# Patient Record
Sex: FEMALE | Race: BLACK OR AFRICAN AMERICAN | Employment: OTHER | URBAN - METROPOLITAN AREA
[De-identification: names, ages, dates, MRNs, and addresses within clinical notes are randomized per-mention and may not be internally consistent; named-entity substitution may affect disease eponyms.]

---

## 2017-05-07 ENCOUNTER — ED HISTORICAL/CONVERTED ENCOUNTER (OUTPATIENT)
Dept: OTHER | Age: 57
End: 2017-05-07

## 2017-05-08 ENCOUNTER — IP HISTORICAL/CONVERTED ENCOUNTER (OUTPATIENT)
Dept: OTHER | Age: 57
End: 2017-05-08

## 2018-04-15 ENCOUNTER — IP HISTORICAL/CONVERTED ENCOUNTER (OUTPATIENT)
Dept: OTHER | Age: 58
End: 2018-04-15

## 2018-05-21 ENCOUNTER — OP HISTORICAL/CONVERTED ENCOUNTER (OUTPATIENT)
Dept: OTHER | Age: 58
End: 2018-05-21

## 2018-05-22 ENCOUNTER — OP HISTORICAL/CONVERTED ENCOUNTER (OUTPATIENT)
Dept: OTHER | Age: 58
End: 2018-05-22

## 2018-06-07 ENCOUNTER — OP HISTORICAL/CONVERTED ENCOUNTER (OUTPATIENT)
Dept: OTHER | Age: 58
End: 2018-06-07

## 2018-06-29 ENCOUNTER — OP HISTORICAL/CONVERTED ENCOUNTER (OUTPATIENT)
Dept: OTHER | Age: 58
End: 2018-06-29

## 2018-08-06 ENCOUNTER — OP HISTORICAL/CONVERTED ENCOUNTER (OUTPATIENT)
Dept: OTHER | Age: 58
End: 2018-08-06

## 2018-08-13 ENCOUNTER — OP HISTORICAL/CONVERTED ENCOUNTER (OUTPATIENT)
Dept: OTHER | Age: 58
End: 2018-08-13

## 2018-08-14 ENCOUNTER — OP HISTORICAL/CONVERTED ENCOUNTER (OUTPATIENT)
Dept: OTHER | Age: 58
End: 2018-08-14

## 2018-08-20 ENCOUNTER — OP HISTORICAL/CONVERTED ENCOUNTER (OUTPATIENT)
Dept: OTHER | Age: 58
End: 2018-08-20

## 2018-08-23 ENCOUNTER — OP HISTORICAL/CONVERTED ENCOUNTER (OUTPATIENT)
Dept: OTHER | Age: 58
End: 2018-08-23

## 2018-08-27 ENCOUNTER — OP HISTORICAL/CONVERTED ENCOUNTER (OUTPATIENT)
Dept: OTHER | Age: 58
End: 2018-08-27

## 2018-09-10 ENCOUNTER — OP HISTORICAL/CONVERTED ENCOUNTER (OUTPATIENT)
Dept: OTHER | Age: 58
End: 2018-09-10

## 2018-09-17 ENCOUNTER — OP HISTORICAL/CONVERTED ENCOUNTER (OUTPATIENT)
Dept: OTHER | Age: 58
End: 2018-09-17

## 2018-09-24 ENCOUNTER — OP HISTORICAL/CONVERTED ENCOUNTER (OUTPATIENT)
Dept: OTHER | Age: 58
End: 2018-09-24

## 2018-10-01 ENCOUNTER — OP HISTORICAL/CONVERTED ENCOUNTER (OUTPATIENT)
Dept: OTHER | Age: 58
End: 2018-10-01

## 2018-10-08 ENCOUNTER — OP HISTORICAL/CONVERTED ENCOUNTER (OUTPATIENT)
Dept: OTHER | Age: 58
End: 2018-10-08

## 2018-10-15 ENCOUNTER — OP HISTORICAL/CONVERTED ENCOUNTER (OUTPATIENT)
Dept: OTHER | Age: 58
End: 2018-10-15

## 2018-10-22 ENCOUNTER — OP HISTORICAL/CONVERTED ENCOUNTER (OUTPATIENT)
Dept: OTHER | Age: 58
End: 2018-10-22

## 2018-10-29 ENCOUNTER — OP HISTORICAL/CONVERTED ENCOUNTER (OUTPATIENT)
Dept: OTHER | Age: 58
End: 2018-10-29

## 2018-11-05 ENCOUNTER — OP HISTORICAL/CONVERTED ENCOUNTER (OUTPATIENT)
Dept: OTHER | Age: 58
End: 2018-11-05

## 2018-11-12 ENCOUNTER — OP HISTORICAL/CONVERTED ENCOUNTER (OUTPATIENT)
Dept: OTHER | Age: 58
End: 2018-11-12

## 2018-11-26 ENCOUNTER — OP HISTORICAL/CONVERTED ENCOUNTER (OUTPATIENT)
Dept: OTHER | Age: 58
End: 2018-11-26

## 2019-01-07 ENCOUNTER — OP HISTORICAL/CONVERTED ENCOUNTER (OUTPATIENT)
Dept: OTHER | Age: 59
End: 2019-01-07

## 2019-01-10 ENCOUNTER — OP HISTORICAL/CONVERTED ENCOUNTER (OUTPATIENT)
Dept: OTHER | Age: 59
End: 2019-01-10

## 2019-01-14 ENCOUNTER — OP HISTORICAL/CONVERTED ENCOUNTER (OUTPATIENT)
Dept: OTHER | Age: 59
End: 2019-01-14

## 2019-01-28 ENCOUNTER — OP HISTORICAL/CONVERTED ENCOUNTER (OUTPATIENT)
Dept: OTHER | Age: 59
End: 2019-01-28

## 2019-01-30 ENCOUNTER — ED HISTORICAL/CONVERTED ENCOUNTER (OUTPATIENT)
Dept: OTHER | Age: 59
End: 2019-01-30

## 2019-02-18 ENCOUNTER — OP HISTORICAL/CONVERTED ENCOUNTER (OUTPATIENT)
Dept: OTHER | Age: 59
End: 2019-02-18

## 2019-04-23 ENCOUNTER — OP HISTORICAL/CONVERTED ENCOUNTER (OUTPATIENT)
Dept: OTHER | Age: 59
End: 2019-04-23

## 2020-01-31 ENCOUNTER — ED HISTORICAL/CONVERTED ENCOUNTER (OUTPATIENT)
Dept: OTHER | Age: 60
End: 2020-01-31

## 2020-02-13 ENCOUNTER — OP HISTORICAL/CONVERTED ENCOUNTER (OUTPATIENT)
Dept: OTHER | Age: 60
End: 2020-02-13

## 2020-03-25 ENCOUNTER — OP HISTORICAL/CONVERTED ENCOUNTER (OUTPATIENT)
Dept: OTHER | Age: 60
End: 2020-03-25

## 2020-06-22 ENCOUNTER — IP HISTORICAL/CONVERTED ENCOUNTER (OUTPATIENT)
Dept: OTHER | Age: 60
End: 2020-06-22

## 2020-07-08 ENCOUNTER — OP HISTORICAL/CONVERTED ENCOUNTER (OUTPATIENT)
Dept: OTHER | Age: 60
End: 2020-07-08

## 2020-08-07 ENCOUNTER — OP HISTORICAL/CONVERTED ENCOUNTER (OUTPATIENT)
Dept: OTHER | Age: 60
End: 2020-08-07

## 2020-08-21 ENCOUNTER — IP HISTORICAL/CONVERTED ENCOUNTER (OUTPATIENT)
Dept: OTHER | Age: 60
End: 2020-08-21

## 2021-03-08 ENCOUNTER — APPOINTMENT (OUTPATIENT)
Dept: GENERAL RADIOLOGY | Age: 61
DRG: 177 | End: 2021-03-08
Attending: EMERGENCY MEDICINE
Payer: COMMERCIAL

## 2021-03-08 ENCOUNTER — HOSPITAL ENCOUNTER (INPATIENT)
Age: 61
LOS: 10 days | Discharge: HOME | DRG: 177 | End: 2021-03-18
Attending: EMERGENCY MEDICINE | Admitting: FAMILY MEDICINE
Payer: COMMERCIAL

## 2021-03-08 DIAGNOSIS — U07.1 COVID-19 VIRUS INFECTION: ICD-10-CM

## 2021-03-08 DIAGNOSIS — J18.9 PNEUMONIA DUE TO INFECTIOUS ORGANISM, UNSPECIFIED LATERALITY, UNSPECIFIED PART OF LUNG: Primary | ICD-10-CM

## 2021-03-08 LAB
ABO + RH BLD: NORMAL
ALBUMIN SERPL-MCNC: 1.9 G/DL (ref 3.5–5)
ALBUMIN/GLOB SERPL: 0.5 {RATIO} (ref 1.1–2.2)
ALP SERPL-CCNC: 122 U/L (ref 45–117)
ALT SERPL-CCNC: 22 U/L (ref 12–78)
ANION GAP SERPL CALC-SCNC: 9 MMOL/L (ref 5–15)
APPEARANCE UR: CLEAR
APTT PPP: 30 SEC (ref 23–35.7)
AST SERPL W P-5'-P-CCNC: 26 U/L (ref 15–37)
ATRIAL RATE: 71 BPM
BACTERIA URNS QL MICRO: NEGATIVE /HPF
BASOPHILS # BLD: 0 K/UL (ref 0–0.1)
BASOPHILS NFR BLD: 0 % (ref 0–1)
BILIRUB SERPL-MCNC: 0.3 MG/DL (ref 0.2–1)
BILIRUB UR QL: NEGATIVE
BLOOD BANK CMNT PATIENT-IMP: NORMAL
BLOOD GROUP ANTIBODIES SERPL: NEGATIVE
BLOOD GROUP ANTIBODIES SERPL: NORMAL
BNP SERPL-MCNC: ABNORMAL PG/ML
BUN SERPL-MCNC: 53 MG/DL (ref 6–20)
BUN/CREAT SERPL: 10 (ref 12–20)
CA-I BLD-MCNC: 8.3 MG/DL (ref 8.5–10.1)
CALCULATED P AXIS, ECG09: 46 DEGREES
CALCULATED R AXIS, ECG10: 63 DEGREES
CALCULATED T AXIS, ECG11: 75 DEGREES
CHLORIDE SERPL-SCNC: 108 MMOL/L (ref 97–108)
CO2 SERPL-SCNC: 22 MMOL/L (ref 21–32)
COLOR UR: ABNORMAL
COVID-19 RAPID TEST, COVR: DETECTED
CREAT SERPL-MCNC: 5.4 MG/DL (ref 0.55–1.02)
DIAGNOSIS, 93000: NORMAL
DIFFERENTIAL METHOD BLD: ABNORMAL
EOSINOPHIL # BLD: 0 K/UL (ref 0–0.4)
EOSINOPHIL NFR BLD: 1 % (ref 0–7)
ERYTHROCYTE [DISTWIDTH] IN BLOOD BY AUTOMATED COUNT: 15.2 % (ref 11.5–14.5)
GLOBULIN SER CALC-MCNC: 3.7 G/DL (ref 2–4)
GLUCOSE SERPL-MCNC: 103 MG/DL (ref 65–100)
GLUCOSE UR STRIP.AUTO-MCNC: 50 MG/DL
HCT VFR BLD AUTO: 25.8 % (ref 35–47)
HGB BLD-MCNC: 8 G/DL (ref 11.5–16)
HGB UR QL STRIP: ABNORMAL
IMM GRANULOCYTES # BLD AUTO: 0 K/UL (ref 0–0.04)
IMM GRANULOCYTES NFR BLD AUTO: 0 % (ref 0–0.5)
INR PPP: 1.1 (ref 0.9–1.1)
KETONES UR QL STRIP.AUTO: NEGATIVE MG/DL
LACTATE SERPL-SCNC: 0.5 MMOL/L (ref 0.4–2)
LEUKOCYTE ESTERASE UR QL STRIP.AUTO: NEGATIVE
LYMPHOCYTES # BLD: 0.8 K/UL (ref 0.8–3.5)
LYMPHOCYTES NFR BLD: 16 % (ref 12–49)
MAGNESIUM SERPL-MCNC: 1.8 MG/DL (ref 1.6–2.4)
MCH RBC QN AUTO: 27.3 PG (ref 26–34)
MCHC RBC AUTO-ENTMCNC: 31 G/DL (ref 30–36.5)
MCV RBC AUTO: 88.1 FL (ref 80–99)
MONOCYTES # BLD: 0.6 K/UL (ref 0–1)
MONOCYTES NFR BLD: 12 % (ref 5–13)
MUCOUS THREADS URNS QL MICRO: ABNORMAL /LPF
NEUTS SEG # BLD: 3.7 K/UL (ref 1.8–8)
NEUTS SEG NFR BLD: 71 % (ref 32–75)
NITRITE UR QL STRIP.AUTO: NEGATIVE
P-R INTERVAL, ECG05: 202 MS
PH UR STRIP: 7 [PH] (ref 5–8)
PLATELET # BLD AUTO: 193 K/UL (ref 150–400)
PMV BLD AUTO: 11.1 FL (ref 8.9–12.9)
POTASSIUM SERPL-SCNC: 4.9 MMOL/L (ref 3.5–5.1)
PROCALCITONIN SERPL-MCNC: 0.25 NG/ML
PROT SERPL-MCNC: 5.6 G/DL (ref 6.4–8.2)
PROT UR STRIP-MCNC: >300 MG/DL
PROTHROMBIN TIME: 14.6 SEC (ref 11.9–14.7)
Q-T INTERVAL, ECG07: 422 MS
QRS DURATION, ECG06: 84 MS
QTC CALCULATION (BEZET), ECG08: 458 MS
RBC # BLD AUTO: 2.93 M/UL (ref 3.8–5.2)
RBC #/AREA URNS HPF: ABNORMAL /HPF (ref 0–5)
SARS-COV-2, COV2: NORMAL
SODIUM SERPL-SCNC: 139 MMOL/L (ref 136–145)
SP GR UR REFRACTOMETRY: 1.01 (ref 1–1.03)
SPECIMEN EXP DATE BLD: NORMAL
SPECIMEN SOURCE: ABNORMAL
THERAPEUTIC RANGE,PTTT: NORMAL SEC (ref 68–109)
TROPONIN I SERPL-MCNC: <0.05 NG/ML
TSH SERPL DL<=0.05 MIU/L-ACNC: 1.57 UIU/ML (ref 0.36–3.74)
UA: UC IF INDICATED,UAUC: ABNORMAL
UROBILINOGEN UR QL STRIP.AUTO: 0.1 EU/DL (ref 0.1–1)
VENTRICULAR RATE, ECG03: 71 BPM
WBC # BLD AUTO: 5.1 K/UL (ref 3.6–11)
WBC URNS QL MICRO: ABNORMAL /HPF (ref 0–4)

## 2021-03-08 PROCEDURE — 96374 THER/PROPH/DIAG INJ IV PUSH: CPT

## 2021-03-08 PROCEDURE — 86870 RBC ANTIBODY IDENTIFICATION: CPT

## 2021-03-08 PROCEDURE — 74011250636 HC RX REV CODE- 250/636: Performed by: NURSE PRACTITIONER

## 2021-03-08 PROCEDURE — 85730 THROMBOPLASTIN TIME PARTIAL: CPT

## 2021-03-08 PROCEDURE — 71045 X-RAY EXAM CHEST 1 VIEW: CPT

## 2021-03-08 PROCEDURE — 74011250636 HC RX REV CODE- 250/636: Performed by: EMERGENCY MEDICINE

## 2021-03-08 PROCEDURE — 83735 ASSAY OF MAGNESIUM: CPT

## 2021-03-08 PROCEDURE — 36415 COLL VENOUS BLD VENIPUNCTURE: CPT

## 2021-03-08 PROCEDURE — 83605 ASSAY OF LACTIC ACID: CPT

## 2021-03-08 PROCEDURE — 87086 URINE CULTURE/COLONY COUNT: CPT

## 2021-03-08 PROCEDURE — 81001 URINALYSIS AUTO W/SCOPE: CPT

## 2021-03-08 PROCEDURE — 85610 PROTHROMBIN TIME: CPT

## 2021-03-08 PROCEDURE — 84145 PROCALCITONIN (PCT): CPT

## 2021-03-08 PROCEDURE — 74011000258 HC RX REV CODE- 258: Performed by: NURSE PRACTITIONER

## 2021-03-08 PROCEDURE — 86901 BLOOD TYPING SEROLOGIC RH(D): CPT

## 2021-03-08 PROCEDURE — 93005 ELECTROCARDIOGRAM TRACING: CPT

## 2021-03-08 PROCEDURE — 87040 BLOOD CULTURE FOR BACTERIA: CPT

## 2021-03-08 PROCEDURE — 84484 ASSAY OF TROPONIN QUANT: CPT

## 2021-03-08 PROCEDURE — 87635 SARS-COV-2 COVID-19 AMP PRB: CPT

## 2021-03-08 PROCEDURE — 85025 COMPLETE CBC W/AUTO DIFF WBC: CPT

## 2021-03-08 PROCEDURE — 84443 ASSAY THYROID STIM HORMONE: CPT

## 2021-03-08 PROCEDURE — 80053 COMPREHEN METABOLIC PANEL: CPT

## 2021-03-08 PROCEDURE — 74011000250 HC RX REV CODE- 250: Performed by: EMERGENCY MEDICINE

## 2021-03-08 PROCEDURE — 74011250637 HC RX REV CODE- 250/637: Performed by: EMERGENCY MEDICINE

## 2021-03-08 PROCEDURE — 65270000029 HC RM PRIVATE

## 2021-03-08 PROCEDURE — 96375 TX/PRO/DX INJ NEW DRUG ADDON: CPT

## 2021-03-08 PROCEDURE — 83880 ASSAY OF NATRIURETIC PEPTIDE: CPT

## 2021-03-08 PROCEDURE — 83540 ASSAY OF IRON: CPT

## 2021-03-08 PROCEDURE — 99285 EMERGENCY DEPT VISIT HI MDM: CPT

## 2021-03-08 RX ORDER — DEXAMETHASONE SODIUM PHOSPHATE 10 MG/ML
6 INJECTION INTRAMUSCULAR; INTRAVENOUS ONCE
Status: ACTIVE | OUTPATIENT
Start: 2021-03-08 | End: 2021-03-09

## 2021-03-08 RX ORDER — ONDANSETRON 2 MG/ML
4 INJECTION INTRAMUSCULAR; INTRAVENOUS
Status: COMPLETED | OUTPATIENT
Start: 2021-03-08 | End: 2021-03-08

## 2021-03-08 RX ORDER — FUROSEMIDE 10 MG/ML
40 INJECTION INTRAMUSCULAR; INTRAVENOUS DAILY
Status: DISCONTINUED | OUTPATIENT
Start: 2021-03-09 | End: 2021-03-09

## 2021-03-08 RX ORDER — DEXAMETHASONE SODIUM PHOSPHATE 4 MG/ML
4 INJECTION, SOLUTION INTRA-ARTICULAR; INTRALESIONAL; INTRAMUSCULAR; INTRAVENOUS; SOFT TISSUE EVERY 8 HOURS
Status: DISCONTINUED | OUTPATIENT
Start: 2021-03-09 | End: 2021-03-14

## 2021-03-08 RX ORDER — SODIUM CHLORIDE 0.9 % (FLUSH) 0.9 %
5-40 SYRINGE (ML) INJECTION EVERY 8 HOURS
Status: DISCONTINUED | OUTPATIENT
Start: 2021-03-08 | End: 2021-03-18 | Stop reason: HOSPADM

## 2021-03-08 RX ORDER — MAG HYDROX/ALUMINUM HYD/SIMETH 200-200-20
30 SUSPENSION, ORAL (FINAL DOSE FORM) ORAL ONCE
Status: COMPLETED | OUTPATIENT
Start: 2021-03-08 | End: 2021-03-08

## 2021-03-08 RX ORDER — PROMETHAZINE HYDROCHLORIDE 25 MG/1
12.5 TABLET ORAL
Status: DISCONTINUED | OUTPATIENT
Start: 2021-03-08 | End: 2021-03-18 | Stop reason: HOSPADM

## 2021-03-08 RX ORDER — POLYETHYLENE GLYCOL 3350 17 G/17G
17 POWDER, FOR SOLUTION ORAL DAILY PRN
Status: DISCONTINUED | OUTPATIENT
Start: 2021-03-08 | End: 2021-03-18 | Stop reason: HOSPADM

## 2021-03-08 RX ORDER — LIDOCAINE HYDROCHLORIDE 20 MG/ML
15 SOLUTION OROPHARYNGEAL ONCE
Status: COMPLETED | OUTPATIENT
Start: 2021-03-08 | End: 2021-03-08

## 2021-03-08 RX ORDER — DEXAMETHASONE SODIUM PHOSPHATE 10 MG/ML
6 INJECTION INTRAMUSCULAR; INTRAVENOUS ONCE
Status: COMPLETED | OUTPATIENT
Start: 2021-03-08 | End: 2021-03-08

## 2021-03-08 RX ORDER — ACETAMINOPHEN 325 MG/1
650 TABLET ORAL
Status: DISCONTINUED | OUTPATIENT
Start: 2021-03-08 | End: 2021-03-18 | Stop reason: HOSPADM

## 2021-03-08 RX ORDER — FAMOTIDINE 20 MG/1
20 TABLET, FILM COATED ORAL DAILY
Status: DISCONTINUED | OUTPATIENT
Start: 2021-03-09 | End: 2021-03-18 | Stop reason: HOSPADM

## 2021-03-08 RX ORDER — ONDANSETRON 2 MG/ML
4 INJECTION INTRAMUSCULAR; INTRAVENOUS
Status: DISCONTINUED | OUTPATIENT
Start: 2021-03-08 | End: 2021-03-18 | Stop reason: HOSPADM

## 2021-03-08 RX ORDER — ACETAMINOPHEN 650 MG/1
650 SUPPOSITORY RECTAL
Status: DISCONTINUED | OUTPATIENT
Start: 2021-03-08 | End: 2021-03-18 | Stop reason: HOSPADM

## 2021-03-08 RX ORDER — SODIUM CHLORIDE 0.9 % (FLUSH) 0.9 %
5-40 SYRINGE (ML) INJECTION AS NEEDED
Status: DISCONTINUED | OUTPATIENT
Start: 2021-03-08 | End: 2021-03-18 | Stop reason: HOSPADM

## 2021-03-08 RX ORDER — FUROSEMIDE 10 MG/ML
40 INJECTION INTRAMUSCULAR; INTRAVENOUS
Status: COMPLETED | OUTPATIENT
Start: 2021-03-08 | End: 2021-03-08

## 2021-03-08 RX ADMIN — DEXAMETHASONE SODIUM PHOSPHATE 6 MG: 10 INJECTION, SOLUTION INTRAMUSCULAR; INTRAVENOUS at 13:40

## 2021-03-08 RX ADMIN — LIDOCAINE HYDROCHLORIDE 15 ML: 20 SOLUTION ORAL; TOPICAL at 11:37

## 2021-03-08 RX ADMIN — ONDANSETRON 4 MG: 2 INJECTION INTRAMUSCULAR; INTRAVENOUS at 11:37

## 2021-03-08 RX ADMIN — FUROSEMIDE 40 MG: 10 INJECTION, SOLUTION INTRAMUSCULAR; INTRAVENOUS at 13:41

## 2021-03-08 RX ADMIN — PIPERACILLIN AND TAZOBACTAM 3.38 G: 3; .375 INJECTION, POWDER, LYOPHILIZED, FOR SOLUTION INTRAVENOUS at 18:16

## 2021-03-08 RX ADMIN — ALUMINUM HYDROXIDE, MAGNESIUM HYDROXIDE, AND SIMETHICONE 30 ML: 200; 200; 20 SUSPENSION ORAL at 11:37

## 2021-03-08 RX ADMIN — CEFTRIAXONE 1 G: 1 INJECTION, POWDER, FOR SOLUTION INTRAMUSCULAR; INTRAVENOUS at 13:40

## 2021-03-08 RX ADMIN — AZITHROMYCIN DIHYDRATE 500 MG: 500 INJECTION, POWDER, LYOPHILIZED, FOR SOLUTION INTRAVENOUS at 13:40

## 2021-03-08 RX ADMIN — Medication 10 ML: at 18:10

## 2021-03-08 NOTE — ACP (ADVANCE CARE PLANNING)
Advance Care Planning   Healthcare Decision Maker:       Primary Decision Maker: Arielle Brown - Mother - [phone]

## 2021-03-08 NOTE — PROGRESS NOTES
3/8/21. CM met with pt. PCP is Dr. Rosalia Contreras, Ella Cárdenas - pt stated she spoke to this am. Pt lives @ home with her family & uses no DME. D/C Plan is home. [address] . Mother Etta Warner @ [phone]) will transport home upon d/c.

## 2021-03-08 NOTE — ED NOTES
Updated pt's mother in [place] [address] [phone]. She will return home, can communicate via call.

## 2021-03-08 NOTE — ED NOTES
Calling report to floor, unable to take, nurse off floor. Pt up to [facility], urine to lab.

## 2021-03-08 NOTE — ED PROVIDER NOTES
EMERGENCY DEPARTMENT HISTORY AND PHYSICAL EXAM      Date: 3/8/2021  Patient Name: Millicent Guzman    History of Presenting Illness     Chief Complaint   Patient presents with    Abdominal Pain       History Provided By: Patient    HPI: Millicent Guzman, 62 y.o. female with a past medical history significant No significant past medical history presents to the ED with chief complaint of Abdominal Pain  . 52-year-old female concerned that she has some GERD gastritis worsening upper abdominal pain. Also with some labored breathing. Slight cough with very mild. Presents via EMS. She describes the pain now as a burning pain. Goes to her entire abdomen. No black or bloody stool. No nausea or vomiting. There are no other complaints, changes, or physical findings at this time. PCP: Other, MD Adria        Past History     Past Medical History:  No past medical history on file. Past Surgical History:  No past surgical history on file. Family History:  No family history on file. Social History:  Social History     Tobacco Use    Smoking status: Not on file   Substance Use Topics    Alcohol use: Not on file    Drug use: Not on file       Allergies: Allergies   Allergen Reactions    Doxycycline Swelling    Tetracycline Swelling         Review of Systems   Review of Systems   Constitutional: Negative. Negative for chills, fatigue and fever. HENT: Negative. Negative for congestion, nosebleeds and sore throat. Eyes: Negative. Negative for pain, discharge and visual disturbance. Respiratory: Positive for cough. Negative for chest tightness and shortness of breath. Cardiovascular: Negative for chest pain, palpitations and leg swelling. Gastrointestinal: Positive for abdominal pain. Negative for blood in stool, constipation, diarrhea, nausea and vomiting. Endocrine: Negative. Genitourinary: Negative.   Negative for difficulty urinating, dysuria, pelvic pain and vaginal bleeding. Musculoskeletal: Negative. Negative for arthralgias, back pain and myalgias. Skin: Negative. Negative for rash and wound. Allergic/Immunologic: Negative. Neurological: Negative. Negative for dizziness, syncope, weakness, numbness and headaches. Hematological: Negative. Psychiatric/Behavioral: Negative. Negative for agitation, confusion and suicidal ideas. All other systems reviewed and are negative. Physical Exam   Physical Exam  Vitals signs and nursing note reviewed. Exam conducted with a chaperone present. Constitutional:       Appearance: Normal appearance. She is normal weight. HENT:      Head: Normocephalic and atraumatic. Nose: Nose normal.      Mouth/Throat:      Mouth: Mucous membranes are moist.      Pharynx: Oropharynx is clear. Eyes:      Extraocular Movements: Extraocular movements intact. Conjunctiva/sclera: Conjunctivae normal.      Pupils: Pupils are equal, round, and reactive to light. Neck:      Musculoskeletal: Normal range of motion and neck supple. Cardiovascular:      Rate and Rhythm: Normal rate and regular rhythm. Pulses: Normal pulses. Heart sounds: Normal heart sounds. Pulmonary:      Effort: Respiratory distress present. Breath sounds: Normal breath sounds. Abdominal:      General: Abdomen is flat. Bowel sounds are normal. There is no distension. Palpations: Abdomen is soft. Tenderness: There is abdominal tenderness in the epigastric area. There is no guarding. Musculoskeletal: Normal range of motion. General: No swelling, tenderness, deformity or signs of injury. Right lower leg: No edema. Left lower leg: No edema. Skin:     General: Skin is warm and dry. Capillary Refill: Capillary refill takes less than 2 seconds. Findings: No lesion or rash. Neurological:      General: No focal deficit present. Mental Status: She is alert and oriented to person, place, and time. Mental status is at baseline. Cranial Nerves: No cranial nerve deficit. Psychiatric:         Mood and Affect: Mood normal.         Behavior: Behavior normal.         Thought Content: Thought content normal.         Judgment: Judgment normal.         Diagnostic Study Results     Labs -     Recent Results (from the past 12 hour(s))   CBC WITH AUTOMATED DIFF    Collection Time: 03/08/21 11:45 AM   Result Value Ref Range    WBC 5.1 3.6 - 11.0 K/uL    RBC 2.93 (L) 3.80 - 5.20 M/uL    HGB 8.0 (L) 11.5 - 16.0 g/dL    HCT 25.8 (L) 35.0 - 47.0 %    MCV 88.1 80.0 - 99.0 FL    MCH 27.3 26.0 - 34.0 PG    MCHC 31.0 30.0 - 36.5 g/dL    RDW 15.2 (H) 11.5 - 14.5 %    PLATELET 173 707 - 416 K/uL    MPV 11.1 8.9 - 12.9 FL    NEUTROPHILS 71 32 - 75 %    LYMPHOCYTES 16 12 - 49 %    MONOCYTES 12 5 - 13 %    EOSINOPHILS 1 0 - 7 %    BASOPHILS 0 0 - 1 %    IMMATURE GRANULOCYTES 0 0.0 - 0.5 %    ABS. NEUTROPHILS 3.7 1.8 - 8.0 K/UL    ABS. LYMPHOCYTES 0.8 0.8 - 3.5 K/UL    ABS. MONOCYTES 0.6 0.0 - 1.0 K/UL    ABS. EOSINOPHILS 0.0 0.0 - 0.4 K/UL    ABS. BASOPHILS 0.0 0.0 - 0.1 K/UL    ABS. IMM. GRANS. 0.0 0.00 - 0.04 K/UL    DF AUTOMATED     METABOLIC PANEL, COMPREHENSIVE    Collection Time: 03/08/21 11:45 AM   Result Value Ref Range    Sodium 139 136 - 145 mmol/L    Potassium 4.9 3.5 - 5.1 mmol/L    Chloride 108 97 - 108 mmol/L    CO2 22 21 - 32 mmol/L    Anion gap 9 5 - 15 mmol/L    Glucose 103 (H) 65 - 100 mg/dL    BUN 53 (H) 6 - 20 mg/dL    Creatinine 5.40 (H) 0.55 - 1.02 mg/dL    BUN/Creatinine ratio 10 (L) 12 - 20      GFR est AA 10 (L) >60 ml/min/1.73m2    GFR est non-AA 8 (L) >60 ml/min/1.73m2    Calcium 8.3 (L) 8.5 - 10.1 mg/dL    Bilirubin, total 0.3 0.2 - 1.0 mg/dL    AST (SGOT) 26 15 - 37 U/L    ALT (SGPT) 22 12 - 78 U/L    Alk.  phosphatase 122 (H) 45 - 117 U/L    Protein, total 5.6 (L) 6.4 - 8.2 g/dL    Albumin 1.9 (L) 3.5 - 5.0 g/dL    Globulin 3.7 2.0 - 4.0 g/dL    A-G Ratio 0.5 (L) 1.1 - 2.2     TROPONIN I Collection Time: 03/08/21 11:45 AM   Result Value Ref Range    Troponin-I, Qt. <0.05 <0.05 ng/mL   BNP    Collection Time: 03/08/21 11:45 AM   Result Value Ref Range    NT pro-BNP 14,349 (H) <125 pg/mL   PROTHROMBIN TIME + INR    Collection Time: 03/08/21 11:45 AM   Result Value Ref Range    Prothrombin time 14.6 11.9 - 14.7 sec    INR 1.1 0.9 - 1.1     PTT    Collection Time: 03/08/21 11:45 AM   Result Value Ref Range    aPTT 30.0 23.0 - 35.7 sec    aPTT, therapeutic range   68 - 109 sec   LACTIC ACID    Collection Time: 03/08/21 11:45 AM   Result Value Ref Range    Lactic acid 0.5 0.4 - 2.0 mmol/L   TSH 3RD GENERATION    Collection Time: 03/08/21 11:45 AM   Result Value Ref Range    TSH 1.57 0.36 - 3.74 uIU/mL   MAGNESIUM    Collection Time: 03/08/21 11:45 AM   Result Value Ref Range    Magnesium 1.8 1.6 - 2.4 mg/dL   PROCALCITONIN    Collection Time: 03/08/21 11:45 AM   Result Value Ref Range    Procalcitonin 0.25 (H) 0 ng/mL   SARS-COV-2    Collection Time: 03/08/21  1:25 PM   Result Value Ref Range    SARS-CoV-2 Please find results under separate order     COVID-19 RAPID TEST    Collection Time: 03/08/21  1:25 PM   Result Value Ref Range    Specimen source Nasopharyngeal      COVID-19 rapid test DETECTED (A) Not Detected         Radiologic Studies -   XR CHEST SNGL V   Final Result        CT Results  (Last 48 hours)    None        CXR Results  (Last 48 hours)               03/08/21 1203  XR CHEST SNGL V Final result    Narrative:  1 new comparison June 21       Cardiomegaly with congestion. Mild interstitial edema. Localized airspace   disease right perihilar. No effusion or pneumothorax             Medical Decision Making and ED Course   I am the first provider for this patient. I reviewed the vital signs, available nursing notes, past medical history, past surgical history, family history and social history. Vital Signs-Reviewed the patient's vital signs.   Patient Vitals for the past 12 hrs:   Temp Pulse Resp BP SpO2   03/08/21 1357 -- 69 24 (!) 155/86 99 %   03/08/21 1154 -- (!) 103 26 (!) 160/90 97 %   03/08/21 1054 98.6 °F (37 °C) 72 20 (!) 169/117 97 %       EKG interpretation:   EKG at 1129. Normal sinus rhythm rate of 71. No ST changes. No T wave inversions. Reason rule out dysrhythmia. Interpreted by ER physician. Records Reviewed: Previous Hospital chart. EMS run report      ED Course:   Initial assessment performed. The patients presenting problems have been discussed, and they are in agreement with the care plan formulated and outlined with them. I have encouraged them to ask questions as they arise throughout their visit.     Orders Placed This Encounter    CULTURE, BLOOD, PAIRED     Standing Status:   Standing     Number of Occurrences:   1    COVID-19 RAPID TEST     Standing Status:   Standing     Number of Occurrences:   1    XR CHEST SNGL V     Standing Status:   Standing     Number of Occurrences:   1     Order Specific Question:   Transport     Answer:   BED [2]     Order Specific Question:   Reason for Exam     Answer:   sob    CBC WITH AUTOMATED DIFF     Standing Status:   Standing     Number of Occurrences:   1    METABOLIC PANEL, COMPREHENSIVE     Standing Status:   Standing     Number of Occurrences:   1    TROPONIN I     Standing Status:   Standing     Number of Occurrences:   1    BNP     Standing Status:   Standing     Number of Occurrences:   1    PROTHROMBIN TIME + INR     Standing Status:   Standing     Number of Occurrences:   1    PTT     Standing Status:   Standing     Number of Occurrences:   1    URINALYSIS W/ REFLEX CULTURE     Standing Status:   Standing     Number of Occurrences:   1    LACTIC ACID     Standing Status:   Standing     Number of Occurrences:   1    TSH 3RD GENERATION     Standing Status:   Standing     Number of Occurrences:   1    MAGNESIUM     Standing Status:   Standing     Number of Occurrences:   1    PROCALCITONIN     Standing Status:   Standing     Number of Occurrences:   1    SARS-COV-2     Standing Status:   Standing     Number of Occurrences:   1     Order Specific Question:   Specimen source     Answer:   NASOPHARYNGEAL SWAB [650]     Order Specific Question:   Is this test for diagnosis or screening? Answer:   Diagnosis of ill patient     Order Specific Question:   Symptomatic for COVID-19 as defined by CDC? Answer:   Yes     Order Specific Question:   Date of Symptom Onset     Answer:   3/8/2021     Order Specific Question:   Hospitalized for COVID-19? Answer:   Yes     Order Specific Question:   Admitted to ICU for COVID-19? Answer:   Unknown     Order Specific Question:   Employed in healthcare setting? Answer:   No     Order Specific Question:   Resident in a congregate (group) care setting? Answer:   No     Order Specific Question:   Previously tested for COVID-19? Answer:   No     Order Specific Question:   Pregnant? Answer:   No    Droplet Plus Isolation     Standing Status:   Standing     Number of Occurrences:   1    EKG, 12 LEAD, INITIAL     Standing Status:   Standing     Number of Occurrences:   1     Order Specific Question:   Reason for Exam:     Answer:   sob    alum-mag hydroxide-simeth (MYLANTA) oral suspension 30 mL    lidocaine (XYLOCAINE) 2 % viscous solution 15 mL    ondansetron (ZOFRAN) injection 4 mg    cefTRIAXone (ROCEPHIN) 1 g in sterile water (preservative free) 10 mL IV syringe     Order Specific Question:   Antibiotic Indications     Answer:   Pneumonia (CAP)    azithromycin (ZITHROMAX) 500 mg in 0.9% sodium chloride 250 mL (VIAL-MATE)     Order Specific Question:   Antibiotic Indications     Answer:   Pneumonia (CAP)    dexamethasone (PF) (DECADRON) 10 mg/mL injection 6 mg    furosemide (LASIX) injection 40 mg    INITIAL PHYSICIAN ORDER: INPATIENT Remote Telemetry; 3.  Patient receiving treatment that can only be provided in an inpatient setting (further clarification in H&P documentation)     Standing Status:   Standing     Number of Occurrences:   1     Order Specific Question:   Status: Answer:   INPATIENT [101]     Order Specific Question:   Type of Bed     Answer:   Remote Telemetry [29]     Order Specific Question:   Inpatient Hospitalization Certified Necessary for the Following Reasons     Answer:   3. Patient receiving treatment that can only be provided in an inpatient setting (further clarification in H&P documentation)     Order Specific Question:   Admitting Diagnosis     Answer:   PNA (pneumonia) [[id number]]     Order Specific Question:   Admitting Physician     Answer:   Candace Valentin     Order Specific Question:   Attending Physician     Answer:   Candace Valentin     Order Specific Question:   Estimated Length of Stay     Answer:   2 Midnights     Order Specific Question:   Discharge Plan:     Answer:   Home with Office Follow-up              CONSULTANTS:  Consults  Dr Miladis Mcbride admit    Provider Notes (Medical Decision Making):   62year-old with burning epigastric pain. Patient however is also very labored with her breathing with slight cough. Chest x-ray does show pneumonia. Concern for Covid swab done. Patient does have Covid pneumonia. Admission for increased respiratory rate and sepsis SIRS with empiric antibiotics ordered. Procedures                       Disposition       Emergency Department Disposition:  Admitted      Diagnosis     Clinical Impression:   1. Pneumonia due to infectious organism, unspecified laterality, unspecified part of lung    2. COVID-19 virus infection        Attestations:    Ansley Santana MD    Please note that this dictation was completed with [employer], the computer voice recognition software. Quite often unanticipated grammatical, syntax, homophones, and other interpretive errors are inadvertently transcribed by the computer software. Please disregard these errors.   Please excuse any errors that have escaped final proofreading. Thank you.

## 2021-03-08 NOTE — ED TRIAGE NOTES
abd pain , excessive belching for months, acid reflux , has been taking otc meds. Anxiety , able to refocus easily. Lower extre edema. Renal failure, not on dialysis,  Sat 94% on RA, placed on 2 ltr o2.

## 2021-03-08 NOTE — ED NOTES
Blood cx and COVID swab to lab. Pt sitting on side of cart. rr less labored. No urine output yet. Call bell at side.

## 2021-03-08 NOTE — ED NOTES
Tele monitor tech sees monitor of pt, in w/c, transfer to floor. belongings with pt. Speech clear, skin w/d. On o2 at 2 ltrs.

## 2021-03-08 NOTE — PROGRESS NOTES
Reason for Admission:  PNA                     RUR Score:     N/A                Plan for utilizing home health: Declined. Uses no DME. PCP: First and Last name: Dr. Nani Sarmiento     Name of Practice:    Are you a current patient: Yes/No: Yes   Approximate date of last visit: Spoke with MD today 3/8/21. Can you participate in a virtual visit with your PCP:                   Yes/Call  Current Advanced Directive/Advance Care Plan: Yes, ACP      Healthcare Decision Maker:   Primary HCDM is mother Michel Drummond @ [phone]). Transition of Care Plan:                    D/C Plan is home. Mother Michel Drummond) to transport home on d/c.

## 2021-03-08 NOTE — ED NOTES
Sitting on side of cart. rr mid to upper 20's. Sat 97 on 2 ltr nc. Skin w/d. Took po meds, undressed, ready for CXR.

## 2021-03-08 NOTE — ROUTINE PROCESS
TRANSFER - OUT REPORT:    Verbal report given to bebeto tillman(name) on Millicent Guzman  being transferred to [address] [place] [address](unit) for routine progression of care       Report consisted of patients Situation, Background, Assessment and   Recommendations(SBAR). Information from the following report(s) SBAR, ED Summary and MAR was reviewed with the receiving nurse. Lines:   Peripheral IV 03/08/21 Right Antecubital (Active)        Opportunity for questions and clarification was provided.       Patient transported with:   Monitor  O2 @ 2 ltr liters  Tech

## 2021-03-09 ENCOUNTER — APPOINTMENT (OUTPATIENT)
Dept: ULTRASOUND IMAGING | Age: 61
DRG: 177 | End: 2021-03-09
Attending: INTERNAL MEDICINE
Payer: COMMERCIAL

## 2021-03-09 LAB
ALBUMIN SERPL-MCNC: 2 G/DL (ref 3.5–5)
ALBUMIN/GLOB SERPL: 0.4 {RATIO} (ref 1.1–2.2)
ALP SERPL-CCNC: 133 U/L (ref 45–117)
ALT SERPL-CCNC: 25 U/L (ref 12–78)
ANION GAP SERPL CALC-SCNC: 10 MMOL/L (ref 5–15)
AST SERPL W P-5'-P-CCNC: 20 U/L (ref 15–37)
BASOPHILS # BLD: 0 K/UL (ref 0–0.1)
BASOPHILS NFR BLD: 0 % (ref 0–1)
BILIRUB SERPL-MCNC: 0.3 MG/DL (ref 0.2–1)
BUN SERPL-MCNC: 61 MG/DL (ref 6–20)
BUN/CREAT SERPL: 11 (ref 12–20)
CA-I BLD-MCNC: 9.2 MG/DL (ref 8.5–10.1)
CHLORIDE SERPL-SCNC: 109 MMOL/L (ref 97–108)
CK SERPL-CCNC: 377 U/L (ref 26–192)
CO2 SERPL-SCNC: 18 MMOL/L (ref 21–32)
CREAT SERPL-MCNC: 5.49 MG/DL (ref 0.55–1.02)
DIFFERENTIAL METHOD BLD: ABNORMAL
EOSINOPHIL # BLD: 0 K/UL (ref 0–0.4)
EOSINOPHIL NFR BLD: 0 % (ref 0–7)
ERYTHROCYTE [DISTWIDTH] IN BLOOD BY AUTOMATED COUNT: 15.2 % (ref 11.5–14.5)
GLOBULIN SER CALC-MCNC: 4.5 G/DL (ref 2–4)
GLUCOSE BLD STRIP.AUTO-MCNC: 182 MG/DL (ref 65–100)
GLUCOSE BLD STRIP.AUTO-MCNC: 189 MG/DL (ref 65–100)
GLUCOSE BLD STRIP.AUTO-MCNC: 193 MG/DL (ref 65–100)
GLUCOSE SERPL-MCNC: 179 MG/DL (ref 65–100)
HCT VFR BLD AUTO: 30 % (ref 35–47)
HGB BLD-MCNC: 9.5 G/DL (ref 11.5–16)
IMM GRANULOCYTES # BLD AUTO: 0 K/UL (ref 0–0.04)
IMM GRANULOCYTES NFR BLD AUTO: 0 % (ref 0–0.5)
IRON SATN MFR SERPL: 9 % (ref 20–50)
IRON SERPL-MCNC: 16 UG/DL (ref 35–150)
LYMPHOCYTES # BLD: 0.6 K/UL (ref 0.8–3.5)
LYMPHOCYTES NFR BLD: 14 % (ref 12–49)
MCH RBC QN AUTO: 27.5 PG (ref 26–34)
MCHC RBC AUTO-ENTMCNC: 31.7 G/DL (ref 30–36.5)
MCV RBC AUTO: 87 FL (ref 80–99)
MONOCYTES # BLD: 0.2 K/UL (ref 0–1)
MONOCYTES NFR BLD: 3 % (ref 5–13)
NEUTS SEG # BLD: 3.7 K/UL (ref 1.8–8)
NEUTS SEG NFR BLD: 83 % (ref 32–75)
PERFORMED BY, TECHID: ABNORMAL
PHOSPHATE SERPL-MCNC: 5.3 MG/DL (ref 2.6–4.7)
PLATELET # BLD AUTO: 191 K/UL (ref 150–400)
PMV BLD AUTO: 11 FL (ref 8.9–12.9)
POTASSIUM SERPL-SCNC: 5.2 MMOL/L (ref 3.5–5.1)
PROT SERPL-MCNC: 6.5 G/DL (ref 6.4–8.2)
RBC # BLD AUTO: 3.45 M/UL (ref 3.8–5.2)
SODIUM SERPL-SCNC: 137 MMOL/L (ref 136–145)
TIBC SERPL-MCNC: 176 UG/DL (ref 250–450)
WBC # BLD AUTO: 4.4 K/UL (ref 3.6–11)

## 2021-03-09 PROCEDURE — 85025 COMPLETE CBC W/AUTO DIFF WBC: CPT

## 2021-03-09 PROCEDURE — 87040 BLOOD CULTURE FOR BACTERIA: CPT

## 2021-03-09 PROCEDURE — 80053 COMPREHEN METABOLIC PANEL: CPT

## 2021-03-09 PROCEDURE — 74011250636 HC RX REV CODE- 250/636: Performed by: NURSE PRACTITIONER

## 2021-03-09 PROCEDURE — 82550 ASSAY OF CK (CPK): CPT

## 2021-03-09 PROCEDURE — 74011000258 HC RX REV CODE- 258: Performed by: NURSE PRACTITIONER

## 2021-03-09 PROCEDURE — 74011250637 HC RX REV CODE- 250/637: Performed by: NURSE PRACTITIONER

## 2021-03-09 PROCEDURE — 74011250636 HC RX REV CODE- 250/636: Performed by: INTERNAL MEDICINE

## 2021-03-09 PROCEDURE — 76770 US EXAM ABDO BACK WALL COMP: CPT

## 2021-03-09 PROCEDURE — 74011250637 HC RX REV CODE- 250/637: Performed by: FAMILY MEDICINE

## 2021-03-09 PROCEDURE — 84100 ASSAY OF PHOSPHORUS: CPT

## 2021-03-09 PROCEDURE — 84165 PROTEIN E-PHORESIS SERUM: CPT

## 2021-03-09 PROCEDURE — 82962 GLUCOSE BLOOD TEST: CPT

## 2021-03-09 PROCEDURE — 87205 SMEAR GRAM STAIN: CPT

## 2021-03-09 PROCEDURE — 74011250636 HC RX REV CODE- 250/636: Performed by: FAMILY MEDICINE

## 2021-03-09 PROCEDURE — 65270000029 HC RM PRIVATE

## 2021-03-09 RX ORDER — SODIUM POLYSTYRENE SULFONATE 15 G/60ML
30 SUSPENSION ORAL; RECTAL
Status: COMPLETED | OUTPATIENT
Start: 2021-03-09 | End: 2021-03-09

## 2021-03-09 RX ORDER — FUROSEMIDE 40 MG/1
40 TABLET ORAL DAILY
COMMUNITY
End: 2021-03-18

## 2021-03-09 RX ORDER — CARVEDILOL 12.5 MG/1
12.5 TABLET ORAL 2 TIMES DAILY
COMMUNITY

## 2021-03-09 RX ORDER — VALSARTAN 160 MG/1
160 TABLET ORAL DAILY
COMMUNITY

## 2021-03-09 RX ORDER — SODIUM BICARBONATE 650 MG/1
TABLET ORAL 3 TIMES DAILY
COMMUNITY
End: 2021-06-21

## 2021-03-09 RX ORDER — AMLODIPINE BESYLATE 10 MG/1
TABLET ORAL DAILY
COMMUNITY
End: 2021-03-22

## 2021-03-09 RX ORDER — ISOSORBIDE DINITRATE 20 MG/1
20 TABLET ORAL 3 TIMES DAILY
COMMUNITY
End: 2021-06-21

## 2021-03-09 RX ORDER — ASPIRIN 81 MG/1
81 TABLET ORAL
Status: ON HOLD | COMMUNITY
End: 2022-06-16

## 2021-03-09 RX ORDER — DOCUSATE SODIUM 100 MG/1
100 CAPSULE, LIQUID FILLED ORAL 2 TIMES DAILY
Status: ON HOLD | COMMUNITY
End: 2022-06-16

## 2021-03-09 RX ORDER — HYDRALAZINE HYDROCHLORIDE 100 MG/1
100 TABLET, FILM COATED ORAL
COMMUNITY
End: 2021-03-22

## 2021-03-09 RX ORDER — FUROSEMIDE 10 MG/ML
80 INJECTION INTRAMUSCULAR; INTRAVENOUS 2 TIMES DAILY
Status: COMPLETED | OUTPATIENT
Start: 2021-03-09 | End: 2021-03-11

## 2021-03-09 RX ORDER — HEPARIN SODIUM 5000 [USP'U]/ML
5000 INJECTION, SOLUTION INTRAVENOUS; SUBCUTANEOUS EVERY 8 HOURS
Status: DISCONTINUED | OUTPATIENT
Start: 2021-03-09 | End: 2021-03-18 | Stop reason: HOSPADM

## 2021-03-09 RX ORDER — METOLAZONE 5 MG/1
TABLET ORAL DAILY
COMMUNITY
End: 2021-03-22

## 2021-03-09 RX ORDER — GLIPIZIDE 10 MG/1
10 TABLET ORAL 2 TIMES DAILY
Status: ON HOLD | COMMUNITY
End: 2021-03-22 | Stop reason: SDUPTHER

## 2021-03-09 RX ADMIN — HEPARIN SODIUM 5000 UNITS: 5000 INJECTION INTRAVENOUS; SUBCUTANEOUS at 21:13

## 2021-03-09 RX ADMIN — AZITHROMYCIN DIHYDRATE 500 MG: 500 INJECTION, POWDER, LYOPHILIZED, FOR SOLUTION INTRAVENOUS at 14:20

## 2021-03-09 RX ADMIN — FAMOTIDINE 20 MG: 20 TABLET, FILM COATED ORAL at 10:28

## 2021-03-09 RX ADMIN — Medication 10 ML: at 05:20

## 2021-03-09 RX ADMIN — FUROSEMIDE 40 MG: 10 INJECTION, SOLUTION INTRAMUSCULAR; INTRAVENOUS at 10:28

## 2021-03-09 RX ADMIN — FUROSEMIDE 80 MG: 10 INJECTION, SOLUTION INTRAMUSCULAR; INTRAVENOUS at 21:13

## 2021-03-09 RX ADMIN — Medication 10 ML: at 22:00

## 2021-03-09 RX ADMIN — PIPERACILLIN AND TAZOBACTAM 3.38 G: 3; .375 INJECTION, POWDER, LYOPHILIZED, FOR SOLUTION INTRAVENOUS at 17:17

## 2021-03-09 RX ADMIN — DEXAMETHASONE SODIUM PHOSPHATE 4 MG: 4 INJECTION, SOLUTION INTRA-ARTICULAR; INTRALESIONAL; INTRAMUSCULAR; INTRAVENOUS; SOFT TISSUE at 17:17

## 2021-03-09 RX ADMIN — Medication 10 ML: at 17:18

## 2021-03-09 RX ADMIN — DEXAMETHASONE SODIUM PHOSPHATE 4 MG: 4 INJECTION, SOLUTION INTRA-ARTICULAR; INTRALESIONAL; INTRAMUSCULAR; INTRAVENOUS; SOFT TISSUE at 10:28

## 2021-03-09 RX ADMIN — DEXAMETHASONE SODIUM PHOSPHATE 4 MG: 4 INJECTION, SOLUTION INTRA-ARTICULAR; INTRALESIONAL; INTRAMUSCULAR; INTRAVENOUS; SOFT TISSUE at 01:58

## 2021-03-09 RX ADMIN — SODIUM POLYSTYRENE SULFONATE 30 G: 15 SUSPENSION ORAL; RECTAL at 14:15

## 2021-03-09 RX ADMIN — PIPERACILLIN AND TAZOBACTAM 3.38 G: 3; .375 INJECTION, POWDER, LYOPHILIZED, FOR SOLUTION INTRAVENOUS at 06:10

## 2021-03-09 RX ADMIN — Medication 10 ML: at 05:19

## 2021-03-09 RX ADMIN — HEPARIN SODIUM 5000 UNITS: 5000 INJECTION INTRAVENOUS; SUBCUTANEOUS at 14:24

## 2021-03-09 NOTE — INTERDISCIPLINARY ROUNDS
Patient had her mother bring in her medications so that we could put them on file here and make sure that she receives them while she is here. I put them in on her admission that was not done when she was admitted last evening to complete the admission.

## 2021-03-09 NOTE — CONSULTS
Consult Date: 3/9/2021    IP CONSULT TO NEPHROLOGY  Consult performed by: Jamaica Griffiths MD  Consult ordered by: Saud Valle MD          Subjective [place] Goldmann:  Patient is 23-year-old -American female with history of morbid obesity, hypertension, CKD stage IV, fluid overload, cardiomyopathy, COPD, mild pulmonary hypertension who is admitted to the hospital with complaints of acid reflux plus foamy discharge from mouth. She was found to have coronavirus and is being treated with Decadron, Zosyn and Zithromax. Renal function is found to be worse and I have been consulted. In the past, she has advanced CKD stage V and has been seen by my colleague Dr. Shaheed Schwab. Patient states her legs are significantly swollen with fluid and she is urinating quite frequently. Patient also states that she has been told that she may need dialysis soon and asks me about the possibility of kidney transplant. Past Medical History:   Diagnosis Date    Hypertension     Morbid obesity ([address]       No past surgical history on file. No family history on file.    Social History     Tobacco Use    Smoking status: Not on file   Substance Use Topics    Alcohol use: Not on file       Current Facility-Administered Medications   Medication Dose Route Frequency Provider Last Rate Last Admin    heparin (porcine) injection 5,000 Units  5,000 Units SubCUTAneous Q8H Everardo Gunter MD        sodium polystyrene (KAYEXALATE) 15 gram/60 mL oral suspension 30 g  30 g Oral NOW Rebecca Reyes MD        furosemide (LASIX) injection 80 mg  80 mg IntraVENous BID Everardo Gunter MD        sodium chloride (NS) flush 5-40 mL  5-40 mL IntraVENous Q8H Laith Lemus NP   10 mL at 03/09/21 0520    sodium chloride (NS) flush 5-40 mL  5-40 mL IntraVENous PRN Laith Lemus NP        acetaminophen (TYLENOL) tablet 650 mg  650 mg Oral Q6H PRN Laith Lmeus NP        Or   [facility] acetaminophen (TYLENOL) suppository 650 mg  650 mg Rectal Q6H PRN Ettie Cancel, NP        polyethylene glycol [facility]) packet 17 g  17 g Oral DAILY PRN Ettie Cancel, NP        promethazine (PHENERGAN) tablet 12.5 mg  12.5 mg Oral Q6H PRN Ettie Cancel, NP        Or    ondansetron [facility]) injection 4 mg  4 mg IntraVENous Q6H PRN Ettie Cancel, NP        famotidine (PEPCID) tablet 20 mg  20 mg Oral DAILY Ettie Cancel, NP   20 mg at 03/09/21 1028    dexamethasone (DECADRON) 4 mg/mL injection 4 mg  4 mg IntraVENous Q8H Ettie Cancel, NP   4 mg at 03/09/21 1028    piperacillin-tazobactam (ZOSYN) 3.375 g in 0.9% sodium chloride (MBP/ADV) 100 mL MBP  3.375 g IntraVENous Q12H Ettie Cancel, NP 25 mL/hr at 03/09/21 0610 3.375 g at 03/09/21 0610    azithromycin (ZITHROMAX) 500 mg in 0.9% sodium chloride 250 mL (VIAL-MATE)  500 mg IntraVENous Q24H Rebecca Reyes MD            Review of Systems   Respiratory: Positive for shortness of breath and wheezing. Cardiovascular: Positive for palpitations and leg swelling. Gastrointestinal: Positive for abdominal distention. All other systems reviewed and are negative. Objective     Vital signs for last 24 hours:  Visit Vitals  BP (!) 190/98 (BP 1 Location: Right upper arm)   Pulse 72   Temp 97.5 °F (36.4 °C)   Resp 18   SpO2 96%   Breastfeeding No       Intake/Output this shift:  Current Shift: 03/09 0701 - 03/09 1900  In: 340 [P.O.:240; I.V.:100]  Out: 400 [Urine:400]  Last 3 Shifts: 03/07 1901 - 03/09 0700  In: 120 [P.O.:120]  Out: -   Physical Exam   Constitutional: She is oriented to person, place, and time. She appears well-developed and well-nourished. HENT:   Head: Normocephalic and atraumatic. Neck: No JVD present. No tracheal deviation present. Cardiovascular: Normal rate and regular rhythm. Pulmonary/Chest: Effort normal and breath sounds normal. No stridor. Abdominal: Soft. Bowel sounds are normal.   Neurological: She is alert and oriented to person, place, and time.    Skin: Skin is warm and dry. Psychiatric: She has a normal mood and affect.  Her behavior is normal.      Significant 3+ to 4+ edema of legs  Data Review:   Recent Results (from the past 24 hour(s))   URINALYSIS W/ REFLEX CULTURE    Collection Time: 03/08/21  3:15 PM    Specimen: Urine   Result Value Ref Range    Color Yellow/Straw      Appearance Clear Clear      Specific gravity 1.008 1.003 - 1.030      pH (UA) 7.0 5.0 - 8.0      Protein >300 (A) Negative mg/dL    Glucose 50 (A) Negative mg/dL    Ketone Negative Negative mg/dL    Bilirubin Negative Negative      Blood Small (A) Negative      Urobilinogen 0.1 0.1 - 1.0 EU/dL    Nitrites Negative Negative      Leukocyte Esterase Negative Negative      UA:UC IF INDICATED Culture not indicated by UA result Culture not indicated by UA result      WBC 0-4 0 - 4 /hpf    RBC 0-5 0 - 5 /hpf    Bacteria Negative Negative /hpf    Mucus Trace /lpf   TYPE & SCREEN    Collection Time: 03/08/21  7:10 PM   Result Value Ref Range    Crossmatch Expiration 03/11/2021,2359     ABO/Rh(D) B Positive     Antibody screen Negative     Antibody ID Anti-JK(b)     Comment CERNER HX:  B POS, ANTI JKb, JKb ANTIGEN NEG    IRON PROFILE    Collection Time: 03/08/21  7:10 PM   Result Value Ref Range    Iron 16 (L) 35 - 150 ug/dL    TIBC 176 (L) 250 - 450 ug/dL    Iron % saturation 9 (L) 20 - 50 %   METABOLIC PANEL, COMPREHENSIVE    Collection Time: 03/09/21  9:43 AM   Result Value Ref Range    Sodium 137 136 - 145 mmol/L    Potassium 5.2 (H) 3.5 - 5.1 mmol/L    Chloride 109 (H) 97 - 108 mmol/L    CO2 18 (L) 21 - 32 mmol/L    Anion gap 10 5 - 15 mmol/L    Glucose 179 (H) 65 - 100 mg/dL    BUN 61 (H) 6 - 20 mg/dL    Creatinine 5.49 (H) 0.55 - 1.02 mg/dL    BUN/Creatinine ratio 11 (L) 12 - 20      GFR est AA 10 (L) >60 ml/min/1.73m2    GFR est non-AA 8 (L) >60 ml/min/1.73m2    Calcium 9.2 8.5 - 10.1 mg/dL    Bilirubin, total 0.3 0.2 - 1.0 mg/dL    AST (SGOT) 20 15 - 37 U/L    ALT (SGPT) 25 12 - 78 U/L Alk. phosphatase 133 (H) 45 - 117 U/L    Protein, total 6.5 6.4 - 8.2 g/dL    Albumin 2.0 (L) 3.5 - 5.0 g/dL    Globulin 4.5 (H) 2.0 - 4.0 g/dL    A-G Ratio 0.4 (L) 1.1 - 2.2     CBC WITH AUTOMATED DIFF    Collection Time: 03/09/21  9:43 AM   Result Value Ref Range    WBC 4.4 3.6 - 11.0 K/uL    RBC 3.45 (L) 3.80 - 5.20 M/uL    HGB 9.5 (L) 11.5 - 16.0 g/dL    HCT 30.0 (L) 35.0 - 47.0 %    MCV 87.0 80.0 - 99.0 FL    MCH 27.5 26.0 - 34.0 PG    MCHC 31.7 30.0 - 36.5 g/dL    RDW 15.2 (H) 11.5 - 14.5 %    PLATELET 454 614 - 147 K/uL    MPV 11.0 8.9 - 12.9 FL    NEUTROPHILS 83 (H) 32 - 75 %    LYMPHOCYTES 14 12 - 49 %    MONOCYTES 3 (L) 5 - 13 %    EOSINOPHILS 0 0 - 7 %    BASOPHILS 0 0 - 1 %    IMMATURE GRANULOCYTES 0 0.0 - 0.5 %    ABS. NEUTROPHILS 3.7 1.8 - 8.0 K/UL    ABS. LYMPHOCYTES 0.6 (L) 0.8 - 3.5 K/UL    ABS. MONOCYTES 0.2 0.0 - 1.0 K/UL    ABS. EOSINOPHILS 0.0 0.0 - 0.4 K/UL    ABS. BASOPHILS 0.0 0.0 - 0.1 K/UL    ABS. IMM. GRANS. 0.0 0.00 - 0.04 K/UL    DF AUTOMATED     GLUCOSE, POC    Collection Time: 03/09/21 12:00 PM   Result Value Ref Range    Glucose (POC) 193 (H) 65 - 100 mg/dL    Performed by Raiza Spring          XR CHEST SNGL V   Final Result      US RETROPERITONEUM COMP    (Results Pending)        Patient Active Problem List   Diagnosis Code    PNA (pneumonia) J18.9        DIAGNOSES:  1. CKD stage V-most likely progressive CKD or could be EDDIE on CKD  2. Nephrotic syndrome  3. Anasarca  4. Coronavirus pneumonia  5. Hypertension due to hypervolemia  6. Hyperkalemia  7. Renal tubular acidosis, type IV    PLAN:  · Would intensify diuretic treatment. · This might help with renal tubular acidosis. This may also help with lowering potassium.   · Ultrasound to rule out obstruction  · We will also do work-up for  paraproteinemia  · Intensify Lasix to 80 mg IV twice daily  · Track daily weights  · If this does not succeed in addressing anasarca/fluid overload, I would suggest initiation of hemodialysis. discussed with the patient and she agrees. We will continue to follow.   Thank you for consult

## 2021-03-09 NOTE — H&P
History and Physical    NAME: Stevo Harrell   :  1960   MRN:  [id number]     Date/Time:  3/8/2021 9:22 PM    Patient PCP: Adria Link MD  ______________________________________________________________________             Subjective:     CHIEF COMPLAINT:     Abdominal pain    HISTORY OF PRESENT ILLNESS:       Patient is a 62y.o. year old female history of type 2 diabetes chronic kidney disease CHF chronic kidney disease came to the ER complaining of abdominal pain and some shortness of breath  Patient having some symptom for 1 week nausea and getting more worse seen by the ER physician work-up done shows acute kidney injury elevated BNP    Patient denies any fever chills cough congestion  Past Medical History:   Diagnosis Date    Hypertension     Morbid obesity ([address]    Type 2 diabetes chronic kidney disease and CHF    No past surgical history on file. Social History     Tobacco Use    Smoking status: Not on file   Substance Use Topics    Alcohol use: Not on file        No family history on file.     Allergies   Allergen Reactions    Doxycycline Swelling    Tetracycline Swelling        Prior to Admission medications    Not on File         Current Facility-Administered Medications:     dexamethasone (PF) (DECADRON) 10 mg/mL injection 6 mg, 6 mg, IntraVENous, ONCE, Marvin Shrestha, NP, Stopped at 21 1700    sodium chloride (NS) flush 5-40 mL, 5-40 mL, IntraVENous, Q8H, Mann[place] Choicekemal Shrestha, NP, 10 mL at 21 1810    sodium chloride (NS) flush 5-40 mL, 5-40 mL, IntraVENous, PRN, Marvin Fady, NP  [facility]  acetaminophen (TYLENOL) tablet 650 mg, 650 mg, Oral, Q6H PRN **OR** acetaminophen (TYLENOL) suppository 650 mg, 650 mg, Rectal, Q6H PRN, Marvin Shrestha, NP    polyethylene glycol (MIRALAX) packet 17 g, 17 g, Oral, DAILY PRN, [place] Fady, NP    promethazine (PHENERGAN) tablet 12.5 mg, 12.5 mg, Oral, Q6H PRN **OR** ondansetron (ZOFRAN) injection 4 mg, 4 mg, IntraVENous, Q6H PRN, MERY Quezada  Jennifer Krause ON 3/9/2021] famotidine (PEPCID) tablet 20 mg, 20 mg, Oral, DAILY, MERY Quezada  [START ON 3/9/2021] dexamethasone (DECADRON) 4 mg/mL injection 4 mg, 4 mg, IntraVENous, Q8H, Yanna Jimenez NP    piperacillin-tazobactam (ZOSYN) 3.375 g in 0.9% sodium chloride (MBP/ADV) 100 mL MBP, 3.375 g, IntraVENous, Q12H, Yanna Jimenez NP, Last Rate: 25 mL/hr at 03/08/21 1816, 3.375 g at 03/08/21 1816    [START ON 3/9/2021] furosemide (LASIX) injection 40 mg, 40 mg, IntraVENous, DAILY, Kezia Reyes MD Duana Hark  [START ON 3/9/2021] azithromycin (ZITHROMAX) 500 mg in 0.9% sodium chloride 250 mL (VIAL-MATE), 500 mg, IntraVENous, Q24H, Rebecca Reyes MD    LAB DATA REVIEWED:    Recent Results (from the past 24 hour(s))   EKG, 12 LEAD, INITIAL    Collection Time: 03/08/21 11:29 AM   Result Value Ref Range    Ventricular Rate 71 BPM    Atrial Rate 71 BPM    P-R Interval 202 ms    QRS Duration 84 ms    Q-T Interval 422 ms    QTC Calculation (Bezet) 458 ms    Calculated P Axis 46 degrees    Calculated R Axis 63 degrees    Calculated T Axis 75 degrees    Diagnosis       Normal sinus rhythm  Normal ECG  When compared with ECG of 19-AUG-2020 07:43,  No significant change was found  Confirmed by [facility], [address] ([id number]) on 3/8/2021 4:54:07 PM     CBC WITH AUTOMATED DIFF    Collection Time: 03/08/21 11:45 AM   Result Value Ref Range    WBC 5.1 3.6 - 11.0 K/uL    RBC 2.93 (L) 3.80 - 5.20 M/uL    HGB 8.0 (L) 11.5 - 16.0 g/dL    HCT 25.8 (L) 35.0 - 47.0 %    MCV 88.1 80.0 - 99.0 FL    MCH 27.3 26.0 - 34.0 PG    MCHC 31.0 30.0 - 36.5 g/dL    RDW 15.2 (H) 11.5 - 14.5 %    PLATELET 394 400 - 837 K/uL    MPV 11.1 8.9 - 12.9 FL    NEUTROPHILS 71 32 - 75 %    LYMPHOCYTES 16 12 - 49 %    MONOCYTES 12 5 - 13 %    EOSINOPHILS 1 0 - 7 %    BASOPHILS 0 0 - 1 %    IMMATURE GRANULOCYTES 0 0.0 - 0.5 %    ABS. NEUTROPHILS 3.7 1.8 - 8.0 K/UL    ABS. LYMPHOCYTES 0.8 0.8 - 3.5 K/UL    ABS.  MONOCYTES 0.6 0.0 - 1.0 K/UL    ABS. EOSINOPHILS 0.0 0.0 - 0.4 K/UL    ABS. BASOPHILS 0.0 0.0 - 0.1 K/UL    ABS. IMM. GRANS. 0.0 0.00 - 0.04 K/UL    DF AUTOMATED     METABOLIC PANEL, COMPREHENSIVE    Collection Time: 03/08/21 11:45 AM   Result Value Ref Range    Sodium 139 136 - 145 mmol/L    Potassium 4.9 3.5 - 5.1 mmol/L    Chloride 108 97 - 108 mmol/L    CO2 22 21 - 32 mmol/L    Anion gap 9 5 - 15 mmol/L    Glucose 103 (H) 65 - 100 mg/dL    BUN 53 (H) 6 - 20 mg/dL    Creatinine 5.40 (H) 0.55 - 1.02 mg/dL    BUN/Creatinine ratio 10 (L) 12 - 20      GFR est AA 10 (L) >60 ml/min/1.73m2    GFR est non-AA 8 (L) >60 ml/min/1.73m2    Calcium 8.3 (L) 8.5 - 10.1 mg/dL    Bilirubin, total 0.3 0.2 - 1.0 mg/dL    AST (SGOT) 26 15 - 37 U/L    ALT (SGPT) 22 12 - 78 U/L    Alk.  phosphatase 122 (H) 45 - 117 U/L    Protein, total 5.6 (L) 6.4 - 8.2 g/dL    Albumin 1.9 (L) 3.5 - 5.0 g/dL    Globulin 3.7 2.0 - 4.0 g/dL    A-G Ratio 0.5 (L) 1.1 - 2.2     TROPONIN I    Collection Time: 03/08/21 11:45 AM   Result Value Ref Range    Troponin-I, Qt. <0.05 <0.05 ng/mL   BNP    Collection Time: 03/08/21 11:45 AM   Result Value Ref Range    NT pro-BNP 14,349 (H) <125 pg/mL   PROTHROMBIN TIME + INR    Collection Time: 03/08/21 11:45 AM   Result Value Ref Range    Prothrombin time 14.6 11.9 - 14.7 sec    INR 1.1 0.9 - 1.1     PTT    Collection Time: 03/08/21 11:45 AM   Result Value Ref Range    aPTT 30.0 23.0 - 35.7 sec    aPTT, therapeutic range   68 - 109 sec   LACTIC ACID    Collection Time: 03/08/21 11:45 AM   Result Value Ref Range    Lactic acid 0.5 0.4 - 2.0 mmol/L   TSH 3RD GENERATION    Collection Time: 03/08/21 11:45 AM   Result Value Ref Range    TSH 1.57 0.36 - 3.74 uIU/mL   MAGNESIUM    Collection Time: 03/08/21 11:45 AM   Result Value Ref Range    Magnesium 1.8 1.6 - 2.4 mg/dL   PROCALCITONIN    Collection Time: 03/08/21 11:45 AM   Result Value Ref Range    Procalcitonin 0.25 (H) 0 ng/mL   SARS-COV-2    Collection Time: 03/08/21  1:25 PM   Result Value Ref Range    SARS-CoV-2 Please find results under separate order     COVID-19 RAPID TEST    Collection Time: 03/08/21  1:25 PM   Result Value Ref Range    Specimen source Nasopharyngeal      COVID-19 rapid test DETECTED (A) Not Detected     URINALYSIS W/ REFLEX CULTURE    Collection Time: 03/08/21  3:15 PM    Specimen: Urine   Result Value Ref Range    Color Yellow/Straw      Appearance Clear Clear      Specific gravity 1.008 1.003 - 1.030      pH (UA) 7.0 5.0 - 8.0      Protein >300 (A) Negative mg/dL    Glucose 50 (A) Negative mg/dL    Ketone Negative Negative mg/dL    Bilirubin Negative Negative      Blood Small (A) Negative      Urobilinogen 0.1 0.1 - 1.0 EU/dL    Nitrites Negative Negative      Leukocyte Esterase Negative Negative      UA:UC IF INDICATED Culture not indicated by UA result Culture not indicated by UA result      WBC 0-4 0 - 4 /hpf    RBC 0-5 0 - 5 /hpf    Bacteria Negative Negative /hpf    Mucus Trace /lpf   TYPE & SCREEN    Collection Time: 03/08/21  7:10 PM   Result Value Ref Range    Crossmatch Expiration 03/11/2021,2359     ABO/Rh(D) B Positive     Antibody screen Negative     Antibody ID Anti-JK(b)     Comment CERNER HX:  B POS, ANTI JKb, JKb ANTIGEN NEG        XR Results (most recent):  Results from Hospital Encounter encounter on 03/08/21   XR CHEST SNGL V    Narrative 1 new comparison June 21    Cardiomegaly with congestion. Mild interstitial edema. Localized airspace  disease right perihilar. No effusion or pneumothorax          XR CHEST SNGL V   Final Result           Review of Systems:  Constitutional: Negative for chills and fever. Generalized weakness  HENT: Negative. Eyes: Negative. Respiratory: Negative. Cardiovascular: Negative. Gastrointestinal:  abdominal pain and nausea. Skin: Negative. Neurological: Negative.       Objective:   VITALS:    Visit Vitals  BP (!) 156/88 (BP 1 Location: Right upper arm)   Pulse 66   Temp 98 °F (36.7 °C)   Resp 16   SpO2 95% Breastfeeding No       Physical Exam:   Constitutional: pt is oriented to person, place, and time. HENT:   Head: Normocephalic and atraumatic. Eyes: Pupils are equal, round, and reactive to light. EOM are normal.   Cardiovascular: Normal rate, regular rhythm and normal heart sounds. Pulmonary/Chest: Breath sounds normal. No wheezes. No rales. Exhibits no tenderness. Abdominal: Soft. Bowel sounds are normal. There is no abdominal tenderness. There is no rebound and no guarding. Musculoskeletal: Normal range of motion. Neurological: pt is alert and oriented to person, place, and time. Alert. Normal strength. No cranial nerve deficit or sensory deficit. Displays a negative Romberg sign.     Right lower extremity blister and left lower extremity large wound wound not draining      ASSESSMENT & PLAN:    COVID-19 pneumonitis  Acute on chronic kidney disease  Type 2 diabetes  Acute CHF  Anemia chronic disease      Start on IV Zithromax and IV Zosyn with Decadron  Lasix 40 mg IV daily  Continue Pepcid 20 mg daily  Need home medication list    Nephrology consult infectious disease consult and pulmonary consult    Monitor H&H  Monitor input and output  PT OT consult  Wound care nurse consult  Wound care nurse consult  Stool for occult blood iron studies  ________________________________________________________________________    Signed: Lillie Talavera MD

## 2021-03-09 NOTE — PROGRESS NOTES
General Daily Progress Note          Patient Name:   Stevo Harrell       YOB: 1960       Age:  62 y.o.       Admit Date: 3/8/2021      Subjective:         Patient blood pressure still high  No further abdominal pain        Objective:     Visit Vitals  BP (!) 190/98 (BP 1 Location: Right upper arm)   Pulse 72   Temp 97.5 °F (36.4 °C)   Resp 18   SpO2 96%   Breastfeeding No        Recent Results (from the past 24 hour(s))   CULTURE, BLOOD, PAIRED    Collection Time: 03/08/21  1:05 PM    Specimen: Blood   Result Value Ref Range    Special Requests: No Special Requests      Culture result: No growth after 19 hours     SARS-COV-2    Collection Time: 03/08/21  1:25 PM   Result Value Ref Range    SARS-CoV-2 Please find results under separate order     COVID-19 RAPID TEST    Collection Time: 03/08/21  1:25 PM   Result Value Ref Range    Specimen source Nasopharyngeal      COVID-19 rapid test DETECTED (A) Not Detected     URINALYSIS W/ REFLEX CULTURE    Collection Time: 03/08/21  3:15 PM    Specimen: Urine   Result Value Ref Range    Color Yellow/Straw      Appearance Clear Clear      Specific gravity 1.008 1.003 - 1.030      pH (UA) 7.0 5.0 - 8.0      Protein >300 (A) Negative mg/dL    Glucose 50 (A) Negative mg/dL    Ketone Negative Negative mg/dL    Bilirubin Negative Negative      Blood Small (A) Negative      Urobilinogen 0.1 0.1 - 1.0 EU/dL    Nitrites Negative Negative      Leukocyte Esterase Negative Negative      UA:UC IF INDICATED Culture not indicated by UA result Culture not indicated by UA result      WBC 0-4 0 - 4 /hpf    RBC 0-5 0 - 5 /hpf    Bacteria Negative Negative /hpf    Mucus Trace /lpf   TYPE & SCREEN    Collection Time: 03/08/21  7:10 PM   Result Value Ref Range    Crossmatch Expiration 03/11/2021,2359     ABO/Rh(D) B Positive     Antibody screen Negative     Antibody ID Anti-JK(b)     Comment ROXANA HX:  B POS, ANTI JKb, JKb ANTIGEN NEG    METABOLIC PANEL, COMPREHENSIVE Collection Time: 03/09/21  9:43 AM   Result Value Ref Range    Sodium 137 136 - 145 mmol/L    Potassium 5.2 (H) 3.5 - 5.1 mmol/L    Chloride 109 (H) 97 - 108 mmol/L    CO2 18 (L) 21 - 32 mmol/L    Anion gap 10 5 - 15 mmol/L    Glucose 179 (H) 65 - 100 mg/dL    BUN 61 (H) 6 - 20 mg/dL    Creatinine 5.49 (H) 0.55 - 1.02 mg/dL    BUN/Creatinine ratio 11 (L) 12 - 20      GFR est AA 10 (L) >60 ml/min/1.73m2    GFR est non-AA 8 (L) >60 ml/min/1.73m2    Calcium 9.2 8.5 - 10.1 mg/dL    Bilirubin, total 0.3 0.2 - 1.0 mg/dL    AST (SGOT) 20 15 - 37 U/L    ALT (SGPT) 25 12 - 78 U/L    Alk. phosphatase 133 (H) 45 - 117 U/L    Protein, total 6.5 6.4 - 8.2 g/dL    Albumin 2.0 (L) 3.5 - 5.0 g/dL    Globulin 4.5 (H) 2.0 - 4.0 g/dL    A-G Ratio 0.4 (L) 1.1 - 2.2     CBC WITH AUTOMATED DIFF    Collection Time: 03/09/21  9:43 AM   Result Value Ref Range    WBC 4.4 3.6 - 11.0 K/uL    RBC 3.45 (L) 3.80 - 5.20 M/uL    HGB 9.5 (L) 11.5 - 16.0 g/dL    HCT 30.0 (L) 35.0 - 47.0 %    MCV 87.0 80.0 - 99.0 FL    MCH 27.5 26.0 - 34.0 PG    MCHC 31.7 30.0 - 36.5 g/dL    RDW 15.2 (H) 11.5 - 14.5 %    PLATELET 145 925 - 241 K/uL    MPV 11.0 8.9 - 12.9 FL    NEUTROPHILS 83 (H) 32 - 75 %    LYMPHOCYTES 14 12 - 49 %    MONOCYTES 3 (L) 5 - 13 %    EOSINOPHILS 0 0 - 7 %    BASOPHILS 0 0 - 1 %    IMMATURE GRANULOCYTES 0 0.0 - 0.5 %    ABS. NEUTROPHILS 3.7 1.8 - 8.0 K/UL    ABS. LYMPHOCYTES 0.6 (L) 0.8 - 3.5 K/UL    ABS. MONOCYTES 0.2 0.0 - 1.0 K/UL    ABS. EOSINOPHILS 0.0 0.0 - 0.4 K/UL    ABS. BASOPHILS 0.0 0.0 - 0.1 K/UL    ABS. IMM. GRANS. 0.0 0.00 - 0.04 K/UL    DF AUTOMATED     GLUCOSE, POC    Collection Time: 03/09/21 12:00 PM   Result Value Ref Range    Glucose (POC) 193 (H) 65 - 100 mg/dL    Performed by Taina Sands      [unfilled]      Review of Systems    Constitutional: Negative for chills and fever. HENT: Negative. Eyes: Negative. Respiratory: Negative. Cardiovascular: Negative.     Gastrointestinal: Negative for abdominal pain and nausea. Skin: Negative. Neurological: Negative. Physical Exam:      Constitutional: pt is oriented to person, place, and time. HENT:   Head: Normocephalic and atraumatic. Eyes: Pupils are equal, round, and reactive to light. EOM are normal.   Cardiovascular: Normal rate, regular rhythm and normal heart sounds. Pulmonary/Chest: Breath sounds normal. No wheezes. No rales. Exhibits no tenderness. Abdominal: Soft. Bowel sounds are normal. There is no abdominal tenderness. There is no rebound and no guarding. Musculoskeletal: Normal range of motion. Neurological: pt is alert and oriented to person, place, and time.      XR CHEST SNGL V   Final Result      US RETROPERITONEUM COMP    (Results Pending)        Recent Results (from the past 24 hour(s))   CULTURE, BLOOD, PAIRED    Collection Time: 03/08/21  1:05 PM    Specimen: Blood   Result Value Ref Range    Special Requests: No Special Requests      Culture result: No growth after 19 hours     SARS-COV-2    Collection Time: 03/08/21  1:25 PM   Result Value Ref Range    SARS-CoV-2 Please find results under separate order     COVID-19 RAPID TEST    Collection Time: 03/08/21  1:25 PM   Result Value Ref Range    Specimen source Nasopharyngeal      COVID-19 rapid test DETECTED (A) Not Detected     URINALYSIS W/ REFLEX CULTURE    Collection Time: 03/08/21  3:15 PM    Specimen: Urine   Result Value Ref Range    Color Yellow/Straw      Appearance Clear Clear      Specific gravity 1.008 1.003 - 1.030      pH (UA) 7.0 5.0 - 8.0      Protein >300 (A) Negative mg/dL    Glucose 50 (A) Negative mg/dL    Ketone Negative Negative mg/dL    Bilirubin Negative Negative      Blood Small (A) Negative      Urobilinogen 0.1 0.1 - 1.0 EU/dL    Nitrites Negative Negative      Leukocyte Esterase Negative Negative      UA:UC IF INDICATED Culture not indicated by UA result Culture not indicated by UA result      WBC 0-4 0 - 4 /hpf    RBC 0-5 0 - 5 /hpf Bacteria Negative Negative /hpf    Mucus Trace /lpf   TYPE & SCREEN    Collection Time: 03/08/21  7:10 PM   Result Value Ref Range    Crossmatch Expiration 03/11/2021,2359     ABO/Rh(D) B Positive     Antibody screen Negative     Antibody ID Anti-JK(b)     Comment CERNER HX:  B POS, ANTI JKb, JKb ANTIGEN NEG    METABOLIC PANEL, COMPREHENSIVE    Collection Time: 03/09/21  9:43 AM   Result Value Ref Range    Sodium 137 136 - 145 mmol/L    Potassium 5.2 (H) 3.5 - 5.1 mmol/L    Chloride 109 (H) 97 - 108 mmol/L    CO2 18 (L) 21 - 32 mmol/L    Anion gap 10 5 - 15 mmol/L    Glucose 179 (H) 65 - 100 mg/dL    BUN 61 (H) 6 - 20 mg/dL    Creatinine 5.49 (H) 0.55 - 1.02 mg/dL    BUN/Creatinine ratio 11 (L) 12 - 20      GFR est AA 10 (L) >60 ml/min/1.73m2    GFR est non-AA 8 (L) >60 ml/min/1.73m2    Calcium 9.2 8.5 - 10.1 mg/dL    Bilirubin, total 0.3 0.2 - 1.0 mg/dL    AST (SGOT) 20 15 - 37 U/L    ALT (SGPT) 25 12 - 78 U/L    Alk. phosphatase 133 (H) 45 - 117 U/L    Protein, total 6.5 6.4 - 8.2 g/dL    Albumin 2.0 (L) 3.5 - 5.0 g/dL    Globulin 4.5 (H) 2.0 - 4.0 g/dL    A-G Ratio 0.4 (L) 1.1 - 2.2     CBC WITH AUTOMATED DIFF    Collection Time: 03/09/21  9:43 AM   Result Value Ref Range    WBC 4.4 3.6 - 11.0 K/uL    RBC 3.45 (L) 3.80 - 5.20 M/uL    HGB 9.5 (L) 11.5 - 16.0 g/dL    HCT 30.0 (L) 35.0 - 47.0 %    MCV 87.0 80.0 - 99.0 FL    MCH 27.5 26.0 - 34.0 PG    MCHC 31.7 30.0 - 36.5 g/dL    RDW 15.2 (H) 11.5 - 14.5 %    PLATELET 703 049 - 095 K/uL    MPV 11.0 8.9 - 12.9 FL    NEUTROPHILS 83 (H) 32 - 75 %    LYMPHOCYTES 14 12 - 49 %    MONOCYTES 3 (L) 5 - 13 %    EOSINOPHILS 0 0 - 7 %    BASOPHILS 0 0 - 1 %    IMMATURE GRANULOCYTES 0 0.0 - 0.5 %    ABS. NEUTROPHILS 3.7 1.8 - 8.0 K/UL    ABS. LYMPHOCYTES 0.6 (L) 0.8 - 3.5 K/UL    ABS. MONOCYTES 0.2 0.0 - 1.0 K/UL    ABS. EOSINOPHILS 0.0 0.0 - 0.4 K/UL    ABS. BASOPHILS 0.0 0.0 - 0.1 K/UL    ABS. IMM.  GRANS. 0.0 0.00 - 0.04 K/UL    DF AUTOMATED     GLUCOSE, POC    Collection Time: 03/09/21 12:00 PM   Result Value Ref Range    Glucose (POC) 193 (H) 65 - 100 mg/dL    Performed by Bas[facility]        Results     Procedure Component Value Units Date/Time    CULTURE, BLOOD, LINE [[id number]] Collected: 03/09/21 0032    Order Status: Completed Specimen: Blood Updated: 03/09/21 0041    CULTURE, RESPIRATORY/SPUTUM/BRONCH Von Muckle STAIN [[id number]]     Order Status: Sent Specimen: Sputum     CULTURE, URINE [[id number]] Collected: 03/08/21 1900    Order Status: Completed Specimen: Urine Updated: 03/09/21 0628    COVID-19 RAPID TEST [[id number]]  (Abnormal) Collected: 03/08/21 1325    Order Status: Completed Specimen: Nasopharyngeal Updated: 03/08/21 1415     Specimen source Nasopharyngeal        Comment: Results verified, phoned to and read back by Aiden Monet AT [id number] BY JF        COVID-19 rapid test DETECTED        Comment: Rapid [employer] ID Now   The specimen is POSITIVE for SARS-CoV-2, the novel coronavirus associated with COVID-19. This test has been authorized by the FDA under an Emergency Use Authorization (EUA) for use by authorized laboratories.    Fact sheet for Healthcare Providers: ConventionUpdate.co.nz Fact sheet for Patients: ConventionUpdate.co.nz   Methodology: Isothermal Nucleic Acid Amplification       CULTURE, BLOOD, PAIRED [[id number]] Collected: 03/08/21 1305    Order Status: Completed Specimen: Blood Updated: 03/09/21 0948     Special Requests: No Special Requests        Culture result: No growth after 19 hours              Labs:     Recent Labs     03/09/21  0943 03/08/21  1145   WBC 4.4 5.1   HGB 9.5* 8.0*   HCT 30.0* 25.8*    193     Recent Labs     03/09/21  0943 03/08/21  1145    139   K 5.2* 4.9   * 108   CO2 18* 22   BUN 61* 53*   CREA 5.49* 5.40*   * 103*   CA 9.2 8.3*   MG  --  1.8     Recent Labs     03/09/21  0943 03/08/21  1145   ALT 25 22   * 122*   TBILI 0.3 0.3   TP 6.5 5.6*   ALB 2.0* 1.9* GLOB 4.5* 3.7     Recent Labs     03/08/21  1145   INR 1.1   PTP 14.6   APTT 30.0      No results for input(s): FE, TIBC, PSAT, FERR in the last 72 hours. No results found for: FOL, RBCF   No results for input(s): PH, PCO2, PO2 in the last 72 hours.   Recent Labs     03/08/21  1145   TROIQ <0.05     No results found for: CHOL, CHOLX, CHLST, CHOLV, HDL, HDLP, LDL, LDLC, DLDLP, TGLX, TRIGL, TRIGP, CHHD, CHHDX  Lab Results   Component Value Date/Time    Glucose (POC) 193 (H) 03/09/2021 12:00 PM     Lab Results   Component Value Date/Time    Color Yellow/Straw 03/08/2021 03:15 PM    Appearance Clear 03/08/2021 03:15 PM    Specific gravity 1.008 03/08/2021 03:15 PM    pH (UA) 7.0 03/08/2021 03:15 PM    Protein >300 (A) 03/08/2021 03:15 PM    Glucose 50 (A) 03/08/2021 03:15 PM    Ketone Negative 03/08/2021 03:15 PM    Bilirubin Negative 03/08/2021 03:15 PM    Urobilinogen 0.1 03/08/2021 03:15 PM    Nitrites Negative 03/08/2021 03:15 PM    Leukocyte Esterase Negative 03/08/2021 03:15 PM    Bacteria Negative 03/08/2021 03:15 PM    WBC 0-4 03/08/2021 03:15 PM    RBC 0-5 03/08/2021 03:15 PM         Assessment:     COVID-19 pneumonitis  Acute on chronic kidney disease  Type 2 diabetes  Acute CHF  Anemia chronic disease  Hypokalemia    Plan:     Continue Zithromax Decadron and Zosyn  Lasix 40 mg IV daily  On Pepcid 20 mg daily  Kayexalate 30 g    Consult nephrology oncology still pending        Current Facility-Administered Medications:     heparin (porcine) injection 5,000 Units, 5,000 Units, SubCUTAneous, Q8H, Tequila Gunter MD    sodium chloride (NS) flush 5-40 mL, 5-40 mL, IntraVENous, Q8H, Segun Aldana, NP, 10 mL at 03/09/21 0520    sodium chloride (NS) flush 5-40 mL, 5-40 mL, IntraVENous, PRN, Alyne Later, NP    acetaminophen (TYLENOL) tablet 650 mg, 650 mg, Oral, Q6H PRN **OR** acetaminophen (TYLENOL) suppository 650 mg, 650 mg, Rectal, Q6H PRN, Alyne Later, NP    polyethylene glycol (MIRALAX) packet 17 g, 17 g, Oral, DAILY PRN, Britany Keyes NP    promethazine (PHENERGAN) tablet 12.5 mg, 12.5 mg, Oral, Q6H PRN **OR** ondansetron (ZOFRAN) injection 4 mg, 4 mg, IntraVENous, Q6H PRN, Britany Keyes NP    famotidine (PEPCID) tablet 20 mg, 20 mg, Oral, DAILY, Britany Keyes NP, 20 mg at 03/09/21 1028    dexamethasone (DECADRON) 4 mg/mL injection 4 mg, 4 mg, IntraVENous, Q8H, Britany Keyes NP, 4 mg at 03/09/21 1028    piperacillin-tazobactam (ZOSYN) 3.375 g in 0.9% sodium chloride (MBP/ADV) 100 mL MBP, 3.375 g, IntraVENous, Q12H, Britany Keyes NP, Last Rate: 25 mL/hr at 03/09/21 0610, 3.375 g at 03/09/21 0610    furosemide (LASIX) injection 40 mg, 40 mg, IntraVENous, DAILY, Rebecca Reyes MD, 40 mg at 03/09/21 1028    azithromycin (ZITHROMAX) 500 mg in 0.9% sodium chloride 250 mL (VIAL-MATE), 500 mg, IntraVENous, Q24H, Ira Hernadez MD

## 2021-03-09 NOTE — PROGRESS NOTES
Patient admitted with anterior RLE blister that is not opened. Patient has LLE large round wound that is not draining and has some scabbing. Patient left foot near ankle has two circular wounds that are not draining. Patient states that these wounds are on a \"spot where I had surgery\". Patient unable to state type of surgery. No other skin issues noted.

## 2021-03-09 NOTE — CONSULTS
Consult    NAME: David Davison   :  1960   MRN:  [id number]     Date/Time:  3/9/2021 7:35 AM    Patient PCP: Cal Loya MD  ________________________________________________________________________     Assessment:   Primary cardiologist: [facility] Michael Dudley MD)    PROBLEM LIST:  1.  COVID-19 disease  2. Noncompliance  3. Shortness of breath  4. Chronic heart failure with recovered ejection fraction (HFrecEF )  5. Grade II (moderate) diastolic dysfunction, or pseudonormalization  6. Mild to moderate pulmonary hypertension (RVSP =45 mmHg)  7. Nonocclusive coronary artery disease  8. Hypertension  9. Type 2 diabetes  10. Former smoker (stopped 6 years ago)    6. Class III (severe) obesity (BMI = 41.8 kg/m²)  12. Acute kidney injury/chronic kidney disease (stage IV)  13. Nephrotic syndrome most likely diabetic nephropathy  14. Fluid overload  15. Chronic hypoalbuminemia, due to nephrotic syndrome  16. Edema  17. Anemia  18. Hypocalcemia        []        High complexity decision making was performed        Subjective:   CHIEF COMPLAINT:     HISTORY OF PRESENT ILLNESS:     This 50-year-old -American female with nonocclusive coronary disease presents for evaluation of shortness of breath. The patient is known to me from prior hospitalization, and office visits. She was last seen in office approximately 6 months ago. Since then she has been without cardiovascular complaints until recently. The patient does have a history of nonocclusive coronary artery disease. In addition she has a history of diastolic dysfunction. Within the past 2 weeks she began to experience increasing shortness of breath or dyspnea on exertion. She also describes 2 pillow orthopnea and lower extremity pitting edema. Because of the worsening nature of her breathing difficulties she presented to the emergency department.     In the emergency department her clinical history, laboratory findings, and imaging studies were consistent with acute decompensated heart failure. The patient is also noted to have pneumonia as well as COVID-19 disease. She is started on appropriate therapy including antibiotics and admitted to general medicine with telemetry. Cardiology is consulted to assist in the evaluation management. Past Medical History:   Diagnosis Date    Hypertension     Morbid obesity ([address]       No past surgical history on file. Allergies   Allergen Reactions    Doxycycline Swelling    Tetracycline Swelling      Meds:  See below  Social History     Tobacco Use    Smoking status: Not on file   Substance Use Topics    Alcohol use: Not on file      No family history on file. REVIEW OF SYSTEMS:     []         Unable to obtain  ROS due to ---   [x]         Total of 12 systems reviewed as follows:    Constitutional: negative fever, negative chills, negative weight loss  Eyes:   negative visual changes  ENT:   negative sore throat, tongue or lip swelling  Respiratory:  negative cough, negative dyspnea  Cards:  negative for chest pain, palpitations, lower extremity edema  GI:   negative for nausea, vomiting, diarrhea, and abdominal pain  Genitourinary: negative for frequency, dysuria  Integument:  negative for rash   Hematologic:  negative for easy bruising and gum/nose bleeding  Musculoskel: negative for myalgias,  back pain  Neurological:  negative for headaches, dizziness, vertigo, weakness  Behavl/Psych: negative for feelings of anxiety, depression     Pertinent Positives include :    Objective:      Physical Exam:    Last 24hrs VS reviewed since prior progress note.  Most recent are:    Visit Vitals  BP (!) 180/92 (BP 1 Location: Left upper arm, BP Patient Position: At rest;Lying right side)   Pulse 75   Temp 97.8 °F (36.6 °C)   Resp 16   SpO2 99%   Breastfeeding No       Intake/Output Summary (Last 24 hours) at 3/9/2021 3977  Last data filed at 3/9/2021 020  Gross per 24 hour   Intake 120 ml   Output --   Net 120 ml        General Appearance: Well developed, obese, in no acute respiratory distress. Ears/Nose/Mouth/Throat: Pupils equal and round, Hearing grossly normal.  Neck: Supple. JVP within normal limits. Carotids good upstrokes, with no bruit. Chest: Lungs clear to auscultation bilaterally. Cardiovascular: JVP is not elevated, PMI is not tempted, normal intensity S1-S2, without S3  Abdomen: Soft, non-tender, bowel sounds are active. No organomegaly. Extremities: No edema bilaterally. Femoral pulses +2, Distal Pulses +1. Skin: Warm and dry. Neuro: CN II-XII grossly intact, Strength and sensation grossly intact. Data:      Telemetry:    EKG:  []  No new EKG for review  XR CHEST SNGL V   Final Result           Prior to Admission medications    Not on File       Recent Results (from the past 24 hour(s))   EKG, 12 LEAD, INITIAL    Collection Time: 03/08/21 11:29 AM   Result Value Ref Range    Ventricular Rate 71 BPM    Atrial Rate 71 BPM    P-R Interval 202 ms    QRS Duration 84 ms    Q-T Interval 422 ms    QTC Calculation (Bezet) 458 ms    Calculated P Axis 46 degrees    Calculated R Axis 63 degrees    Calculated T Axis 75 degrees    Diagnosis       Normal sinus rhythm  Normal ECG  When compared with ECG of 19-AUG-2020 07:43,  No significant change was found  Confirmed by [facility] Aniyah Nugent ([id number]) on 3/8/2021 4:54:07 PM     CBC WITH AUTOMATED DIFF    Collection Time: 03/08/21 11:45 AM   Result Value Ref Range    WBC 5.1 3.6 - 11.0 K/uL    RBC 2.93 (L) 3.80 - 5.20 M/uL    HGB 8.0 (L) 11.5 - 16.0 g/dL    HCT 25.8 (L) 35.0 - 47.0 %    MCV 88.1 80.0 - 99.0 FL    MCH 27.3 26.0 - 34.0 PG    MCHC 31.0 30.0 - 36.5 g/dL    RDW 15.2 (H) 11.5 - 14.5 %    PLATELET 607 997 - 792 K/uL    MPV 11.1 8.9 - 12.9 FL    NEUTROPHILS 71 32 - 75 %    LYMPHOCYTES 16 12 - 49 %    MONOCYTES 12 5 - 13 %    EOSINOPHILS 1 0 - 7 %    BASOPHILS 0 0 - 1 %    IMMATURE GRANULOCYTES 0 0.0 - 0.5 %    ABS. NEUTROPHILS 3.7 1.8 - 8.0 K/UL    ABS. LYMPHOCYTES 0.8 0.8 - 3.5 K/UL    ABS. MONOCYTES 0.6 0.0 - 1.0 K/UL    ABS. EOSINOPHILS 0.0 0.0 - 0.4 K/UL    ABS. BASOPHILS 0.0 0.0 - 0.1 K/UL    ABS. IMM. GRANS. 0.0 0.00 - 0.04 K/UL    DF AUTOMATED     METABOLIC PANEL, COMPREHENSIVE    Collection Time: 03/08/21 11:45 AM   Result Value Ref Range    Sodium 139 136 - 145 mmol/L    Potassium 4.9 3.5 - 5.1 mmol/L    Chloride 108 97 - 108 mmol/L    CO2 22 21 - 32 mmol/L    Anion gap 9 5 - 15 mmol/L    Glucose 103 (H) 65 - 100 mg/dL    BUN 53 (H) 6 - 20 mg/dL    Creatinine 5.40 (H) 0.55 - 1.02 mg/dL    BUN/Creatinine ratio 10 (L) 12 - 20      GFR est AA 10 (L) >60 ml/min/1.73m2    GFR est non-AA 8 (L) >60 ml/min/1.73m2    Calcium 8.3 (L) 8.5 - 10.1 mg/dL    Bilirubin, total 0.3 0.2 - 1.0 mg/dL    AST (SGOT) 26 15 - 37 U/L    ALT (SGPT) 22 12 - 78 U/L    Alk.  phosphatase 122 (H) 45 - 117 U/L    Protein, total 5.6 (L) 6.4 - 8.2 g/dL    Albumin 1.9 (L) 3.5 - 5.0 g/dL    Globulin 3.7 2.0 - 4.0 g/dL    A-G Ratio 0.5 (L) 1.1 - 2.2     TROPONIN I    Collection Time: 03/08/21 11:45 AM   Result Value Ref Range    Troponin-I, Qt. <0.05 <0.05 ng/mL   BNP    Collection Time: 03/08/21 11:45 AM   Result Value Ref Range    NT pro-BNP 14,349 (H) <125 pg/mL   PROTHROMBIN TIME + INR    Collection Time: 03/08/21 11:45 AM   Result Value Ref Range    Prothrombin time 14.6 11.9 - 14.7 sec    INR 1.1 0.9 - 1.1     PTT    Collection Time: 03/08/21 11:45 AM   Result Value Ref Range    aPTT 30.0 23.0 - 35.7 sec    aPTT, therapeutic range   68 - 109 sec   LACTIC ACID    Collection Time: 03/08/21 11:45 AM   Result Value Ref Range    Lactic acid 0.5 0.4 - 2.0 mmol/L   TSH 3RD GENERATION    Collection Time: 03/08/21 11:45 AM   Result Value Ref Range    TSH 1.57 0.36 - 3.74 uIU/mL   MAGNESIUM    Collection Time: 03/08/21 11:45 AM   Result Value Ref Range    Magnesium 1.8 1.6 - 2.4 mg/dL   PROCALCITONIN    Collection Time: 03/08/21 11:45 AM   Result Value Ref Range    Procalcitonin 0.25 (H) 0 ng/mL   SARS-COV-2    Collection Time: 03/08/21  1:25 PM   Result Value Ref Range    SARS-CoV-2 Please find results under separate order     COVID-19 RAPID TEST    Collection Time: 03/08/21  1:25 PM   Result Value Ref Range    Specimen source Nasopharyngeal      COVID-19 rapid test DETECTED (A) Not Detected     URINALYSIS W/ REFLEX CULTURE    Collection Time: 03/08/21  3:15 PM    Specimen: Urine   Result Value Ref Range    Color Yellow/Straw      Appearance Clear Clear      Specific gravity 1.008 1.003 - 1.030      pH (UA) 7.0 5.0 - 8.0      Protein >300 (A) Negative mg/dL    Glucose 50 (A) Negative mg/dL    Ketone Negative Negative mg/dL    Bilirubin Negative Negative      Blood Small (A) Negative      Urobilinogen 0.1 0.1 - 1.0 EU/dL    Nitrites Negative Negative      Leukocyte Esterase Negative Negative      UA:UC IF INDICATED Culture not indicated by UA result Culture not indicated by UA result      WBC 0-4 0 - 4 /hpf    RBC 0-5 0 - 5 /hpf    Bacteria Negative Negative /hpf    Mucus Trace /lpf   TYPE & SCREEN    Collection Time: 03/08/21  7:10 PM   Result Value Ref Range    Crossmatch Expiration 03/11/2021,2359     ABO/Rh(D) B Positive     Antibody screen Negative     Antibody ID Anti-JK(b)     Comment ROXANA HX:  B POS, ANTI JKb, JKb ANTIGEN NEG            Plan:   1. Continue telemetry monitor  2. Conclude serial cardiac enzymes  3. Monitor serum electrolytes, and renal function  4. Monitor fluid balance, and daily weights  5.   Continue current cardiovascular medications including IV furosemide   Isaiah Hill MD

## 2021-03-09 NOTE — CONSULTS
PULMONARY CONSULT  [facility]    Name: Stephanie Suarez MRN: [id number]   : 1960 Hospital: [facility]   Date: 3/9/2021  Admission date: 3/8/2021 Hospital Day: 2       HPI:     Hospital Problems  Never Reviewed          Codes Class Noted POA    PNA (pneumonia) ICD-10-CM: J18.9  ICD-9-CM: 899  3/8/2021 Unknown                   [x] High complexity decision making was performed  [x] See my orders for details      Subjective/Initial History:     I was asked by Tri Potter MD to see Stephanie Suarez  a 62 y.o.  female in consultation     Excerpts from admission 3/8/2021 or consult notes as follows:   69-year-old lady came in because of abdominal discomfort significant past medical history of congestive heart failure chronic kidney disease and type 2 diabetes mellitus she was complaining of shortness of breath dyspnea and feeling nauseous for the past 1 week no history of passing out so admitted and she is COVID-19 positive so pulmonary consult was called for further evaluation.       Allergies   Allergen Reactions    Doxycycline Swelling    Tetracycline Swelling        MAR reviewed and pertinent medications noted or modified as needed     Current Facility-Administered Medications   Medication    sodium chloride (NS) flush 5-40 mL    sodium chloride (NS) flush 5-40 mL    acetaminophen (TYLENOL) tablet 650 mg    Or    acetaminophen (TYLENOL) suppository 650 mg    polyethylene glycol (MIRALAX) packet 17 g    promethazine (PHENERGAN) tablet 12.5 mg    Or    ondansetron (ZOFRAN) injection 4 mg    famotidine (PEPCID) tablet 20 mg    dexamethasone (DECADRON) 4 mg/mL injection 4 mg    piperacillin-tazobactam (ZOSYN) 3.375 g in 0.9% sodium chloride (MBP/ADV) 100 mL MBP    furosemide (LASIX) injection 40 mg    azithromycin (ZITHROMAX) 500 mg in 0.9% sodium chloride 250 mL (VIAL-MATE)      Patient PCP: Fariba, MD Adria  PMH:  has a past medical history of Hypertension and Morbid obesity ([facility] [address] PSH:   has no past surgical history on file. FHX: family history is not on file. SHX:       ROS:    Review of Systems   Constitutional: Negative. HENT: Negative. Eyes: Negative. Respiratory: Positive for shortness of breath. Cardiovascular: Negative. Gastrointestinal: Positive for abdominal pain and nausea. Genitourinary: Negative. Musculoskeletal: Negative. Skin: Negative. Neurological: Positive for weakness. Endo/Heme/Allergies: Negative. Psychiatric/Behavioral: Negative. Objective:     Vital Signs: Telemetry:    normal sinus rhythm Intake/Output:   Visit Vitals  BP (!) 190/98 (BP 1 Location: Right upper arm)   Pulse 72   Temp 97.5 °F (36.4 °C)   Resp 18   SpO2 96%   Breastfeeding No       Temp (24hrs), Av °F (36.7 °C), Min:97.5 °F (36.4 °C), Max:98.6 °F (37 °C)        O2 Device: Room air O2 Flow Rate (L/min): 2 l/min       Wt Readings from Last 4 Encounters:   No data found for Wt          Intake/Output Summary (Last 24 hours) at 3/9/2021 0856  Last data filed at 3/9/2021 0208  Gross per 24 hour   Intake 120 ml   Output --   Net 120 ml       Last shift:      No intake/output data recorded. Last 3 shifts:  1901 -  0700  In: 120 [P.O.:120]  Out: -        Physical Exam:     Physical Exam   Constitutional: She appears distressed. HENT:   Head: Normocephalic and atraumatic. Eyes: Pupils are equal, round, and reactive to light. Conjunctivae and EOM are normal.   Neck: Normal range of motion. Neck supple. Cardiovascular: Normal rate and regular rhythm. Pulmonary/Chest: Effort normal and breath sounds normal.   Abdominal: Soft. Bowel sounds are normal.   Musculoskeletal: Normal range of motion. Neurological: She is alert. Psychiatric: She has a normal mood and affect.         Labs:    Recent Labs     21  1145   WBC 5.1   HGB 8.0*      INR 1.1   APTT 30.0     Recent Labs     21  1145      K 4.9      CO2 22   *   BUN 53*   CREA 5.40*   CA 8.3*   MG 1.8   LAC 0.5   ALB 1.9*   ALT 22     No results for input(s): PH, PCO2, PO2, HCO3, FIO2 in the last 72 hours. Recent Labs     03/08/21  1145   TROIQ <0.05     No results found for: BNPP, BNP   No results found for: CULT  Lab Results   Component Value Date/Time    TSH 1.57 03/08/2021 11:45 AM       Imaging:    CXR Results  (Last 48 hours)               03/08/21 1203  XR CHEST SNGL V Final result    Narrative:  1 new comparison June 21       Cardiomegaly with congestion. Mild interstitial edema. Localized airspace   disease right perihilar. No effusion or pneumothorax           Results from [place] encounter on 03/08/21   XR CHEST SNGL V    Narrative 1 new comparison June 21    Cardiomegaly with congestion. Mild interstitial edema. Localized airspace  disease right perihilar. No effusion or pneumothorax     No results found for this or any previous visit. IMPRESSION:   1. COVID-19 pneumonia  2. Acute on chronic kidney disease   3. Congestive heart failure  4. Mild pulmonary hypertension  5. Ex COPD  6. Anemia of chronic disease  7. Additional workup outlined below  8. Pt is requiring Drug therapy requiring intensive monitoring for toxicity  9. Pt is unstable, unpredictable needing inpatient monitoring; is acutely ill and at high risk of sudden decline and decompensation with severe consequenses and continued end organ dysfunction and failure  10. Prognosis guarded       RECOMMENDATIONS/PLAN:     1. She is on room air  2. She is on Decadron Zosyn and Zithromax not a candidate for remdesivir or Actemra  3. Agree with Empiric IV antibiotics pending culture results   4. Follow culture results  5. Chest x-ray shows cardiomegaly with congestion and infiltrate right perihilar  6. Supplemental O2 to keep sats > 93%  7. Aspiration precautions  8. Labs to follow electrolytes, renal function and and blood counts  9.  Glucose monitoring and SSI  10. Bronchial hygiene with respiratory therapy techniques, bronchodilators  11. DVT, SUP prophylaxis       This care involved high complexity medical decision making: I personally:  · Reviewed the flowsheet and previous days notes  · Reviewed and summarized records or history from previous days note or discussions with staff, family  · High Risk Drug therapy requiring intensive monitoring for toxicity: eg steroids, pressors, antibiotics  · Reviewed and/or ordered Clinical lab tests  · Reviewed images and/or ordered Radiology tests  · Reviewed the patients ECG / Telemetry  · Reviewed and/or adjusted NiPPV settings  · Called and arranged for Radiologic procedures or interventions  · performed or ordered Diagnostic endoscopies with identified risk factors.   · discussed my assessment/management with : Nursing, Hospitalist and Family for coordination of care          Hector Mckenna MD

## 2021-03-09 NOTE — PROGRESS NOTES
Problem: Patient Education: Go to Patient Education Activity  Goal: Patient/Family Education  Outcome: Progressing Towards Goal     Problem: Pneumonia: Day 1  Goal: Off Pathway (Use only if patient is Off Pathway)  Outcome: Progressing Towards Goal  Goal: Activity/Safety  Outcome: Progressing Towards Goal  Goal: Consults, if ordered  Outcome: Progressing Towards Goal  Goal: Diagnostic Test/Procedures  Outcome: Progressing Towards Goal  Goal: Nutrition/Diet  Outcome: Progressing Towards Goal  Goal: Medications  Outcome: Progressing Towards Goal  Goal: Respiratory  Outcome: Progressing Towards Goal  Goal: Treatments/Interventions/Procedures  Outcome: Progressing Towards Goal  Goal: Psychosocial  Outcome: Progressing Towards Goal  Goal: *Oxygen saturation within defined limits  Outcome: Progressing Towards Goal  Goal: *Influenza vaccine administered (October-March)  Outcome: Progressing Towards Goal  Goal: *Pneumoccocal vaccine administered  Outcome: Progressing Towards Goal  Goal: *Hemodynamically stable  Outcome: Progressing Towards Goal  Goal: *Demonstrates progressive activity  Outcome: Progressing Towards Goal  Goal: *Tolerating diet  Outcome: Progressing Towards Goal     Problem: Pneumonia: Day 2  Goal: Off Pathway (Use only if patient is Off Pathway)  Outcome: Progressing Towards Goal  Goal: Activity/Safety  Outcome: Progressing Towards Goal  Goal: Consults, if ordered  Outcome: Progressing Towards Goal  Goal: Diagnostic Test/Procedures  Outcome: Progressing Towards Goal  Goal: Nutrition/Diet  Outcome: Progressing Towards Goal  Goal: Discharge Planning  Outcome: Progressing Towards Goal  Goal: Medications  Outcome: Progressing Towards Goal  Goal: Respiratory  Outcome: Progressing Towards Goal  Goal: Treatments/Interventions/Procedures  Outcome: Progressing Towards Goal  Goal: Psychosocial  Outcome: Progressing Towards Goal  Goal: *Oxygen saturation within defined limits  Outcome: Progressing Towards Goal  Goal: *Hemodynamically stable  Outcome: Progressing Towards Goal  Goal: *Demonstrates progressive activity  Outcome: Progressing Towards Goal  Goal: *Tolerating diet  Outcome: Progressing Towards Goal  Goal: *Optimal pain control at patient's stated goal  Outcome: Progressing Towards Goal     Problem: Pneumonia: Discharge Outcomes  Goal: *Demonstrates progressive activity  Outcome: Progressing Towards Goal  Goal: *Describes follow-up/return visits to physicians  Outcome: Progressing Towards Goal  Goal: *Tolerating diet  Outcome: Progressing Towards Goal  Goal: *Verbalizes name, dosage, time, side effects, and number of days to continue medications  Outcome: Progressing Towards Goal  Goal: *Influenza immunization  Outcome: Progressing Towards Goal  Goal: *Pneumococcal immunization  Outcome: Progressing Towards Goal  Goal: *Respiratory status at baseline  Outcome: Progressing Towards Goal  Goal: *Vital signs within defined limits  Outcome: Progressing Towards Goal  Goal: *Describes available resources and support systems  Outcome: Progressing Towards Goal  Goal: *Optimal pain control at patient's stated goal  Outcome: Progressing Towards Goal     Problem: Falls - Risk of  Goal: *Absence of Falls  Description: Document [facility] Fall Risk and appropriate interventions in the flowsheet.   Outcome: Progressing Towards Goal  Note: Fall Risk Interventions:  Mobility Interventions: Patient to call before getting OOB                             Problem: Patient Education: Go to Patient Education Activity  Goal: Patient/Family Education  Outcome: Progressing Towards Goal

## 2021-03-09 NOTE — PROGRESS NOTES
Problem: Pneumonia: Day 1  Goal: Nutrition/Diet  Outcome: Progressing Towards Goal  Note: PATIENT IS TOLERATING HER DIET AND REPORTS NO NAUSEA

## 2021-03-10 ENCOUNTER — APPOINTMENT (OUTPATIENT)
Dept: NON INVASIVE DIAGNOSTICS | Age: 61
DRG: 177 | End: 2021-03-10
Attending: FAMILY MEDICINE
Payer: COMMERCIAL

## 2021-03-10 LAB
ALBUMIN SERPL ELPH-MCNC: 2.3 G/DL (ref 2.9–4.4)
ALBUMIN SERPL-MCNC: 2 G/DL (ref 3.5–5)
ALBUMIN SERPL-MCNC: 2 G/DL (ref 3.5–5)
ALBUMIN/GLOB SERPL: 0.5 {RATIO} (ref 1.1–2.2)
ALBUMIN/GLOB SERPL: 0.7 {RATIO} (ref 0.7–1.7)
ALP SERPL-CCNC: 116 U/L (ref 45–117)
ALPHA1 GLOB SERPL ELPH-MCNC: 0.3 G/DL (ref 0–0.4)
ALPHA2 GLOB SERPL ELPH-MCNC: 1 G/DL (ref 0.4–1)
ALT SERPL-CCNC: 21 U/L (ref 12–78)
ANION GAP SERPL CALC-SCNC: 6 MMOL/L (ref 5–15)
ANION GAP SERPL CALC-SCNC: 7 MMOL/L (ref 5–15)
AST SERPL W P-5'-P-CCNC: 16 U/L (ref 15–37)
B-GLOBULIN SERPL ELPH-MCNC: 0.9 G/DL (ref 0.7–1.3)
BACTERIA SPEC CULT: NORMAL
BASOPHILS # BLD: 0 K/UL (ref 0–0.1)
BASOPHILS NFR BLD: 0 % (ref 0–1)
BILIRUB SERPL-MCNC: 0.2 MG/DL (ref 0.2–1)
BUN SERPL-MCNC: 72 MG/DL (ref 6–20)
BUN SERPL-MCNC: 73 MG/DL (ref 6–20)
BUN/CREAT SERPL: 12 (ref 12–20)
BUN/CREAT SERPL: 12 (ref 12–20)
CA-I BLD-MCNC: 9 MG/DL (ref 8.5–10.1)
CA-I BLD-MCNC: 9 MG/DL (ref 8.5–10.1)
CHLORIDE SERPL-SCNC: 109 MMOL/L (ref 97–108)
CHLORIDE SERPL-SCNC: 110 MMOL/L (ref 97–108)
CO2 SERPL-SCNC: 25 MMOL/L (ref 21–32)
CO2 SERPL-SCNC: 25 MMOL/L (ref 21–32)
CREAT SERPL-MCNC: 6.01 MG/DL (ref 0.55–1.02)
CREAT SERPL-MCNC: 6.01 MG/DL (ref 0.55–1.02)
DIFFERENTIAL METHOD BLD: ABNORMAL
EOSINOPHIL # BLD: 0 K/UL (ref 0–0.4)
EOSINOPHIL NFR BLD: 0 % (ref 0–7)
ERYTHROCYTE [DISTWIDTH] IN BLOOD BY AUTOMATED COUNT: 15.2 % (ref 11.5–14.5)
GAMMA GLOB SERPL ELPH-MCNC: 0.9 G/DL (ref 0.4–1.8)
GLOBULIN SER CALC-MCNC: 3.2 G/DL (ref 2.2–3.9)
GLOBULIN SER CALC-MCNC: 4.4 G/DL (ref 2–4)
GLUCOSE BLD STRIP.AUTO-MCNC: 135 MG/DL (ref 65–100)
GLUCOSE BLD STRIP.AUTO-MCNC: 167 MG/DL (ref 65–100)
GLUCOSE BLD STRIP.AUTO-MCNC: 74 MG/DL (ref 65–100)
GLUCOSE SERPL-MCNC: 109 MG/DL (ref 65–100)
GLUCOSE SERPL-MCNC: 109 MG/DL (ref 65–100)
HCT VFR BLD AUTO: 28.5 % (ref 35–47)
HGB BLD-MCNC: 9 G/DL (ref 11.5–16)
IMM GRANULOCYTES # BLD AUTO: 0 K/UL (ref 0–0.04)
IMM GRANULOCYTES NFR BLD AUTO: 0 % (ref 0–0.5)
LYMPHOCYTES # BLD: 0.5 K/UL (ref 0.8–3.5)
LYMPHOCYTES NFR BLD: 5 % (ref 12–49)
M PROTEIN SERPL ELPH-MCNC: ABNORMAL G/DL
MCH RBC QN AUTO: 27.3 PG (ref 26–34)
MCHC RBC AUTO-ENTMCNC: 31.6 G/DL (ref 30–36.5)
MCV RBC AUTO: 86.4 FL (ref 80–99)
MONOCYTES # BLD: 0.4 K/UL (ref 0–1)
MONOCYTES NFR BLD: 4 % (ref 5–13)
NEUTS SEG # BLD: 9.6 K/UL (ref 1.8–8)
NEUTS SEG NFR BLD: 91 % (ref 32–75)
PERFORMED BY, TECHID: ABNORMAL
PERFORMED BY, TECHID: ABNORMAL
PERFORMED BY, TECHID: NORMAL
PHOSPHATE SERPL-MCNC: 5 MG/DL (ref 2.6–4.7)
PLATELET # BLD AUTO: 206 K/UL (ref 150–400)
PMV BLD AUTO: 10.8 FL (ref 8.9–12.9)
POTASSIUM SERPL-SCNC: 4.9 MMOL/L (ref 3.5–5.1)
POTASSIUM SERPL-SCNC: 4.9 MMOL/L (ref 3.5–5.1)
PROT SERPL-MCNC: 5.5 G/DL (ref 6–8.5)
PROT SERPL-MCNC: 6.4 G/DL (ref 6.4–8.2)
RBC # BLD AUTO: 3.3 M/UL (ref 3.8–5.2)
SODIUM SERPL-SCNC: 141 MMOL/L (ref 136–145)
SODIUM SERPL-SCNC: 141 MMOL/L (ref 136–145)
SPECIAL REQUESTS,SREQ: NORMAL
WBC # BLD AUTO: 10.5 K/UL (ref 3.6–11)

## 2021-03-10 PROCEDURE — 74011250636 HC RX REV CODE- 250/636: Performed by: FAMILY MEDICINE

## 2021-03-10 PROCEDURE — 85025 COMPLETE CBC W/AUTO DIFF WBC: CPT

## 2021-03-10 PROCEDURE — 97165 OT EVAL LOW COMPLEX 30 MIN: CPT

## 2021-03-10 PROCEDURE — 97116 GAIT TRAINING THERAPY: CPT

## 2021-03-10 PROCEDURE — 82962 GLUCOSE BLOOD TEST: CPT

## 2021-03-10 PROCEDURE — 93306 TTE W/DOPPLER COMPLETE: CPT

## 2021-03-10 PROCEDURE — 80053 COMPREHEN METABOLIC PANEL: CPT

## 2021-03-10 PROCEDURE — 74011250636 HC RX REV CODE- 250/636: Performed by: INTERNAL MEDICINE

## 2021-03-10 PROCEDURE — 65270000029 HC RM PRIVATE

## 2021-03-10 PROCEDURE — 74011000258 HC RX REV CODE- 258: Performed by: NURSE PRACTITIONER

## 2021-03-10 PROCEDURE — 36415 COLL VENOUS BLD VENIPUNCTURE: CPT

## 2021-03-10 PROCEDURE — 74011250637 HC RX REV CODE- 250/637: Performed by: NURSE PRACTITIONER

## 2021-03-10 PROCEDURE — 74011250636 HC RX REV CODE- 250/636: Performed by: NURSE PRACTITIONER

## 2021-03-10 PROCEDURE — 97161 PT EVAL LOW COMPLEX 20 MIN: CPT

## 2021-03-10 PROCEDURE — 80069 RENAL FUNCTION PANEL: CPT

## 2021-03-10 PROCEDURE — 74011250637 HC RX REV CODE- 250/637: Performed by: INTERNAL MEDICINE

## 2021-03-10 PROCEDURE — 97530 THERAPEUTIC ACTIVITIES: CPT

## 2021-03-10 RX ORDER — AMLODIPINE BESYLATE 5 MG/1
10 TABLET ORAL DAILY
Status: DISCONTINUED | OUTPATIENT
Start: 2021-03-11 | End: 2021-03-18 | Stop reason: HOSPADM

## 2021-03-10 RX ORDER — CARVEDILOL 12.5 MG/1
12.5 TABLET ORAL 2 TIMES DAILY WITH MEALS
Status: DISCONTINUED | OUTPATIENT
Start: 2021-03-10 | End: 2021-03-18 | Stop reason: HOSPADM

## 2021-03-10 RX ORDER — ISOSORBIDE DINITRATE 10 MG/1
20 TABLET ORAL 3 TIMES DAILY
Status: DISCONTINUED | OUTPATIENT
Start: 2021-03-10 | End: 2021-03-18 | Stop reason: HOSPADM

## 2021-03-10 RX ORDER — HYDRALAZINE HYDROCHLORIDE 50 MG/1
50 TABLET, FILM COATED ORAL 3 TIMES DAILY
Status: DISCONTINUED | OUTPATIENT
Start: 2021-03-10 | End: 2021-03-11

## 2021-03-10 RX ADMIN — ISOSORBIDE DINITRATE 20 MG: 10 TABLET ORAL at 22:39

## 2021-03-10 RX ADMIN — FUROSEMIDE 80 MG: 10 INJECTION, SOLUTION INTRAMUSCULAR; INTRAVENOUS at 09:51

## 2021-03-10 RX ADMIN — Medication 10 ML: at 06:00

## 2021-03-10 RX ADMIN — DEXAMETHASONE SODIUM PHOSPHATE 4 MG: 4 INJECTION, SOLUTION INTRA-ARTICULAR; INTRALESIONAL; INTRAMUSCULAR; INTRAVENOUS; SOFT TISSUE at 17:13

## 2021-03-10 RX ADMIN — PIPERACILLIN AND TAZOBACTAM 3.38 G: 3; .375 INJECTION, POWDER, LYOPHILIZED, FOR SOLUTION INTRAVENOUS at 06:00

## 2021-03-10 RX ADMIN — Medication 10 ML: at 17:09

## 2021-03-10 RX ADMIN — HEPARIN SODIUM 5000 UNITS: 5000 INJECTION INTRAVENOUS; SUBCUTANEOUS at 22:39

## 2021-03-10 RX ADMIN — ISOSORBIDE DINITRATE 20 MG: 10 TABLET ORAL at 17:13

## 2021-03-10 RX ADMIN — FAMOTIDINE 20 MG: 20 TABLET, FILM COATED ORAL at 10:02

## 2021-03-10 RX ADMIN — CARVEDILOL 12.5 MG: 12.5 TABLET, FILM COATED ORAL at 17:13

## 2021-03-10 RX ADMIN — FUROSEMIDE 80 MG: 10 INJECTION, SOLUTION INTRAMUSCULAR; INTRAVENOUS at 22:38

## 2021-03-10 RX ADMIN — AZITHROMYCIN DIHYDRATE 500 MG: 500 INJECTION, POWDER, LYOPHILIZED, FOR SOLUTION INTRAVENOUS at 13:40

## 2021-03-10 RX ADMIN — DEXAMETHASONE SODIUM PHOSPHATE 4 MG: 4 INJECTION, SOLUTION INTRA-ARTICULAR; INTRALESIONAL; INTRAMUSCULAR; INTRAVENOUS; SOFT TISSUE at 01:00

## 2021-03-10 RX ADMIN — HEPARIN SODIUM 5000 UNITS: 5000 INJECTION INTRAVENOUS; SUBCUTANEOUS at 10:04

## 2021-03-10 RX ADMIN — DEXAMETHASONE SODIUM PHOSPHATE 4 MG: 4 INJECTION, SOLUTION INTRA-ARTICULAR; INTRALESIONAL; INTRAMUSCULAR; INTRAVENOUS; SOFT TISSUE at 09:51

## 2021-03-10 RX ADMIN — PIPERACILLIN AND TAZOBACTAM 3.38 G: 3; .375 INJECTION, POWDER, LYOPHILIZED, FOR SOLUTION INTRAVENOUS at 17:08

## 2021-03-10 RX ADMIN — HYDRALAZINE HYDROCHLORIDE 50 MG: 50 TABLET, FILM COATED ORAL at 17:12

## 2021-03-10 RX ADMIN — HEPARIN SODIUM 5000 UNITS: 5000 INJECTION INTRAVENOUS; SUBCUTANEOUS at 03:00

## 2021-03-10 RX ADMIN — HYDRALAZINE HYDROCHLORIDE 50 MG: 50 TABLET, FILM COATED ORAL at 22:39

## 2021-03-10 RX ADMIN — Medication 10 ML: at 22:40

## 2021-03-10 NOTE — PROGRESS NOTES
Problem: Patient Education: Go to Patient Education Activity  Goal: Patient/Family Education  Outcome: Progressing Towards Goal     Problem: Pneumonia: Day 1  Goal: Off Pathway (Use only if patient is Off Pathway)  Outcome: Progressing Towards Goal  Goal: Activity/Safety  Outcome: Progressing Towards Goal  Goal: Consults, if ordered  Outcome: Progressing Towards Goal  Goal: Diagnostic Test/Procedures  Outcome: Progressing Towards Goal  Goal: Nutrition/Diet  Outcome: Progressing Towards Goal  Goal: Medications  Outcome: Progressing Towards Goal  Goal: Respiratory  Outcome: Progressing Towards Goal  Goal: Treatments/Interventions/Procedures  Outcome: Progressing Towards Goal  Goal: Psychosocial  Outcome: Progressing Towards Goal  Goal: *Oxygen saturation within defined limits  Outcome: Progressing Towards Goal  Goal: *Influenza vaccine administered (October-March)  Outcome: Progressing Towards Goal  Goal: *Pneumoccocal vaccine administered  Outcome: Progressing Towards Goal  Goal: *Hemodynamically stable  Outcome: Progressing Towards Goal  Goal: *Demonstrates progressive activity  Outcome: Progressing Towards Goal  Goal: *Tolerating diet  Outcome: Progressing Towards Goal     Problem: Pneumonia: Day 2  Goal: Off Pathway (Use only if patient is Off Pathway)  Outcome: Progressing Towards Goal  Goal: Activity/Safety  Outcome: Progressing Towards Goal  Goal: Consults, if ordered  Outcome: Progressing Towards Goal  Goal: Diagnostic Test/Procedures  Outcome: Progressing Towards Goal  Goal: Nutrition/Diet  Outcome: Progressing Towards Goal  Goal: Discharge Planning  Outcome: Progressing Towards Goal  Goal: Medications  Outcome: Progressing Towards Goal  Goal: Respiratory  Outcome: Progressing Towards Goal  Goal: Treatments/Interventions/Procedures  Outcome: Progressing Towards Goal  Goal: Psychosocial  Outcome: Progressing Towards Goal  Goal: *Oxygen saturation within defined limits  Outcome: Progressing Towards Goal  Goal: *Hemodynamically stable  Outcome: Progressing Towards Goal  Goal: *Demonstrates progressive activity  Outcome: Progressing Towards Goal  Goal: *Tolerating diet  Outcome: Progressing Towards Goal  Goal: *Optimal pain control at patient's stated goal  Outcome: Progressing Towards Goal     Problem: Pneumonia: Day 3  Goal: Off Pathway (Use only if patient is Off Pathway)  Outcome: Progressing Towards Goal  Goal: Activity/Safety  Outcome: Progressing Towards Goal  Goal: Consults, if ordered  Outcome: Progressing Towards Goal  Goal: Diagnostic Test/Procedures  Outcome: Progressing Towards Goal  Goal: Nutrition/Diet  Outcome: Progressing Towards Goal  Goal: Discharge Planning  Outcome: Progressing Towards Goal  Goal: Medications  Outcome: Progressing Towards Goal  Goal: Respiratory  Outcome: Progressing Towards Goal  Goal: Treatments/Interventions/Procedures  Outcome: Progressing Towards Goal  Goal: Psychosocial  Outcome: Progressing Towards Goal  Goal: *Oxygen saturation within defined limits  Outcome: Progressing Towards Goal  Goal: *Hemodynamically stable  Outcome: Progressing Towards Goal  Goal: *Demonstrates progressive activity  Outcome: Progressing Towards Goal  Goal: *Tolerating diet  Outcome: Progressing Towards Goal  Goal: *Describes available resources and support systems  Outcome: Progressing Towards Goal  Goal: *Optimal pain control at patient's stated goal  Outcome: Progressing Towards Goal     Problem: Pneumonia: Discharge Outcomes  Goal: *Demonstrates progressive activity  Outcome: Progressing Towards Goal  Goal: *Describes follow-up/return visits to physicians  Outcome: Progressing Towards Goal  Goal: *Tolerating diet  Outcome: Progressing Towards Goal  Goal: *Verbalizes name, dosage, time, side effects, and number of days to continue medications  Outcome: Progressing Towards Goal  Goal: *Influenza immunization  Outcome: Progressing Towards Goal  Goal: *Pneumococcal immunization  Outcome: Progressing Towards Goal  Goal: *Respiratory status at baseline  Outcome: Progressing Towards Goal  Goal: *Vital signs within defined limits  Outcome: Progressing Towards Goal  Goal: *Describes available resources and support systems  Outcome: Progressing Towards Goal  Goal: *Optimal pain control at patient's stated goal  Outcome: Progressing Towards Goal     Problem: Falls - Risk of  Goal: *Absence of Falls  Description: Document Chrissy Michele Fall Risk and appropriate interventions in the flowsheet.   Outcome: Progressing Towards Goal  Note: Fall Risk Interventions:  Mobility Interventions: Bed/chair exit alarm, Patient to call before getting OOB                             Problem: Patient Education: Go to Patient Education Activity  Goal: Patient/Family Education  Outcome: Progressing Towards Goal

## 2021-03-10 NOTE — WOUND CARE
Wound Care Note:    Wound Care into see patient because of wounds to BLEs. However, patient has scar tissue from previous wounds. No open or draining wounds at this time. Provided patient with Remedy Moisturizing Lotion for dry skin. Advised patient to follow up with PCP for new compression stockings. Patient stated that her current stockings are ripped. Elevate BLEs with pillows while in bed and in chair to help control swelling. Patient is ambulatory and able to reposition self in bed. Will continue to follow. Skin Care & Pressure Relief Recommendations:  Minimize layers of linen  Pads under patient to optimize support surface and microclimate  Turn/reposition approximately every 2 hours. Pillow Wedges  Manage incontinence  Promote continence; Skin Protective lotion to buttocks and sacrum daily and as needed with incontinence care    Offload heels with pillows or offloading boots. Discussed above plan with patient.       Transition of Care: Plan to follow weekly while admitted to hospital.

## 2021-03-10 NOTE — PROGRESS NOTES
PHYSICAL THERAPY EVALUATION  Patient: Tab Rodriguez (24 y.o. female)  Date: 3/10/2021  Primary Diagnosis: PNA (pneumonia) [J18.9]        Precautions: fall  ASSESSMENT  Pt is a 63 y/o F with PMH of type 2 diabetes, chronic kidney disease, and CHF, presenting to [facility] ED with cc of abdominal pain and SOB. Per medical chart, pt has been having  symptoms for 1 week with assosicated nausea. Work-up done shows acute kidney injury, elevated BNP. Pt also COVID-19 (+). Pt admitted 3/8/21 and being treated for PNA. Pt received semi-supine in bed upon arrival, currently on RA, AXO x4, and agreeable to PT/OT evaluations at this time. 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 Pt reports does not currently own any DME, denies fall hx. Based on current observations, pt demonstrates good functional activity tolerance, AROM/strength, coordination, bed mobility, and static/dyanamic sitting/standing balance with no LOB observed. Pt performs bed mobility/supine>sit IND. She transfers sit><stand to/from EOB and chair IND, ambulated in room x2. Pt reports no difficulty with  getting herself to/from bathroom during hospital stay. Denies any SOB or dizziness. Pt educated on PE HEP and energy conservation strategies with good understanding verbalized. At this time, pt with no acute PT needs, performing mobility at IND level or functional baseline. Patient reported she had difficulty lifting her leg while any higher step or curb. she reported would like someone to come at home for [facility]. after seeing her mobility level and her ability to get out of the house, she was educated that she can benefit more from OP therapy and not HH. she also can go to gym for water aerobics and get stronger . No further skilled PT required. Will d/c PT at this time as pt at functional baseline. Pt in agreement.  Recommend d/c home with family once medically appropriate. PLAN :  Recommendations and Planned Interventions:OP therapy   Frequency/Duration: Patient will be followed by physical therapy:  eval only  Recommendation for discharge: (in order for the patient to meet his/her long term goals)  Outpatient physical therapy follow up recommended for LE strengthening    This discharge recommendation:  Has been made in collaboration with the attending provider and/or case management    IF patient discharges home will need the following DME: to be determined (TBD)         SUBJECTIVE:   Patient stated i need HH for leg exs.     OBJECTIVE DATA SUMMARY:   HISTORY:    Past Medical History:   Diagnosis Date    Hypertension     Morbid obesity ([facility] [address]    No past surgical history on file. Personal factors and/or comorbidities impacting plan of care:   Home Situation  Home Environment: Private residence  # Steps to Enter: 8  Rails to Enter: Yes  Hand Rails : Bilateral  One/Two Story Residence: One story  Living Alone: No(Per pt, lives with daughter)  Support Systems: Family member(s)  Patient Expects to be Discharged to[de-identified] Private residence  Current DME Used/Available at Home: None  Tub or Shower Type: Tub/Shower combination    EXAMINATION/PRESENTATION/DECISION MAKING:   Critical Behavior:  Neurologic State: Alert  Orientation Level: Oriented X4  Cognition: Follows commands, Appropriate decision making     Hearing: Auditory  Auditory Impairment: None  Edema: jean carlos le  Range Of Motion:  AROM: Within functional limits                       Strength:    Strength: Within functional limits                              Coordination:  Coordination: Within functional limits       Functional Mobility:  Bed Mobility:  Rolling: Independent  Supine to Sit: Independent     Scooting: Independent  Transfers:  Sit to Stand: Independent  Stand to Sit: Independent        Bed to Chair: Independent              Balance:   Sitting: Intact; Without support  Standing: Intact; Without support    Therapeutic Exercises:   AP, LAQ, reviewed and confirm with better technics to perform exs ,SLR   Standing at sink, marching, 4 way hip SLR  Functional Measure:  [facility] Mobility Inpatient Short Form  How much difficulty does the patient currently have. .. Unable A Lot A Little None   1. Turning over in bed (including adjusting bedclothes, sheets and blankets)? [] 1   [] 2   [] 3   [x] 4   2. Sitting down on and standing up from a chair with arms ( e.g., wheelchair, bedside commode, etc.)   [] 1   [] 2   [] 3   [x] 4   3. Moving from lying on back to sitting on the side of the bed? [] 1   [] 2   [] 3   [x] 4          How much help from another person does the patient currently need. .. Total A Lot A Little None   4. Moving to and from a bed to a chair (including a wheelchair)? [] 1   [] 2   [] 3   [x] 4   5. Need to walk in hospital room? [] 1   [] 2   [] 3   [x] 4   6. Climbing 3-5 steps with a railing? [] 1   [] 2   [] 3   [x] 4   © 2007, Trustees of [address] [place] [address] [address] [address], under license to [email or web address]. All rights reserved     Score:  Initial: 24/24 Most Recent: X (Date: 03/10/2021 )   Interpretation of Tool:  Represents activities that are increasingly more difficult (i.e. Bed mobility, Transfers, Gait).   Score 24 23 22-20 19-15 14-10 9-7 6   Modifier CH CI CJ CK CL CM CN           Physical Therapy Evaluation Charge Determination   History Examination Presentation Decision-Making   LOW Complexity : Zero comorbidities / personal factors that will impact the outcome / POC LOW Complexity : 1-2 Standardized tests and measures addressing body structure, function, activity limitation and / or participation in recreation  LOW Complexity : Stable, uncomplicated  LOW Complexity : FOTO score of       Based on the above components, the patient evaluation is determined to be of the following complexity level: LOW     Pain Ratin/10    Activity Tolerance: WNL  Please refer to the flowsheet for vital signs taken during this treatment. After treatment patient left in no apparent distress:   Sitting in chair, Call bell within reach, and Caregiver / family present    COMMUNICATION/EDUCATION:   The patients plan of care was discussed with: Occupational therapist and Registered nurse. Patient/family agree to work toward stated goals and plan of care.     Thank you for this referral.  Anders Xiong PT

## 2021-03-10 NOTE — PROGRESS NOTES
Progress Note      3/10/2021 7:51 AM  NAME: Tab Rodriguez   MRN:  [id number]   Admit Diagnosis: PNA (pneumonia) [J18.9]      Problem List:   1. COVID-19 disease  2. Noncompliance   3. Covid pneumonitis  4. Chronic heart failure with recovered ejection fraction (HFrecEF )  5. Grade II (moderate) diastolic dysfunction, or pseudonormalization  6. Mild to moderate pulmonary hypertension (RVSP =45 mmHg)  7. Nonocclusive coronary artery disease  8. Hypertension  9. Type 2 diabetes  10. Former smoker (stopped 6 years ago)     11. Class III (severe) obesity (BMI = 41.8 kg/m²)  12. Acute kidney injury/chronic kidney disease (stage IV)  13. Nephrotic syndrome most likely diabetic nephropathy  14. Fluid overload  15. Chronic hypoalbuminemia, due to nephrotic syndrome  16. Edema  17. Anemia  18. Hypocalcemia       Subjective: The patient is seen and examined in room 525. There were no acute cardiovascular events reported overnight. Currently, she denies any cardiovascular complaints. She specifically denies chest pain or shortness of breath. Discussed with RN events overnight.      Medications Personally Reviewed:    Current Facility-Administered Medications   Medication Dose Route Frequency    heparin (porcine) injection 5,000 Units  5,000 Units SubCUTAneous Q8H    furosemide (LASIX) injection 80 mg  80 mg IntraVENous BID    sodium chloride (NS) flush 5-40 mL  5-40 mL IntraVENous Q8H    sodium chloride (NS) flush 5-40 mL  5-40 mL IntraVENous PRN    acetaminophen (TYLENOL) tablet 650 mg  650 mg Oral Q6H PRN    Or    acetaminophen (TYLENOL) suppository 650 mg  650 mg Rectal Q6H PRN    polyethylene glycol (MIRALAX) packet 17 g  17 g Oral DAILY PRN    promethazine (PHENERGAN) tablet 12.5 mg  12.5 mg Oral Q6H PRN    Or    ondansetron (ZOFRAN) injection 4 mg  4 mg IntraVENous Q6H PRN    famotidine (PEPCID) tablet 20 mg  20 mg Oral DAILY    dexamethasone (DECADRON) 4 mg/mL injection 4 mg  4 mg IntraVENous Q8H    piperacillin-tazobactam (ZOSYN) 3.375 g in 0.9% sodium chloride (MBP/ADV) 100 mL MBP  3.375 g IntraVENous Q12H    azithromycin (ZITHROMAX) 500 mg in 0.9% sodium chloride 250 mL (VIAL-MATE)  500 mg IntraVENous Q24H           Objective:      Physical Exam:  Last 24hrs VS reviewed since prior progress note. Most recent are:    Visit Vitals  BP (!) 140/83 Comment: BP after pt took home BP meds   Pulse 78   Temp 97.5 °F (36.4 °C)   Resp 16   SpO2 100%   Breastfeeding No       Intake/Output Summary (Last 24 hours) at 3/10/2021 0751  Last data filed at 3/9/2021 1721  Gross per 24 hour   Intake 340 ml   Output 700 ml   Net -360 ml        Physical exam: Essentially unchanged    Data Review    Telemetry: Sinus rhythm with rare ventricular ectopy    EKG:   [x]  No new EKG for review    Lab Data Personally Reviewed:    Recent Labs     03/09/21  0943 03/08/21  1145   WBC 4.4 5.1   HGB 9.5* 8.0*   HCT 30.0* 25.8*    193     Recent Labs     03/08/21  1145   INR 1.1   PTP 14.6   APTT 30.0      Recent Labs     03/09/21  0943 03/08/21  1145    139   K 5.2* 4.9   * 108   CO2 18* 22   BUN 61* 53*   CREA 5.49* 5.40*   * 103*   CA 9.2 8.3*   MG  --  1.8     Recent Labs     03/09/21  0943 03/08/21  1145   *  --    TROIQ  --  <0.05     No results found for: CHOL, CHOLX, CHLST, CHOLV, HDL, HDLP, LDL, LDLC, DLDLP, TGLX, TRIGL, TRIGP, CHHD, CHHDX    Recent Labs     03/09/21 0943 03/08/21  1145   * 122*   TP 6.5 5.6*   ALB 2.0* 1.9*   GLOB 4.5* 3.7     No results for input(s): PH, PCO2, PO2 in the last 72 hours. Assessment/Plan:   . Continue telemetry monitoring while hospitalized  2. Continue to monitor serum electrolytes, and renal function (recheck serum potassium)  3. Continue to monitor fluid balance, and daily weights  4. Continue current cardiovascular medications including furosemide, and heparin  5.   Will continue to follow with the     Warrenhuong Nieves, MD

## 2021-03-10 NOTE — CONSULTS
PULMONARY NOTE  [facility]    Name: Júnior Ratliff MRN: [id number]   : 1960 Hospital: [address] [place] [address]   Date: 3/10/2021  Admission date: 3/8/2021 Hospital Day: 3       HPI:     Hospital Problems  Never Reviewed          Codes Class Noted POA    PNA (pneumonia) ICD-10-CM: J18.9  ICD-9-CM: 267  3/8/2021 Unknown                   [x] High complexity decision making was performed  [x] See my orders for details      Subjective/Initial History:     I was asked by Reyes Cough, MD to see Júnior Ratliff  a 62 y.o.  female in consultation     Excerpts from admission 3/8/2021 or consult notes as follows:   52-year-old lady came in because of abdominal discomfort significant past medical history of congestive heart failure chronic kidney disease and type 2 diabetes mellitus she was complaining of shortness of breath dyspnea and feeling nauseous for the past 1 week no history of passing out so admitted and she is COVID-19 positive so pulmonary consult was called for further evaluation.       Allergies   Allergen Reactions    Doxycycline Swelling    Tetracycline Swelling        MAR reviewed and pertinent medications noted or modified as needed     Current Facility-Administered Medications   Medication    heparin (porcine) injection 5,000 Units    furosemide (LASIX) injection 80 mg    sodium chloride (NS) flush 5-40 mL    sodium chloride (NS) flush 5-40 mL    acetaminophen (TYLENOL) tablet 650 mg    Or    acetaminophen (TYLENOL) suppository 650 mg    polyethylene glycol (MIRALAX) packet 17 g    promethazine (PHENERGAN) tablet 12.5 mg    Or    ondansetron (ZOFRAN) injection 4 mg    famotidine (PEPCID) tablet 20 mg    dexamethasone (DECADRON) 4 mg/mL injection 4 mg    piperacillin-tazobactam (ZOSYN) 3.375 g in 0.9% sodium chloride (MBP/ADV) 100 mL MBP    azithromycin (ZITHROMAX) 500 mg in 0.9% sodium chloride 250 mL (VIAL-MATE)      Patient PCP: Other, Phys, MD  PMH:  has a past medical history of Hypertension and Morbid obesity ([address] PSH:   has no past surgical history on file. FHX: family history is not on file. SHX:       ROS:    Review of Systems   Constitutional: Negative. HENT: Negative. Eyes: Negative. Respiratory: Positive for shortness of breath. Cardiovascular: Negative. Gastrointestinal: Positive for abdominal pain and nausea. Genitourinary: Negative. Musculoskeletal: Negative. Skin: Negative. Neurological: Positive for weakness. Endo/Heme/Allergies: Negative. Psychiatric/Behavioral: Negative. Objective:     Vital Signs: Telemetry:    normal sinus rhythm Intake/Output:   Visit Vitals  BP (!) 140/83 (BP 1 Location: Left upper arm, BP Patient Position: At rest)   Pulse 72   Temp 98.1 °F (36.7 °C)   Resp 16   SpO2 96%   Breastfeeding No       Temp (24hrs), Av.7 °F (36.5 °C), Min:97.2 °F (36.2 °C), Max:98.1 °F (36.7 °C)        O2 Device: Room air O2 Flow Rate (L/min): 2 l/min       Wt Readings from Last 4 Encounters:   No data found for Wt          Intake/Output Summary (Last 24 hours) at 3/10/2021 0959  Last data filed at 3/9/2021 1721  Gross per 24 hour   Intake 340 ml   Output 700 ml   Net -360 ml       Last shift:      No intake/output data recorded. Last 3 shifts:  1901 - 03/10 0700  In: 460 [P.O.:360; I.V.:100]  Out: 700 [Urine:700]       Physical Exam:     Physical Exam   Constitutional: She appears distressed. HENT:   Head: Normocephalic and atraumatic. Eyes: Pupils are equal, round, and reactive to light. Conjunctivae and EOM are normal.   Neck: Normal range of motion. Neck supple. Cardiovascular: Normal rate and regular rhythm. Pulmonary/Chest: Effort normal and breath sounds normal.   Abdominal: Soft. Bowel sounds are normal.   Musculoskeletal: Normal range of motion. Neurological: She is alert. Psychiatric: She has a normal mood and affect.         Labs:    Recent Labs 03/09/21  0943 03/08/21  1145   WBC 4.4 5.1   HGB 9.5* 8.0*    193   INR  --  1.1   APTT  --  30.0     Recent Labs     03/09/21  0943 03/08/21  1145    139   K 5.2* 4.9   * 108   CO2 18* 22   * 103*   BUN 61* 53*   CREA 5.49* 5.40*   CA 9.2 8.3*   MG  --  1.8   PHOS 5.3*  --    LAC  --  0.5   ALB 2.0* 1.9*   ALT 25 22     No results for input(s): PH, PCO2, PO2, HCO3, FIO2 in the last 72 hours. Recent Labs     03/09/21  0943 03/08/21  1145   *  --    TROIQ  --  <0.05     No results found for: BNPP, BNP   Lab Results   Component Value Date/Time    Culture result: No Growth (<1000 cfu/mL) 03/08/2021 07:00 PM    Culture result: No growth 2 days 03/08/2021 01:05 PM     Lab Results   Component Value Date/Time    TSH 1.57 03/08/2021 11:45 AM       Imaging:    CXR Results  (Last 48 hours)               03/08/21 1203  XR CHEST SNGL V Final result    Narrative:  1 new comparison June 21       Cardiomegaly with congestion. Mild interstitial edema. Localized airspace   disease right perihilar. No effusion or pneumothorax           Results from [place] encounter on 03/08/21   XR CHEST SNGL V    Narrative 1 new comparison June 21    Cardiomegaly with congestion. Mild interstitial edema. Localized airspace  disease right perihilar. No effusion or pneumothorax     No results found for this or any previous visit. IMPRESSION:   1. COVID-19 pneumonia  2. Acute on chronic kidney disease   3. Congestive heart failure  4. Mild pulmonary hypertension  5. Ex COPD  6. Anemia of chronic disease  7. Additional workup outlined below  8. Pt is requiring Drug therapy requiring intensive monitoring for toxicity  9. Prognosis guarded       RECOMMENDATIONS/PLAN:     1. She is on room air  2. She is on Decadron Zosyn and Zithromax not a candidate for remdesivir or Actemra  3. Agree with Empiric IV antibiotics pending culture results   4. Follow culture results  5.  Chest x-ray shows cardiomegaly with congestion and infiltrate right perihilar  6. Supplemental O2 to keep sats > 93%  7. Aspiration precautions  8. Labs to follow electrolytes, renal function and and blood counts  9. Glucose monitoring and SSI  10. Bronchial hygiene with respiratory therapy techniques, bronchodilators  11.  DVT, SUP prophylaxis           Skeet Lefort, MD

## 2021-03-10 NOTE — INTERDISCIPLINARY ROUNDS
Spoke with Dr. Martina Baez in regards to patient medications that she takes at home. Explained that she has been taking her own medications last night while in the room so that her bp will decrease to a safe level.

## 2021-03-10 NOTE — PROGRESS NOTES
OCCUPATIONAL THERAPY EVALUATION/DISCHARGE  Patient: Braden Rosen (31 y.o. female)  Date: 3/10/2021  Primary Diagnosis: PNA (pneumonia) [J18.9]       Precautions: COVID-19 (+)       ASSESSMENT  Pt is a 63 y/o F with PMH of type 2 diabetes, chronic kidney disease, and CHF, presenting to [facility] ED with cc of abdominal pain and SOB. Per medical chart, pt has been having  symptoms for 1 week with assosicated nausea. Work-up done shows acute kidney injury, elevated BNP. Pt also COVID-19 (+). Pt admitted 3/8/21 and being treated for PNA. Pt received semi-supine in bed upon arrival, currently on RA, AXO x4, and agreeable to OT/PT evaluations at this time. 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 Pt reports does not currently own any DME, denies fall hx. Based on current observations, pt demonstrates good functional activity tolerance, AROM/strength, coordination, bed mobility, and static/dyanamic sitting/standing balance with no LOB observed. Pt performs bed mobility/supine>sit IND and dons socks IND at EOB by crossing leg over opposite knee/reaching anteriorly without any LOB. She transfers sit><stand to/from EOB and chair IND, simulating bathroom mobility IND with functional ambulation in room IND. Pt reports no difficulty with ADLs or getting herself to/from bathroom during hospital stay. Denies any SOB or dizziness. Pt educated on UE HEP and energy conservation strategies with good understanding verbalized. At this time, pt with no acute OT needs, performing ADLs at IND level or functional baseline. No further skilled OT required. Will d/c OT at this time as pt at functional baseline. Pt in agreement. Recommend d/c home with family once medically appropriate.      Other factors to consider for discharge: IND at baseline     PLAN :  Recommendation for discharge: (in order for the patient to meet his/her long term goals)  No skilled occupational therapy/ follow up rehabilitation needs identified at this time. This discharge recommendation:  Has been made in collaboration with the attending provider and/or case management    IF patient discharges home will need the following DME: Pt may benefit from shower chair at d/c       SUBJECTIVE:   Patient stated I've been able to get to the bathroom.     OBJECTIVE DATA SUMMARY:   HISTORY:   Past Medical History:   Diagnosis Date    Hypertension     Morbid obesity ([address]    No past surgical history on file. Prior Level of Function/Environment/Context: IND at [facility]  Expanded or extensive additional review of patient history:   Home Situation  Home Environment: Private residence  # Steps to Enter: 8  Rails to Enter: Yes  Hand Rails : Bilateral  One/Two Story Residence: One story  Living Alone: No(Per pt, lives with daughter)  Support Systems: Family member(s)  Patient Expects to be Discharged to[de-identified] Private residence  Current DME Used/Available at Home: None  Tub or Shower Type: Tub/Shower combination    EXAMINATION OF PERFORMANCE DEFICITS:  Cognitive/Behavioral Status:  Neurologic State: Alert  Orientation Level: Oriented X4  Cognition: Follows commands; Appropriate decision making    Hearing: Auditory  Auditory Impairment: None    Vision/Perceptual:     WFL      Range of Motion:  AROM: Within functional limits     Strength:  Strength: Within functional limits     Coordination:  Coordination: Within functional limits  Fine Motor Skills-Upper: Left Intact; Right Intact    Gross Motor Skills-Upper: Left Intact; Right Intact    Balance:  Sitting: Intact; Without support  Standing: Intact; Without support    Functional Mobility and Transfers for ADLs:  Bed Mobility:  Rolling: Independent  Supine to Sit: Independent  Scooting: Independent    Transfers:  Sit to Stand: Independent  Stand to Sit: Independent  Bed to Chair: Independent  Bathroom Mobility: Independent(Simulated ambulating in room)  Toilet Transfer : Independent(Simulated bed to chair)    ADL Intervention and task modifications:  Feeding  Feeding Assistance: Independent    Lower Body Dressing Assistance  Socks: Independent  Leg Crossed Method Used: Yes  Position Performed: Bending forward method;Seated edge of bed    Therapeutic Exercise:  Pt educated on UE HEP to complete throughout the day to improve ROM and strength with good understanding verbalized. Functional Measure:    [employer] AM-PACTM \"6 Clicks\"                                                       Daily Activity Inpatient Short Form  How much help from another person does the patient currently need. .. Total; A Lot A Little None   1. Putting on and taking off regular lower body clothing? []  1 []  2 []  3 [x]  4   2. Bathing (including washing, rinsing, drying)? []  1 []  2 []  3 [x]  4   3. Toileting, which includes using toilet, bedpan or urinal? [] 1 []  2 []  3 [x]  4   4. Putting on and taking off regular upper body clothing? []  1 []  2 []  3 [x]  4   5. Taking care of personal grooming such as brushing teeth? []  1 []  2 []  3 [x]  4   6. Eating meals? []  1 []  2 []  3 [x]  4   © 2007, Trustees of [employer], under license to [employer]. All rights reserved     Score: 24/24     Interpretation of Tool:  Represents clinically-significant functional categories (i.e. Activities of daily living).   Percentage of Impairment CH    0%   CI    1-19% CJ    20-39% CK    40-59% CL    60-79% CM    80-99% CN     100%   [facility]  Score 6-24 24 23 20-22 15-19 10-14 7-9 6       Occupational Therapy Evaluation Charge Determination   History Examination Decision-Making   LOW Complexity : Brief history review  LOW Complexity : 1-3 performance deficits relating to physical, cognitive , or psychosocial skils that result in activity limitations and / or participation restrictions  LOW Complexity : No comorbidities that affect functional and no verbal or physical assistance needed to complete eval tasks       Based on the above components, the patient evaluation is determined to be of the following complexity level: LOW   Pain Ratin/10    Activity Tolerance:   WNL and tolerates ADLs without rest breaks  Please refer to the flowsheet for vital signs taken during this treatment. After treatment patient left in no apparent distress:    Sitting in chair, Heels elevated for pressure relief and Call bell within reach    COMMUNICATION/EDUCATION:   The patients plan of care was discussed with: Physical therapist and Registered nurse. OT/PT sessions occurred together for increased patient and clinician safety for initial OOB ADL/mobility.     Thank you for this referral.  Midge Porteous  Time Calculation: 23 mins

## 2021-03-10 NOTE — PROGRESS NOTES
Pt had elevated BP overnight 195/108 during Epic downtime. I was unable to clarify with pharmacy if her home medications had been verified/approved. Pt decided to take her home BP medication around 5a. m. Pt Took the following:  Amlodipine 10mg, Valsartan 160mg, Hydralazine 100mg, and carvedilol 12.5mg.   BP was rechecked and is now 140/83.

## 2021-03-10 NOTE — PROGRESS NOTES
General Daily Progress Note          Patient Name:   Chana Story       YOB: 1960       Age:  62 y.o. Admit Date: 3/8/2021      Subjective:         Blood pressure much better today      Objective:     Visit Vitals  BP (!) 140/83 (BP 1 Location: Left upper arm, BP Patient Position: At rest)   Pulse 72   Temp 98.1 °F (36.7 °C)   Resp 16   SpO2 96%   Breastfeeding No        Recent Results (from the past 24 hour(s))   GLUCOSE, POC    Collection Time: 03/09/21 12:00 PM   Result Value Ref Range    Glucose (POC) 193 (H) 65 - 100 mg/dL    Performed by Khalida, POC    Collection Time: 03/09/21  4:51 PM   Result Value Ref Range    Glucose (POC) 189 (H) 65 - 100 mg/dL    Performed by Salty Palencia    GLUCOSE, POC    Collection Time: 03/09/21  8:50 PM   Result Value Ref Range    Glucose (POC) 182 (H) 65 - 100 mg/dL    Performed by Nader Dyson, POC    Collection Time: 03/10/21  8:02 AM   Result Value Ref Range    Glucose (POC) 167 (H) 65 - 100 mg/dL    Performed by Juju [facility] [place])      [unfilled]      Review of Systems    Constitutional: Negative for chills and fever. HENT: Negative. Eyes: Negative. Respiratory: Negative. Cardiovascular: Negative. Gastrointestinal: Negative for abdominal pain and nausea. Skin: Negative. Neurological: Negative. Physical Exam:      Constitutional: pt is oriented to person, place, and time. HENT:   Head: Normocephalic and atraumatic. Eyes: Pupils are equal, round, and reactive to light. EOM are normal.   Cardiovascular: Normal rate, regular rhythm and normal heart sounds. Pulmonary/Chest: Breath sounds normal. No wheezes. No rales. Exhibits no tenderness. Abdominal: Soft. Bowel sounds are normal. There is no abdominal tenderness. There is no rebound and no guarding. Musculoskeletal: Normal range of motion. Neurological: pt is alert and oriented to person, place, and time.      [address] [facility] RETROPERITONEUM COMP   Final Result   1. This examination is negative for hydronephrosis as an etiology for the   patient's renal insufficiency. 2.  On prior sonographic imaging there were demonstrated echogenic masses within   the left kidney which are no longer present. There are now is a smaller but more   normal morphology to the patient's left kidney.       XR CHEST SNGL V   Final Result           Recent Results (from the past 24 hour(s))   GLUCOSE, POC    Collection Time: 03/09/21 12:00 PM   Result Value Ref Range    Glucose (POC) 193 (H) 65 - 100 mg/dL    Performed by Khalida, POC    Collection Time: 03/09/21  4:51 PM   Result Value Ref Range    Glucose (POC) 189 (H) 65 - 100 mg/dL    Performed by Corazon Fuller    GLUCOSE, POC    Collection Time: 03/09/21  8:50 PM   Result Value Ref Range    Glucose (POC) 182 (H) 65 - 100 mg/dL    Performed by Nader Dyson, POC    Collection Time: 03/10/21  8:02 AM   Result Value Ref Range    Glucose (POC) 167 (H) 65 - 100 mg/dL    Performed by Matheus Akbar [facility])        Results     Procedure Component Value Units Date/Time    CULTURE, BLOOD, LINE [[id number]] Collected: 03/09/21 0032    Order Status: Completed Specimen: Blood Updated: 03/09/21 0041    CULTURE, RESPIRATORY/SPUTUM/BRONCH Da Necessary STAIN [[id number]]     Order Status: Sent Specimen: Sputum     CULTURE, URINE [[id number]] Collected: 03/08/21 1900    Order Status: Completed Specimen: Urine Updated: 03/10/21 0904     Special Requests: No Special Requests        Culture result: No Growth (<1000 cfu/mL)       COVID-19 RAPID TEST [[id number]]  (Abnormal) Collected: 03/08/21 1325    Order Status: Completed Specimen: Nasopharyngeal Updated: 03/08/21 1415     Specimen source Nasopharyngeal        Comment: Results verified, phoned to and read back by Sunshine Melara AT [id number] BY JF        COVID-19 rapid test DETECTED        Comment: Rapid Abbott ID Now   The specimen is POSITIVE for SARS-CoV-2, the novel coronavirus associated with COVID-19. This test has been authorized by the FDA under an Emergency Use Authorization (EUA) for use by authorized laboratories. Fact sheet for Healthcare Providers: ConventionUpdate.co.nz Fact sheet for Patients: ConventionUpdate.co.nz   Methodology: Isothermal Nucleic Acid Amplification       CULTURE, BLOOD, PAIRED [[id number]] Collected: 03/08/21 1305    Order Status: Completed Specimen: Blood Updated: 03/10/21 0704     Special Requests: No Special Requests        Culture result: No growth 2 days              Labs:     Recent Labs     03/09/21  0943 03/08/21  1145   WBC 4.4 5.1   HGB 9.5* 8.0*   HCT 30.0* 25.8*    193     Recent Labs     03/09/21  0943 03/08/21  1145    139   K 5.2* 4.9   * 108   CO2 18* 22   BUN 61* 53*   CREA 5.49* 5.40*   * 103*   CA 9.2 8.3*   MG  --  1.8   PHOS 5.3*  --      Recent Labs     03/09/21  0943 03/08/21  1145   ALT 25 22   * 122*   TBILI 0.3 0.3   TP 6.5 5.6*   ALB 2.0* 1.9*   GLOB 4.5* 3.7     Recent Labs     03/08/21  1145   INR 1.1   PTP 14.6   APTT 30.0      Recent Labs     03/08/21  1910   TIBC 176*   PSAT 9*      No results found for: FOL, RBCF   No results for input(s): PH, PCO2, PO2 in the last 72 hours.   Recent Labs     03/09/21 0943 03/08/21  1145   *  --    TROIQ  --  <0.05     No results found for: CHOL, CHOLX, CHLST, CHOLV, HDL, HDLP, LDL, LDLC, DLDLP, TGLX, TRIGL, TRIGP, CHHD, CHHDX  Lab Results   Component Value Date/Time    Glucose (POC) 167 (H) 03/10/2021 08:02 AM    Glucose (POC) 182 (H) 03/09/2021 08:50 PM    Glucose (POC) 189 (H) 03/09/2021 04:51 PM    Glucose (POC) 193 (H) 03/09/2021 12:00 PM     Lab Results   Component Value Date/Time    Color Yellow/Straw 03/08/2021 03:15 PM    Appearance Clear 03/08/2021 03:15 PM    Specific gravity 1.008 03/08/2021 03:15 PM    pH (UA) 7.0 03/08/2021 03:15 PM    Protein >300 (A) 03/08/2021 03:15 PM    Glucose 50 (A) 03/08/2021 03:15 PM    Ketone Negative 03/08/2021 03:15 PM    Bilirubin Negative 03/08/2021 03:15 PM    Urobilinogen 0.1 03/08/2021 03:15 PM    Nitrites Negative 03/08/2021 03:15 PM    Leukocyte Esterase Negative 03/08/2021 03:15 PM    Bacteria Negative 03/08/2021 03:15 PM    WBC 0-4 03/08/2021 03:15 PM    RBC 0-5 03/08/2021 03:15 PM         Assessment:     COVID-19 pneumonitis  Acute on chronic kidney disease  Type 2 diabetes  Acute CHF  Anemia chronic disease  Hypokalemia    Plan:     Continue Zithromax Decadron and Zosyn  Lasix 40 mg IV daily  On Pepcid 20 mg daily  Kayexalate 30 g    Today's labs are pending        Current Facility-Administered Medications:     heparin (porcine) injection 5,000 Units, 5,000 Units, SubCUTAneous, Q8H, Katelyn Gunter MD, 5,000 Units at 03/10/21 1004    furosemide (LASIX) injection 80 mg, 80 mg, IntraVENous, BID, Jules Gunter MD, 80 mg at 03/10/21 0951    sodium chloride (NS) flush 5-40 mL, 5-40 mL, IntraVENous, Q8H, Ettie Cancel, NP, 10 mL at 03/10/21 0600    sodium chloride (NS) flush 5-40 mL, 5-40 mL, IntraVENous, PRN, Ettie Cancel, NP    acetaminophen (TYLENOL) tablet 650 mg, 650 mg, Oral, Q6H PRN **OR** acetaminophen (TYLENOL) suppository 650 mg, 650 mg, Rectal, Q6H PRN, Ettie Cancel, NP    polyethylene glycol (MIRALAX) packet 17 g, 17 g, Oral, DAILY PRN, Ettie Cancel, NP    promethazine (PHENERGAN) tablet 12.5 mg, 12.5 mg, Oral, Q6H PRN **OR** ondansetron (ZOFRAN) injection 4 mg, 4 mg, IntraVENous, Q6H PRN, Ettie Cancel, NP    famotidine (PEPCID) tablet 20 mg, 20 mg, Oral, DAILY, Ettie Cancel, NP, 20 mg at 03/10/21 1002    dexamethasone (DECADRON) 4 mg/mL injection 4 mg, 4 mg, IntraVENous, Q8H, Ettie Cancel, NP, 4 mg at 03/10/21 0951    piperacillin-tazobactam (ZOSYN) 3.375 g in 0.9% sodium chloride (MBP/ADV) 100 mL MBP, 3.375 g, IntraVENous, Q12H, Ettie Cancel, NP, Last Rate: 25 mL/hr at 03/10/21 0600, 3.375 g at 03/10/21 0600    azithromycin (ZITHROMAX) 500 mg in 0.9% sodium chloride 250 mL (VIAL-MATE), 500 mg, IntraVENous, Q24H, Rebecca Reyes MD, 500 mg at 03/09/21 1420

## 2021-03-10 NOTE — PROGRESS NOTES
She states that her home medicines are not being given to her and she is taking her own home medications. Blood pressure now is better controlled. Leg swelling is coming down. Not any more short of breath. Past Medical History:   Diagnosis Date    Hypertension     Morbid obesity ([address]       No past surgical history on file. No family history on file.    Social History     Tobacco Use    Smoking status: Not on file   Substance Use Topics    Alcohol use: Not on file       Current Facility-Administered Medications   Medication Dose Route Frequency Provider Last Rate Last Admin    heparin (porcine) injection 5,000 Units  5,000 Units SubCUTAneous Q8H Indy Gunter MD   5,000 Units at 03/10/21 1004    furosemide (LASIX) injection 80 mg  80 mg IntraVENous BID Devin Bailey MD   80 mg at 03/10/21 0951    sodium chloride (NS) flush 5-40 mL  5-40 mL IntraVENous Q8H Lidia Patel NP   10 mL at 03/10/21 0600    sodium chloride (NS) flush 5-40 mL  5-40 mL IntraVENous PRN Lidia Patel NP        acetaminophen (TYLENOL) tablet 650 mg  650 mg Oral Q6H PRN Lidia Patel NP        Or   Voncile Searing acetaminophen (TYLENOL) suppository 650 mg  650 mg Rectal Q6H PRN Lidia Patel NP        polyethylene glycol (MIRALAX) packet 17 g  17 g Oral DAILY PRN Lidia Patel NP        promethazine (PHENERGAN) tablet 12.5 mg  12.5 mg Oral Q6H PRN Lidia Patel NP        Or    [facility]ron [facility]) injection 4 mg  4 mg IntraVENous Q6H PRN Lidia Patel NP        famotidine (PEPCID) tablet 20 mg  20 mg Oral DAILY Lidia Patel NP   20 mg at 03/10/21 1002    dexamethasone (DECADRON) 4 mg/mL injection 4 mg  4 mg IntraVENous Q8H Lidia Patel NP   4 mg at 03/10/21 0951    piperacillin-tazobactam (ZOSYN) 3.375 g in 0.9% sodium chloride (MBP/ADV) 100 mL MBP  3.375 g IntraVENous Q12H Lidia Patel NP 25 mL/hr at 03/10/21 0600 3.375 g at 03/10/21 0600    azithromycin (ZITHROMAX) 500 mg in 0.9% sodium chloride 250 mL (VIAL-MATE)  500 mg IntraVENous Q24H Rebecca Reyes MD   500 mg at 03/10/21 1340        Review of Systems   Respiratory: Positive for shortness of breath and wheezing. Cardiovascular: Positive for palpitations and leg swelling. Gastrointestinal: Positive for abdominal distention. All other systems reviewed and are negative. Objective     Vital signs for last 24 hours:  Visit Vitals  BP (!) 140/83   Pulse 72   Temp 98.1 °F (36.7 °C)   Resp 16   Ht 5' 8.9\" (1.75 m)   Wt 113.4 kg (250 lb)   SpO2 96%   Breastfeeding No   BMI 37.03 kg/m²       Intake/Output this shift:  Current Shift: No intake/output data recorded. Last 3 Shifts: 03/08 1901 - 03/10 0700  In: 460 [P.O.:360; I.V.:100]  Out: 700 [Urine:700]  Physical Exam   Constitutional: She is oriented to person, place, and time. She appears well-developed and well-nourished. HENT:   Head: Normocephalic and atraumatic. Neck: No JVD present. No tracheal deviation present. Cardiovascular: Normal rate and regular rhythm. Pulmonary/Chest: Effort normal and breath sounds normal. No stridor. Abdominal: Soft. Bowel sounds are normal.   Neurological: She is alert and oriented to person, place, and time. Skin: Skin is warm and dry. Psychiatric: She has a normal mood and affect.  Her behavior is normal.      Significant 3+ to 4+ edema of legs      Data Review:   Recent Results (from the past 24 hour(s))   GLUCOSE, POC    Collection Time: 03/09/21  4:51 PM   Result Value Ref Range    Glucose (POC) 189 (H) 65 - 100 mg/dL    Performed by Mariposa Hinojosa    GLUCOSE, POC    Collection Time: 03/09/21  8:50 PM   Result Value Ref Range    Glucose (POC) 182 (H) 65 - 100 mg/dL    Performed by Nader Dyson, POC    Collection Time: 03/10/21  8:02 AM   Result Value Ref Range    Glucose (POC) 167 (H) 65 - 100 mg/dL    Performed by [facility])    GLUCOSE, POC    Collection Time: 03/10/21 11:13 AM   Result Value Ref Range Glucose (POC) 135 (H) 65 - 100 mg/dL    Performed by Tommas Pop    ECHO ADULT COMPLETE    Collection Time: 03/10/21 11:57 AM   Result Value Ref Range    LV ED Vol A2C 133.00 cm3    LV ED Vol A4C 100.00 cm3    LV ES Vol A4C 43.00 cm3    LV ES Vol A2C 72.00 cm3    IVSd 1.12 (A) 0.60 - 0.90 cm    LVIDd 5.48 (A) 3.90 - 5.30 cm    LVIDs 4.16 cm    Pulmonic Regurgitant End Max Velocity 121.00 cm/s    LVOT Peak Gradient 6.00 mmHg    LVPWd 1.27 (A) 0.60 - 0.90 cm    LV E' Septal Velocity 3.70 cm/s    BP EF 47.4 55.0 - 100.0 %    E/E' septal 31.89     Pulmonic Regurgitant End Max Velocity 166.00 cm/s    AoV PG 11.00 mmHg    Pulmonic Regurgitant End Max Velocity 370.00 cm/s    Mitral Valve E Wave Deceleration Time 134.00 ms    MV A Travis 94.40 cm/s    MV E Travis 118.00 cm/s    MV E/A 1.25     Pulmonic Regurgitant End Max Velocity 143.00 cm/s    Pulmonic Valve Systolic Peak Instantaneous Gradient 8.00 mmHg    Pulmonic Regurgitant End Max Velocity 112.00 cm/s    Pulmonic Valve Systolic Peak Instantaneous Gradient 5.00 mmHg    P Vein A Dur 120.00 ms    Pulmonary Vein \"A\" Wave Velocity 31.10 cm/s    Est. RA Pressure 15.00 mmHg    RVIDd 4.03 cm    RVSP 57.00 mmHg    Tricuspid Valve Max Velocity 324.00 cm/s    Triscuspid Valve Regurgitation Peak Gradient 42.00 mmHg    Right Atrial Area 4C 15.70 cm2    LA Area 4C 24.16 cm2    LV Mass .2 67.0 - 162.0 g    LV Mass AL Index 118.7 43.0 - 95.0 g/m2    Aortic Sinus Valsalva 3.30 cm    Left Atrium Minor Axis 2.03 cm    Left Atrium Major Axis 4.60 cm   RENAL FUNCTION PANEL    Collection Time: 03/10/21 12:12 PM   Result Value Ref Range    Sodium 141 136 - 145 mmol/L    Potassium 4.9 3.5 - 5.1 mmol/L    Chloride 110 (H) 97 - 108 mmol/L    CO2 25 21 - 32 mmol/L    Anion gap 6 5 - 15 mmol/L    Glucose 109 (H) 65 - 100 mg/dL    BUN 72 (H) 6 - 20 mg/dL    Creatinine 6.01 (H) 0.55 - 1.02 mg/dL    BUN/Creatinine ratio 12 12 - 20      GFR est AA 9 (L) >60 ml/min/1.73m2    GFR est non-AA 7 (L) >60 ml/min/1.73m2    Calcium 9.0 8.5 - 10.1 mg/dL    Phosphorus 5.0 (H) 2.6 - 4.7 mg/dL    Albumin 2.0 (L) 3.5 - 5.0 g/dL   CBC WITH AUTOMATED DIFF    Collection Time: 03/10/21 12:28 PM   Result Value Ref Range    WBC 10.5 3.6 - 11.0 K/uL    RBC 3.30 (L) 3.80 - 5.20 M/uL    HGB 9.0 (L) 11.5 - 16.0 g/dL    HCT 28.5 (L) 35.0 - 47.0 %    MCV 86.4 80.0 - 99.0 FL    MCH 27.3 26.0 - 34.0 PG    MCHC 31.6 30.0 - 36.5 g/dL    RDW 15.2 (H) 11.5 - 14.5 %    PLATELET 668 620 - 503 K/uL    MPV 10.8 8.9 - 12.9 FL    NEUTROPHILS 91 (H) 32 - 75 %    LYMPHOCYTES 5 (L) 12 - 49 %    MONOCYTES 4 (L) 5 - 13 %    EOSINOPHILS 0 0 - 7 %    BASOPHILS 0 0 - 1 %    IMMATURE GRANULOCYTES 0 0.0 - 0.5 %    ABS. NEUTROPHILS 9.6 (H) 1.8 - 8.0 K/UL    ABS. LYMPHOCYTES 0.5 (L) 0.8 - 3.5 K/UL    ABS. MONOCYTES 0.4 0.0 - 1.0 K/UL    ABS. EOSINOPHILS 0.0 0.0 - 0.4 K/UL    ABS. BASOPHILS 0.0 0.0 - 0.1 K/UL    ABS. IMM. GRANS. 0.0 0.00 - 0.04 K/UL    DF AUTOMATED     METABOLIC PANEL, COMPREHENSIVE    Collection Time: 03/10/21 12:28 PM   Result Value Ref Range    Sodium 141 136 - 145 mmol/L    Potassium 4.9 3.5 - 5.1 mmol/L    Chloride 109 (H) 97 - 108 mmol/L    CO2 25 21 - 32 mmol/L    Anion gap 7 5 - 15 mmol/L    Glucose 109 (H) 65 - 100 mg/dL    BUN 73 (H) 6 - 20 mg/dL    Creatinine 6.01 (H) 0.55 - 1.02 mg/dL    BUN/Creatinine ratio 12 12 - 20      GFR est AA 9 (L) >60 ml/min/1.73m2    GFR est non-AA 7 (L) >60 ml/min/1.73m2    Calcium 9.0 8.5 - 10.1 mg/dL    Bilirubin, total 0.2 0.2 - 1.0 mg/dL    AST (SGOT) 16 15 - 37 U/L    ALT (SGPT) 21 12 - 78 U/L    Alk. phosphatase 116 45 - 117 U/L    Protein, total 6.4 6.4 - 8.2 g/dL    Albumin 2.0 (L) 3.5 - 5.0 g/dL    Globulin 4.4 (H) 2.0 - 4.0 g/dL    A-G Ratio 0.5 (L) 1.1 - 2.2           US RETROPERITONEUM COMP   Final Result   1. This examination is negative for hydronephrosis as an etiology for the   patient's renal insufficiency.    2.  On prior sonographic imaging there were demonstrated echogenic masses within   the left kidney which are no longer present. There are now is a smaller but more   normal morphology to the patient's left kidney. XR CHEST SNGL V   Final Result           Patient Active Problem List   Diagnosis Code    PNA (pneumonia) J18.9        DIAGNOSES:  1. CKD stage V-most likely progressive CKD or could be EDDIE on CKD  2. Nephrotic syndrome  3. Anasarca  4. Coronavirus pneumonia  5. Hypertension due to hypervolemia  6. Hyperkalemia  7. Renal tubular acidosis, type IV    PLAN:  She is responding to Lasix at this time. Blood pressure is well controlled. Explained to patient that we need to give her the home medications in the hospital so that we dont double on doses in error. Potassium is well controlled  Metabolic acidosis is corrected  Defer dialysis for now    Home medications are reinstated, which includes amlodipine, Isordil, hydralazine at half the dose, and Coreg.   Holding off the following for now-Diovan, sodium bicarbonate, oral Lasix and metolazone

## 2021-03-10 NOTE — INTERDISCIPLINARY ROUNDS
Skin Assessment:    Patient has bilateral lower extremities that have scar tissue from some previous wounds that were there. There is no draining wounds. No further skin issues or breakdowns.

## 2021-03-11 LAB
ALBUMIN SERPL-MCNC: 2.2 G/DL (ref 3.5–5)
ALBUMIN SERPL-MCNC: 2.2 G/DL (ref 3.5–5)
ALBUMIN/GLOB SERPL: 0.5 {RATIO} (ref 1.1–2.2)
ALP SERPL-CCNC: 108 U/L (ref 45–117)
ALT SERPL-CCNC: 22 U/L (ref 12–78)
ANION GAP SERPL CALC-SCNC: 9 MMOL/L (ref 5–15)
ANION GAP SERPL CALC-SCNC: 9 MMOL/L (ref 5–15)
AST SERPL W P-5'-P-CCNC: 20 U/L (ref 15–37)
BASOPHILS # BLD: 0 K/UL (ref 0–0.1)
BASOPHILS NFR BLD: 0 % (ref 0–1)
BILIRUB SERPL-MCNC: 0.2 MG/DL (ref 0.2–1)
BUN SERPL-MCNC: 78 MG/DL (ref 6–20)
BUN SERPL-MCNC: 78 MG/DL (ref 6–20)
BUN/CREAT SERPL: 13 (ref 12–20)
BUN/CREAT SERPL: 13 (ref 12–20)
CA-I BLD-MCNC: 8.9 MG/DL (ref 8.5–10.1)
CA-I BLD-MCNC: 9.2 MG/DL (ref 8.5–10.1)
CHLORIDE SERPL-SCNC: 108 MMOL/L (ref 97–108)
CHLORIDE SERPL-SCNC: 109 MMOL/L (ref 97–108)
CO2 SERPL-SCNC: 21 MMOL/L (ref 21–32)
CO2 SERPL-SCNC: 22 MMOL/L (ref 21–32)
CREAT SERPL-MCNC: 6 MG/DL (ref 0.55–1.02)
CREAT SERPL-MCNC: 6.02 MG/DL (ref 0.55–1.02)
DIFFERENTIAL METHOD BLD: ABNORMAL
ECHO AO SINUS VALSALVA DIAM: 3.3 CM
ECHO AV PEAK GRADIENT: 11 MMHG
ECHO EST RA PRESSURE: 15 MMHG
ECHO LA AREA 4C: 24.16 CM2
ECHO LA MAJOR AXIS: 4.6 CM
ECHO LA MINOR AXIS: 2.03 CM
ECHO LV E' SEPTAL VELOCITY: 3.7 CM/S
ECHO LV EDV A2C: 133 CM3
ECHO LV EDV A4C: 100 CM3
ECHO LV EJECTION FRACTION BIPLANE: 47.4 % (ref 55–100)
ECHO LV ESV A2C: 72 CM3
ECHO LV ESV A4C: 43 CM3
ECHO LV INTERNAL DIMENSION DIASTOLIC: 5.48 CM (ref 3.9–5.3)
ECHO LV INTERNAL DIMENSION SYSTOLIC: 4.16 CM
ECHO LV IVSD: 1.12 CM (ref 0.6–0.9)
ECHO LV MASS 2D: 269.2 G (ref 67–162)
ECHO LV MASS INDEX 2D: 118.7 G/M2 (ref 43–95)
ECHO LV POSTERIOR WALL DIASTOLIC: 1.27 CM (ref 0.6–0.9)
ECHO LVOT PEAK GRADIENT: 6 MMHG
ECHO MV A VELOCITY: 94.4 CM/S
ECHO MV E DECELERATION TIME (DT): 134 MS
ECHO MV E VELOCITY: 118 CM/S
ECHO MV E/A RATIO: 1.25
ECHO MV E/E' SEPTAL: 31.89
ECHO PV PEAK INSTANTANEOUS GRADIENT SYSTOLIC: 5 MMHG
ECHO PV PEAK INSTANTANEOUS GRADIENT SYSTOLIC: 8 MMHG
ECHO PV REGURGITANT MAX VELOCITY: 112 CM/S
ECHO PV REGURGITANT MAX VELOCITY: 121 CM/S
ECHO PV REGURGITANT MAX VELOCITY: 143 CM/S
ECHO PV REGURGITANT MAX VELOCITY: 166 CM/S
ECHO PV REGURGITANT MAX VELOCITY: 370 CM/S
ECHO PVEIN A DURATION: 120 MS
ECHO PVEIN A VELOCITY: 31.1 CM/S
ECHO RA AREA 4C: 15.7 CM2
ECHO RIGHT VENTRICULAR SYSTOLIC PRESSURE (RVSP): 57 MMHG
ECHO RV INTERNAL DIMENSION: 4.03 CM
ECHO TV MAX VELOCITY: 324 CM/S
ECHO TV REGURGITANT PEAK GRADIENT: 42 MMHG
EOSINOPHIL # BLD: 0 K/UL (ref 0–0.4)
EOSINOPHIL #/AREA URNS HPF: NEGATIVE /[HPF]
EOSINOPHIL NFR BLD: 0 % (ref 0–7)
ERYTHROCYTE [DISTWIDTH] IN BLOOD BY AUTOMATED COUNT: 15.1 % (ref 11.5–14.5)
GLOBULIN SER CALC-MCNC: 4.2 G/DL (ref 2–4)
GLUCOSE BLD STRIP.AUTO-MCNC: 121 MG/DL (ref 65–100)
GLUCOSE BLD STRIP.AUTO-MCNC: 41 MG/DL (ref 65–100)
GLUCOSE SERPL-MCNC: 45 MG/DL (ref 65–100)
GLUCOSE SERPL-MCNC: 46 MG/DL (ref 65–100)
HCT VFR BLD AUTO: 29.6 % (ref 35–47)
HGB BLD-MCNC: 9.3 G/DL (ref 11.5–16)
IMM GRANULOCYTES # BLD AUTO: 0.1 K/UL (ref 0–0.04)
IMM GRANULOCYTES NFR BLD AUTO: 0 % (ref 0–0.5)
LYMPHOCYTES # BLD: 0.6 K/UL (ref 0.8–3.5)
LYMPHOCYTES NFR BLD: 5 % (ref 12–49)
MCH RBC QN AUTO: 27 PG (ref 26–34)
MCHC RBC AUTO-ENTMCNC: 31.4 G/DL (ref 30–36.5)
MCV RBC AUTO: 86 FL (ref 80–99)
MONOCYTES # BLD: 0.4 K/UL (ref 0–1)
MONOCYTES NFR BLD: 4 % (ref 5–13)
NEUTS SEG # BLD: 10.4 K/UL (ref 1.8–8)
NEUTS SEG NFR BLD: 91 % (ref 32–75)
PERFORMED BY, TECHID: ABNORMAL
PERFORMED BY, TECHID: ABNORMAL
PHOSPHATE SERPL-MCNC: 4.8 MG/DL (ref 2.6–4.7)
PLATELET # BLD AUTO: 231 K/UL (ref 150–400)
PMV BLD AUTO: 11.2 FL (ref 8.9–12.9)
POTASSIUM SERPL-SCNC: 4.5 MMOL/L (ref 3.5–5.1)
POTASSIUM SERPL-SCNC: 4.5 MMOL/L (ref 3.5–5.1)
PROT SERPL-MCNC: 6.4 G/DL (ref 6.4–8.2)
RBC # BLD AUTO: 3.44 M/UL (ref 3.8–5.2)
SODIUM SERPL-SCNC: 139 MMOL/L (ref 136–145)
SODIUM SERPL-SCNC: 139 MMOL/L (ref 136–145)
WBC # BLD AUTO: 11.4 K/UL (ref 3.6–11)

## 2021-03-11 PROCEDURE — 74011250636 HC RX REV CODE- 250/636: Performed by: INTERNAL MEDICINE

## 2021-03-11 PROCEDURE — 80053 COMPREHEN METABOLIC PANEL: CPT

## 2021-03-11 PROCEDURE — 74011000258 HC RX REV CODE- 258: Performed by: NURSE PRACTITIONER

## 2021-03-11 PROCEDURE — 36415 COLL VENOUS BLD VENIPUNCTURE: CPT

## 2021-03-11 PROCEDURE — 74011250636 HC RX REV CODE- 250/636: Performed by: NURSE PRACTITIONER

## 2021-03-11 PROCEDURE — 65270000029 HC RM PRIVATE

## 2021-03-11 PROCEDURE — 82962 GLUCOSE BLOOD TEST: CPT

## 2021-03-11 PROCEDURE — 74011250637 HC RX REV CODE- 250/637: Performed by: INTERNAL MEDICINE

## 2021-03-11 PROCEDURE — 74011250637 HC RX REV CODE- 250/637: Performed by: NURSE PRACTITIONER

## 2021-03-11 PROCEDURE — 80069 RENAL FUNCTION PANEL: CPT

## 2021-03-11 PROCEDURE — 85025 COMPLETE CBC W/AUTO DIFF WBC: CPT

## 2021-03-11 RX ORDER — HYDRALAZINE HYDROCHLORIDE 50 MG/1
100 TABLET, FILM COATED ORAL 3 TIMES DAILY
Status: DISCONTINUED | OUTPATIENT
Start: 2021-03-11 | End: 2021-03-18 | Stop reason: HOSPADM

## 2021-03-11 RX ORDER — FUROSEMIDE 10 MG/ML
80 INJECTION INTRAMUSCULAR; INTRAVENOUS EVERY 12 HOURS
Status: DISCONTINUED | OUTPATIENT
Start: 2021-03-11 | End: 2021-03-13

## 2021-03-11 RX ORDER — SILDENAFIL CITRATE 20 MG/1
20 TABLET ORAL 3 TIMES DAILY
Status: DISCONTINUED | OUTPATIENT
Start: 2021-03-11 | End: 2021-03-11

## 2021-03-11 RX ADMIN — PIPERACILLIN AND TAZOBACTAM 3.38 G: 3; .375 INJECTION, POWDER, LYOPHILIZED, FOR SOLUTION INTRAVENOUS at 05:59

## 2021-03-11 RX ADMIN — FAMOTIDINE 20 MG: 20 TABLET, FILM COATED ORAL at 09:26

## 2021-03-11 RX ADMIN — HEPARIN SODIUM 5000 UNITS: 5000 INJECTION INTRAVENOUS; SUBCUTANEOUS at 13:33

## 2021-03-11 RX ADMIN — ISOSORBIDE DINITRATE 20 MG: 10 TABLET ORAL at 22:00

## 2021-03-11 RX ADMIN — FUROSEMIDE 80 MG: 10 INJECTION, SOLUTION INTRAMUSCULAR; INTRAVENOUS at 13:33

## 2021-03-11 RX ADMIN — CARVEDILOL 12.5 MG: 12.5 TABLET, FILM COATED ORAL at 09:26

## 2021-03-11 RX ADMIN — DEXAMETHASONE SODIUM PHOSPHATE 4 MG: 4 INJECTION, SOLUTION INTRA-ARTICULAR; INTRALESIONAL; INTRAMUSCULAR; INTRAVENOUS; SOFT TISSUE at 02:36

## 2021-03-11 RX ADMIN — HEPARIN SODIUM 5000 UNITS: 5000 INJECTION INTRAVENOUS; SUBCUTANEOUS at 03:29

## 2021-03-11 RX ADMIN — HYDRALAZINE HYDROCHLORIDE 100 MG: 50 TABLET, FILM COATED ORAL at 22:00

## 2021-03-11 RX ADMIN — HYDRALAZINE HYDROCHLORIDE 50 MG: 50 TABLET, FILM COATED ORAL at 09:26

## 2021-03-11 RX ADMIN — Medication 10 ML: at 06:00

## 2021-03-11 RX ADMIN — ISOSORBIDE DINITRATE 20 MG: 10 TABLET ORAL at 09:26

## 2021-03-11 RX ADMIN — DEXAMETHASONE SODIUM PHOSPHATE 4 MG: 4 INJECTION, SOLUTION INTRA-ARTICULAR; INTRALESIONAL; INTRAMUSCULAR; INTRAVENOUS; SOFT TISSUE at 09:27

## 2021-03-11 RX ADMIN — HEPARIN SODIUM 5000 UNITS: 5000 INJECTION INTRAVENOUS; SUBCUTANEOUS at 23:59

## 2021-03-11 RX ADMIN — ISOSORBIDE DINITRATE 20 MG: 10 TABLET ORAL at 17:09

## 2021-03-11 RX ADMIN — CARVEDILOL 12.5 MG: 12.5 TABLET, FILM COATED ORAL at 17:09

## 2021-03-11 RX ADMIN — HYDRALAZINE HYDROCHLORIDE 100 MG: 50 TABLET, FILM COATED ORAL at 17:09

## 2021-03-11 RX ADMIN — FUROSEMIDE 80 MG: 10 INJECTION, SOLUTION INTRAMUSCULAR; INTRAVENOUS at 09:27

## 2021-03-11 RX ADMIN — AMLODIPINE BESYLATE 10 MG: 5 TABLET ORAL at 09:27

## 2021-03-11 NOTE — PROGRESS NOTES
Progress Note      3/11/2021 7:16 AM  NAME: Júnior Ratliff   MRN:  [id number]   Admit Diagnosis: PNA (pneumonia) [J18.9]      Problem List:   1.  COVID-19 disease  2.  Noncompliance   3. Covid pneumonitis  4.  Chronic heart failure with recovered ejection fraction (HFrecEF )  5.  Grade II (moderate) diastolic dysfunction, or pseudonormalization  6. Moderate pulmonary hypertension (RVSP =57 mmHg)  7.  Nonocclusive coronary artery disease  8. Mild valvular heart disease       8a. Mild tricuspid regurgitation       8b. Trace pulmonic valve regurgitation       8c. Mild mitral valve regurgitation  9.  Hypertension  10.  Type 2 diabetes    11.  Former smoker (stopped 6 years ago)  12.  Class III (severe) obesity (BMI = 41.8 kg/m²)  13.  Acute kidney injury/chronic kidney disease (stage IV)  14.  Nephrotic syndrome most likely diabetic nephropathy  15.  Fluid overload  16.  Chronic hypoalbuminemia, due to nephrotic syndrome  17.  Edema  18.  Anemia  19.  Hypocalcemia       Subjective: The patient is seen and examined in room 525. There were no acute cardiovascular events reported overnight. Currently, she denies any cardiovascular complaints. Specifically, she denies chest pain or shortness of breath. The results of her echocardiogram including diastolic dysfunction, and mild to moderate pulmonary hypertension were shared with the patient. We discussed treatment including possible outpatient pulmonary evaluation and the addition of sildenafil.     Medications Personally Reviewed:    Current Facility-Administered Medications   Medication Dose Route Frequency    amLODIPine (NORVASC) tablet 10 mg  10 mg Oral DAILY    hydrALAZINE (APRESOLINE) tablet 50 mg  50 mg Oral TID    carvediloL (COREG) tablet 12.5 mg  12.5 mg Oral BID WITH MEALS    isosorbide dinitrate (ISORDIL) tablet 20 mg  20 mg Oral TID    heparin (porcine) injection 5,000 Units  5,000 Units SubCUTAneous Q8H    furosemide (LASIX) injection 80 mg  80 mg IntraVENous BID    sodium chloride (NS) flush 5-40 mL  5-40 mL IntraVENous Q8H    sodium chloride (NS) flush 5-40 mL  5-40 mL IntraVENous PRN    acetaminophen (TYLENOL) tablet 650 mg  650 mg Oral Q6H PRN    Or    acetaminophen (TYLENOL) suppository 650 mg  650 mg Rectal Q6H PRN    polyethylene glycol (MIRALAX) packet 17 g  17 g Oral DAILY PRN    promethazine (PHENERGAN) tablet 12.5 mg  12.5 mg Oral Q6H PRN    Or    ondansetron (ZOFRAN) injection 4 mg  4 mg IntraVENous Q6H PRN    famotidine (PEPCID) tablet 20 mg  20 mg Oral DAILY    dexamethasone (DECADRON) 4 mg/mL injection 4 mg  4 mg IntraVENous Q8H    piperacillin-tazobactam (ZOSYN) 3.375 g in 0.9% sodium chloride (MBP/ADV) 100 mL MBP  3.375 g IntraVENous Q12H    azithromycin (ZITHROMAX) 500 mg in 0.9% sodium chloride 250 mL (VIAL-MATE)  500 mg IntraVENous Q24H           Objective:      Physical Exam:  Last 24hrs VS reviewed since prior progress note. Most recent are:    Visit Vitals  BP (!) 156/91   Pulse 76   Temp 98 °F (36.7 °C)   Resp 18   Ht 5' 8.9\" (1.75 m)   Wt 113.4 kg (250 lb)   SpO2 94%   Breastfeeding No   BMI 37.03 kg/m²     No intake or output data in the 24 hours ending 03/11/21 0716     Physical exam: Essentially unchanged    Data Review    Telemetry: Sinus rhythm without ventricular ectopy    Complete 2-D echocardiogram:   Final result   · LV: Calculated LVEF is 47%. Normal cavity size. Mild concentric hypertrophy. Moderate (grade 2) left ventricular diastolic dysfunction Left ventricular diastolic dysfunction. · LA: Moderately dilated left atrium. · RV: Mildly dilated right ventricle. Borderline low systolic function. · MV: Mild mitral valve regurgitation is present. · TV: Mild tricuspid valve regurgitation is present. · PA: Moderate pulmonary hypertension. Pulmonary arterial systolic pressure is 57 mmHg. · Echo study was technically difficulty.          Lab Data Personally Reviewed:    Recent Labs 03/10/21  1228 03/09/21  0943   WBC 10.5 4.4   HGB 9.0* 9.5*   HCT 28.5* 30.0*    191     Recent Labs     03/08/21  1145   INR 1.1   PTP 14.6   APTT 30.0      Recent Labs     03/10/21  1228 03/10/21  1212 03/09/21  0943 03/08/21  1145    141 137 139   K 4.9 4.9 5.2* 4.9   * 110* 109* 108   CO2 25 25 18* 22   BUN 73* 72* 61* 53*   CREA 6.01* 6.01* 5.49* 5.40*   * 109* 179* 103*   CA 9.0 9.0 9.2 8.3*   MG  --   --   --  1.8     Recent Labs     03/09/21  0943 03/08/21  1145   *  --    TROIQ  --  <0.05     No results found for: CHOL, CHOLX, CHLST, CHOLV, HDL, HDLP, LDL, LDLC, DLDLP, Ele Mohs, CHHD, CHHDX    Recent Labs     03/10/21  1228 03/10/21  1212 03/09/21  0943 03/08/21  1145     --  133* 122*   TP 6.4  --  5.5*  6.5 5.6*   ALB 2.0* 2.0* 2.0* 1.9*   GLOB 4.4*  --  4.5* 3.7     No results for input(s): PH, PCO2, PO2 in the last 72 hours. Assessment/Plan:   1. Continue telemetry monitor while hospitalized  2. Continue to monitor serum electrolytes, and renal function  3. Continue to monitor fluid balance, and daily weights  4. Continue current cardiovascular medications including amlodipine, carvedilol, furosemide, heparin, hydralazine, and isosorbide    5.   Add sildenafil 20 mg 3 times daily for pulmonary hypertension     Irene Griffith MD

## 2021-03-11 NOTE — PROGRESS NOTES
Dr Elver Dunaway aware of no iv access. Will get a line in when [facility] team returns in the am. All orders are not to change from iv to po.

## 2021-03-11 NOTE — PROGRESS NOTES
General Daily Progress Note          Patient Name:   Ambrocio Wolfe       YOB: 1960       Age:  62 y.o. Admit Date: 3/8/2021      Subjective:         Blood pressure much better today    Still significant leg edema      Objective:     Visit Vitals  BP (!) 178/98 (BP Patient Position: Sitting)   Pulse 84   Temp 97.5 °F (36.4 °C)   Resp 18   Ht 5' 8.9\" (1.75 m)   Wt 113.4 kg (250 lb)   SpO2 100%   Breastfeeding No   BMI 37.03 kg/m²        Recent Results (from the past 24 hour(s))   GLUCOSE, POC    Collection Time: 03/10/21  3:54 PM   Result Value Ref Range    Glucose (POC) 74 65 - 100 mg/dL    Performed by Susan Greer    RENAL FUNCTION PANEL    Collection Time: 03/11/21  7:55 AM   Result Value Ref Range    Sodium 139 136 - 145 mmol/L    Potassium 4.5 3.5 - 5.1 mmol/L    Chloride 109 (H) 97 - 108 mmol/L    CO2 21 21 - 32 mmol/L    Anion gap 9 5 - 15 mmol/L    Glucose 45 (LL) 65 - 100 mg/dL    BUN 78 (H) 6 - 20 mg/dL    Creatinine 6.00 (H) 0.55 - 1.02 mg/dL    BUN/Creatinine ratio 13 12 - 20      GFR est AA 9 (L) >60 ml/min/1.73m2    GFR est non-AA 7 (L) >60 ml/min/1.73m2    Calcium 8.9 8.5 - 10.1 mg/dL    Phosphorus 4.8 (H) 2.6 - 4.7 mg/dL    Albumin 2.2 (L) 3.5 - 5.0 g/dL   CBC WITH AUTOMATED DIFF    Collection Time: 03/11/21  7:55 AM   Result Value Ref Range    WBC 11.4 (H) 3.6 - 11.0 K/uL    RBC 3.44 (L) 3.80 - 5.20 M/uL    HGB 9.3 (L) 11.5 - 16.0 g/dL    HCT 29.6 (L) 35.0 - 47.0 %    MCV 86.0 80.0 - 99.0 FL    MCH 27.0 26.0 - 34.0 PG    MCHC 31.4 30.0 - 36.5 g/dL    RDW 15.1 (H) 11.5 - 14.5 %    PLATELET 615 019 - 462 K/uL    MPV 11.2 8.9 - 12.9 FL    NEUTROPHILS 91 (H) 32 - 75 %    LYMPHOCYTES 5 (L) 12 - 49 %    MONOCYTES 4 (L) 5 - 13 %    EOSINOPHILS 0 0 - 7 %    BASOPHILS 0 0 - 1 %    IMMATURE GRANULOCYTES 0 0.0 - 0.5 %    ABS. NEUTROPHILS 10.4 (H) 1.8 - 8.0 K/UL    ABS. LYMPHOCYTES 0.6 (L) 0.8 - 3.5 K/UL    ABS. MONOCYTES 0.4 0.0 - 1.0 K/UL    ABS.  EOSINOPHILS 0.0 0.0 - 0.4 K/UL ABS. BASOPHILS 0.0 0.0 - 0.1 K/UL    ABS. IMM. GRANS. 0.1 (H) 0.00 - 0.04 K/UL    DF AUTOMATED     METABOLIC PANEL, COMPREHENSIVE    Collection Time: 03/11/21  7:55 AM   Result Value Ref Range    Sodium 139 136 - 145 mmol/L    Potassium 4.5 3.5 - 5.1 mmol/L    Chloride 108 97 - 108 mmol/L    CO2 22 21 - 32 mmol/L    Anion gap 9 5 - 15 mmol/L    Glucose 46 (LL) 65 - 100 mg/dL    BUN 78 (H) 6 - 20 mg/dL    Creatinine 6.02 (H) 0.55 - 1.02 mg/dL    BUN/Creatinine ratio 13 12 - 20      GFR est AA 9 (L) >60 ml/min/1.73m2    GFR est non-AA 7 (L) >60 ml/min/1.73m2    Calcium 9.2 8.5 - 10.1 mg/dL    Bilirubin, total 0.2 0.2 - 1.0 mg/dL    AST (SGOT) 20 15 - 37 U/L    ALT (SGPT) 22 12 - 78 U/L    Alk. phosphatase 108 45 - 117 U/L    Protein, total 6.4 6.4 - 8.2 g/dL    Albumin 2.2 (L) 3.5 - 5.0 g/dL    Globulin 4.2 (H) 2.0 - 4.0 g/dL    A-G Ratio 0.5 (L) 1.1 - 2.2     GLUCOSE, POC    Collection Time: 03/11/21  8:07 AM   Result Value Ref Range    Glucose (POC) 41 (LL) 65 - 100 mg/dL    Performed by Elli ALVAREZ    GLUCOSE, POC    Collection Time: 03/11/21 11:39 AM   Result Value Ref Range    Glucose (POC) 121 (H) 65 - 100 mg/dL    Performed by Elli ALVAREZ      [unfilled]      Review of Systems    Constitutional: Negative for chills and fever. HENT: Negative. Eyes: Negative. Respiratory: Negative. Cardiovascular: Negative. Gastrointestinal: Negative for abdominal pain and nausea. Skin: Negative. Neurological: Negative. Physical Exam:      Constitutional: pt is oriented to person, place, and time. HENT:   Head: Normocephalic and atraumatic. Eyes: Pupils are equal, round, and reactive to light. EOM are normal.   Cardiovascular: Normal rate, regular rhythm and normal heart sounds. Pulmonary/Chest: Breath sounds normal. No wheezes. No rales. Exhibits no tenderness. Abdominal: Soft. Bowel sounds are normal. There is no abdominal tenderness. There is no rebound and no guarding. Musculoskeletal: Normal range of motion. Neurological: pt is alert and oriented to person, place, and time. Extremities 2+ edema    US RETROPERITONEUM COMP   Final Result   1. This examination is negative for hydronephrosis as an etiology for the   patient's renal insufficiency. 2.  On prior sonographic imaging there were demonstrated echogenic masses within   the left kidney which are no longer present. There are now is a smaller but more   normal morphology to the patient's left kidney. XR CHEST SNGL V   Final Result           Recent Results (from the past 24 hour(s))   GLUCOSE, POC    Collection Time: 03/10/21  3:54 PM   Result Value Ref Range    Glucose (POC) 74 65 - 100 mg/dL    Performed by Rafael Rocha    RENAL FUNCTION PANEL    Collection Time: 03/11/21  7:55 AM   Result Value Ref Range    Sodium 139 136 - 145 mmol/L    Potassium 4.5 3.5 - 5.1 mmol/L    Chloride 109 (H) 97 - 108 mmol/L    CO2 21 21 - 32 mmol/L    Anion gap 9 5 - 15 mmol/L    Glucose 45 (LL) 65 - 100 mg/dL    BUN 78 (H) 6 - 20 mg/dL    Creatinine 6.00 (H) 0.55 - 1.02 mg/dL    BUN/Creatinine ratio 13 12 - 20      GFR est AA 9 (L) >60 ml/min/1.73m2    GFR est non-AA 7 (L) >60 ml/min/1.73m2    Calcium 8.9 8.5 - 10.1 mg/dL    Phosphorus 4.8 (H) 2.6 - 4.7 mg/dL    Albumin 2.2 (L) 3.5 - 5.0 g/dL   CBC WITH AUTOMATED DIFF    Collection Time: 03/11/21  7:55 AM   Result Value Ref Range    WBC 11.4 (H) 3.6 - 11.0 K/uL    RBC 3.44 (L) 3.80 - 5.20 M/uL    HGB 9.3 (L) 11.5 - 16.0 g/dL    HCT 29.6 (L) 35.0 - 47.0 %    MCV 86.0 80.0 - 99.0 FL    MCH 27.0 26.0 - 34.0 PG    MCHC 31.4 30.0 - 36.5 g/dL    RDW 15.1 (H) 11.5 - 14.5 %    PLATELET 543 364 - 271 K/uL    MPV 11.2 8.9 - 12.9 FL    NEUTROPHILS 91 (H) 32 - 75 %    LYMPHOCYTES 5 (L) 12 - 49 %    MONOCYTES 4 (L) 5 - 13 %    EOSINOPHILS 0 0 - 7 %    BASOPHILS 0 0 - 1 %    IMMATURE GRANULOCYTES 0 0.0 - 0.5 %    ABS. NEUTROPHILS 10.4 (H) 1.8 - 8.0 K/UL    ABS.  LYMPHOCYTES 0.6 (L) 0.8 - 3.5 K/UL    ABS. MONOCYTES 0.4 0.0 - 1.0 K/UL    ABS. EOSINOPHILS 0.0 0.0 - 0.4 K/UL    ABS. BASOPHILS 0.0 0.0 - 0.1 K/UL    ABS. IMM. GRANS. 0.1 (H) 0.00 - 0.04 K/UL    DF AUTOMATED     METABOLIC PANEL, COMPREHENSIVE    Collection Time: 03/11/21  7:55 AM   Result Value Ref Range    Sodium 139 136 - 145 mmol/L    Potassium 4.5 3.5 - 5.1 mmol/L    Chloride 108 97 - 108 mmol/L    CO2 22 21 - 32 mmol/L    Anion gap 9 5 - 15 mmol/L    Glucose 46 (LL) 65 - 100 mg/dL    BUN 78 (H) 6 - 20 mg/dL    Creatinine 6.02 (H) 0.55 - 1.02 mg/dL    BUN/Creatinine ratio 13 12 - 20      GFR est AA 9 (L) >60 ml/min/1.73m2    GFR est non-AA 7 (L) >60 ml/min/1.73m2    Calcium 9.2 8.5 - 10.1 mg/dL    Bilirubin, total 0.2 0.2 - 1.0 mg/dL    AST (SGOT) 20 15 - 37 U/L    ALT (SGPT) 22 12 - 78 U/L    Alk.  phosphatase 108 45 - 117 U/L    Protein, total 6.4 6.4 - 8.2 g/dL    Albumin 2.2 (L) 3.5 - 5.0 g/dL    Globulin 4.2 (H) 2.0 - 4.0 g/dL    A-G Ratio 0.5 (L) 1.1 - 2.2     GLUCOSE, POC    Collection Time: 03/11/21  8:07 AM   Result Value Ref Range    Glucose (POC) 41 (LL) 65 - 100 mg/dL    Performed by Marlo Alu    GLUCOSE, POC    Collection Time: 03/11/21 11:39 AM   Result Value Ref Range    Glucose (POC) 121 (H) 65 - 100 mg/dL    Performed by Marlo [employer]        Results     Procedure Component Value Units Date/Time    CULTURE, BLOOD, LINE [[id number]] Collected: 03/09/21 0032    Order Status: Completed Specimen: Blood Updated: 03/11/21 0719     Special Requests: No Special Requests        Culture result: No growth 1 day       CULTURE, URINE [[id number]] Collected: 03/08/21 1900    Order Status: Completed Specimen: Urine Updated: 03/10/21 0904     Special Requests: No Special Requests        Culture result: No Growth (<1000 cfu/mL)       CULTURE, RESPIRATORY/SPUTUM/BRONCH Thereasa Gravely STAIN [[id number]] Collected: 03/08/21 1900    Order Status: Canceled Specimen: Sputum     COVID-19 RAPID TEST [[id number]]  (Abnormal) Collected: 03/08/21 1325    Order Status: Completed Specimen: Nasopharyngeal Updated: 03/08/21 1415     Specimen source Nasopharyngeal        Comment: Results verified, phoned to and read back by Sergo Canchola AT 0192 BY JF        COVID-19 rapid test DETECTED        Comment: Rapid [employer] ID Now   The specimen is POSITIVE for SARS-CoV-2, the novel coronavirus associated with COVID-19. This test has been authorized by the FDA under an Emergency Use Authorization (EUA) for use by authorized laboratories. Fact sheet for Healthcare Providers: kstattoo[employer]com Fact sheet for Patients: kstattoo.com   Methodology: Isothermal Nucleic Acid Amplification       CULTURE, BLOOD, PAIRED [[id number]] Collected: 03/08/21 1305    Order Status: Completed Specimen: Blood Updated: 03/11/21 0655     Special Requests: No Special Requests        Culture result: No growth 3 days              Labs:     Recent Labs     03/11/21  0755 03/10/21  1228   WBC 11.4* 10.5   HGB 9.3* 9.0*   HCT 29.6* 28.5*    206     Recent Labs     03/11/21  0755 03/10/21  1228 03/10/21  1212 03/09/21  0943     139 141 141 137   K 4.5  4.5 4.9 4.9 5.2*   *  108 109* 110* 109*   CO2 21  22 25 25 18*   BUN 78*  78* 73* 72* 61*   CREA 6.00*  6.02* 6.01* 6.01* 5.49*   GLU 45*  46* 109* 109* 179*   CA 8.9  9.2 9.0 9.0 9.2   PHOS 4.8*  --  5.0* 5.3*     Recent Labs     03/11/21  0755 03/10/21  1228 03/10/21  1212 03/09/21  0943   ALT 22 21  --  25    116  --  133*   TBILI 0.2 0.2  --  0.3   TP 6.4 6.4  --  5.5*  6.5   ALB 2.2*  2.2* 2.0* 2.0* 2.0*   GLOB 4.2* 4.4*  --  4.5*     No results for input(s): INR, PTP, APTT, INREXT, INREXT in the last 72 hours. Recent Labs     03/08/21  1910   TIBC 176*   PSAT 9*      No results found for: FOL, RBCF   No results for input(s): PH, PCO2, PO2 in the last 72 hours.   Recent Labs     03/09/21  0943   *     No results found for: CHOL, CHOLX, CHLST, CHOLV, HDL, HDLP, LDL, LDLC, DLDLP, TGLX, TRIGL, TRIGP, CHHD, CHHDX  Lab Results   Component Value Date/Time    Glucose (POC) 121 (H) 03/11/2021 11:39 AM    Glucose (POC) 41 (LL) 03/11/2021 08:07 AM    Glucose (POC) 74 03/10/2021 03:54 PM    Glucose (POC) 135 (H) 03/10/2021 11:13 AM    Glucose (POC) 167 (H) 03/10/2021 08:02 AM     Lab Results   Component Value Date/Time    Color Yellow/Straw 03/08/2021 03:15 PM    Appearance Clear 03/08/2021 03:15 PM    Specific gravity 1.008 03/08/2021 03:15 PM    pH (UA) 7.0 03/08/2021 03:15 PM    Protein >300 (A) 03/08/2021 03:15 PM    Glucose 50 (A) 03/08/2021 03:15 PM    Ketone Negative 03/08/2021 03:15 PM    Bilirubin Negative 03/08/2021 03:15 PM    Urobilinogen 0.1 03/08/2021 03:15 PM    Nitrites Negative 03/08/2021 03:15 PM    Leukocyte Esterase Negative 03/08/2021 03:15 PM    Bacteria Negative 03/08/2021 03:15 PM    WBC 0-4 03/08/2021 03:15 PM    RBC 0-5 03/08/2021 03:15 PM         Assessment:     COVID-19 pneumonitis  Acute on chronic kidney disease  Type 2 diabetes  Acute CHF  Anemia chronic disease  Hypokalemia    Plan:       Patient on Norvasc 10 mg daily  Zithromax 500 mg IV daily  Coreg 12.5 twice daily  Decadron 4 mg every 6 hours  Pepcid 20 daily  On Lasix 80 mg IV every 12 hours  Hydralazine 100 mg 3 times a day  Isosorbide dinitrate 20 mg 3 times a day  IV Zosyn  Subcu heparin for DVT prophylaxis    Repeat the labs in the morning        Current Facility-Administered Medications:     hydrALAZINE (APRESOLINE) tablet 100 mg, 100 mg, Oral, TID, Milan Gunter MD    furosemide (LASIX) injection 80 mg, 80 mg, IntraVENous, Q12H, Katelyn Gunter MD    amLODIPine (NORVASC) tablet 10 mg, 10 mg, Oral, DAILY, Katelyn Gunter MD, 10 mg at 03/11/21 0753    carvediloL (COREG) tablet 12.5 mg, 12.5 mg, Oral, BID WITH MEALS, Katelyn Gunter MD, 12.5 mg at 03/11/21 0926    isosorbide dinitrate (ISORDIL) tablet 20 mg, 20 mg, Oral, TID, Milan Gunter MD, 20 mg at 03/11/21 7447    heparin (porcine) injection 5,000 Units, 5,000 Units, SubCUTAneous, Q8H, Jules Gunter MD, 5,000 Units at 03/11/21 0329    sodium chloride (NS) flush 5-40 mL, 5-40 mL, IntraVENous, Q8H, Ettie Cancel, NP, 10 mL at 03/11/21 0600    sodium chloride (NS) flush 5-40 mL, 5-40 mL, IntraVENous, PRN, Ettie Cancel, NP  [employer]Ask  acetaminophen (TYLENOL) tablet 650 mg, 650 mg, Oral, Q6H PRN **OR** acetaminophen (TYLENOL) suppository 650 mg, 650 mg, Rectal, Q6H PRN, Ettie Cancel, NP    polyethylene glycol (MIRALAX) packet 17 g, 17 g, Oral, DAILY PRN, Ettie Cancel, NP    promethazine (PHENERGAN) tablet 12.5 mg, 12.5 mg, Oral, Q6H PRN **OR** ondansetron (ZOFRAN) injection 4 mg, 4 mg, IntraVENous, Q6H PRN, Ettie Cancel, NP    famotidine (PEPCID) tablet 20 mg, 20 mg, Oral, DAILY, Ettie Cancel, NP, 20 mg at 03/11/21 0926    dexamethasone (DECADRON) 4 mg/mL injection 4 mg, 4 mg, IntraVENous, Q8H, Ettie Cancel, NP, 4 mg at 03/11/21 0927    piperacillin-tazobactam (ZOSYN) 3.375 g in 0.9% sodium chloride (MBP/ADV) 100 mL MBP, 3.375 g, IntraVENous, Q12H, Ettie Cancel, NP, Last Rate: 25 mL/hr at 03/11/21 0559, 3.375 g at 03/11/21 0559    azithromycin (ZITHROMAX) 500 mg in 0.9% sodium chloride 250 mL (VIAL-MATE), 500 mg, IntraVENous, Q24H, Rebecca Reyes MD, 500 mg at 03/10/21 1340

## 2021-03-11 NOTE — CONSULTS
PULMONARY NOTE  [facility]    Name: Joseph Johnson MRN: [id number]   : 1960 Hospital: [address] [place] [address]   Date: 3/11/2021  Admission date: 3/8/2021 Hospital Day: 4       HPI:     Hospital Problems  Never Reviewed          Codes Class Noted POA    PNA (pneumonia) ICD-10-CM: J18.9  ICD-9-CM: 023  3/8/2021 Unknown                   [x] High complexity decision making was performed  [x] See my orders for details      Subjective/Initial History:     I was asked by Natasha Winters MD to see Joseph Johnson  a 62 y.o.  female in consultation     Excerpts from admission 3/8/2021 or consult notes as follows:   23-year-old lady came in because of abdominal discomfort significant past medical history of congestive heart failure chronic kidney disease and type 2 diabetes mellitus she was complaining of shortness of breath dyspnea and feeling nauseous for the past 1 week no history of passing out so admitted and she is COVID-19 positive so pulmonary consult was called for further evaluation.       Allergies   Allergen Reactions    Doxycycline Swelling    Tetracycline Swelling        MAR reviewed and pertinent medications noted or modified as needed     Current Facility-Administered Medications   Medication    sildenafiL (pulmonary hypertension) (REVATIO) tablet 20 mg    amLODIPine (NORVASC) tablet 10 mg    hydrALAZINE (APRESOLINE) tablet 50 mg    carvediloL (COREG) tablet 12.5 mg    isosorbide dinitrate (ISORDIL) tablet 20 mg    heparin (porcine) injection 5,000 Units    furosemide (LASIX) injection 80 mg    sodium chloride (NS) flush 5-40 mL    sodium chloride (NS) flush 5-40 mL    acetaminophen (TYLENOL) tablet 650 mg    Or    acetaminophen (TYLENOL) suppository 650 mg    polyethylene glycol (MIRALAX) packet 17 g    promethazine (PHENERGAN) tablet 12.5 mg    Or    ondansetron (ZOFRAN) injection 4 mg    famotidine (PEPCID) tablet 20 mg    dexamethasone (DECADRON) 4 mg/mL injection 4 mg    piperacillin-tazobactam (ZOSYN) 3.375 g in 0.9% sodium chloride (MBP/ADV) 100 mL MBP    azithromycin (ZITHROMAX) 500 mg in 0.9% sodium chloride 250 mL (VIAL-MATE)      Patient PCP: Fariba, MD Adria  PMH:  has a past medical history of Hypertension and Morbid obesity ([address] PSH:   has no past surgical history on file. FHX: family history is not on file. SHX:       ROS:    Review of Systems   Constitutional: Negative. HENT: Negative. Eyes: Negative. Respiratory: Positive for shortness of breath. Cardiovascular: Negative. Gastrointestinal: Positive for abdominal pain and nausea. Genitourinary: Negative. Musculoskeletal: Negative. Skin: Negative. Neurological: Positive for weakness. Endo/Heme/Allergies: Negative. Psychiatric/Behavioral: Negative. Objective:     Vital Signs: Telemetry:    normal sinus rhythm Intake/Output:   Visit Vitals  BP (!) 178/98 (BP Patient Position: Sitting)   Pulse 84   Temp 97.5 °F (36.4 °C)   Resp 18   Ht 5' 8.9\" (1.75 m)   Wt 113.4 kg (250 lb)   SpO2 96%   Breastfeeding No   BMI 37.03 kg/m²       Temp (24hrs), Av.6 °F (36.4 °C), Min:97.4 °F (36.3 °C), Max:98 °F (36.7 °C)        O2 Device: Room air O2 Flow Rate (L/min): 2 l/min       Wt Readings from Last 4 Encounters:   03/10/21 113.4 kg (250 lb)        No intake or output data in the 24 hours ending 21 0916    Last shift:      No intake/output data recorded. Last 3 shifts: No intake/output data recorded. Physical Exam:     Physical Exam   Constitutional: She appears distressed. HENT:   Head: Normocephalic and atraumatic. Eyes: Pupils are equal, round, and reactive to light. Conjunctivae and EOM are normal.   Neck: Normal range of motion. Neck supple. Cardiovascular: Normal rate and regular rhythm. Pulmonary/Chest: Effort normal and breath sounds normal.   Abdominal: Soft.  Bowel sounds are normal.   Musculoskeletal: Normal range of motion. Neurological: She is alert. Psychiatric: She has a normal mood and affect. Labs:    Recent Labs     03/11/21  0755 03/10/21  1228 03/09/21  0943 03/08/21  1145   WBC 11.4* 10.5 4.4 5.1   HGB 9.3* 9.0* 9.5* 8.0*    206 191 193   INR  --   --   --  1.1   APTT  --   --   --  30.0     Recent Labs     03/11/21  0755 03/10/21  1228 03/10/21  1212 03/09/21  0943 03/08/21  1145     139 141 141 137 139   K 4.5  4.5 4.9 4.9 5.2* 4.9   *  108 109* 110* 109* 108   CO2 21 22 25 25 18* 22   GLU 45*  46* 109* 109* 179* 103*   BUN 78*  78* 73* 72* 61* 53*   CREA 6.00*  6.02* 6.01* 6.01* 5.49* 5.40*   CA 8.9  9.2 9.0 9.0 9.2 8.3*   MG  --   --   --   --  1.8   PHOS 4.8*  --  5.0* 5.3*  --    LAC  --   --   --   --  0.5   ALB 2.2*  2.2* 2.0* 2.0* 2.0* 1.9*   ALT 22 21  --  25 22     No results for input(s): PH, PCO2, PO2, HCO3, FIO2 in the last 72 hours. Recent Labs     03/09/21  0943 03/08/21  1145   *  --    TROIQ  --  <0.05     No results found for: BNPP, BNP   Lab Results   Component Value Date/Time    Culture result: No growth 1 day 03/09/2021 12:32 AM    Culture result: No Growth (<1000 cfu/mL) 03/08/2021 07:00 PM    Culture result: No growth 3 days 03/08/2021 01:05 PM     Lab Results   Component Value Date/Time    TSH 1.57 03/08/2021 11:45 AM       Imaging:    CXR Results  (Last 48 hours)    None        Results from [place] encounter on 03/08/21   XR CHEST SNGL V    Narrative 1 new comparison June 21    Cardiomegaly with congestion. Mild interstitial edema. Localized airspace  disease right perihilar. No effusion or pneumothorax     No results found for this or any previous visit. IMPRESSION:   1. COVID-19 pneumonia  2. Acute on chronic kidney disease   3. Congestive heart failure  4. Mild pulmonary hypertension  5. Ex COPD  6. Anemia of chronic disease  7. Additional workup outlined below  8.  Pt is requiring Drug therapy requiring intensive monitoring for toxicity  9. Prognosis guarded       RECOMMENDATIONS/PLAN:     1. She is on room air  2. She is on Decadron Zosyn and Zithromax not a candidate for remdesivir or Actemra  3. Agree with Empiric IV antibiotics pending culture results   4. Follow culture results  5. Chest x-ray shows cardiomegaly with congestion and infiltrate right perihilar  6. Supplemental O2 to keep sats > 93%  7. Aspiration precautions  8. Labs to follow electrolytes, renal function and and blood counts  9.  DVT, SUP prophylaxis           Zachary Ro MD

## 2021-03-11 NOTE — PROGRESS NOTES
Patient is seen and examined. No new complaints to me. Leg swelling is same or going down. Blood pressure is improved but still high. Breathing improved. Walking inside room. Without oxygen. Past Medical History:   Diagnosis Date    Hypertension     Morbid obesity ([address]       No past surgical history on file. No family history on file.    Social History     Tobacco Use    Smoking status: Not on file   Substance Use Topics    Alcohol use: Not on file       Current Facility-Administered Medications   Medication Dose Route Frequency Provider Last Rate Last Admin    amLODIPine (NORVASC) tablet 10 mg  10 mg Oral DAILY Nahomy Gunter MD   10 mg at 03/11/21 7156    hydrALAZINE (APRESOLINE) tablet 50 mg  50 mg Oral TID Latrell Verdin MD   50 mg at 03/11/21 0926    carvediloL (COREG) tablet 12.5 mg  12.5 mg Oral BID WITH MEALS Latrell Verdin MD   12.5 mg at 03/11/21 0926    isosorbide dinitrate (ISORDIL) tablet 20 mg  20 mg Oral TID Latrell Verdin MD   20 mg at 03/11/21 0926    heparin (porcine) injection 5,000 Units  5,000 Units SubCUTAneous Q8H Latrell Verdin MD   5,000 Units at 03/11/21 0329    sodium chloride (NS) flush 5-40 mL  5-40 mL IntraVENous Q8H Anna Guerra, NP   10 mL at 03/11/21 0600    sodium chloride (NS) flush 5-40 mL  5-40 mL IntraVENous PRN Anna Guerra, NP        acetaminophen (TYLENOL) tablet 650 mg  650 mg Oral Q6H PRN Anna Kanga NP        Or   Curt Phan acetaminophen (TYLENOL) suppository 650 mg  650 mg Rectal Q6H PRN Anna Kanga, NP        polyethylene glycol (MIRALAX) packet 17 g  17 g Oral DAILY PRN Anna Kanga, NP        promethazine (PHENERGAN) tablet 12.5 mg  12.5 mg Oral Q6H PRN Anna Kanga, NP        Or    [facility]) injection 4 mg  4 mg IntraVENous Q6H PRN Anna Mari, NP        famotidine (PEPCID) tablet 20 mg  20 mg Oral DAILY Gaylyn Mari, NP   20 mg at 03/11/21 0926    dexamethasone (DECADRON) 4 mg/mL injection 4 mg  4 mg IntraVENous Q8H Carmelo Hinojosa NP   4 mg at 03/11/21 0927    piperacillin-tazobactam (ZOSYN) 3.375 g in 0.9% sodium chloride (MBP/ADV) 100 mL MBP  3.375 g IntraVENous Q12H Carmelo Hinojosa NP 25 mL/hr at 03/11/21 0559 3.375 g at 03/11/21 0559    azithromycin (ZITHROMAX) 500 mg in 0.9% sodium chloride 250 mL (VIAL-MATE)  500 mg IntraVENous Q24H Rebecca Reyes MD   500 mg at 03/10/21 1340        Review of Systems   Respiratory: Negative for shortness of breath and wheezing. Cardiovascular: Positive for leg swelling. Negative for palpitations. Gastrointestinal: Positive for abdominal distention. All other systems reviewed and are negative. Objective     Vital signs for last 24 hours:  Visit Vitals  BP (!) 178/98 (BP Patient Position: Sitting)   Pulse 84   Temp 97.5 °F (36.4 °C)   Resp 18   Ht 5' 8.9\" (1.75 m)   Wt 113.4 kg (250 lb)   SpO2 100%   Breastfeeding No   BMI 37.03 kg/m²       Intake/Output this shift:  Current Shift: No intake/output data recorded. Last 3 Shifts: No intake/output data recorded. Physical Exam   Constitutional: She is oriented to person, place, and time. She appears well-developed and well-nourished. HENT:   Head: Normocephalic and atraumatic. Neck: No JVD present. No tracheal deviation present. Cardiovascular: Normal rate and regular rhythm. Pulmonary/Chest: Effort normal and breath sounds normal. No stridor. Abdominal: Soft. Bowel sounds are normal.   Neurological: She is alert and oriented to person, place, and time. Skin: Skin is warm and dry. Psychiatric: She has a normal mood and affect.  Her behavior is normal.      Significant 3+ to 4+ edema of legs      Data Review:   Recent Results (from the past 24 hour(s))   GLUCOSE, POC    Collection Time: 03/10/21  3:54 PM   Result Value Ref Range    Glucose (POC) 74 65 - 100 mg/dL    Performed by Kari Anderson    RENAL FUNCTION PANEL    Collection Time: 03/11/21  7:55 AM   Result Value Ref Range Sodium 139 136 - 145 mmol/L    Potassium 4.5 3.5 - 5.1 mmol/L    Chloride 109 (H) 97 - 108 mmol/L    CO2 21 21 - 32 mmol/L    Anion gap 9 5 - 15 mmol/L    Glucose 45 (LL) 65 - 100 mg/dL    BUN 78 (H) 6 - 20 mg/dL    Creatinine 6.00 (H) 0.55 - 1.02 mg/dL    BUN/Creatinine ratio 13 12 - 20      GFR est AA 9 (L) >60 ml/min/1.73m2    GFR est non-AA 7 (L) >60 ml/min/1.73m2    Calcium 8.9 8.5 - 10.1 mg/dL    Phosphorus 4.8 (H) 2.6 - 4.7 mg/dL    Albumin 2.2 (L) 3.5 - 5.0 g/dL   CBC WITH AUTOMATED DIFF    Collection Time: 03/11/21  7:55 AM   Result Value Ref Range    WBC 11.4 (H) 3.6 - 11.0 K/uL    RBC 3.44 (L) 3.80 - 5.20 M/uL    HGB 9.3 (L) 11.5 - 16.0 g/dL    HCT 29.6 (L) 35.0 - 47.0 %    MCV 86.0 80.0 - 99.0 FL    MCH 27.0 26.0 - 34.0 PG    MCHC 31.4 30.0 - 36.5 g/dL    RDW 15.1 (H) 11.5 - 14.5 %    PLATELET 616 591 - 394 K/uL    MPV 11.2 8.9 - 12.9 FL    NEUTROPHILS 91 (H) 32 - 75 %    LYMPHOCYTES 5 (L) 12 - 49 %    MONOCYTES 4 (L) 5 - 13 %    EOSINOPHILS 0 0 - 7 %    BASOPHILS 0 0 - 1 %    IMMATURE GRANULOCYTES 0 0.0 - 0.5 %    ABS. NEUTROPHILS 10.4 (H) 1.8 - 8.0 K/UL    ABS. LYMPHOCYTES 0.6 (L) 0.8 - 3.5 K/UL    ABS. MONOCYTES 0.4 0.0 - 1.0 K/UL    ABS. EOSINOPHILS 0.0 0.0 - 0.4 K/UL    ABS. BASOPHILS 0.0 0.0 - 0.1 K/UL    ABS. IMM. GRANS. 0.1 (H) 0.00 - 0.04 K/UL    DF AUTOMATED     METABOLIC PANEL, COMPREHENSIVE    Collection Time: 03/11/21  7:55 AM   Result Value Ref Range    Sodium 139 136 - 145 mmol/L    Potassium 4.5 3.5 - 5.1 mmol/L    Chloride 108 97 - 108 mmol/L    CO2 22 21 - 32 mmol/L    Anion gap 9 5 - 15 mmol/L    Glucose 46 (LL) 65 - 100 mg/dL    BUN 78 (H) 6 - 20 mg/dL    Creatinine 6.02 (H) 0.55 - 1.02 mg/dL    BUN/Creatinine ratio 13 12 - 20      GFR est AA 9 (L) >60 ml/min/1.73m2    GFR est non-AA 7 (L) >60 ml/min/1.73m2    Calcium 9.2 8.5 - 10.1 mg/dL    Bilirubin, total 0.2 0.2 - 1.0 mg/dL    AST (SGOT) 20 15 - 37 U/L    ALT (SGPT) 22 12 - 78 U/L    Alk.  phosphatase 108 45 - 117 U/L    Protein, total 6.4 6.4 - 8.2 g/dL    Albumin 2.2 (L) 3.5 - 5.0 g/dL    Globulin 4.2 (H) 2.0 - 4.0 g/dL    A-G Ratio 0.5 (L) 1.1 - 2.2     GLUCOSE, POC    Collection Time: 03/11/21  8:07 AM   Result Value Ref Range    Glucose (POC) 41 (LL) 65 - 100 mg/dL    Performed by Elli ALVAREZ    GLUCOSE, POC    Collection Time: 03/11/21 11:39 AM   Result Value Ref Range    Glucose (POC) 121 (H) 65 - 100 mg/dL    Performed by Elli ALVAREZ          US RETROPERITONEUM COMP   Final Result   1. This examination is negative for hydronephrosis as an etiology for the   patient's renal insufficiency. 2.  On prior sonographic imaging there were demonstrated echogenic masses within   the left kidney which are no longer present. There are now is a smaller but more   normal morphology to the patient's left kidney. XR CHEST SNGL V   Final Result           Patient Active Problem List   Diagnosis Code    PNA (pneumonia) J18.9        DIAGNOSES:  1. CKD stage V-most likely progressive CKD or could be EDDIE on CKD  2. Nephrotic syndrome  3. Anasarca  4. Coronavirus pneumonia  5. Hypertension due to hypervolemia  6. Hyperkalemia  7. Renal tubular acidosis, type IV    PLAN:  Increase hydralazine 200  Increase Lasix dose 80 BID  Sildenafil added - Dr Issa Benavides appreciated.   Monitor for hypoglycemia  No indication for dialysis

## 2021-03-11 NOTE — PROGRESS NOTES
Nursing is unable to restart a peripheral iv line on this patient. There have been 5 attempts by nursing.

## 2021-03-12 ENCOUNTER — APPOINTMENT (OUTPATIENT)
Dept: GENERAL RADIOLOGY | Age: 61
DRG: 177 | End: 2021-03-12
Attending: RADIOLOGY
Payer: COMMERCIAL

## 2021-03-12 ENCOUNTER — APPOINTMENT (OUTPATIENT)
Dept: INTERVENTIONAL RADIOLOGY/VASCULAR | Age: 61
DRG: 177 | End: 2021-03-12
Attending: INTERNAL MEDICINE
Payer: COMMERCIAL

## 2021-03-12 LAB
ALBUMIN SERPL-MCNC: 1.9 G/DL (ref 3.5–5)
ALBUMIN SERPL-MCNC: 1.9 G/DL (ref 3.5–5)
ALBUMIN/GLOB SERPL: 0.5 {RATIO} (ref 1.1–2.2)
ALP SERPL-CCNC: 96 U/L (ref 45–117)
ALT SERPL-CCNC: 20 U/L (ref 12–78)
ANION GAP SERPL CALC-SCNC: 7 MMOL/L (ref 5–15)
ANION GAP SERPL CALC-SCNC: 8 MMOL/L (ref 5–15)
AST SERPL W P-5'-P-CCNC: 23 U/L (ref 15–37)
BILIRUB SERPL-MCNC: 0.1 MG/DL (ref 0.2–1)
BNP SERPL-MCNC: ABNORMAL PG/ML
BUN SERPL-MCNC: 79 MG/DL (ref 6–20)
BUN SERPL-MCNC: 80 MG/DL (ref 6–20)
BUN/CREAT SERPL: 13 (ref 12–20)
BUN/CREAT SERPL: 13 (ref 12–20)
CA-I BLD-MCNC: 8.2 MG/DL (ref 8.5–10.1)
CA-I BLD-MCNC: 8.3 MG/DL (ref 8.5–10.1)
CHLORIDE SERPL-SCNC: 109 MMOL/L (ref 97–108)
CHLORIDE SERPL-SCNC: 109 MMOL/L (ref 97–108)
CO2 SERPL-SCNC: 22 MMOL/L (ref 21–32)
CO2 SERPL-SCNC: 23 MMOL/L (ref 21–32)
CREAT SERPL-MCNC: 6.23 MG/DL (ref 0.55–1.02)
CREAT SERPL-MCNC: 6.31 MG/DL (ref 0.55–1.02)
ERYTHROCYTE [DISTWIDTH] IN BLOOD BY AUTOMATED COUNT: 15.3 % (ref 11.5–14.5)
GLOBULIN SER CALC-MCNC: 3.8 G/DL (ref 2–4)
GLUCOSE BLD STRIP.AUTO-MCNC: 100 MG/DL (ref 65–100)
GLUCOSE BLD STRIP.AUTO-MCNC: 104 MG/DL (ref 65–100)
GLUCOSE BLD STRIP.AUTO-MCNC: 106 MG/DL (ref 65–100)
GLUCOSE BLD STRIP.AUTO-MCNC: 190 MG/DL (ref 65–100)
GLUCOSE SERPL-MCNC: 127 MG/DL (ref 65–100)
GLUCOSE SERPL-MCNC: 127 MG/DL (ref 65–100)
HCT VFR BLD AUTO: 27.3 % (ref 35–47)
HGB BLD-MCNC: 8.6 G/DL (ref 11.5–16)
MCH RBC QN AUTO: 27.2 PG (ref 26–34)
MCHC RBC AUTO-ENTMCNC: 31.5 G/DL (ref 30–36.5)
MCV RBC AUTO: 86.4 FL (ref 80–99)
PERFORMED BY, TECHID: ABNORMAL
PERFORMED BY, TECHID: NORMAL
PHOSPHATE SERPL-MCNC: 4.1 MG/DL (ref 2.6–4.7)
PLATELET # BLD AUTO: 220 K/UL (ref 150–400)
PMV BLD AUTO: 11 FL (ref 8.9–12.9)
POTASSIUM SERPL-SCNC: 4.6 MMOL/L (ref 3.5–5.1)
POTASSIUM SERPL-SCNC: 4.6 MMOL/L (ref 3.5–5.1)
PROT SERPL-MCNC: 5.7 G/DL (ref 6.4–8.2)
RBC # BLD AUTO: 3.16 M/UL (ref 3.8–5.2)
SODIUM SERPL-SCNC: 139 MMOL/L (ref 136–145)
SODIUM SERPL-SCNC: 139 MMOL/L (ref 136–145)
WBC # BLD AUTO: 9.4 K/UL (ref 3.6–11)

## 2021-03-12 PROCEDURE — 36556 INSERT NON-TUNNEL CV CATH: CPT

## 2021-03-12 PROCEDURE — 76937 US GUIDE VASCULAR ACCESS: CPT

## 2021-03-12 PROCEDURE — 74011250637 HC RX REV CODE- 250/637: Performed by: INTERNAL MEDICINE

## 2021-03-12 PROCEDURE — 74011000258 HC RX REV CODE- 258: Performed by: NURSE PRACTITIONER

## 2021-03-12 PROCEDURE — 74011250636 HC RX REV CODE- 250/636: Performed by: NURSE PRACTITIONER

## 2021-03-12 PROCEDURE — 74011250636 HC RX REV CODE- 250/636: Performed by: INTERNAL MEDICINE

## 2021-03-12 PROCEDURE — 74011250637 HC RX REV CODE- 250/637: Performed by: NURSE PRACTITIONER

## 2021-03-12 PROCEDURE — 80053 COMPREHEN METABOLIC PANEL: CPT

## 2021-03-12 PROCEDURE — 36415 COLL VENOUS BLD VENIPUNCTURE: CPT

## 2021-03-12 PROCEDURE — 83880 ASSAY OF NATRIURETIC PEPTIDE: CPT

## 2021-03-12 PROCEDURE — 80069 RENAL FUNCTION PANEL: CPT

## 2021-03-12 PROCEDURE — 71045 X-RAY EXAM CHEST 1 VIEW: CPT

## 2021-03-12 PROCEDURE — 05HM33Z INSERTION OF INFUSION DEVICE INTO RIGHT INTERNAL JUGULAR VEIN, PERCUTANEOUS APPROACH: ICD-10-PCS | Performed by: FAMILY MEDICINE

## 2021-03-12 PROCEDURE — 85027 COMPLETE CBC AUTOMATED: CPT

## 2021-03-12 PROCEDURE — 65270000029 HC RM PRIVATE

## 2021-03-12 PROCEDURE — 82962 GLUCOSE BLOOD TEST: CPT

## 2021-03-12 RX ORDER — FUROSEMIDE 40 MG/1
120 TABLET ORAL 2 TIMES DAILY
Status: DISCONTINUED | OUTPATIENT
Start: 2021-03-12 | End: 2021-03-13

## 2021-03-12 RX ADMIN — FUROSEMIDE 80 MG: 10 INJECTION, SOLUTION INTRAMUSCULAR; INTRAVENOUS at 20:03

## 2021-03-12 RX ADMIN — CARVEDILOL 12.5 MG: 12.5 TABLET, FILM COATED ORAL at 10:26

## 2021-03-12 RX ADMIN — ISOSORBIDE DINITRATE 20 MG: 10 TABLET ORAL at 18:13

## 2021-03-12 RX ADMIN — ISOSORBIDE DINITRATE 20 MG: 10 TABLET ORAL at 10:26

## 2021-03-12 RX ADMIN — DEXAMETHASONE SODIUM PHOSPHATE 4 MG: 4 INJECTION, SOLUTION INTRA-ARTICULAR; INTRALESIONAL; INTRAMUSCULAR; INTRAVENOUS; SOFT TISSUE at 18:13

## 2021-03-12 RX ADMIN — HYDRALAZINE HYDROCHLORIDE 100 MG: 50 TABLET, FILM COATED ORAL at 18:13

## 2021-03-12 RX ADMIN — HEPARIN SODIUM 5000 UNITS: 5000 INJECTION INTRAVENOUS; SUBCUTANEOUS at 20:03

## 2021-03-12 RX ADMIN — PIPERACILLIN AND TAZOBACTAM 3.38 G: 3; .375 INJECTION, POWDER, LYOPHILIZED, FOR SOLUTION INTRAVENOUS at 18:13

## 2021-03-12 RX ADMIN — HYDRALAZINE HYDROCHLORIDE 100 MG: 50 TABLET, FILM COATED ORAL at 10:26

## 2021-03-12 RX ADMIN — FUROSEMIDE 120 MG: 40 TABLET ORAL at 20:05

## 2021-03-12 RX ADMIN — ISOSORBIDE DINITRATE 20 MG: 10 TABLET ORAL at 20:10

## 2021-03-12 RX ADMIN — HEPARIN SODIUM 5000 UNITS: 5000 INJECTION INTRAVENOUS; SUBCUTANEOUS at 10:35

## 2021-03-12 RX ADMIN — AMLODIPINE BESYLATE 10 MG: 5 TABLET ORAL at 10:26

## 2021-03-12 RX ADMIN — Medication 10 ML: at 18:22

## 2021-03-12 RX ADMIN — FAMOTIDINE 20 MG: 20 TABLET, FILM COATED ORAL at 09:00

## 2021-03-12 RX ADMIN — HYDRALAZINE HYDROCHLORIDE 100 MG: 50 TABLET, FILM COATED ORAL at 20:10

## 2021-03-12 RX ADMIN — Medication 10 ML: at 20:24

## 2021-03-12 RX ADMIN — CARVEDILOL 12.5 MG: 12.5 TABLET, FILM COATED ORAL at 18:27

## 2021-03-12 NOTE — CONSULTS
PULMONARY NOTE  [facility]    Name: Helen Velázquez MRN: [id number]   : 1960 Hospital: [address] [place] [address]   Date: 3/12/2021  Admission date: 3/8/2021 Hospital Day: 5       HPI:     Hospital Problems  Never Reviewed          Codes Class Noted POA    PNA (pneumonia) ICD-10-CM: J18.9  ICD-9-CM: 706  3/8/2021 Unknown                   [x] High complexity decision making was performed  [x] See my orders for details      Subjective/Initial History:     I was asked by Mohsen Mccarty MD to see Helen Velázquez  a 62 y.o.  female in consultation     Excerpts from admission 3/8/2021 or consult notes as follows:   50-year-old lady came in because of abdominal discomfort significant past medical history of congestive heart failure chronic kidney disease and type 2 diabetes mellitus she was complaining of shortness of breath dyspnea and feeling nauseous for the past 1 week no history of passing out so admitted and she is COVID-19 positive so pulmonary consult was called for further evaluation.       Allergies   Allergen Reactions    Doxycycline Swelling    Egg Diarrhea    Milk Containing Products Nausea and Vomiting    Tetracycline Swelling        MAR reviewed and pertinent medications noted or modified as needed     Current Facility-Administered Medications   Medication    hydrALAZINE (APRESOLINE) tablet 100 mg    furosemide (LASIX) injection 80 mg    amLODIPine (NORVASC) tablet 10 mg    carvediloL (COREG) tablet 12.5 mg    isosorbide dinitrate (ISORDIL) tablet 20 mg    heparin (porcine) injection 5,000 Units    sodium chloride (NS) flush 5-40 mL    sodium chloride (NS) flush 5-40 mL    acetaminophen (TYLENOL) tablet 650 mg    Or    acetaminophen (TYLENOL) suppository 650 mg    polyethylene glycol (MIRALAX) packet 17 g    promethazine (PHENERGAN) tablet 12.5 mg    Or    ondansetron (ZOFRAN) injection 4 mg    famotidine (PEPCID) tablet 20 mg    dexamethasone (DECADRON) 4 mg/mL injection 4 mg    piperacillin-tazobactam (ZOSYN) 3.375 g in 0.9% sodium chloride (MBP/ADV) 100 mL MBP    azithromycin (ZITHROMAX) 500 mg in 0.9% sodium chloride 250 mL (VIAL-MATE)      Patient PCP: Fariba, MD Adria  PMH:  has a past medical history of Hypertension and Morbid obesity ([address] PSH:   has no past surgical history on file. FHX: family history is not on file. SHX:       ROS:    Review of Systems   Constitutional: Negative. HENT: Negative. Eyes: Negative. Respiratory: Positive for shortness of breath. Cardiovascular: Negative. Gastrointestinal: Positive for abdominal pain and nausea. Genitourinary: Negative. Musculoskeletal: Negative. Skin: Negative. Neurological: Positive for weakness. Endo/Heme/Allergies: Negative. Psychiatric/Behavioral: Negative. Objective:     Vital Signs: Telemetry:    normal sinus rhythm Intake/Output:   Visit Vitals  BP (!) 156/76 (BP 1 Location: Left upper arm, BP Patient Position: At rest)   Pulse 76   Temp 98.2 °F (36.8 °C)   Resp 20   Ht 5' 8.9\" (1.75 m)   Wt 113.4 kg (250 lb)   SpO2 98%   Breastfeeding No   BMI 37.03 kg/m²       Temp (24hrs), Av.9 °F (36.6 °C), Min:97.2 °F (36.2 °C), Max:98.3 °F (36.8 °C)        O2 Device: Room air O2 Flow Rate (L/min): 0 l/min       Wt Readings from Last 4 Encounters:   03/10/21 113.4 kg (250 lb)        No intake or output data in the 24 hours ending 21 1041    Last shift:      No intake/output data recorded. Last 3 shifts: No intake/output data recorded. Physical Exam:     Physical Exam   Constitutional: She appears distressed. HENT:   Head: Normocephalic and atraumatic. Eyes: Pupils are equal, round, and reactive to light. Conjunctivae and EOM are normal.   Neck: Normal range of motion. Neck supple. Cardiovascular: Normal rate and regular rhythm. Pulmonary/Chest: Effort normal and breath sounds normal.   Abdominal: Soft.  Bowel sounds are normal. Musculoskeletal: Normal range of motion. Neurological: She is alert. Psychiatric: She has a normal mood and affect. Labs:    Recent Labs     03/11/21  0755 03/10/21  1228   WBC 11.4* 10.5   HGB 9.3* 9.0*    206     Recent Labs     03/11/21  0755 03/10/21  1228 03/10/21  1212     139 141 141   K 4.5  4.5 4.9 4.9   *  108 109* 110*   CO2 21 22 25 25   GLU 45*  46* 109* 109*   BUN 78*  78* 73* 72*   CREA 6.00*  6.02* 6.01* 6.01*   CA 8.9  9.2 9.0 9.0   PHOS 4.8*  --  5.0*   ALB 2.2*  2.2* 2.0* 2.0*   ALT 22 21  --      No results for input(s): PH, PCO2, PO2, HCO3, FIO2 in the last 72 hours. No results for input(s): CPK, CKNDX, TROIQ in the last 72 hours. No lab exists for component: CPKMB  No results found for: BNPP, BNP   Lab Results   Component Value Date/Time    Culture result: No growth 1 day 03/09/2021 12:32 AM    Culture result: No Growth (<1000 cfu/mL) 03/08/2021 07:00 PM    Culture result: No growth 3 days 03/08/2021 01:05 PM     Lab Results   Component Value Date/Time    TSH 1.57 03/08/2021 11:45 AM       Imaging:    CXR Results  (Last 48 hours)    None        Results from [place] encounter on 03/08/21   XR CHEST SNGL V    Narrative 1 new comparison June 21    Cardiomegaly with congestion. Mild interstitial edema. Localized airspace  disease right perihilar. No effusion or pneumothorax     No results found for this or any previous visit. IMPRESSION:   1. COVID-19 pneumonia  2. Acute on chronic kidney disease   3. Congestive heart failure  4. Pulmonary hypertension  5. Ex COPD  6. Anemia of chronic disease  7. Additional workup outlined below  8. Pt is requiring Drug therapy requiring intensive monitoring for toxicity  9. Prognosis guarded       RECOMMENDATIONS/PLAN:     1. She is on room air  2. She is on Decadron Zosyn and Zithromax not a candidate for remdesivir or Actemra  3.  Chest x-ray shows cardiomegaly with congestion and infiltrate right perihilar  4. She is on Lasix 80 twice daily  5. On sildenafil for pulmonary hypertension  6. Supplemental O2 to keep sats > 93%  7. Aspiration precautions  8. Labs to follow electrolytes, renal function and and blood counts  9.  DVT, SUP prophylaxis           Wade Quiñones MD

## 2021-03-12 NOTE — PROGRESS NOTES
Progress Note      3/12/2021 7:14 AM  NAME: David Davison   MRN:  [id number]   Admit Diagnosis: PNA (pneumonia) [J18.9]      Problem List:   1.  COVID-19 disease  2.  Noncompliance   3.  Covid pneumonitis  4.  Chronic heart failure with recovered ejection fraction (HFrecEF )  5.  Grade II (moderate) diastolic dysfunction, or pseudonormalization  6. Moderate pulmonary hypertension (RVSP =57 mmHg)  7.  Nonocclusive coronary artery disease  8. Mild valvular heart disease       8a. Mild tricuspid regurgitation       8b. Trace pulmonic valve regurgitation       8c. Mild mitral valve regurgitation  9.  Hypertension  10.  Type 2 diabetes     11.  Former smoker (stopped 6 years ago)  12.  Class III (severe) obesity (BMI = 41.8 kg/m²)  13.  Acute kidney injury/chronic kidney disease (stage IV)  14.  Nephrotic syndrome most likely diabetic nephropathy  15.  Fluid overload  16.  Chronic hypoalbuminemia, due to nephrotic syndrome  17. Leukocytosis  18.  Anemia  19.  Hypocalcemia       Subjective: The patient is seen and examined in room 525. There were no acute cardiovascular events reported overnight. Currently, she denies any cardiovascular complaints. Specifically, she denies chest pain or shortness of breath.     Medications Personally Reviewed:    Current Facility-Administered Medications   Medication Dose Route Frequency    hydrALAZINE (APRESOLINE) tablet 100 mg  100 mg Oral TID    furosemide (LASIX) injection 80 mg  80 mg IntraVENous Q12H    amLODIPine (NORVASC) tablet 10 mg  10 mg Oral DAILY    carvediloL (COREG) tablet 12.5 mg  12.5 mg Oral BID WITH MEALS    isosorbide dinitrate (ISORDIL) tablet 20 mg  20 mg Oral TID    heparin (porcine) injection 5,000 Units  5,000 Units SubCUTAneous Q8H    sodium chloride (NS) flush 5-40 mL  5-40 mL IntraVENous Q8H    sodium chloride (NS) flush 5-40 mL  5-40 mL IntraVENous PRN    acetaminophen (TYLENOL) tablet 650 mg  650 mg Oral Q6H PRN    Or    acetaminophen (TYLENOL) suppository 650 mg  650 mg Rectal Q6H PRN    polyethylene glycol (MIRALAX) packet 17 g  17 g Oral DAILY PRN    promethazine (PHENERGAN) tablet 12.5 mg  12.5 mg Oral Q6H PRN    Or    ondansetron (ZOFRAN) injection 4 mg  4 mg IntraVENous Q6H PRN    famotidine (PEPCID) tablet 20 mg  20 mg Oral DAILY    dexamethasone (DECADRON) 4 mg/mL injection 4 mg  4 mg IntraVENous Q8H    piperacillin-tazobactam (ZOSYN) 3.375 g in 0.9% sodium chloride (MBP/ADV) 100 mL MBP  3.375 g IntraVENous Q12H    azithromycin (ZITHROMAX) 500 mg in 0.9% sodium chloride 250 mL (VIAL-MATE)  500 mg IntraVENous Q24H           Objective:      Physical Exam:  Last 24hrs VS reviewed since prior progress note. Most recent are:    Visit Vitals  /67 (BP 1 Location: Left upper arm, BP Patient Position: At rest)   Pulse 75   Temp 98.2 °F (36.8 °C)   Resp 18   Ht 5' 8.9\" (1.75 m)   Wt 113.4 kg (250 lb)   SpO2 100%   Breastfeeding No   BMI 37.03 kg/m²     No intake or output data in the 24 hours ending 03/12/21 0714     Physical exam: Essentially unchanged    Data Review    Telemetry: Sinus rhythm without ventricular ectopy    EKG:   [x]  No new EKG for review    Lab Data Personally Reviewed:    Recent Labs     03/11/21  0755 03/10/21  1228   WBC 11.4* 10.5   HGB 9.3* 9.0*   HCT 29.6* 28.5*    206     No results for input(s): INR, PTP, APTT, INREXT in the last 72 hours.    Recent Labs     03/11/21  0755 03/10/21  1228 03/10/21  1212     139 141 141   K 4.5  4.5 4.9 4.9   *  108 109* 110*   CO2 21 22 25 25   BUN 78*  78* 73* 72*   CREA 6.00*  6.02* 6.01* 6.01*   GLU 45*  46* 109* 109*   CA 8.9  9.2 9.0 9.0     Recent Labs     03/09/21  0943   *     No results found for: CHOL, CHOLX, CHLST, CHOLV, HDL, HDLP, LDL, LDLC, DLDLP, Timmothy Puls, CHHD, CHHDX    Recent Labs     03/11/21  0755 03/10/21  1228 03/10/21  1212 03/09/21  0943    116  --  133*   TP 6.4 6.4  --  5.5*  6.5   ALB 2.2*  2.2* 2.0* 2.0* 2.0*   GLOB 4.2* 4.4*  --  4.5*     No results for input(s): PH, PCO2, PO2 in the last 72 hours. Assessment/Plan:   1. Continue telemetry monitoring while hospitalized  2. Continue to monitor serum electrolytes, and renal function  3. Continue current cardiovascular medications including amlodipine, carvedilol, furosemide, heparin, hydralazine, and isosorbide  4. No further cardiac testing indicated at this time  5. Wwill sign off, but remain available as needed    6.   The patient is to follow-up in our office within 1 to 2 weeks after discharge     Chris Pardo MD

## 2021-03-12 NOTE — PROGRESS NOTES
Patient is seen and examined. No new complaints to me. So she is lost her IV lines. No blood draws available  She is now getting IV Lasix. Past Medical History:   Diagnosis Date    Hypertension     Morbid obesity ([address]       No past surgical history on file. No family history on file.    Social History     Tobacco Use    Smoking status: Not on file   Substance Use Topics    Alcohol use: Not on file       Current Facility-Administered Medications   Medication Dose Route Frequency Provider Last Rate Last Admin    hydrALAZINE (APRESOLINE) tablet 100 mg  100 mg Oral TID Garrett Argueta MD   100 mg at 03/12/21 1026    furosemide (LASIX) injection 80 mg  80 mg IntraVENous Q12H Garrett Argueta MD   Stopped at 03/12/21 0900    amLODIPine (NORVASC) tablet 10 mg  10 mg Oral DAILY Tim Gunter MD   10 mg at 03/12/21 1026    carvediloL (COREG) tablet 12.5 mg  12.5 mg Oral BID WITH MEALS Tim Gunter MD   12.5 mg at 03/12/21 1026    isosorbide dinitrate (ISORDIL) tablet 20 mg  20 mg Oral TID Garrett Argueta MD   20 mg at 03/12/21 1026    heparin (porcine) injection 5,000 Units  5,000 Units SubCUTAneous Q8H Garrett Argueta MD   5,000 Units at 03/12/21 1035    sodium chloride (NS) flush 5-40 mL  5-40 mL IntraVENous Q8H Janeth Moran NP   Stopped at 03/11/21 1400    sodium chloride (NS) flush 5-40 mL  5-40 mL IntraVENous PRN Janeth Moran NP        acetaminophen (TYLENOL) tablet 650 mg  650 mg Oral Q6H PRN Janeth Moran NP        Or   Collette Satchel acetaminophen (TYLENOL) suppository 650 mg  650 mg Rectal Q6H PRN Janeth Moran NP        polyethylene glycol (MIRALAX) packet 17 g  17 g Oral DAILY PRN Janeth Moran NP        promethazine (PHENERGAN) tablet 12.5 mg  12.5 mg Oral Q6H PRN Janeth Moran NP        Or    ondansetron [facility]) injection 4 mg  4 mg IntraVENous Q6H PRN Janeth Moran NP        famotidine (PEPCID) tablet 20 mg  20 mg Oral DAILY Janeth Moran NP   20 mg at 03/12/21 0900    dexamethasone (DECADRON) 4 mg/mL injection 4 mg  4 mg IntraVENous Q8H Lidia Patel NP   Stopped at 03/11/21 1700    piperacillin-tazobactam (ZOSYN) 3.375 g in 0.9% sodium chloride (MBP/ADV) 100 mL MBP  3.375 g IntraVENous Q12H Lidia Patel NP   Stopped at 03/11/21 1800    azithromycin (ZITHROMAX) 500 mg in 0.9% sodium chloride 250 mL (VIAL-MATE)  500 mg IntraVENous Q24H Rebecca Reyes MD   Stopped at 03/11/21 1400        Review of Systems   Respiratory: Negative for shortness of breath and wheezing. Cardiovascular: Positive for leg swelling. Negative for palpitations. Gastrointestinal: Positive for abdominal distention. All other systems reviewed and are negative. Objective     Vital signs for last 24 hours:  Visit Vitals  BP (!) 156/76 (BP 1 Location: Left upper arm, BP Patient Position: At rest)   Pulse 76   Temp 98.2 °F (36.8 °C)   Resp 20   Ht 5' 8.9\" (1.75 m)   Wt 113.4 kg (250 lb)   SpO2 98%   Breastfeeding No   BMI 37.03 kg/m²       Intake/Output this shift:  Current Shift: No intake/output data recorded. Last 3 Shifts: No intake/output data recorded. Physical Exam   Constitutional: She is oriented to person, place, and time. She appears well-developed and well-nourished. HENT:   Head: Normocephalic and atraumatic. Neck: No JVD present. No tracheal deviation present. Cardiovascular: Normal rate and regular rhythm. Pulmonary/Chest: Effort normal and breath sounds normal. No stridor. Abdominal: Soft. Bowel sounds are normal.   Neurological: She is alert and oriented to person, place, and time. Skin: Skin is warm and dry. Psychiatric: She has a normal mood and affect.  Her behavior is normal.      Significant 3+ to 4+ edema of legs      Data Review:   Recent Results (from the past 24 hour(s))   GLUCOSE, POC    Collection Time: 03/12/21  6:20 AM   Result Value Ref Range    Glucose (POC) 100 65 - 100 mg/dL    Performed by [facility]) GLUCOSE, POC    Collection Time: 03/12/21  7:54 AM   Result Value Ref Range    Glucose (POC) 104 (H) 65 - 100 mg/dL    Performed by Kelli ALVAREZ          US RETROPERITONEUM COMP   Final Result   1. This examination is negative for hydronephrosis as an etiology for the   patient's renal insufficiency. 2.  On prior sonographic imaging there were demonstrated echogenic masses within   the left kidney which are no longer present. There are now is a smaller but more   normal morphology to the patient's left kidney. XR CHEST SNGL V   Final Result           Patient Active Problem List   Diagnosis Code    PNA (pneumonia) J18.9        DIAGNOSES:  1. CKD stage V-most likely progressive CKD or could be EDDIE on CKD  2. Nephrotic syndrome  3. Anasarca  4. Coronavirus pneumonia  5. Hypertension due to hypervolemia  6. Hyperkalemia  7. Renal tubular acidosis, type IV    PLAN:  Lasix 120 mg p.o. twice a day until IV lines are established  IR consult for tunneled central line or power line. Continue with current management.   Discussed with Dr. Britney Qureshi

## 2021-03-12 NOTE — PROGRESS NOTES
Chart reviewed. Patient continues to have no discharge needs at this time and will return home self-care once medically stable. CM remains available should any needs arise.

## 2021-03-12 NOTE — PROGRESS NOTES
General Daily Progress Note          Patient Name:   Taniya Fountain       YOB: 1960       Age:  62 y.o. Admit Date: 3/8/2021      Subjective:     Lying in bed. No distress    Awaiting vascular access.  IR consulted by Dr. John Huff      Objective:     Visit Vitals  BP (!) 143/78 (BP 1 Location: Left upper arm, BP Patient Position: At rest)   Pulse 76   Temp 98.2 °F (36.8 °C)   Resp 20   Ht 5' 8.9\" (1.75 m)   Wt 113.4 kg (250 lb)   SpO2 99%   Breastfeeding No   BMI 37.03 kg/m²        Recent Results (from the past 24 hour(s))   GLUCOSE, POC    Collection Time: 03/12/21  6:20 AM   Result Value Ref Range    Glucose (POC) 100 65 - 100 mg/dL    Performed by Theodora Love [facility])    GLUCOSE, POC    Collection Time: 03/12/21  7:54 AM   Result Value Ref Range    Glucose (POC) 104 (H) 65 - 100 mg/dL    Performed by Ainsley ALVAREZ    RENAL FUNCTION PANEL    Collection Time: 03/12/21 11:37 AM   Result Value Ref Range    Sodium 139 136 - 145 mmol/L    Potassium 4.6 3.5 - 5.1 mmol/L    Chloride 109 (H) 97 - 108 mmol/L    CO2 22 21 - 32 mmol/L    Anion gap 8 5 - 15 mmol/L    Glucose 127 (H) 65 - 100 mg/dL    BUN 80 (H) 6 - 20 mg/dL    Creatinine 6.31 (H) 0.55 - 1.02 mg/dL    BUN/Creatinine ratio 13 12 - 20      GFR est AA 8 (L) >60 ml/min/1.73m2    GFR est non-AA 7 (L) >60 ml/min/1.73m2    Calcium 8.3 (L) 8.5 - 10.1 mg/dL    Phosphorus 4.1 2.6 - 4.7 mg/dL    Albumin 1.9 (L) 3.5 - 5.0 g/dL   BNP    Collection Time: 03/12/21 11:37 AM   Result Value Ref Range    NT pro-BNP 15,314 (H) <125 pg/mL   CBC W/O DIFF    Collection Time: 03/12/21 11:37 AM   Result Value Ref Range    WBC 9.4 3.6 - 11.0 K/uL    RBC 3.16 (L) 3.80 - 5.20 M/uL    HGB 8.6 (L) 11.5 - 16.0 g/dL    HCT 27.3 (L) 35.0 - 47.0 %    MCV 86.4 80.0 - 99.0 FL    MCH 27.2 26.0 - 34.0 PG    MCHC 31.5 30.0 - 36.5 g/dL    RDW 15.3 (H) 11.5 - 14.5 %    PLATELET 543 747 - 261 K/uL    MPV 11.0 8.9 - 23.3 FL   METABOLIC PANEL, COMPREHENSIVE Collection Time: 03/12/21 11:37 AM   Result Value Ref Range    Sodium 139 136 - 145 mmol/L    Potassium 4.6 3.5 - 5.1 mmol/L    Chloride 109 (H) 97 - 108 mmol/L    CO2 23 21 - 32 mmol/L    Anion gap 7 5 - 15 mmol/L    Glucose 127 (H) 65 - 100 mg/dL    BUN 79 (H) 6 - 20 mg/dL    Creatinine 6.23 (H) 0.55 - 1.02 mg/dL    BUN/Creatinine ratio 13 12 - 20      GFR est AA 8 (L) >60 ml/min/1.73m2    GFR est non-AA 7 (L) >60 ml/min/1.73m2    Calcium 8.2 (L) 8.5 - 10.1 mg/dL    Bilirubin, total 0.1 (L) 0.2 - 1.0 mg/dL    AST (SGOT) 23 15 - 37 U/L    ALT (SGPT) 20 12 - 78 U/L    Alk. phosphatase 96 45 - 117 U/L    Protein, total 5.7 (L) 6.4 - 8.2 g/dL    Albumin 1.9 (L) 3.5 - 5.0 g/dL    Globulin 3.8 2.0 - 4.0 g/dL    A-G Ratio 0.5 (L) 1.1 - 2.2     GLUCOSE, POC    Collection Time: 03/12/21 12:42 PM   Result Value Ref Range    Glucose (POC) 106 (H) 65 - 100 mg/dL    Performed by Yuliya ALVAREZ      [unfilled]      Review of Systems    Constitutional: Negative for chills and fever. HENT: Negative. Eyes: Negative. Respiratory: Negative. Cardiovascular: LEG SWELLING   Gastrointestinal: Negative for abdominal pain and nausea. Skin: Negative. Neurological: Negative. Physical Exam:      Constitutional: Alert and oriented to person, place, and time. Head: Normocephalic and atraumatic. Eyes: Pupils are equal, round, and reactive to light. EOM are normal.   Cardiovascular: Normal rate, regular rhythm and normal heart sounds. 3+ PITTING BILATERAL LOWER EXTREMITY EDEMA   Pulmonary/Chest: Breath sounds normal. No wheezes. No rales. Exhibits no tenderness. Abdominal: SOFTLY DISTENDED (chronic) Bowel sounds are normal. There is no abdominal tenderness. There is no rebound and no guarding. Musculoskeletal: Normal range of motion. Neurological: pt is alert and oriented to person, place, and time. US RETROPERITONEUM COMP   Final Result   1.   This examination is negative for hydronephrosis as an etiology for the   patient's renal insufficiency. 2.  On prior sonographic imaging there were demonstrated echogenic masses within   the left kidney which are no longer present. There are now is a smaller but more   normal morphology to the patient's left kidney.       XR CHEST SNGL V   Final Result           Recent Results (from the past 24 hour(s))   GLUCOSE, POC    Collection Time: 03/12/21  6:20 AM   Result Value Ref Range    Glucose (POC) 100 65 - 100 mg/dL    Performed by Ann PAK [facility])    GLUCOSE, POC    Collection Time: 03/12/21  7:54 AM   Result Value Ref Range    Glucose (POC) 104 (H) 65 - 100 mg/dL    Performed by Jameel ALVAREZ    RENAL FUNCTION PANEL    Collection Time: 03/12/21 11:37 AM   Result Value Ref Range    Sodium 139 136 - 145 mmol/L    Potassium 4.6 3.5 - 5.1 mmol/L    Chloride 109 (H) 97 - 108 mmol/L    CO2 22 21 - 32 mmol/L    Anion gap 8 5 - 15 mmol/L    Glucose 127 (H) 65 - 100 mg/dL    BUN 80 (H) 6 - 20 mg/dL    Creatinine 6.31 (H) 0.55 - 1.02 mg/dL    BUN/Creatinine ratio 13 12 - 20      GFR est AA 8 (L) >60 ml/min/1.73m2    GFR est non-AA 7 (L) >60 ml/min/1.73m2    Calcium 8.3 (L) 8.5 - 10.1 mg/dL    Phosphorus 4.1 2.6 - 4.7 mg/dL    Albumin 1.9 (L) 3.5 - 5.0 g/dL   BNP    Collection Time: 03/12/21 11:37 AM   Result Value Ref Range    NT pro-BNP 15,314 (H) <125 pg/mL   CBC W/O DIFF    Collection Time: 03/12/21 11:37 AM   Result Value Ref Range    WBC 9.4 3.6 - 11.0 K/uL    RBC 3.16 (L) 3.80 - 5.20 M/uL    HGB 8.6 (L) 11.5 - 16.0 g/dL    HCT 27.3 (L) 35.0 - 47.0 %    MCV 86.4 80.0 - 99.0 FL    MCH 27.2 26.0 - 34.0 PG    MCHC 31.5 30.0 - 36.5 g/dL    RDW 15.3 (H) 11.5 - 14.5 %    PLATELET 040 225 - 039 K/uL    MPV 11.0 8.9 - 35.0 FL   METABOLIC PANEL, COMPREHENSIVE    Collection Time: 03/12/21 11:37 AM   Result Value Ref Range    Sodium 139 136 - 145 mmol/L    Potassium 4.6 3.5 - 5.1 mmol/L    Chloride 109 (H) 97 - 108 mmol/L    CO2 23 21 - 32 mmol/L    Anion gap 7 5 - 15 mmol/L    Glucose 127 (H) 65 - 100 mg/dL    BUN 79 (H) 6 - 20 mg/dL    Creatinine 6.23 (H) 0.55 - 1.02 mg/dL    BUN/Creatinine ratio 13 12 - 20      GFR est AA 8 (L) >60 ml/min/1.73m2    GFR est non-AA 7 (L) >60 ml/min/1.73m2    Calcium 8.2 (L) 8.5 - 10.1 mg/dL    Bilirubin, total 0.1 (L) 0.2 - 1.0 mg/dL    AST (SGOT) 23 15 - 37 U/L    ALT (SGPT) 20 12 - 78 U/L    Alk. phosphatase 96 45 - 117 U/L    Protein, total 5.7 (L) 6.4 - 8.2 g/dL    Albumin 1.9 (L) 3.5 - 5.0 g/dL    Globulin 3.8 2.0 - 4.0 g/dL    A-G Ratio 0.5 (L) 1.1 - 2.2     GLUCOSE, POC    Collection Time: 03/12/21 12:42 PM   Result Value Ref Range    Glucose (POC) 106 (H) 65 - 100 mg/dL    Performed by Justin Kwong        Results     Procedure Component Value Units Date/Time    CULTURE, BLOOD, LINE [[id number]] Collected: 03/09/21 0032    Order Status: Completed Specimen: Blood Updated: 03/11/21 0719     Special Requests: No Special Requests        Culture result: No growth 1 day       CULTURE, URINE [[id number]] Collected: 03/08/21 1900    Order Status: Completed Specimen: Urine Updated: 03/10/21 0904     Special Requests: No Special Requests        Culture result: No Growth (<1000 cfu/mL)       CULTURE, RESPIRATORY/SPUTUM/BRONCH [place] STAIN [[id number]] Collected: 03/08/21 1900    Order Status: Canceled Specimen: Sputum     COVID-19 RAPID TEST [[id number]]  (Abnormal) Collected: 03/08/21 1325    Order Status: Completed Specimen: Nasopharyngeal Updated: 03/08/21 1415     Specimen source Nasopharyngeal        Comment: Results verified, phoned to and read back by Ashley Singh AT [id number] BY JF        COVID-19 rapid test DETECTED        Comment: Rapid Abbott ID Now   The specimen is POSITIVE for SARS-CoV-2, the novel coronavirus associated with COVID-19. This test has been authorized by the FDA under an Emergency Use Authorization (EUA) for use by authorized laboratories.    Fact sheet for Healthcare Providers: [employer]stattoo[employer]com Fact sheet for Patients: [employer]   Methodology: Isothermal Nucleic Acid Amplification       CULTURE, BLOOD, PAIRED [[id number]] Collected: 03/08/21 1305    Order Status: Completed Specimen: Blood Updated: 03/11/21 0655     Special Requests: No Special Requests        Culture result: No growth 3 days              Labs:     Recent Labs     03/12/21 1137 03/11/21  0755   WBC 9.4 11.4*   HGB 8.6* 9.3*   HCT 27.3* 29.6*    231     Recent Labs     03/12/21 1137 03/11/21 0755 03/10/21  1228 03/10/21  1212     139 139  139 141 141   K 4.6  4.6 4.5  4.5 4.9 4.9   *  109* 109*  108 109* 110*   CO2 22  23 21  22 25 25   BUN 80*  79* 78*  78* 73* 72*   CREA 6.31*  6.23* 6.00*  6.02* 6.01* 6.01*   *  127* 45*  46* 109* 109*   CA 8.3*  8.2* 8.9  9.2 9.0 9.0   PHOS 4.1 4.8*  --  5.0*     Recent Labs     03/12/21  1137 03/11/21  0755 03/10/21  1228   ALT 20 22 21   AP 96 108 116   TBILI 0.1* 0.2 0.2   TP 5.7* 6.4 6.4   ALB 1.9*  1.9* 2.2*  2.2* 2.0*   GLOB 3.8 4.2* 4.4*     No results for input(s): INR, PTP, APTT, INREXT in the last 72 hours. No results for input(s): FE, TIBC, PSAT, FERR in the last 72 hours. No results found for: FOL, RBCF   No results for input(s): PH, PCO2, PO2 in the last 72 hours. No results for input(s): CPK, CKNDX, TROIQ in the last 72 hours.     No lab exists for component: CPKMB  No results found for: CHOL, CHOLX, CHLST, CHOLV, HDL, HDLP, LDL, LDLC, DLDLP, TGLX, TRIGL, TRIGP, CHHD, CHHDX  Lab Results   Component Value Date/Time    Glucose (POC) 106 (H) 03/12/2021 12:42 PM    Glucose (POC) 104 (H) 03/12/2021 07:54 AM    Glucose (POC) 100 03/12/2021 06:20 AM    Glucose (POC) 121 (H) 03/11/2021 11:39 AM    Glucose (POC) 41 (LL) 03/11/2021 08:07 AM     Lab Results   Component Value Date/Time    Color Yellow/Straw 03/08/2021 03:15 PM    Appearance Clear 03/08/2021 03:15 PM    Specific gravity 1.008 03/08/2021 03:15 PM    pH (UA) 7.0 03/08/2021 03:15 PM    Protein >300 (A) 03/08/2021 03:15 PM    Glucose 50 (A) 03/08/2021 03:15 PM    Ketone Negative 03/08/2021 03:15 PM    Bilirubin Negative 03/08/2021 03:15 PM    Urobilinogen 0.1 03/08/2021 03:15 PM    Nitrites Negative 03/08/2021 03:15 PM    Leukocyte Esterase Negative 03/08/2021 03:15 PM    Bacteria Negative 03/08/2021 03:15 PM    WBC 0-4 03/08/2021 03:15 PM    RBC 0-5 03/08/2021 03:15 PM         Assessment/Plan     COVID 19 Pneumonia     Pulmonary following     On Decadron, zosyn, and azithromycin     Repeat CXR prn     Blood cultures NG preliminary - continue current antibiotics pending        Final report     Continue COVID precautions  Moderate pulmonary hypertension  HFpEF     Pulmonary and cardiology following     LVEF 93%, Grade 2 diastolic dysfunction, PA pressure 57 on echo 3/10/21     On Isordil - no sildenafil yet while on nitrates.      BNP still elevated over 15,000 (minimal change from admission)     Lasix changed to po by nephrology while awaiting vascular access  CKD stage V  Nephrotic syndrome  Hypertension in renal disease, chronic  Hyperkalemia - resolved  Anemia of chronic disease      Nephrology following      IR for vascular access      Monitor labs      Creatinine remains above 6, GFR 8 (no change)  Hypoalbuminemia      1.9 today      Nephrology following      Anasarca     DVT PPX Heparin subcutaneous    Patient is full code this admission    Discussed with Dr. Tabatha Rome        Current Facility-Administered Medications:     furosemide (LASIX) tablet 120 mg, 120 mg, Oral, BID, Renee Gunter MD    hydrALAZINE (APRESOLINE) tablet 100 mg, 100 mg, Oral, TID, Katelyn Gunter MD, 100 mg at 03/12/21 1026    furosemide (LASIX) injection 80 mg, 80 mg, IntraVENous, Q12H, Renee Gunter MD, Stopped at 03/12/21 0900    amLODIPine (NORVASC) tablet 10 mg, 10 mg, Oral, DAILY, Katelyn Gunter MD, 10 mg at 03/12/21 1026   carvediloL (COREG) tablet 12.5 mg, 12.5 mg, Oral, BID WITH MEALS, Pako Gunter MD, 12.5 mg at 03/12/21 1026    isosorbide dinitrate (ISORDIL) tablet 20 mg, 20 mg, Oral, TID, Pako Gunter MD, 20 mg at 03/12/21 1026    heparin (porcine) injection 5,000 Units, 5,000 Units, SubCUTAneous, Q8H, Pako Gunter MD, 5,000 Units at 03/12/21 1035    sodium chloride (NS) flush 5-40 mL, 5-40 mL, IntraVENous, Q8H, Se Medina NP, Stopped at 03/11/21 1400    sodium chloride (NS) flush 5-40 mL, 5-40 mL, IntraVENous, PRN, Se Medina NP  Sherl Log  acetaminophen (TYLENOL) tablet 650 mg, 650 mg, Oral, Q6H PRN **OR** acetaminophen (TYLENOL) suppository 650 mg, 650 mg, Rectal, Q6H PRN, Se Medina NP    polyethylene glycol (MIRALAX) packet 17 g, 17 g, Oral, DAILY PRN, Se Medina NP    promethazine (PHENERGAN) tablet 12.5 mg, 12.5 mg, Oral, Q6H PRN **OR** ondansetron (ZOFRAN) injection 4 mg, 4 mg, IntraVENous, Q6H PRN, Se Medina NP    famotidine (PEPCID) tablet 20 mg, 20 mg, Oral, DAILY, Se Medina NP, 20 mg at 03/12/21 0900    dexamethasone (DECADRON) 4 mg/mL injection 4 mg, 4 mg, IntraVENous, Q8H, Se Medina NP, Stopped at 03/11/21 1700    piperacillin-tazobactam (ZOSYN) 3.375 g in 0.9% sodium chloride (MBP/ADV) 100 mL MBP, 3.375 g, IntraVENous, Q12H, Se Medina NP, Stopped at 03/11/21 1800    azithromycin (ZITHROMAX) 500 mg in 0.9% sodium chloride 250 mL (VIAL-MATE), 500 mg, IntraVENous, Q24H, Jory Reyes MD, Stopped at 03/11/21 1400

## 2021-03-12 NOTE — PROGRESS NOTES
Problem: Patient Education: Go to Patient Education Activity  Goal: Patient/Family Education  Outcome: Progressing Towards Goal     Problem: Pneumonia: Day 1  Goal: Off Pathway (Use only if patient is Off Pathway)  Outcome: Progressing Towards Goal  Goal: Activity/Safety  Outcome: Progressing Towards Goal  Goal: Consults, if ordered  Outcome: Progressing Towards Goal  Goal: Diagnostic Test/Procedures  Outcome: Progressing Towards Goal  Goal: Nutrition/Diet  Outcome: Progressing Towards Goal  Goal: Medications  Outcome: Progressing Towards Goal  Goal: Respiratory  Outcome: Progressing Towards Goal  Goal: Treatments/Interventions/Procedures  Outcome: Progressing Towards Goal  Goal: Psychosocial  Outcome: Progressing Towards Goal  Goal: *Oxygen saturation within defined limits  Outcome: Progressing Towards Goal  Goal: *Influenza vaccine administered (October-March)  Outcome: Progressing Towards Goal  Goal: *Pneumoccocal vaccine administered  Outcome: Progressing Towards Goal  Goal: *Hemodynamically stable  Outcome: Progressing Towards Goal  Goal: *Demonstrates progressive activity  Outcome: Progressing Towards Goal  Goal: *Tolerating diet  Outcome: Progressing Towards Goal

## 2021-03-12 NOTE — PROGRESS NOTES
Called for picc placement no order in kidney function very elevated suggested trialysis if dialysis was being considered or CVC.  Nurse will speak to Dr Anton Nicely dfor final decision

## 2021-03-12 NOTE — PROGRESS NOTES
Change batteries in telemetry box. Patient is resting in bed in no acute distress. Patient has no complaints at this time. Patient back to sleep after battery changed.

## 2021-03-13 LAB
GLUCOSE BLD STRIP.AUTO-MCNC: 301 MG/DL (ref 65–100)
GLUCOSE BLD STRIP.AUTO-MCNC: 301 MG/DL (ref 65–100)
PERFORMED BY, TECHID: ABNORMAL
PERFORMED BY, TECHID: ABNORMAL

## 2021-03-13 PROCEDURE — 74011250636 HC RX REV CODE- 250/636: Performed by: INTERNAL MEDICINE

## 2021-03-13 PROCEDURE — 82962 GLUCOSE BLOOD TEST: CPT

## 2021-03-13 PROCEDURE — 65270000029 HC RM PRIVATE

## 2021-03-13 PROCEDURE — 74011000258 HC RX REV CODE- 258: Performed by: NURSE PRACTITIONER

## 2021-03-13 PROCEDURE — 74011250636 HC RX REV CODE- 250/636: Performed by: FAMILY MEDICINE

## 2021-03-13 PROCEDURE — 74011250636 HC RX REV CODE- 250/636: Performed by: NURSE PRACTITIONER

## 2021-03-13 PROCEDURE — 74011250637 HC RX REV CODE- 250/637: Performed by: INTERNAL MEDICINE

## 2021-03-13 PROCEDURE — 74011250637 HC RX REV CODE- 250/637: Performed by: NURSE PRACTITIONER

## 2021-03-13 RX ORDER — FUROSEMIDE 10 MG/ML
80 INJECTION INTRAMUSCULAR; INTRAVENOUS EVERY 8 HOURS
Status: DISCONTINUED | OUTPATIENT
Start: 2021-03-13 | End: 2021-03-17

## 2021-03-13 RX ADMIN — PIPERACILLIN AND TAZOBACTAM 3.38 G: 3; .375 INJECTION, POWDER, LYOPHILIZED, FOR SOLUTION INTRAVENOUS at 05:22

## 2021-03-13 RX ADMIN — AMLODIPINE BESYLATE 10 MG: 5 TABLET ORAL at 08:16

## 2021-03-13 RX ADMIN — DEXAMETHASONE SODIUM PHOSPHATE 4 MG: 4 INJECTION, SOLUTION INTRA-ARTICULAR; INTRALESIONAL; INTRAMUSCULAR; INTRAVENOUS; SOFT TISSUE at 08:16

## 2021-03-13 RX ADMIN — FUROSEMIDE 80 MG: 10 INJECTION, SOLUTION INTRAMUSCULAR; INTRAVENOUS at 22:19

## 2021-03-13 RX ADMIN — PIPERACILLIN AND TAZOBACTAM 3.38 G: 3; .375 INJECTION, POWDER, LYOPHILIZED, FOR SOLUTION INTRAVENOUS at 18:20

## 2021-03-13 RX ADMIN — FAMOTIDINE 20 MG: 20 TABLET, FILM COATED ORAL at 08:15

## 2021-03-13 RX ADMIN — CARVEDILOL 12.5 MG: 12.5 TABLET, FILM COATED ORAL at 18:19

## 2021-03-13 RX ADMIN — HEPARIN SODIUM 5000 UNITS: 5000 INJECTION INTRAVENOUS; SUBCUTANEOUS at 20:33

## 2021-03-13 RX ADMIN — HYDRALAZINE HYDROCHLORIDE 100 MG: 50 TABLET, FILM COATED ORAL at 16:04

## 2021-03-13 RX ADMIN — ISOSORBIDE DINITRATE 20 MG: 10 TABLET ORAL at 22:19

## 2021-03-13 RX ADMIN — ISOSORBIDE DINITRATE 20 MG: 10 TABLET ORAL at 16:04

## 2021-03-13 RX ADMIN — HYDRALAZINE HYDROCHLORIDE 100 MG: 50 TABLET, FILM COATED ORAL at 22:19

## 2021-03-13 RX ADMIN — DEXAMETHASONE SODIUM PHOSPHATE 4 MG: 4 INJECTION, SOLUTION INTRA-ARTICULAR; INTRALESIONAL; INTRAMUSCULAR; INTRAVENOUS; SOFT TISSUE at 00:38

## 2021-03-13 RX ADMIN — Medication 10 ML: at 16:05

## 2021-03-13 RX ADMIN — FUROSEMIDE 80 MG: 10 INJECTION, SOLUTION INTRAMUSCULAR; INTRAVENOUS at 14:36

## 2021-03-13 RX ADMIN — ISOSORBIDE DINITRATE 20 MG: 10 TABLET ORAL at 08:25

## 2021-03-13 RX ADMIN — HEPARIN SODIUM 5000 UNITS: 5000 INJECTION INTRAVENOUS; SUBCUTANEOUS at 11:48

## 2021-03-13 RX ADMIN — FUROSEMIDE 80 MG: 10 INJECTION, SOLUTION INTRAMUSCULAR; INTRAVENOUS at 08:16

## 2021-03-13 RX ADMIN — Medication 10 ML: at 05:22

## 2021-03-13 RX ADMIN — Medication 10 ML: at 22:19

## 2021-03-13 RX ADMIN — HEPARIN SODIUM 5000 UNITS: 5000 INJECTION INTRAVENOUS; SUBCUTANEOUS at 03:42

## 2021-03-13 RX ADMIN — CARVEDILOL 12.5 MG: 12.5 TABLET, FILM COATED ORAL at 08:15

## 2021-03-13 RX ADMIN — DEXAMETHASONE SODIUM PHOSPHATE 4 MG: 4 INJECTION, SOLUTION INTRA-ARTICULAR; INTRALESIONAL; INTRAMUSCULAR; INTRAVENOUS; SOFT TISSUE at 18:19

## 2021-03-13 RX ADMIN — HYDRALAZINE HYDROCHLORIDE 100 MG: 50 TABLET, FILM COATED ORAL at 08:16

## 2021-03-13 RX ADMIN — AZITHROMYCIN DIHYDRATE 500 MG: 500 INJECTION, POWDER, LYOPHILIZED, FOR SOLUTION INTRAVENOUS at 14:37

## 2021-03-13 NOTE — PROGRESS NOTES
Problem: Pneumonia: Day 2  Goal: *Optimal pain control at patient's stated goal  Outcome: Progressing Towards Goal     Problem: Pneumonia: Day 3  Goal: Activity/Safety  Outcome: Progressing Towards Goal     Problem: Heart Failure: Day 1  Goal: Activity/Safety  Outcome: Progressing Towards Goal  Goal: Nutrition/Diet  Outcome: Progressing Towards Goal     Problem: Heart Failure: Day 1  Goal: Activity/Safety  Outcome: Progressing Towards Goal     Problem: Heart Failure: Day 1  Goal: Nutrition/Diet  Outcome: Progressing Towards Goal

## 2021-03-13 NOTE — CONSULTS
PULMONARY NOTE  [facility]    Name: Jeancarlos Garcia MRN: [id number]   : 1960 Hospital: [address] [place] [address]   Date: 3/13/2021  Admission date: 3/8/2021 Hospital Day: 6       HPI:     Hospital Problems  Never Reviewed          Codes Class Noted POA    PNA (pneumonia) ICD-10-CM: J18.9  ICD-9-CM: 292  3/8/2021 Unknown                   [x] High complexity decision making was performed  [x] See my orders for details      Subjective/Initial History:     I was asked by Melvina Rueda MD to see Jeancarlos Garcia  a 62 y.o.  female in consultation     Excerpts from admission 3/8/2021 or consult notes as follows:   31-year-old lady came in because of abdominal discomfort significant past medical history of congestive heart failure chronic kidney disease and type 2 diabetes mellitus she was complaining of shortness of breath dyspnea and feeling nauseous for the past 1 week no history of passing out so admitted and she is COVID-19 positive so pulmonary consult was called for further evaluation.       Allergies   Allergen Reactions    Doxycycline Swelling    Egg Diarrhea    Milk Containing Products Nausea and Vomiting    Tetracycline Swelling        MAR reviewed and pertinent medications noted or modified as needed     Current Facility-Administered Medications   Medication    furosemide (LASIX) tablet 120 mg    hydrALAZINE (APRESOLINE) tablet 100 mg    furosemide (LASIX) injection 80 mg    amLODIPine (NORVASC) tablet 10 mg    carvediloL (COREG) tablet 12.5 mg    isosorbide dinitrate (ISORDIL) tablet 20 mg    heparin (porcine) injection 5,000 Units    sodium chloride (NS) flush 5-40 mL    sodium chloride (NS) flush 5-40 mL    acetaminophen (TYLENOL) tablet 650 mg    Or    acetaminophen (TYLENOL) suppository 650 mg    polyethylene glycol (MIRALAX) packet 17 g    promethazine (PHENERGAN) tablet 12.5 mg    Or    ondansetron (ZOFRAN) injection 4 mg    famotidine (PEPCID) tablet 20 mg    dexamethasone (DECADRON) 4 mg/mL injection 4 mg    piperacillin-tazobactam (ZOSYN) 3.375 g in 0.9% sodium chloride (MBP/ADV) 100 mL MBP    azithromycin (ZITHROMAX) 500 mg in 0.9% sodium chloride 250 mL (VIAL-MATE)      Patient PCP: Fariba, MD Adria  PMH:  has a past medical history of Hypertension and Morbid obesity ([address] PSH:   has a past surgical history that includes ir insert non tunl cvc over 5 yrs (3/12/2021). FHX: family history is not on file. SHX:       ROS:    Review of Systems   Constitutional: Negative. HENT: Negative. Eyes: Negative. Respiratory: Positive for shortness of breath. Cardiovascular: Negative. Gastrointestinal: Positive for abdominal pain and nausea. Genitourinary: Negative. Musculoskeletal: Negative. Skin: Negative. Neurological: Positive for weakness. Endo/Heme/Allergies: Negative. Psychiatric/Behavioral: Negative. Objective:     Vital Signs: Telemetry:    normal sinus rhythm Intake/Output:   Visit Vitals  BP (!) 177/90   Pulse 81   Temp 98.1 °F (36.7 °C)   Resp 20   Ht 5' 8.9\" (1.75 m)   Wt 113.4 kg (250 lb)   SpO2 96%   Breastfeeding No   BMI 37.03 kg/m²       Temp (24hrs), Av.2 °F (36.8 °C), Min:98.1 °F (36.7 °C), Max:98.3 °F (36.8 °C)        O2 Device: Room air O2 Flow Rate (L/min): 0 l/min       Wt Readings from Last 4 Encounters:   03/10/21 113.4 kg (250 lb)        No intake or output data in the 24 hours ending 21 0852    Last shift:      No intake/output data recorded. Last 3 shifts: No intake/output data recorded. Physical Exam:     Physical Exam   Constitutional: She appears distressed. HENT:   Head: Normocephalic and atraumatic. Eyes: Pupils are equal, round, and reactive to light. Conjunctivae and EOM are normal.   Neck: Normal range of motion. Neck supple. Cardiovascular: Normal rate and regular rhythm. Pulmonary/Chest: Effort normal and breath sounds normal.   Abdominal: Soft. Bowel sounds are normal.   Musculoskeletal: Normal range of motion. Neurological: She is alert. Psychiatric: She has a normal mood and affect. Labs:    Recent Labs     03/12/21  1137 03/11/21  0755 03/10/21  1228   WBC 9.4 11.4* 10.5   HGB 8.6* 9.3* 9.0*    231 206     Recent Labs     03/12/21  1137 03/11/21  0755 03/10/21  1228 03/10/21  1212     139 139  139 141 141   K 4.6  4.6 4.5  4.5 4.9 4.9   *  109* 109*  108 109* 110*   CO2 22  23 21  22 25 25   *  127* 45*  46* 109* 109*   BUN 80*  79* 78*  78* 73* 72*   CREA 6.31*  6.23* 6.00*  6.02* 6.01* 6.01*   CA 8.3*  8.2* 8.9  9.2 9.0 9.0   PHOS 4.1 4.8*  --  5.0*   ALB 1.9*  1.9* 2.2*  2.2* 2.0* 2.0*   ALT 20 22 21  --      No results for input(s): PH, PCO2, PO2, HCO3, FIO2 in the last 72 hours. No results for input(s): CPK, CKNDX, TROIQ in the last 72 hours. No lab exists for component: CPKMB  No results found for: BNPP, BNP   Lab Results   Component Value Date/Time    Culture result: No growth 3 days 03/09/2021 12:32 AM    Culture result: No Growth (<1000 cfu/mL) 03/08/2021 07:00 PM    Culture result: No growth 5 days 03/08/2021 01:05 PM     Lab Results   Component Value Date/Time    TSH 1.57 03/08/2021 11:45 AM       Imaging:    CXR Results  (Last 48 hours)    None        Results from [place] encounter on 03/08/21   XR CHEST SNGL V INSPIR    Narrative Portable upright radiograph chest 3:51 PM compared to March 8, 2021. INDICATION: Post right IJ catheter placement. Heart size remains enlarged. Interval placement of a right IJ central line with  tip projecting over the SVC. Improved aeration in the right perihilar region. No  pneumothorax or gross effusion. Impression Status post right IJ central line placement without evidence of  complication and improved aeration in the right perihilar region. XR CHEST SNGL V    Narrative 1 new comparison June 21    Cardiomegaly with congestion. Mild interstitial edema. Localized airspace  disease right perihilar. No effusion or pneumothorax     No results found for this or any previous visit. IMPRESSION:   1. COVID-19 pneumonia  2. Acute on chronic kidney disease   3. Congestive heart failure  4. Pulmonary hypertension  5. Ex COPD  6. Anemia of chronic disease  7. Additional workup outlined below  8. Pt is requiring Drug therapy requiring intensive monitoring for toxicity  9. Prognosis guarded       RECOMMENDATIONS/PLAN:     1. She is on room air  2. She is on Decadron Zosyn and Zithromax not a candidate for remdesivir or Actemra  3. Chest x-ray shows cardiomegaly with congestion and infiltrate right perihilar  4. She is on Lasix 80 twice daily  5. Patient is on Isordil cannot start patient on sildenafil will discuss with cardiologist patient has EF about 47% WHO group 2 for pulmonary hypertension  6. Supplemental O2 to keep sats > 93%  7. Aspiration precautions  8. Labs to follow electrolytes, renal function and and blood counts  9.  DVT, SUP prophylaxis           Ivelisse Escobedo MD

## 2021-03-13 NOTE — PROGRESS NOTES
Bedside and Verbal shift change report given to Jaylen Haney RN (oncoming nurse) by Jennifer Way RN (offgoing nurse). Report included the following information SBAR, Intake/Output, MAR, Recent Results, Med Rec Status and Cardiac Rhythm NSR.

## 2021-03-13 NOTE — PROGRESS NOTES
Patient tearful about having the right IJ in her neck. Educated on why she had to have it and the benefits of not having to be stuck again for another IV insertion.

## 2021-03-13 NOTE — PROGRESS NOTES
Patient is seen and examined. Triple-lumen catheter was placed in right IJ. Patient states that she is urinating frequently. Patient states that she is limiting oral intake. No nausea and vomiting. No dysuria. Leg swelling still remains. No dyspnea at rest.    Past Medical History:   Diagnosis Date    Hypertension     Morbid obesity ([address]       Past Surgical History:   Procedure Laterality Date    IR INSERT NON TUNL CVC OVER 5 YRS  3/12/2021     No family history on file.    Social History     Tobacco Use    Smoking status: Not on file   Substance Use Topics    Alcohol use: Not on file       Current Facility-Administered Medications   Medication Dose Route Frequency Provider Last Rate Last Admin    furosemide (LASIX) tablet 120 mg  120 mg Oral BID Sirisha Schwartz MD   Stopped at 03/13/21 0900    hydrALAZINE (APRESOLINE) tablet 100 mg  100 mg Oral TID Sirisha Schwartz MD   100 mg at 03/13/21 0816    furosemide (LASIX) injection 80 mg  80 mg IntraVENous Q12H Sirisha Schwartz MD   80 mg at 03/13/21 0816    amLODIPine (NORVASC) tablet 10 mg  10 mg Oral DAILY Sirisha Schwartz MD   10 mg at 03/13/21 0816    carvediloL (COREG) tablet 12.5 mg  12.5 mg Oral BID WITH MEALS Sirisha Schwartz MD   12.5 mg at 03/13/21 0815    isosorbide dinitrate (ISORDIL) tablet 20 mg  20 mg Oral TID Sirisha Schwartz MD   20 mg at 03/13/21 0825    heparin (porcine) injection 5,000 Units  5,000 Units SubCUTAneous Q8H Sirisha Schwartz MD   5,000 Units at 03/13/21 0342    sodium chloride (NS) flush 5-40 mL  5-40 mL IntraVENous Q8H Alyne Later, NP   10 mL at 03/13/21 0522    sodium chloride (NS) flush 5-40 mL  5-40 mL IntraVENous PRN Alyne Later, NP        acetaminophen (TYLENOL) tablet 650 mg  650 mg Oral Q6H PRN Alyne Later, NP        Or   [facility] acetaminophen (TYLENOL) suppository 650 mg  650 mg Rectal Q6H PRN Alyne Later, NP        polyethylene glycol (MIRALAX) packet 17 g  17 g Oral DAILY PRN Cheyenne Velasco Minerva Duff, NP        promethazine (PHENERGAN) tablet 12.5 mg  12.5 mg Oral Q6H PRN Rocio Rowe NP        Or    [facility]) injection 4 mg  4 mg IntraVENous Q6H PRN Rociokeke Rowe NP        famotidine (PEPCID) tablet 20 mg  20 mg Oral DAILY Rocio Rowe, NP   20 mg at 03/13/21 0815    dexamethasone (DECADRON) 4 mg/mL injection 4 mg  4 mg IntraVENous Q8H Rocio Ok NP   4 mg at 03/13/21 0816    piperacillin-tazobactam (ZOSYN) 3.375 g in 0.9% sodium chloride (MBP/ADV) 100 mL MBP  3.375 g IntraVENous Q12H Rocio Rowe NP 25 mL/hr at 03/13/21 0522 3.375 g at 03/13/21 0522    azithromycin (ZITHROMAX) 500 mg in 0.9% sodium chloride 250 mL (VIAL-MATE)  500 mg IntraVENous Q24H Katelin Reyes MD   Stopped at 03/11/21 1400        Review of Systems   Respiratory: Negative for shortness of breath and wheezing. Cardiovascular: Positive for leg swelling. Negative for palpitations. Gastrointestinal: Positive for abdominal distention. All other systems reviewed and are negative. Objective     Vital signs for last 24 hours:  Visit Vitals  BP (!) 177/90   Pulse 81   Temp 98.1 °F (36.7 °C)   Resp 20   Ht 5' 8.9\" (1.75 m)   Wt 113.4 kg (250 lb)   SpO2 96%   Breastfeeding No   BMI 37.03 kg/m²       Intake/Output this shift:  Current Shift: No intake/output data recorded. Last 3 Shifts: No intake/output data recorded. Physical Exam   Constitutional: She is oriented to person, place, and time. She appears well-developed and well-nourished. HENT:   Head: Normocephalic and atraumatic. Neck: No JVD present. No tracheal deviation present. Cardiovascular: Normal rate and regular rhythm. Pulmonary/Chest: Effort normal and breath sounds normal. No stridor. Abdominal: Soft. Bowel sounds are normal.   Neurological: She is alert and oriented to person, place, and time. Skin: Skin is warm and dry. Psychiatric: She has a normal mood and affect.  Her behavior is normal. Significant 3+ to 4+ edema of legs      Data Review:   Recent Results (from the past 24 hour(s))   RENAL FUNCTION PANEL    Collection Time: 03/12/21 11:37 AM   Result Value Ref Range    Sodium 139 136 - 145 mmol/L    Potassium 4.6 3.5 - 5.1 mmol/L    Chloride 109 (H) 97 - 108 mmol/L    CO2 22 21 - 32 mmol/L    Anion gap 8 5 - 15 mmol/L    Glucose 127 (H) 65 - 100 mg/dL    BUN 80 (H) 6 - 20 mg/dL    Creatinine 6.31 (H) 0.55 - 1.02 mg/dL    BUN/Creatinine ratio 13 12 - 20      GFR est AA 8 (L) >60 ml/min/1.73m2    GFR est non-AA 7 (L) >60 ml/min/1.73m2    Calcium 8.3 (L) 8.5 - 10.1 mg/dL    Phosphorus 4.1 2.6 - 4.7 mg/dL    Albumin 1.9 (L) 3.5 - 5.0 g/dL   BNP    Collection Time: 03/12/21 11:37 AM   Result Value Ref Range    NT pro-BNP 15,314 (H) <125 pg/mL   CBC W/O DIFF    Collection Time: 03/12/21 11:37 AM   Result Value Ref Range    WBC 9.4 3.6 - 11.0 K/uL    RBC 3.16 (L) 3.80 - 5.20 M/uL    HGB 8.6 (L) 11.5 - 16.0 g/dL    HCT 27.3 (L) 35.0 - 47.0 %    MCV 86.4 80.0 - 99.0 FL    MCH 27.2 26.0 - 34.0 PG    MCHC 31.5 30.0 - 36.5 g/dL    RDW 15.3 (H) 11.5 - 14.5 %    PLATELET 288 430 - 893 K/uL    MPV 11.0 8.9 - 82.3 FL   METABOLIC PANEL, COMPREHENSIVE    Collection Time: 03/12/21 11:37 AM   Result Value Ref Range    Sodium 139 136 - 145 mmol/L    Potassium 4.6 3.5 - 5.1 mmol/L    Chloride 109 (H) 97 - 108 mmol/L    CO2 23 21 - 32 mmol/L    Anion gap 7 5 - 15 mmol/L    Glucose 127 (H) 65 - 100 mg/dL    BUN 79 (H) 6 - 20 mg/dL    Creatinine 6.23 (H) 0.55 - 1.02 mg/dL    BUN/Creatinine ratio 13 12 - 20      GFR est AA 8 (L) >60 ml/min/1.73m2    GFR est non-AA 7 (L) >60 ml/min/1.73m2    Calcium 8.2 (L) 8.5 - 10.1 mg/dL    Bilirubin, total 0.1 (L) 0.2 - 1.0 mg/dL    AST (SGOT) 23 15 - 37 U/L    ALT (SGPT) 20 12 - 78 U/L    Alk.  phosphatase 96 45 - 117 U/L    Protein, total 5.7 (L) 6.4 - 8.2 g/dL    Albumin 1.9 (L) 3.5 - 5.0 g/dL    Globulin 3.8 2.0 - 4.0 g/dL    A-G Ratio 0.5 (L) 1.1 - 2.2     GLUCOSE, POC Collection Time: 03/12/21 12:42 PM   Result Value Ref Range    Glucose (POC) 106 (H) 65 - 100 mg/dL    Performed by Virgie Lozano    GLUCOSE, POC    Collection Time: 03/12/21  4:47 PM   Result Value Ref Range    Glucose (POC) 190 (H) 65 - 100 mg/dL    Performed by Yordy Michael   Final Result   Status post right IJ central line placement without evidence of   complication and improved aeration in the right perihilar region. IR INSERT NON TUNL CVC OVER 5 YRS   Final Result   Successful placement of a triple-lumen central venous catheter. The catheter is   ready for use. IR US GUIDED VASCULAR ACCESS   Final Result   Successful placement of a triple-lumen central venous catheter. The catheter is   ready for use. US RETROPERITONEUM COMP   Final Result   1. This examination is negative for hydronephrosis as an etiology for the   patient's renal insufficiency. 2.  On prior sonographic imaging there were demonstrated echogenic masses within   the left kidney which are no longer present. There are now is a smaller but more   normal morphology to the patient's left kidney. XR CHEST SNGL V   Final Result           Patient Active Problem List   Diagnosis Code    PNA (pneumonia) J18.9        DIAGNOSES:  1. CKD stage V-most likely progressive CKD or could be EDDIE on CKD  2. Nephrotic syndrome  3. Anasarca  4. Coronavirus pneumonia  5. Hypertension due to hypervolemia  6. Hyperkalemia  7. Renal tubular acidosis, type IV    PLAN:  · Continue with IV Lasix and will dose increased to 80 mg 3 times a day. · Potassium is stable. · No evidence of metabolic alkalosis. · Asked her to limit fluids. · 2 g sodium diet. · Potassium is well controlled. · Creatinine is worsening a bit as expected. · Not a candidate for ACE/ ARB at this late stage of CKD. ·  will soon need dialysis.

## 2021-03-13 NOTE — PROGRESS NOTES
General Daily Progress Note          Patient Name:   Clifton Ruiz       YOB: 1960       Age:  62 y.o. Admit Date: 3/8/2021      Subjective:     Lying in bed. No distress    No shortness of breath. Vascular access obtained in right IJ yesterday    AM LABS STILL PENDING AT THIS TIME      Objective:     Visit Vitals  BP (!) 177/90   Pulse 81   Temp 98.1 °F (36.7 °C)   Resp 20   Ht 5' 8.9\" (1.75 m)   Wt 113.4 kg (250 lb)   SpO2 96%   Breastfeeding No   BMI 37.03 kg/m²        Recent Results (from the past 24 hour(s))   GLUCOSE, POC    Collection Time: 03/12/21 12:42 PM   Result Value Ref Range    Glucose (POC) 106 (H) 65 - 100 mg/dL    Performed by Kianna ALVAREZ    GLUCOSE, POC    Collection Time: 03/12/21  4:47 PM   Result Value Ref Range    Glucose (POC) 190 (H) 65 - 100 mg/dL    Performed by Estela Tipton      [unfilled]      Review of Systems    Constitutional: Negative for chills and fever. HENT: Negative. Eyes: Negative. Respiratory: Negative. Cardiovascular: LEG SWELLING (better)  Gastrointestinal: Negative for abdominal pain and nausea. Skin: Negative. Neurological: Negative. Physical Exam:      Constitutional: Alert and oriented to person, place, and time. Head: Normocephalic and atraumatic. Eyes: Pupils are equal, round, and reactive to light. EOM are normal.   Cardiovascular: Normal rate, regular rhythm and normal heart sounds. 3+ PITTING BILATERAL LOWER EXTREMITY EDEMA (slightly less this morning and patient agrees)  Pulmonary/Chest: Breath sounds normal. No wheezes. No rales. Exhibits no tenderness. Abdominal: SOFTLY DISTENDED (chronic) Bowel sounds are normal. There is no abdominal tenderness. There is no rebound and no guarding. Musculoskeletal: Normal range of motion. Neurological: pt is alert and oriented to person, place, and time.      XR CHEST SNGL V INSPIR   Final Result   Status post right IJ central line placement without evidence of   complication and improved aeration in the right perihilar region. IR INSERT NON TUNL CVC OVER 5 YRS   Final Result   Successful placement of a triple-lumen central venous catheter. The catheter is   ready for use. IR US GUIDED VASCULAR ACCESS   Final Result   Successful placement of a triple-lumen central venous catheter. The catheter is   ready for use. US RETROPERITONEUM COMP   Final Result   1. This examination is negative for hydronephrosis as an etiology for the   patient's renal insufficiency. 2.  On prior sonographic imaging there were demonstrated echogenic masses within   the left kidney which are no longer present. There are now is a smaller but more   normal morphology to the patient's left kidney.       XR CHEST SNGL V   Final Result           Recent Results (from the past 24 hour(s))   GLUCOSE, POC    Collection Time: 03/12/21 12:42 PM   Result Value Ref Range    Glucose (POC) 106 (H) 65 - 100 mg/dL    Performed by Eber Garay    GLUCOSE, POC    Collection Time: 03/12/21  4:47 PM   Result Value Ref Range    Glucose (POC) 190 (H) 65 - 100 mg/dL    Performed by Ishan Zhou        Results     Procedure Component Value Units Date/Time    CULTURE, BLOOD, LINE [[id number]] Collected: 03/09/21 0032    Order Status: Completed Specimen: Blood Updated: 03/13/21 0707     Special Requests: No Special Requests        Culture result: No growth 3 days       CULTURE, URINE [[id number]] Collected: 03/08/21 1900    Order Status: Completed Specimen: Urine Updated: 03/10/21 0904     Special Requests: No Special Requests        Culture result: No Growth (<1000 cfu/mL)       CULTURE, RESPIRATORY/SPUTUM/BRONCH Mollie Amen STAIN [[id number]] Collected: 03/08/21 1900    Order Status: Canceled Specimen: Sputum     COVID-19 RAPID TEST [[id number]]  (Abnormal) Collected: 03/08/21 1325    Order Status: Completed Specimen: Nasopharyngeal Updated: 03/08/21 1415     Specimen source Nasopharyngeal        Comment: Results verified, phoned to and read back by Rahda Fuchs AT [id number] BY JF        COVID-19 rapid test DETECTED        Comment: Rapid Abbott ID Now   The specimen is POSITIVE for SARS-CoV-2, the novel coronavirus associated with COVID-19. This test has been authorized by the FDA under an Emergency Use Authorization (EUA) for use by authorized laboratories. Fact sheet for Healthcare Providers: [employer] Fact sheet for Patients: [employer]   Methodology: Isothermal Nucleic Acid Amplification       CULTURE, BLOOD, PAIRED [[id number]] Collected: 03/08/21 1305    Order Status: Completed Specimen: Blood Updated: 03/13/21 0707     Special Requests: No Special Requests        Culture result: No growth 5 days              Labs:     Recent Labs     03/12/21 1137 03/11/21  0755   WBC 9.4 11.4*   HGB 8.6* 9.3*   HCT 27.3* 29.6*    231     Recent Labs     03/12/21  1137 03/11/21  0755 03/10/21  1228     139 139  139 141   K 4.6  4.6 4.5  4.5 4.9   *  109* 109*  108 109*   CO2 22  23 21  22 25   BUN 80*  79* 78*  78* 73*   CREA 6.31*  6.23* 6.00*  6.02* 6.01*   *  127* 45*  46* 109*   CA 8.3*  8.2* 8.9  9.2 9.0   PHOS 4.1 4.8*  --      Recent Labs     03/12/21 1137 03/11/21  0755 03/10/21  1228   ALT 20 22 21   AP 96 108 116   TBILI 0.1* 0.2 0.2   TP 5.7* 6.4 6.4   ALB 1.9*  1.9* 2.2*  2.2* 2.0*   GLOB 3.8 4.2* 4.4*     No results for input(s): INR, PTP, APTT, INREXT, INREXT in the last 72 hours. No results for input(s): FE, TIBC, PSAT, FERR in the last 72 hours. No results found for: FOL, RBCF   No results for input(s): PH, PCO2, PO2 in the last 72 hours. No results for input(s): CPK, CKNDX, TROIQ in the last 72 hours.     No lab exists for component: CPKMB  No results found for: CHOL, CHOLX, CHLST, CHOLV, HDL, HDLP, LDL, LDLC, DLDLP, TGLX, TRIGL, TRIGP, CHHD, CHHDX  Lab Results Component Value Date/Time    Glucose (POC) 190 (H) 03/12/2021 04:47 PM    Glucose (POC) 106 (H) 03/12/2021 12:42 PM    Glucose (POC) 104 (H) 03/12/2021 07:54 AM    Glucose (POC) 100 03/12/2021 06:20 AM    Glucose (POC) 121 (H) 03/11/2021 11:39 AM     Lab Results   Component Value Date/Time    Color Yellow/Straw 03/08/2021 03:15 PM    Appearance Clear 03/08/2021 03:15 PM    Specific gravity 1.008 03/08/2021 03:15 PM    pH (UA) 7.0 03/08/2021 03:15 PM    Protein >300 (A) 03/08/2021 03:15 PM    Glucose 50 (A) 03/08/2021 03:15 PM    Ketone Negative 03/08/2021 03:15 PM    Bilirubin Negative 03/08/2021 03:15 PM    Urobilinogen 0.1 03/08/2021 03:15 PM    Nitrites Negative 03/08/2021 03:15 PM    Leukocyte Esterase Negative 03/08/2021 03:15 PM    Bacteria Negative 03/08/2021 03:15 PM    WBC 0-4 03/08/2021 03:15 PM    RBC 0-5 03/08/2021 03:15 PM         Assessment/Plan     COVID 19 Pneumonia     Pulmonary following     On Decadron, zosyn, and azithromycin     Repeat CXR prn     Blood cultures NG preliminary - continue current antibiotics pending        Final report     Continue COVID precautions     On room air - supplemental oxygen PRN  Moderate pulmonary hypertension WHO group 2  HFpEF     Pulmonary and cardiology following     LVEF 22%, Grade 2 diastolic dysfunction, PA pressure 57 on echo 3/10/21     On Isordil - no sildenafil yet while on nitrates.      BNP still elevated over 15,000 (minimal change from admission)     Lasix increased to TID by nephrology this morning     Changed diet to cardiac 2 gram sodium per nephrology's note  CKD stage V  Nephrotic syndrome  Hypertension in renal disease, chronic  Hyperkalemia - resolved  Anemia of chronic disease      Nephrology following      Monitor labs - pending      AM labs ordered  Hypoalbuminemia      Nephrology following      Anasarca     DVT PPX Heparin subcutaneous    Patient is full code this admission    Discussed with Dr. Zhen Mathew Facility-Administered Medications:     furosemide (LASIX) injection 80 mg, 80 mg, IntraVENous, Q8H, Alan Gunter MD    hydrALAZINE (APRESOLINE) tablet 100 mg, 100 mg, Oral, TID, Alan Gunter MD, 100 mg at 03/13/21 0816    amLODIPine (NORVASC) tablet 10 mg, 10 mg, Oral, DAILY, Alan Gunter MD, 10 mg at 03/13/21 0816    carvediloL (COREG) tablet 12.5 mg, 12.5 mg, Oral, BID WITH MEALS, Alan Gunter MD, 12.5 mg at 03/13/21 0815    isosorbide dinitrate (ISORDIL) tablet 20 mg, 20 mg, Oral, TID, Alan Gunter MD, 20 mg at 03/13/21 0825    heparin (porcine) injection 5,000 Units, 5,000 Units, SubCUTAneous, Q8H, Alan Gunter MD, 5,000 Units at 03/13/21 1148    sodium chloride (NS) flush 5-40 mL, 5-40 mL, IntraVENous, Q8H, Angelica Ge NP, 10 mL at 03/13/21 0522    sodium chloride (NS) flush 5-40 mL, 5-40 mL, IntraVENous, PRN, Angelica Ge NP  [facility]  acetaminophen (TYLENOL) tablet 650 mg, 650 mg, Oral, Q6H PRN **OR** acetaminophen (TYLENOL) suppository 650 mg, 650 mg, Rectal, Q6H PRN, Angelica Ge NP    polyethylene glycol (MIRALAX) packet 17 g, 17 g, Oral, DAILY PRN, Angelica Ge NP    promethazine (PHENERGAN) tablet 12.5 mg, 12.5 mg, Oral, Q6H PRN **OR** ondansetron (ZOFRAN) injection 4 mg, 4 mg, IntraVENous, Q6H PRN, Angelica Ge NP    famotidine (PEPCID) tablet 20 mg, 20 mg, Oral, DAILY, Angelica Ge NP, 20 mg at 03/13/21 0815    dexamethasone (DECADRON) 4 mg/mL injection 4 mg, 4 mg, IntraVENous, Q8H, Angelica Ge NP, 4 mg at 03/13/21 0816    piperacillin-tazobactam (ZOSYN) 3.375 g in 0.9% sodium chloride (MBP/ADV) 100 mL MBP, 3.375 g, IntraVENous, Q12H, Angelica Ge NP, Last Rate: 25 mL/hr at 03/13/21 0522, 3.375 g at 03/13/21 0522    azithromycin (ZITHROMAX) 500 mg in 0.9% sodium chloride 250 mL (VIAL-MATE), 500 mg, IntraVENous, Q24H, Aliyah Reyes MD, Stopped at 03/11/21 1400

## 2021-03-14 LAB
ALBUMIN SERPL-MCNC: 2.1 G/DL (ref 3.5–5)
ALBUMIN SERPL-MCNC: 2.2 G/DL (ref 3.5–5)
ALBUMIN/GLOB SERPL: 0.5 {RATIO} (ref 1.1–2.2)
ALP SERPL-CCNC: 89 U/L (ref 45–117)
ALT SERPL-CCNC: 21 U/L (ref 12–78)
ANION GAP SERPL CALC-SCNC: 10 MMOL/L (ref 5–15)
ANION GAP SERPL CALC-SCNC: 13 MMOL/L (ref 5–15)
AST SERPL W P-5'-P-CCNC: 12 U/L (ref 15–37)
BACTERIA SPEC CULT: NORMAL
BILIRUB SERPL-MCNC: 0.2 MG/DL (ref 0.2–1)
BUN SERPL-MCNC: 79 MG/DL (ref 6–20)
BUN SERPL-MCNC: 83 MG/DL (ref 6–20)
BUN/CREAT SERPL: 13 (ref 12–20)
BUN/CREAT SERPL: 13 (ref 12–20)
CA-I BLD-MCNC: 8.4 MG/DL (ref 8.5–10.1)
CA-I BLD-MCNC: 8.7 MG/DL (ref 8.5–10.1)
CHLORIDE SERPL-SCNC: 105 MMOL/L (ref 97–108)
CHLORIDE SERPL-SCNC: 107 MMOL/L (ref 97–108)
CO2 SERPL-SCNC: 19 MMOL/L (ref 21–32)
CO2 SERPL-SCNC: 20 MMOL/L (ref 21–32)
CREAT SERPL-MCNC: 6.18 MG/DL (ref 0.55–1.02)
CREAT SERPL-MCNC: 6.33 MG/DL (ref 0.55–1.02)
ERYTHROCYTE [DISTWIDTH] IN BLOOD BY AUTOMATED COUNT: 15 % (ref 11.5–14.5)
GLOBULIN SER CALC-MCNC: 4.1 G/DL (ref 2–4)
GLUCOSE BLD STRIP.AUTO-MCNC: 207 MG/DL (ref 65–100)
GLUCOSE BLD STRIP.AUTO-MCNC: 230 MG/DL (ref 65–100)
GLUCOSE BLD STRIP.AUTO-MCNC: 286 MG/DL (ref 65–100)
GLUCOSE SERPL-MCNC: 200 MG/DL (ref 65–100)
GLUCOSE SERPL-MCNC: 222 MG/DL (ref 65–100)
HCT VFR BLD AUTO: 28.6 % (ref 35–47)
HGB BLD-MCNC: 9 G/DL (ref 11.5–16)
MCH RBC QN AUTO: 26.9 PG (ref 26–34)
MCHC RBC AUTO-ENTMCNC: 31.5 G/DL (ref 30–36.5)
MCV RBC AUTO: 85.6 FL (ref 80–99)
PERFORMED BY, TECHID: ABNORMAL
PHOSPHATE SERPL-MCNC: 5 MG/DL (ref 2.6–4.7)
PLATELET # BLD AUTO: 249 K/UL (ref 150–400)
PMV BLD AUTO: 10.9 FL (ref 8.9–12.9)
POTASSIUM SERPL-SCNC: 4.3 MMOL/L (ref 3.5–5.1)
POTASSIUM SERPL-SCNC: 4.6 MMOL/L (ref 3.5–5.1)
PROT SERPL-MCNC: 6.2 G/DL (ref 6.4–8.2)
RBC # BLD AUTO: 3.34 M/UL (ref 3.8–5.2)
SODIUM SERPL-SCNC: 137 MMOL/L (ref 136–145)
SODIUM SERPL-SCNC: 137 MMOL/L (ref 136–145)
SPECIAL REQUESTS,SREQ: NORMAL
WBC # BLD AUTO: 7.7 K/UL (ref 3.6–11)

## 2021-03-14 PROCEDURE — 65270000029 HC RM PRIVATE

## 2021-03-14 PROCEDURE — 80069 RENAL FUNCTION PANEL: CPT

## 2021-03-14 PROCEDURE — 74011250636 HC RX REV CODE- 250/636: Performed by: INTERNAL MEDICINE

## 2021-03-14 PROCEDURE — 85027 COMPLETE CBC AUTOMATED: CPT

## 2021-03-14 PROCEDURE — 80053 COMPREHEN METABOLIC PANEL: CPT

## 2021-03-14 PROCEDURE — 74011250636 HC RX REV CODE- 250/636: Performed by: NURSE PRACTITIONER

## 2021-03-14 PROCEDURE — 74011636637 HC RX REV CODE- 636/637: Performed by: FAMILY MEDICINE

## 2021-03-14 PROCEDURE — 36415 COLL VENOUS BLD VENIPUNCTURE: CPT

## 2021-03-14 PROCEDURE — 74011250637 HC RX REV CODE- 250/637: Performed by: INTERNAL MEDICINE

## 2021-03-14 PROCEDURE — 74011000258 HC RX REV CODE- 258: Performed by: NURSE PRACTITIONER

## 2021-03-14 PROCEDURE — 82962 GLUCOSE BLOOD TEST: CPT

## 2021-03-14 PROCEDURE — 83036 HEMOGLOBIN GLYCOSYLATED A1C: CPT

## 2021-03-14 PROCEDURE — 74011250637 HC RX REV CODE- 250/637: Performed by: FAMILY MEDICINE

## 2021-03-14 PROCEDURE — 74011250637 HC RX REV CODE- 250/637: Performed by: NURSE PRACTITIONER

## 2021-03-14 RX ORDER — INSULIN LISPRO 100 [IU]/ML
INJECTION, SOLUTION INTRAVENOUS; SUBCUTANEOUS
Status: DISCONTINUED | OUTPATIENT
Start: 2021-03-14 | End: 2021-03-14 | Stop reason: SDUPTHER

## 2021-03-14 RX ORDER — INSULIN LISPRO 100 [IU]/ML
INJECTION, SOLUTION INTRAVENOUS; SUBCUTANEOUS
Status: DISCONTINUED | OUTPATIENT
Start: 2021-03-14 | End: 2021-03-18 | Stop reason: HOSPADM

## 2021-03-14 RX ORDER — MAGNESIUM SULFATE 100 %
4 CRYSTALS MISCELLANEOUS AS NEEDED
Status: DISCONTINUED | OUTPATIENT
Start: 2021-03-14 | End: 2021-03-18 | Stop reason: HOSPADM

## 2021-03-14 RX ORDER — CLONIDINE HYDROCHLORIDE 0.1 MG/1
0.1 TABLET ORAL
Status: DISCONTINUED | OUTPATIENT
Start: 2021-03-14 | End: 2021-03-15

## 2021-03-14 RX ORDER — MAGNESIUM SULFATE 100 %
4 CRYSTALS MISCELLANEOUS AS NEEDED
Status: DISCONTINUED | OUTPATIENT
Start: 2021-03-14 | End: 2021-03-14 | Stop reason: SDUPTHER

## 2021-03-14 RX ORDER — DEXAMETHASONE SODIUM PHOSPHATE 4 MG/ML
4 INJECTION, SOLUTION INTRA-ARTICULAR; INTRALESIONAL; INTRAMUSCULAR; INTRAVENOUS; SOFT TISSUE EVERY 12 HOURS
Status: DISCONTINUED | OUTPATIENT
Start: 2021-03-14 | End: 2021-03-18 | Stop reason: HOSPADM

## 2021-03-14 RX ORDER — DEXTROSE 50 % IN WATER (D50W) INTRAVENOUS SYRINGE
25-50 AS NEEDED
Status: DISCONTINUED | OUTPATIENT
Start: 2021-03-14 | End: 2021-03-14 | Stop reason: SDUPTHER

## 2021-03-14 RX ORDER — DEXTROSE 50 % IN WATER (D50W) INTRAVENOUS SYRINGE
25-50 AS NEEDED
Status: DISCONTINUED | OUTPATIENT
Start: 2021-03-14 | End: 2021-03-18 | Stop reason: HOSPADM

## 2021-03-14 RX ADMIN — CLONIDINE HYDROCHLORIDE 0.1 MG: 0.1 TABLET ORAL at 21:55

## 2021-03-14 RX ADMIN — Medication 10 ML: at 13:13

## 2021-03-14 RX ADMIN — DEXAMETHASONE SODIUM PHOSPHATE 4 MG: 4 INJECTION, SOLUTION INTRA-ARTICULAR; INTRALESIONAL; INTRAMUSCULAR; INTRAVENOUS; SOFT TISSUE at 09:08

## 2021-03-14 RX ADMIN — FUROSEMIDE 80 MG: 10 INJECTION, SOLUTION INTRAMUSCULAR; INTRAVENOUS at 13:12

## 2021-03-14 RX ADMIN — INSULIN LISPRO 4 UNITS: 100 INJECTION, SOLUTION INTRAVENOUS; SUBCUTANEOUS at 21:00

## 2021-03-14 RX ADMIN — FUROSEMIDE 80 MG: 10 INJECTION, SOLUTION INTRAMUSCULAR; INTRAVENOUS at 06:14

## 2021-03-14 RX ADMIN — DEXAMETHASONE SODIUM PHOSPHATE 4 MG: 4 INJECTION, SOLUTION INTRAMUSCULAR; INTRAVENOUS at 20:49

## 2021-03-14 RX ADMIN — CARVEDILOL 12.5 MG: 12.5 TABLET, FILM COATED ORAL at 09:08

## 2021-03-14 RX ADMIN — AMLODIPINE BESYLATE 10 MG: 5 TABLET ORAL at 09:08

## 2021-03-14 RX ADMIN — PIPERACILLIN AND TAZOBACTAM 3.38 G: 3; .375 INJECTION, POWDER, LYOPHILIZED, FOR SOLUTION INTRAVENOUS at 17:03

## 2021-03-14 RX ADMIN — PIPERACILLIN AND TAZOBACTAM 3.38 G: 3; .375 INJECTION, POWDER, LYOPHILIZED, FOR SOLUTION INTRAVENOUS at 06:14

## 2021-03-14 RX ADMIN — HYDRALAZINE HYDROCHLORIDE 100 MG: 50 TABLET, FILM COATED ORAL at 09:08

## 2021-03-14 RX ADMIN — HEPARIN SODIUM 5000 UNITS: 5000 INJECTION INTRAVENOUS; SUBCUTANEOUS at 03:31

## 2021-03-14 RX ADMIN — Medication 10 ML: at 06:15

## 2021-03-14 RX ADMIN — INSULIN LISPRO 7 UNITS: 100 INJECTION, SOLUTION INTRAVENOUS; SUBCUTANEOUS at 16:30

## 2021-03-14 RX ADMIN — HEPARIN SODIUM 5000 UNITS: 5000 INJECTION INTRAVENOUS; SUBCUTANEOUS at 20:48

## 2021-03-14 RX ADMIN — DEXAMETHASONE SODIUM PHOSPHATE 4 MG: 4 INJECTION, SOLUTION INTRA-ARTICULAR; INTRALESIONAL; INTRAMUSCULAR; INTRAVENOUS; SOFT TISSUE at 03:31

## 2021-03-14 RX ADMIN — CARVEDILOL 12.5 MG: 12.5 TABLET, FILM COATED ORAL at 17:03

## 2021-03-14 RX ADMIN — HYDRALAZINE HYDROCHLORIDE 100 MG: 50 TABLET, FILM COATED ORAL at 17:03

## 2021-03-14 RX ADMIN — ISOSORBIDE DINITRATE 20 MG: 10 TABLET ORAL at 20:46

## 2021-03-14 RX ADMIN — Medication 10 ML: at 20:47

## 2021-03-14 RX ADMIN — HEPARIN SODIUM 5000 UNITS: 5000 INJECTION INTRAVENOUS; SUBCUTANEOUS at 13:12

## 2021-03-14 RX ADMIN — ISOSORBIDE DINITRATE 20 MG: 10 TABLET ORAL at 17:03

## 2021-03-14 RX ADMIN — FAMOTIDINE 20 MG: 20 TABLET, FILM COATED ORAL at 09:08

## 2021-03-14 RX ADMIN — FUROSEMIDE 80 MG: 10 INJECTION, SOLUTION INTRAMUSCULAR; INTRAVENOUS at 20:47

## 2021-03-14 RX ADMIN — HYDRALAZINE HYDROCHLORIDE 100 MG: 50 TABLET, FILM COATED ORAL at 20:47

## 2021-03-14 RX ADMIN — ISOSORBIDE DINITRATE 20 MG: 10 TABLET ORAL at 09:08

## 2021-03-14 NOTE — CONSULTS
PULMONARY NOTE  [facility]    Name: Mara Ballesteros MRN: [id number]   : 1960 Hospital: [address] [place] [address]   Date: 3/14/2021  Admission date: 3/8/2021 Hospital Day: 7       HPI:     Hospital Problems  Never Reviewed          Codes Class Noted POA    PNA (pneumonia) ICD-10-CM: J18.9  ICD-9-CM: 898  3/8/2021 Unknown                   [x] High complexity decision making was performed  [x] See my orders for details      Subjective/Initial History:     Sitting up on side of bed. Reports watery diarrhea in large amounts without much abdominal cramping. Eating breakfast. No change in lower extremity edema from yesterday. Excerpts from admission 3/8/2021 or consult notes as follows:   64-year-old lady came in because of abdominal discomfort significant past medical history of congestive heart failure chronic kidney disease and type 2 diabetes mellitus she was complaining of shortness of breath dyspnea and feeling nauseous for the past 1 week no history of passing out so admitted and she is COVID-19 positive so pulmonary consult was called for further evaluation.       Allergies   Allergen Reactions    Doxycycline Swelling    Egg Diarrhea    Milk Containing Products Nausea and Vomiting    Tetracycline Swelling        MAR reviewed and pertinent medications noted or modified as needed     Current Facility-Administered Medications   Medication    furosemide (LASIX) injection 80 mg    hydrALAZINE (APRESOLINE) tablet 100 mg    amLODIPine (NORVASC) tablet 10 mg    carvediloL (COREG) tablet 12.5 mg    isosorbide dinitrate (ISORDIL) tablet 20 mg    heparin (porcine) injection 5,000 Units    sodium chloride (NS) flush 5-40 mL    sodium chloride (NS) flush 5-40 mL    acetaminophen (TYLENOL) tablet 650 mg    Or    acetaminophen (TYLENOL) suppository 650 mg    polyethylene glycol (MIRALAX) packet 17 g    promethazine (PHENERGAN) tablet 12.5 mg    Or    ondansetron (ZOFRAN) injection 4 mg    famotidine (PEPCID) tablet 20 mg    dexamethasone (DECADRON) 4 mg/mL injection 4 mg    piperacillin-tazobactam (ZOSYN) 3.375 g in 0.9% sodium chloride (MBP/ADV) 100 mL MBP    azithromycin (ZITHROMAX) 500 mg in 0.9% sodium chloride 250 mL (VIAL-MATE)      Patient PCP: Fariba, MD Adria  PMH:  has a past medical history of Hypertension and Morbid obesity ([address] PSH:   has a past surgical history that includes ir insert non tunl cvc over 5 yrs (3/12/2021). FHX: family history is not on file. SHX:       ROS:    Review of Systems   Constitutional: Negative. HENT: Negative. Eyes: Negative. Respiratory: Positive for shortness of breath. Cardiovascular: Positive for leg swelling. Gastrointestinal: Positive for abdominal pain and diarrhea. Negative for nausea. Genitourinary: Negative. Musculoskeletal: Negative. Skin: Negative. Neurological: Negative for weakness. Endo/Heme/Allergies: Negative. Psychiatric/Behavioral: Negative. Objective:     Vital Signs: Telemetry:    normal sinus rhythm Intake/Output:   Visit Vitals  BP (!) 173/89   Pulse 79   Temp 98.4 °F (36.9 °C)   Resp 18   Ht 5' 8.9\" (1.75 m)   Wt 113.4 kg (250 lb)   SpO2 96%   Breastfeeding No   BMI 37.03 kg/m²       Temp (24hrs), Av.1 °F (36.7 °C), Min:97.9 °F (36.6 °C), Max:98.4 °F (36.9 °C)        O2 Device: Room air O2 Flow Rate (L/min): 0 l/min       Wt Readings from Last 4 Encounters:   03/10/21 113.4 kg (250 lb)          Intake/Output Summary (Last 24 hours) at 3/14/2021 1009  Last data filed at 3/14/2021 0650  Gross per 24 hour   Intake 580 ml   Output 650 ml   Net -70 ml       Last shift:      No intake/output data recorded. Last 3 shifts:  1901 -  0700  In: 80 [P.O.:360; I.V.:220]  Out: 650 [Urine:650]       Physical Exam:     Physical Exam   Constitutional: She is oriented to person, place, and time. She appears well-developed. No distress. HENT:   Head: Normocephalic and atraumatic. Eyes: Pupils are equal, round, and reactive to light. Conjunctivae are normal.   Neck: Normal range of motion. Neck supple. No JVD present. Cardiovascular: Normal rate, regular rhythm and normal heart sounds. Pulmonary/Chest: Effort normal and breath sounds normal.   Abdominal: Soft. Bowel sounds are normal. She exhibits no distension. There is no abdominal tenderness. Musculoskeletal: Normal range of motion. General: Edema present. No tenderness. Comments: 3-4+ pitting edema in bilateral lower extremities - chronic and unchanged from yesterday   Neurological: She is alert and oriented to person, place, and time. Skin: Skin is warm and dry. Areas of scars from healed lower extremity wounds/lesions bilaterally. Psychiatric: She has a normal mood and affect. Labs:    Recent Labs     03/12/21  1137   WBC 9.4   HGB 8.6*        Recent Labs     03/12/21  1137     139   K 4.6  4.6   *  109*   CO2 22  23   *  127*   BUN 80*  79*   CREA 6.31*  6.23*   CA 8.3*  8.2*   PHOS 4.1   ALB 1.9*  1.9*   ALT 20     No results for input(s): PH, PCO2, PO2, HCO3, FIO2 in the last 72 hours. No results for input(s): CPK, CKNDX, TROIQ in the last 72 hours. No lab exists for component: CPKMB  No results found for: BNPP, BNP   Lab Results   Component Value Date/Time    Culture result: No growth 4 days 03/09/2021 01:32 AM    Culture result: No Growth (<1000 cfu/mL) 03/08/2021 07:00 PM    Culture result: No growth 6 days 03/08/2021 02:05 PM     Lab Results   Component Value Date/Time    TSH 1.57 03/08/2021 11:45 AM       Imaging:    CXR Results  (Last 48 hours)    None        Results from [place] encounter on 03/08/21   XR CHEST SNGL V INSPIR    Narrative Portable upright radiograph chest 3:51 PM compared to March 8, 2021. INDICATION: Post right IJ catheter placement. Heart size remains enlarged.  Interval placement of a right IJ central line with  tip projecting over the SVC. Improved aeration in the right perihilar region. No  pneumothorax or gross effusion. Impression Status post right IJ central line placement without evidence of  complication and improved aeration in the right perihilar region. XR CHEST SNGL V    Narrative 1 new comparison June 21    Cardiomegaly with congestion. Mild interstitial edema. Localized airspace  disease right perihilar. No effusion or pneumothorax     No results found for this or any previous visit. IMPRESSION:   1. COVID-19 pneumonia  2. Acute on chronic kidney disease   3. Congestive heart failure  4. Pulmonary hypertension  5. Ex COPD  6. Anemia of chronic disease  7. Additional workup outlined below  8. Pt is requiring Drug therapy requiring intensive monitoring for toxicity  9. Prognosis guarded       RECOMMENDATIONS/PLAN:     1. She is on room air without evidence of hypoxia. 96-97% on room air  2. Zosyn therapy completes tomorrow morning. She is not a candidate for remdesivir or Actemra  3. Azithromycin discontinued and c-diff studies ordered due to new onset of multiple episodes of liquid diarrhea stools. 4. Decadron decreased to q12h  5. Chest x-ray shows cardiomegaly with congestion and infiltrate right perihilar  6. She is on Lasix 80 twice daily  7. Patient is on Isordil cannot start patient on sildenafil will discuss with cardiologist patient has EF about 47% WHO group 2 for pulmonary hypertension  8. Supplemental O2 to keep sats > 93%  9. Aspiration precautions  10. Labs to follow electrolytes, renal function and and blood counts  11.  DVT, SUP prophylaxis     Discussed with Dr. Nnamdi Tinsley, NP

## 2021-03-14 NOTE — PROGRESS NOTES
Patient had blood sugar of 301 at bedtime - this RN asked patient if she takes insulin at home and patient denied insulin at home and refused to take it here when told how high her sugar is. Patient also refusing Zithromax.

## 2021-03-14 NOTE — PROGRESS NOTES
.  Seen and examined in room. Says she is urinating well. States her leg swelling is going down. Says her appetite is good. No nausea. Urinating quite frequently and there is no dysuria     Past Medical History:   Diagnosis Date    Hypertension     Morbid obesity ([address]       Past Surgical History:   Procedure Laterality Date    IR INSERT NON TUNL CVC OVER 5 YRS  3/12/2021     No family history on file.    Social History     Tobacco Use    Smoking status: Not on file   Substance Use Topics    Alcohol use: Not on file       Current Facility-Administered Medications   Medication Dose Route Frequency Provider Last Rate Last Admin    dexamethasone (DECADRON) 4 mg/mL injection 4 mg  4 mg IntraVENous Q12H Suzette Romero NP        glucose chewable tablet 16 g  4 Tab Oral PRN Israel Reyes MD        dextrose (D50W) injection syrg 12.5-25 g  25-50 mL IntraVENous PRN Israel Reyes MD        glucagon (GLUCAGEN) injection 1 mg  1 mg IntraMUSCular PRN Israel Reyes MD       [facility] insulin lispro (HUMALOG) injection   SubCUTAneous AC&HS Israel Reyes MD        cloNIDine HCL (CATAPRES) tablet 0.1 mg  0.1 mg Oral QHS Rebecca Reyes MD        furosemide (LASIX) injection 80 mg  80 mg IntraVENous Q8H Alverto Gunter MD   80 mg at 03/14/21 1312    hydrALAZINE (APRESOLINE) tablet 100 mg  100 mg Oral TID Brianna Abrams MD   100 mg at 03/14/21 0908    amLODIPine (NORVASC) tablet 10 mg  10 mg Oral DAILY Brianna Abrams MD   10 mg at 03/14/21 0908    carvediloL (COREG) tablet 12.5 mg  12.5 mg Oral BID WITH MEALS Brianna Abrams MD   12.5 mg at 03/14/21 0908    isosorbide dinitrate (ISORDIL) tablet 20 mg  20 mg Oral TID Brianna Abrams MD   20 mg at 03/14/21 0908    heparin (porcine) injection 5,000 Units  5,000 Units SubCUTAneous Q8H Brianna Abrams MD   5,000 Units at 03/14/21 1312    sodium chloride (NS) flush 5-40 mL  5-40 mL IntraVENous Q8H Suzette Romero NP   10 mL at 03/14/21 1310    sodium chloride (NS) flush 5-40 mL  5-40 mL IntraVENous PRN Ettie Cancel, NP        acetaminophen (TYLENOL) tablet 650 mg  650 mg Oral Q6H PRN Ettie Cancel, NP        Or   [place] acetaminophen (TYLENOL) suppository 650 mg  650 mg Rectal Q6H PRN Ettie Cancel, NP        polyethylene glycol (MIRALAX) packet 17 g  17 g Oral DAILY PRN Ettie Cancel, NP        promethazine (PHENERGAN) tablet 12.5 mg  12.5 mg Oral Q6H PRN Ettie Cancel, NP        Or    ondansetron [facility]) injection 4 mg  4 mg IntraVENous Q6H PRN Ettie Cancel, NP        famotidine (PEPCID) tablet 20 mg  20 mg Oral DAILY Ettie Cancel, NP   20 mg at 03/14/21 0908    piperacillin-tazobactam (ZOSYN) 3.375 g in 0.9% sodium chloride (MBP/ADV) 100 mL MBP  3.375 g IntraVENous Q12H Ettie Cancel, NP 25 mL/hr at 03/14/21 0614 3.375 g at 03/14/21 3673        Review of Systems   Respiratory: Negative for shortness of breath and wheezing. Cardiovascular: Negative for palpitations and leg swelling. Gastrointestinal: Negative for abdominal distention. All other systems reviewed and are negative. Objective     Vital signs for last 24 hours:  Visit Vitals  BP (!) 173/89   Pulse 79   Temp 98.4 °F (36.9 °C)   Resp 18   Ht 5' 8.9\" (1.75 m)   Wt 91.6 kg (202 lb)   SpO2 96%   Breastfeeding No   BMI 29.92 kg/m²       Intake/Output this shift:  Current Shift: No intake/output data recorded. Last 3 Shifts: 03/12 1901 - 03/14 0700  In: 80 [P.O.:360; I.V.:220]  Out: 650 [Urine:650]  Physical Exam   Constitutional: She is oriented to person, place, and time. She appears well-developed and well-nourished. HENT:   Head: Normocephalic and atraumatic. Neck: No JVD present. No tracheal deviation present. Cardiovascular: Normal rate and regular rhythm. Pulmonary/Chest: Effort normal and breath sounds normal. No stridor. Abdominal: Soft. Bowel sounds are normal.   Neurological: She is alert and oriented to person, place, and time. Skin: Skin is warm and dry. Psychiatric: She has a normal mood and affect.  Her behavior is normal.      Significant 3+ to 4+ edema of legs      Data Review:   Recent Results (from the past 24 hour(s))   GLUCOSE, POC    Collection Time: 03/13/21  8:25 PM   Result Value Ref Range    Glucose (POC) 301 (H) 65 - 100 mg/dL    Performed by Nader Dyson, POC    Collection Time: 03/13/21  8:26 PM   Result Value Ref Range    Glucose (POC) 301 (H) 65 - 100 mg/dL    Performed by Shasta Liang    GLUCOSE, POC    Collection Time: 03/14/21  9:05 AM   Result Value Ref Range    Glucose (POC) 230 (H) 65 - 100 mg/dL    Performed by Kamala Alt    RENAL FUNCTION PANEL    Collection Time: 03/14/21  9:40 AM   Result Value Ref Range    Sodium 137 136 - 145 mmol/L    Potassium 4.6 3.5 - 5.1 mmol/L    Chloride 105 97 - 108 mmol/L    CO2 19 (L) 21 - 32 mmol/L    Anion gap 13 5 - 15 mmol/L    Glucose 200 (H) 65 - 100 mg/dL    BUN 79 (H) 6 - 20 mg/dL    Creatinine 6.18 (H) 0.55 - 1.02 mg/dL    BUN/Creatinine ratio 13 12 - 20      GFR est AA 8 (L) >60 ml/min/1.73m2    GFR est non-AA 7 (L) >60 ml/min/1.73m2    Calcium 8.4 (L) 8.5 - 10.1 mg/dL    Phosphorus 5.0 (H) 2.6 - 4.7 mg/dL    Albumin 2.2 (L) 3.5 - 5.0 g/dL   CBC W/O DIFF    Collection Time: 03/14/21  9:40 AM   Result Value Ref Range    WBC 7.7 3.6 - 11.0 K/uL    RBC 3.34 (L) 3.80 - 5.20 M/uL    HGB 9.0 (L) 11.5 - 16.0 g/dL    HCT 28.6 (L) 35.0 - 47.0 %    MCV 85.6 80.0 - 99.0 FL    MCH 26.9 26.0 - 34.0 PG    MCHC 31.5 30.0 - 36.5 g/dL    RDW 15.0 (H) 11.5 - 14.5 %    PLATELET 799 368 - 074 K/uL    MPV 10.9 8.9 - 80.2 FL   METABOLIC PANEL, COMPREHENSIVE    Collection Time: 03/14/21  9:40 AM   Result Value Ref Range    Sodium 137 136 - 145 mmol/L    Potassium 4.3 3.5 - 5.1 mmol/L    Chloride 107 97 - 108 mmol/L    CO2 20 (L) 21 - 32 mmol/L    Anion gap 10 5 - 15 mmol/L    Glucose 222 (H) 65 - 100 mg/dL    BUN 83 (H) 6 - 20 mg/dL    Creatinine 6.33 (H) 0.55 - 1.02 mg/dL BUN/Creatinine ratio 13 12 - 20      GFR est AA 8 (L) >60 ml/min/1.73m2    GFR est non-AA 7 (L) >60 ml/min/1.73m2    Calcium 8.7 8.5 - 10.1 mg/dL    Bilirubin, total 0.2 0.2 - 1.0 mg/dL    AST (SGOT) 12 (L) 15 - 37 U/L    ALT (SGPT) 21 12 - 78 U/L    Alk. phosphatase 89 45 - 117 U/L    Protein, total 6.2 (L) 6.4 - 8.2 g/dL    Albumin 2.1 (L) 3.5 - 5.0 g/dL    Globulin 4.1 (H) 2.0 - 4.0 g/dL    A-G Ratio 0.5 (L) 1.1 - 2.2     GLUCOSE, POC    Collection Time: 03/14/21  4:06 PM   Result Value Ref Range    Glucose (POC) 286 (H) 65 - 100 mg/dL    Performed by Aura Reddy          XR CHEST SNGL V INSPIR   Final Result   Status post right IJ central line placement without evidence of   complication and improved aeration in the right perihilar region. IR INSERT NON TUNL CVC OVER 5 YRS   Final Result   Successful placement of a triple-lumen central venous catheter. The catheter is   ready for use. IR US GUIDED VASCULAR ACCESS   Final Result   Successful placement of a triple-lumen central venous catheter. The catheter is   ready for use. US RETROPERITONEUM COMP   Final Result   1. This examination is negative for hydronephrosis as an etiology for the   patient's renal insufficiency. 2.  On prior sonographic imaging there were demonstrated echogenic masses within   the left kidney which are no longer present. There are now is a smaller but more   normal morphology to the patient's left kidney. XR CHEST SNGL V   Final Result           Patient Active Problem List   Diagnosis Code    PNA (pneumonia) J18.9        DIAGNOSES:  1. CKD stage V-most likely progressive CKD or could be EDDIE on CKD  2. Nephrotic syndrome  3. Anasarca  4. Coronavirus pneumonia  5. Hypertension due to hypervolemia  6. Hyperkalemia- controlled  7. Renal tubular acidosis, type IV    PLAN:  · See notes from yesterday. · Continue IV Lasix. · Hyperkalemia is controlled.   · Fluid restriction to 1200 mL a day  · Discharge on Lasix 80 twice a day plus metolazone. · We will soon need dialysis.   ·

## 2021-03-14 NOTE — PROGRESS NOTES
Problem: Pneumonia: Day 1  Goal: Activity/Safety  Outcome: Progressing Towards Goal     Problem: Patient Education: Go to Patient Education Activity  Goal: Patient/Family Education  Outcome: Progressing Towards Goal     Problem: Heart Failure: Day 1  Goal: Activity/Safety  Outcome: Progressing Towards Goal  Goal: Medications  Outcome: Progressing Towards Goal

## 2021-03-14 NOTE — PROGRESS NOTES
General Daily Progress Note          Patient Name:   Sue Verma       YOB: 1960       Age:  62 y.o.       Admit Date: 3/8/2021      Subjective:       Pressure still elevated    Still significant leg edema      Objective:     Visit Vitals  BP (!) 173/89   Pulse 79   Temp 98.4 °F (36.9 °C)   Resp 18   Ht 5' 8.9\" (1.75 m)   Wt 113.4 kg (250 lb)   SpO2 96%   Breastfeeding No   BMI 37.03 kg/m²        Recent Results (from the past 24 hour(s))   GLUCOSE, POC    Collection Time: 03/13/21  8:25 PM   Result Value Ref Range    Glucose (POC) 301 (H) 65 - 100 mg/dL    Performed by [facility]    GLUCOSE, POC    Collection Time: 03/13/21  8:26 PM   Result Value Ref Range    Glucose (POC) 301 (H) 65 - 100 mg/dL    Performed by [facility]    GLUCOSE, POC    Collection Time: 03/14/21  9:05 AM   Result Value Ref Range    Glucose (POC) 230 (H) 65 - 100 mg/dL    Performed by Anselmo York    RENAL FUNCTION PANEL    Collection Time: 03/14/21  9:40 AM   Result Value Ref Range    Sodium 137 136 - 145 mmol/L    Potassium 4.6 3.5 - 5.1 mmol/L    Chloride 105 97 - 108 mmol/L    CO2 19 (L) 21 - 32 mmol/L    Anion gap 13 5 - 15 mmol/L    Glucose 200 (H) 65 - 100 mg/dL    BUN 79 (H) 6 - 20 mg/dL    Creatinine 6.18 (H) 0.55 - 1.02 mg/dL    BUN/Creatinine ratio 13 12 - 20      GFR est AA 8 (L) >60 ml/min/1.73m2    GFR est non-AA 7 (L) >60 ml/min/1.73m2    Calcium 8.4 (L) 8.5 - 10.1 mg/dL    Phosphorus 5.0 (H) 2.6 - 4.7 mg/dL    Albumin 2.2 (L) 3.5 - 5.0 g/dL   CBC W/O DIFF    Collection Time: 03/14/21  9:40 AM   Result Value Ref Range    WBC 7.7 3.6 - 11.0 K/uL    RBC 3.34 (L) 3.80 - 5.20 M/uL    HGB 9.0 (L) 11.5 - 16.0 g/dL    HCT 28.6 (L) 35.0 - 47.0 %    MCV 85.6 80.0 - 99.0 FL    MCH 26.9 26.0 - 34.0 PG    MCHC 31.5 30.0 - 36.5 g/dL    RDW 15.0 (H) 11.5 - 14.5 %    PLATELET 994 099 - 960 K/uL    MPV 10.9 8.9 - 19.0 FL   METABOLIC PANEL, COMPREHENSIVE    Collection Time: 03/14/21  9:40 AM   Result Value Ref Range Sodium 137 136 - 145 mmol/L    Potassium 4.3 3.5 - 5.1 mmol/L    Chloride 107 97 - 108 mmol/L    CO2 20 (L) 21 - 32 mmol/L    Anion gap 10 5 - 15 mmol/L    Glucose 222 (H) 65 - 100 mg/dL    BUN 83 (H) 6 - 20 mg/dL    Creatinine 6.33 (H) 0.55 - 1.02 mg/dL    BUN/Creatinine ratio 13 12 - 20      GFR est AA 8 (L) >60 ml/min/1.73m2    GFR est non-AA 7 (L) >60 ml/min/1.73m2    Calcium 8.7 8.5 - 10.1 mg/dL    Bilirubin, total 0.2 0.2 - 1.0 mg/dL    AST (SGOT) 12 (L) 15 - 37 U/L    ALT (SGPT) 21 12 - 78 U/L    Alk. phosphatase 89 45 - 117 U/L    Protein, total 6.2 (L) 6.4 - 8.2 g/dL    Albumin 2.1 (L) 3.5 - 5.0 g/dL    Globulin 4.1 (H) 2.0 - 4.0 g/dL    A-G Ratio 0.5 (L) 1.1 - 2.2       [unfilled]      Review of Systems    Constitutional: Negative for chills and fever. HENT: Negative. Eyes: Negative. Respiratory: Negative. Cardiovascular: Negative. Gastrointestinal: Negative for abdominal pain and nausea. Skin: Negative. Neurological: Negative. Physical Exam:      Constitutional: pt is oriented to person, place, and time. HENT:   Head: Normocephalic and atraumatic. Eyes: Pupils are equal, round, and reactive to light. EOM are normal.   Cardiovascular: Normal rate, regular rhythm and normal heart sounds. Pulmonary/Chest: Breath sounds normal. No wheezes. No rales. Exhibits no tenderness. Abdominal: Soft. Bowel sounds are normal. There is no abdominal tenderness. There is no rebound and no guarding. Musculoskeletal: Normal range of motion. Neurological: pt is alert and oriented to person, place, and time. Extremities 2+ edema    XR CHEST SNGL V INSPIR   Final Result   Status post right IJ central line placement without evidence of   complication and improved aeration in the right perihilar region. IR INSERT NON TUNL CVC OVER 5 YRS   Final Result   Successful placement of a triple-lumen central venous catheter. The catheter is   ready for use.          IR US GUIDED VASCULAR ACCESS   Final Result   Successful placement of a triple-lumen central venous catheter. The catheter is   ready for use. US RETROPERITONEUM COMP   Final Result   1. This examination is negative for hydronephrosis as an etiology for the   patient's renal insufficiency. 2.  On prior sonographic imaging there were demonstrated echogenic masses within   the left kidney which are no longer present. There are now is a smaller but more   normal morphology to the patient's left kidney.       XR CHEST SNGL V   Final Result           Recent Results (from the past 24 hour(s))   GLUCOSE, POC    Collection Time: 03/13/21  8:25 PM   Result Value Ref Range    Glucose (POC) 301 (H) 65 - 100 mg/dL    Performed by Shelby Isaacs    GLUCOSE, POC    Collection Time: 03/13/21  8:26 PM   Result Value Ref Range    Glucose (POC) 301 (H) 65 - 100 mg/dL    Performed by Shelby Isaacs    GLUCOSE, POC    Collection Time: 03/14/21  9:05 AM   Result Value Ref Range    Glucose (POC) 230 (H) 65 - 100 mg/dL    Performed by Edith Boswell    RENAL FUNCTION PANEL    Collection Time: 03/14/21  9:40 AM   Result Value Ref Range    Sodium 137 136 - 145 mmol/L    Potassium 4.6 3.5 - 5.1 mmol/L    Chloride 105 97 - 108 mmol/L    CO2 19 (L) 21 - 32 mmol/L    Anion gap 13 5 - 15 mmol/L    Glucose 200 (H) 65 - 100 mg/dL    BUN 79 (H) 6 - 20 mg/dL    Creatinine 6.18 (H) 0.55 - 1.02 mg/dL    BUN/Creatinine ratio 13 12 - 20      GFR est AA 8 (L) >60 ml/min/1.73m2    GFR est non-AA 7 (L) >60 ml/min/1.73m2    Calcium 8.4 (L) 8.5 - 10.1 mg/dL    Phosphorus 5.0 (H) 2.6 - 4.7 mg/dL    Albumin 2.2 (L) 3.5 - 5.0 g/dL   CBC W/O DIFF    Collection Time: 03/14/21  9:40 AM   Result Value Ref Range    WBC 7.7 3.6 - 11.0 K/uL    RBC 3.34 (L) 3.80 - 5.20 M/uL    HGB 9.0 (L) 11.5 - 16.0 g/dL    HCT 28.6 (L) 35.0 - 47.0 %    MCV 85.6 80.0 - 99.0 FL    MCH 26.9 26.0 - 34.0 PG    MCHC 31.5 30.0 - 36.5 g/dL    RDW 15.0 (H) 11.5 - 14.5 %    PLATELET 542 460 - 627 K/uL    MPV 10.9 8.9 - 61.6 FL   METABOLIC PANEL, COMPREHENSIVE    Collection Time: 03/14/21  9:40 AM   Result Value Ref Range    Sodium 137 136 - 145 mmol/L    Potassium 4.3 3.5 - 5.1 mmol/L    Chloride 107 97 - 108 mmol/L    CO2 20 (L) 21 - 32 mmol/L    Anion gap 10 5 - 15 mmol/L    Glucose 222 (H) 65 - 100 mg/dL    BUN 83 (H) 6 - 20 mg/dL    Creatinine 6.33 (H) 0.55 - 1.02 mg/dL    BUN/Creatinine ratio 13 12 - 20      GFR est AA 8 (L) >60 ml/min/1.73m2    GFR est non-AA 7 (L) >60 ml/min/1.73m2    Calcium 8.7 8.5 - 10.1 mg/dL    Bilirubin, total 0.2 0.2 - 1.0 mg/dL    AST (SGOT) 12 (L) 15 - 37 U/L    ALT (SGPT) 21 12 - 78 U/L    Alk.  phosphatase 89 45 - 117 U/L    Protein, total 6.2 (L) 6.4 - 8.2 g/dL    Albumin 2.1 (L) 3.5 - 5.0 g/dL    Globulin 4.1 (H) 2.0 - 4.0 g/dL    A-G Ratio 0.5 (L) 1.1 - 2.2         Results     Procedure Component Value Units Date/Time    C. DIFFICILE (DNA) [[id number]]     Order Status: Sent Specimen: Stool     CULTURE, BLOOD, LINE [[id number]] Collected: 03/09/21 0132    Order Status: Completed Specimen: Blood Updated: 03/14/21 0850     Special Requests: No Special Requests        Culture result: No growth 4 days       CULTURE, URINE [[id number]] Collected: 03/08/21 1900    Order Status: Completed Specimen: Urine Updated: 03/10/21 0904     Special Requests: No Special Requests        Culture result: No Growth (<1000 cfu/mL)       CULTURE, RESPIRATORY/SPUTUM/BRONCH Radha Shaker STAIN [[id number]] Collected: 03/08/21 1900    Order Status: Canceled Specimen: Sputum     CULTURE, BLOOD, PAIRED [[id number]] Collected: 03/08/21 1405    Order Status: Completed Specimen: Blood Updated: 03/14/21 0850     Special Requests: No Special Requests        Culture result: No growth 6 days       COVID-19 RAPID TEST [[id number]]  (Abnormal) Collected: 03/08/21 1325    Order Status: Completed Specimen: Nasopharyngeal Updated: 03/08/21 1415     Specimen source Nasopharyngeal        Comment: Results verified, phoned to and read back by Anne Marie Barlow AT [id number] BY DONALD        COVID-19 rapid test DETECTED        Comment: Rapid Abbott ID Now   The specimen is POSITIVE for SARS-CoV-2, the novel coronavirus associated with COVID-19. This test has been authorized by the FDA under an Emergency Use Authorization (EUA) for use by authorized laboratories. Fact sheet for Healthcare Providers: ConventionUpdate.co.nz Fact sheet for Patients: ConventionUpdate.co.nz   Methodology: Isothermal Nucleic Acid Amplification              Labs:     Recent Labs     03/14/21  0940 03/12/21  1137   WBC 7.7 9.4   HGB 9.0* 8.6*   HCT 28.6* 27.3*    220     Recent Labs     03/14/21  0940 03/12/21  1137     137 139  139   K 4.3  4.6 4.6  4.6     105 109*  109*   CO2 20*  19* 22  23   BUN 83*  79* 80*  79*   CREA 6.33*  6.18* 6.31*  6.23*   *  200* 127*  127*   CA 8.7  8.4* 8.3*  8.2*   PHOS 5.0* 4.1     Recent Labs     03/14/21  0940 03/12/21  1137   ALT 21 20   AP 89 96   TBILI 0.2 0.1*   TP 6.2* 5.7*   ALB 2.1*  2.2* 1.9*  1.9*   GLOB 4.1* 3.8     No results for input(s): INR, PTP, APTT, INREXT, INREXT in the last 72 hours. No results for input(s): FE, TIBC, PSAT, FERR in the last 72 hours. No results found for: FOL, RBCF   No results for input(s): PH, PCO2, PO2 in the last 72 hours. No results for input(s): CPK, CKNDX, TROIQ in the last 72 hours.     No lab exists for component: CPKMB  No results found for: CHOL, CHOLX, CHLST, CHOLV, HDL, HDLP, LDL, LDLC, DLDLP, TGLX, TRIGL, TRIGP, CHHD, CHHDX  Lab Results   Component Value Date/Time    Glucose (POC) 230 (H) 03/14/2021 09:05 AM    Glucose (POC) 301 (H) 03/13/2021 08:26 PM    Glucose (POC) 301 (H) 03/13/2021 08:25 PM    Glucose (POC) 190 (H) 03/12/2021 04:47 PM    Glucose (POC) 106 (H) 03/12/2021 12:42 PM     Lab Results   Component Value Date/Time    Color Yellow/Straw 03/08/2021 03:15 PM    Appearance Clear 03/08/2021 03:15 PM    Specific gravity 1.008 03/08/2021 03:15 PM    pH (UA) 7.0 03/08/2021 03:15 PM    Protein >300 (A) 03/08/2021 03:15 PM    Glucose 50 (A) 03/08/2021 03:15 PM    Ketone Negative 03/08/2021 03:15 PM    Bilirubin Negative 03/08/2021 03:15 PM    Urobilinogen 0.1 03/08/2021 03:15 PM    Nitrites Negative 03/08/2021 03:15 PM    Leukocyte Esterase Negative 03/08/2021 03:15 PM    Bacteria Negative 03/08/2021 03:15 PM    WBC 0-4 03/08/2021 03:15 PM    RBC 0-5 03/08/2021 03:15 PM         Assessment:     COVID-19 pneumonitis  Moderate pulmonary hypertension  Acute on chronic kidney disease stage V  Type 2 diabetes uncontrolled  Acute CHF  Anemia chronic disease  Hypokalemia  Hypoalbuminemia  Patient uncontrolled    Plan:     Add clonidine 0.1 mg daily  Patient on Norvasc 10 mg daily  Zithromax 500 mg IV daily  Coreg 12.5 twice daily  Decadron 4 mg every 6 hours  Pepcid 20 daily  On Lasix 80 mg IV every 12 hours  Hydralazine 100 mg 3 times a day  Isosorbide dinitrate 20 mg 3 times a day  IV Zosyn  Subcu heparin for DVT prophylaxis  Sliding-scale insulin    Repeat the labs in the morning        Current Facility-Administered Medications:     dexamethasone (DECADRON) 4 mg/mL injection 4 mg, 4 mg, IntraVENous, Q12H, Britany Keyes NP    glucose chewable tablet 16 g, 4 Tab, Oral, PRN, Rebecca Reyes MD    dextrose (D50W) injection syrg 12.5-25 g, 25-50 mL, IntraVENous, PRN, Rebecca Reyes MD    glucagon (GLUCAGEN) injection 1 mg, 1 mg, IntraMUSCular, PRN, Rebecca Reyes MD    insulin lispro (HUMALOG) injection, , SubCUTAneous, AC&HS, Rebecca Reyes MD    furosemide (LASIX) injection 80 mg, 80 mg, IntraVENous, Q8H, Katelyn Gunter MD, 80 mg at 03/14/21 1312    hydrALAZINE (APRESOLINE) tablet 100 mg, 100 mg, Oral, TID, Katelyn Gunter MD, 100 mg at 03/14/21 0908    amLODIPine (NORVASC) tablet 10 mg, 10 mg, Oral, DAILY, Katelyn Gunter MD, 10 mg at 03/14/21 0908    carvediloL (COREG) tablet 12.5 mg, 12.5 mg, Oral, BID WITH MEALS, Louie Gunter MD, 12.5 mg at 03/14/21 0908    isosorbide dinitrate (ISORDIL) tablet 20 mg, 20 mg, Oral, TID, Louie Gunter MD, 20 mg at 03/14/21 0908    heparin (porcine) injection 5,000 Units, 5,000 Units, SubCUTAneous, Q8H, Louie Gunter MD, 5,000 Units at 03/14/21 1312    sodium chloride (NS) flush 5-40 mL, 5-40 mL, IntraVENous, Q8H, Carmelo Hinojosa NP, 10 mL at 03/14/21 1313    sodium chloride (NS) flush 5-40 mL, 5-40 mL, IntraVENous, PRN, MERY Turner.Hummingbirlauro  acetaminophen (TYLENOL) tablet 650 mg, 650 mg, Oral, Q6H PRN **OR** acetaminophen (TYLENOL) suppository 650 mg, 650 mg, Rectal, Q6H PRN, Carmelo Hinojosa NP    polyethylene glycol (MIRALAX) packet 17 g, 17 g, Oral, DAILY PRN, Carmelo Hinojosa NP    promethazine (PHENERGAN) tablet 12.5 mg, 12.5 mg, Oral, Q6H PRN **OR** ondansetron (ZOFRAN) injection 4 mg, 4 mg, IntraVENous, Q6H PRN, Carmelo Hinojosa NP    famotidine (PEPCID) tablet 20 mg, 20 mg, Oral, DAILY, Carmelo Hinojosa NP, 20 mg at 03/14/21 0908    piperacillin-tazobactam (ZOSYN) 3.375 g in 0.9% sodium chloride (MBP/ADV) 100 mL MBP, 3.375 g, IntraVENous, Q12H, Carmelo Hinojosa NP, Last Rate: 25 mL/hr at 03/14/21 0614, 3.375 g at 03/14/21 0102

## 2021-03-15 LAB
EST. AVERAGE GLUCOSE BLD GHB EST-MCNC: 128 MG/DL
GLUCOSE BLD STRIP.AUTO-MCNC: 150 MG/DL (ref 65–100)
GLUCOSE BLD STRIP.AUTO-MCNC: 157 MG/DL (ref 65–100)
GLUCOSE BLD STRIP.AUTO-MCNC: 171 MG/DL (ref 65–100)
GLUCOSE BLD STRIP.AUTO-MCNC: 177 MG/DL (ref 65–100)
HBA1C MFR BLD: 6.1 % (ref 4–5.6)
PERFORMED BY, TECHID: ABNORMAL

## 2021-03-15 PROCEDURE — 65270000029 HC RM PRIVATE

## 2021-03-15 PROCEDURE — 74011000258 HC RX REV CODE- 258: Performed by: NURSE PRACTITIONER

## 2021-03-15 PROCEDURE — 74011250636 HC RX REV CODE- 250/636: Performed by: NURSE PRACTITIONER

## 2021-03-15 PROCEDURE — 74011636637 HC RX REV CODE- 636/637: Performed by: FAMILY MEDICINE

## 2021-03-15 PROCEDURE — 74011250637 HC RX REV CODE- 250/637: Performed by: FAMILY MEDICINE

## 2021-03-15 PROCEDURE — 74011250636 HC RX REV CODE- 250/636: Performed by: INTERNAL MEDICINE

## 2021-03-15 PROCEDURE — 82962 GLUCOSE BLOOD TEST: CPT

## 2021-03-15 PROCEDURE — 74011250637 HC RX REV CODE- 250/637: Performed by: INTERNAL MEDICINE

## 2021-03-15 PROCEDURE — 74011250637 HC RX REV CODE- 250/637: Performed by: NURSE PRACTITIONER

## 2021-03-15 RX ORDER — CLONIDINE HYDROCHLORIDE 0.1 MG/1
0.1 TABLET ORAL 2 TIMES DAILY
Status: DISCONTINUED | OUTPATIENT
Start: 2021-03-15 | End: 2021-03-16

## 2021-03-15 RX ADMIN — FAMOTIDINE 20 MG: 20 TABLET, FILM COATED ORAL at 08:14

## 2021-03-15 RX ADMIN — PIPERACILLIN AND TAZOBACTAM 3.38 G: 3; .375 INJECTION, POWDER, LYOPHILIZED, FOR SOLUTION INTRAVENOUS at 05:48

## 2021-03-15 RX ADMIN — INSULIN LISPRO 3 UNITS: 100 INJECTION, SOLUTION INTRAVENOUS; SUBCUTANEOUS at 07:30

## 2021-03-15 RX ADMIN — Medication 10 ML: at 20:48

## 2021-03-15 RX ADMIN — FUROSEMIDE 80 MG: 10 INJECTION, SOLUTION INTRAMUSCULAR; INTRAVENOUS at 20:48

## 2021-03-15 RX ADMIN — DEXAMETHASONE SODIUM PHOSPHATE 4 MG: 4 INJECTION, SOLUTION INTRAMUSCULAR; INTRAVENOUS at 08:14

## 2021-03-15 RX ADMIN — CLONIDINE HYDROCHLORIDE 0.1 MG: 0.1 TABLET ORAL at 20:47

## 2021-03-15 RX ADMIN — ISOSORBIDE DINITRATE 20 MG: 10 TABLET ORAL at 20:47

## 2021-03-15 RX ADMIN — HYDRALAZINE HYDROCHLORIDE 100 MG: 50 TABLET, FILM COATED ORAL at 08:13

## 2021-03-15 RX ADMIN — ISOSORBIDE DINITRATE 20 MG: 10 TABLET ORAL at 08:13

## 2021-03-15 RX ADMIN — HYDRALAZINE HYDROCHLORIDE 100 MG: 50 TABLET, FILM COATED ORAL at 16:55

## 2021-03-15 RX ADMIN — FUROSEMIDE 80 MG: 10 INJECTION, SOLUTION INTRAMUSCULAR; INTRAVENOUS at 05:48

## 2021-03-15 RX ADMIN — HYDRALAZINE HYDROCHLORIDE 100 MG: 50 TABLET, FILM COATED ORAL at 20:47

## 2021-03-15 RX ADMIN — CARVEDILOL 12.5 MG: 12.5 TABLET, FILM COATED ORAL at 08:13

## 2021-03-15 RX ADMIN — Medication 10 ML: at 15:19

## 2021-03-15 RX ADMIN — HEPARIN SODIUM 5000 UNITS: 5000 INJECTION INTRAVENOUS; SUBCUTANEOUS at 19:38

## 2021-03-15 RX ADMIN — HEPARIN SODIUM 5000 UNITS: 5000 INJECTION INTRAVENOUS; SUBCUTANEOUS at 12:19

## 2021-03-15 RX ADMIN — HEPARIN SODIUM 5000 UNITS: 5000 INJECTION INTRAVENOUS; SUBCUTANEOUS at 04:33

## 2021-03-15 RX ADMIN — AMLODIPINE BESYLATE 10 MG: 5 TABLET ORAL at 08:13

## 2021-03-15 RX ADMIN — INSULIN LISPRO 3 UNITS: 100 INJECTION, SOLUTION INTRAVENOUS; SUBCUTANEOUS at 16:30

## 2021-03-15 RX ADMIN — Medication 10 ML: at 05:48

## 2021-03-15 RX ADMIN — DEXAMETHASONE SODIUM PHOSPHATE 4 MG: 4 INJECTION, SOLUTION INTRAMUSCULAR; INTRAVENOUS at 20:48

## 2021-03-15 RX ADMIN — INSULIN LISPRO 3 UNITS: 100 INJECTION, SOLUTION INTRAVENOUS; SUBCUTANEOUS at 11:30

## 2021-03-15 RX ADMIN — ISOSORBIDE DINITRATE 20 MG: 10 TABLET ORAL at 16:55

## 2021-03-15 RX ADMIN — FUROSEMIDE 80 MG: 10 INJECTION, SOLUTION INTRAMUSCULAR; INTRAVENOUS at 15:19

## 2021-03-15 RX ADMIN — CARVEDILOL 12.5 MG: 12.5 TABLET, FILM COATED ORAL at 16:56

## 2021-03-15 NOTE — PROGRESS NOTES
General Daily Progress Note          Patient Name:   Ankur Jones       YOB: 1960       Age:  62 y.o. Admit Date: 3/8/2021      Subjective:       Pressure still elevated    Still significant leg edema      Objective:     Visit Vitals  BP (!) 177/89 (BP 1 Location: Left upper arm, BP Patient Position: At rest)   Pulse 78   Temp 97.8 °F (36.6 °C)   Resp 20   Ht 5' 8.9\" (1.75 m)   Wt 91.6 kg (202 lb)   SpO2 98%   Breastfeeding No   BMI 29.92 kg/m²        Recent Results (from the past 24 hour(s))   GLUCOSE, POC    Collection Time: 03/14/21  4:06 PM   Result Value Ref Range    Glucose (POC) 286 (H) 65 - 100 mg/dL    Performed by Jolynn Richard, POC    Collection Time: 03/14/21  7:50 PM   Result Value Ref Range    Glucose (POC) 207 (H) 65 - 100 mg/dL    Performed by Landon Martini    GLUCOSE, POC    Collection Time: 03/15/21  7:39 AM   Result Value Ref Range    Glucose (POC) 171 (H) 65 - 100 mg/dL    Performed by Jeff Fontenot    GLUCOSE, POC    Collection Time: 03/15/21 11:19 AM   Result Value Ref Range    Glucose (POC) 150 (H) 65 - 100 mg/dL    Performed by Jeff Fontenot      [unfilled]      Review of Systems    Constitutional: Negative for chills and fever. HENT: Negative. Eyes: Negative. Respiratory: Negative. Cardiovascular: Negative. Gastrointestinal: Negative for abdominal pain and nausea. Skin: Negative. Neurological: Negative. Physical Exam:      Constitutional: pt is oriented to person, place, and time. HENT:   Head: Normocephalic and atraumatic. Eyes: Pupils are equal, round, and reactive to light. EOM are normal.   Cardiovascular: Normal rate, regular rhythm and normal heart sounds. Pulmonary/Chest: Breath sounds normal. No wheezes. No rales. Exhibits no tenderness. Abdominal: Soft. Bowel sounds are normal. There is no abdominal tenderness. There is no rebound and no guarding. Musculoskeletal: Normal range of motion. Neurological: pt is alert and oriented to person, place, and time. Extremities 2+ edema    XR CHEST SNGL V INSPIR   Final Result   Status post right IJ central line placement without evidence of   complication and improved aeration in the right perihilar region. IR INSERT NON TUNL CVC OVER 5 YRS   Final Result   Successful placement of a triple-lumen central venous catheter. The catheter is   ready for use. IR US GUIDED VASCULAR ACCESS   Final Result   Successful placement of a triple-lumen central venous catheter. The catheter is   ready for use. US RETROPERITONEUM COMP   Final Result   1. This examination is negative for hydronephrosis as an etiology for the   patient's renal insufficiency. 2.  On prior sonographic imaging there were demonstrated echogenic masses within   the left kidney which are no longer present. There are now is a smaller but more   normal morphology to the patient's left kidney.       XR CHEST SNGL V   Final Result           Recent Results (from the past 24 hour(s))   GLUCOSE, POC    Collection Time: 03/14/21  4:06 PM   Result Value Ref Range    Glucose (POC) 286 (H) 65 - 100 mg/dL    Performed by Thuan Flores, POC    Collection Time: 03/14/21  7:50 PM   Result Value Ref Range    Glucose (POC) 207 (H) 65 - 100 mg/dL    Performed by Roxana Chavez, POC    Collection Time: 03/15/21  7:39 AM   Result Value Ref Range    Glucose (POC) 171 (H) 65 - 100 mg/dL    Performed by [address] [place] [address], POC    Collection Time: 03/15/21 11:19 AM   Result Value Ref Range    Glucose (POC) 150 (H) 65 - 100 mg/dL    Performed by Marlon Wheat        Results     Procedure Component Value Units Date/Time    C. DIFFICILE (DNA) [[id number]]     Order Status: Sent Specimen: Stool     CULTURE, BLOOD, LINE [[id number]] Collected: 03/09/21 0132    Order Status: Completed Specimen: Blood Updated: 03/14/21 0850     Special Requests: No Special Requests Culture result: No growth 4 days       CULTURE, URINE [[id number]] Collected: 03/08/21 1900    Order Status: Completed Specimen: Urine Updated: 03/10/21 0904     Special Requests: No Special Requests        Culture result: No Growth (<1000 cfu/mL)       CULTURE, RESPIRATORY/SPUTUM/BRONCH Da Necessary STAIN [[id number]] Collected: 03/08/21 1900    Order Status: Canceled Specimen: Sputum     CULTURE, BLOOD, PAIRED [[id number]] Collected: 03/08/21 1405    Order Status: Completed Specimen: Blood Updated: 03/14/21 0850     Special Requests: No Special Requests        Culture result: No growth 6 days       COVID-19 RAPID TEST [[id number]]  (Abnormal) Collected: 03/08/21 1325    Order Status: Completed Specimen: Nasopharyngeal Updated: 03/08/21 1415     Specimen source Nasopharyngeal        Comment: Results verified, phoned to and read back by Sunshine Melara AT [id number] BY JF        COVID-19 rapid test DETECTED        Comment: Rapid Abbott ID Now   The specimen is POSITIVE for SARS-CoV-2, the novel coronavirus associated with COVID-19. This test has been authorized by the FDA under an Emergency Use Authorization (EUA) for use by authorized laboratories.    Fact sheet for Healthcare Providers: ConventionUpdate.co.nz Fact sheet for Patients: ConventionUpdate.co.nz   Methodology: Isothermal Nucleic Acid Amplification              Labs:     Recent Labs     03/14/21  0940 03/12/21  1137   WBC 7.7 9.4   HGB 9.0* 8.6*   HCT 28.6* 27.3*    220     Recent Labs     03/14/21  0940 03/12/21  1137     137 139  139   K 4.3  4.6 4.6  4.6     105 109*  109*   CO2 20*  19* 22  23   BUN 83*  79* 80*  79*   CREA 6.33*  6.18* 6.31*  6.23*   *  200* 127*  127*   CA 8.7  8.4* 8.3*  8.2*   PHOS 5.0* 4.1     Recent Labs     03/14/21  0940 03/12/21  1137   ALT 21 20   AP 89 96   TBILI 0.2 0.1*   TP 6.2* 5.7*   ALB 2.1*  2.2* 1.9*  1.9*   GLOB 4.1* 3.8     No results for input(s): INR, PTP, APTT, INREXT, INREXT in the last 72 hours. No results for input(s): FE, TIBC, PSAT, FERR in the last 72 hours. No results found for: FOL, RBCF   No results for input(s): PH, PCO2, PO2 in the last 72 hours. No results for input(s): CPK, CKNDX, TROIQ in the last 72 hours.     No lab exists for component: CPKMB  No results found for: CHOL, CHOLX, CHLST, CHOLV, HDL, HDLP, LDL, LDLC, DLDLP, TGLX, TRIGL, TRIGP, CHHD, CHHDX  Lab Results   Component Value Date/Time    Glucose (POC) 150 (H) 03/15/2021 11:19 AM    Glucose (POC) 171 (H) 03/15/2021 07:39 AM    Glucose (POC) 207 (H) 03/14/2021 07:50 PM    Glucose (POC) 286 (H) 03/14/2021 04:06 PM    Glucose (POC) 230 (H) 03/14/2021 09:05 AM     Lab Results   Component Value Date/Time    Color Yellow/Straw 03/08/2021 03:15 PM    Appearance Clear 03/08/2021 03:15 PM    Specific gravity 1.008 03/08/2021 03:15 PM    pH (UA) 7.0 03/08/2021 03:15 PM    Protein >300 (A) 03/08/2021 03:15 PM    Glucose 50 (A) 03/08/2021 03:15 PM    Ketone Negative 03/08/2021 03:15 PM    Bilirubin Negative 03/08/2021 03:15 PM    Urobilinogen 0.1 03/08/2021 03:15 PM    Nitrites Negative 03/08/2021 03:15 PM    Leukocyte Esterase Negative 03/08/2021 03:15 PM    Bacteria Negative 03/08/2021 03:15 PM    WBC 0-4 03/08/2021 03:15 PM    RBC 0-5 03/08/2021 03:15 PM         Assessment:     COVID-19 pneumonitis  Moderate pulmonary hypertension  Acute on chronic kidney disease stage V  Type 2 diabetes uncontrolled  Acute CHF  Anemia chronic disease  Hypokalemia  Hypoalbuminemia  Patient uncontrolled    Plan:     Increase clonidine to 0.1 twice daily  Patient on Norvasc 10 mg daily  Zithromax 500 mg IV daily  Coreg 12.5 twice daily  Decadron 4 mg every 6 hours  Pepcid 20 daily  On Lasix 80 mg IV every 12 hours  Hydralazine 100 mg 3 times a day  Isosorbide dinitrate 20 mg 3 times a day  IV Zosyn  Subcu heparin for DVT prophylaxis  Sliding-scale insulin    Repeat the labs in the morning        Current Facility-Administered Medications:     dexamethasone (DECADRON) 4 mg/mL injection 4 mg, 4 mg, IntraVENous, Q12H, Segun Aldana, NP, 4 mg at 03/15/21 8490    glucose chewable tablet 16 g, 4 Tab, Oral, PRN, Julieta Reyes MD    dextrose (D50W) injection syrg 12.5-25 g, 25-50 mL, IntraVENous, PRN, Julieta Reyes MD    glucagon (GLUCAGEN) injection 1 mg, 1 mg, IntraMUSCular, PRN, Julieta Reyes MD    insulin lispro (HUMALOG) injection, , SubCUTAneous, AC&HS, Rebecca Reyes MD, 3 Units at 03/15/21 1130    cloNIDine HCL (CATAPRES) tablet 0.1 mg, 0.1 mg, Oral, QHS, Rebecca Reyes MD, 0.1 mg at 03/14/21 2155    furosemide (LASIX) injection 80 mg, 80 mg, IntraVENous, Q8H, Tequila Gunter MD, 80 mg at 03/15/21 0548    hydrALAZINE (APRESOLINE) tablet 100 mg, 100 mg, Oral, TID, Tequila Gunter MD, 100 mg at 03/15/21 0813    amLODIPine (NORVASC) tablet 10 mg, 10 mg, Oral, DAILY, Tequila Gunter MD, 10 mg at 03/15/21 0813    carvediloL (COREG) tablet 12.5 mg, 12.5 mg, Oral, BID WITH MEALS, Tequila Gunter MD, 12.5 mg at 03/15/21 0813    isosorbide dinitrate (ISORDIL) tablet 20 mg, 20 mg, Oral, TID, Tequila Gunter MD, 20 mg at 03/15/21 0813    heparin (porcine) injection 5,000 Units, 5,000 Units, SubCUTAneous, Q8H, Tequila Gunter MD, 5,000 Units at 03/15/21 1219    sodium chloride (NS) flush 5-40 mL, 5-40 mL, IntraVENous, Q8H, Segun Aldana, NP, 10 mL at 03/15/21 0548    sodium chloride (NS) flush 5-40 mL, 5-40 mL, IntraVENous, PRN, Alyne Later, NP    acetaminophen (TYLENOL) tablet 650 mg, 650 mg, Oral, Q6H PRN **OR** acetaminophen (TYLENOL) suppository 650 mg, 650 mg, Rectal, Q6H PRN, Alyne Later, NP    polyethylene glycol (MIRALAX) packet 17 g, 17 g, Oral, DAILY PRN, Alyne Later, NP    promethazine (PHENERGAN) tablet 12.5 mg, 12.5 mg, Oral, Q6H PRN **OR** ondansetron (ZOFRAN) injection 4 mg, 4 mg, IntraVENous, Q6H PRN, Alyne Later, NP    famotidine (PEPCID) tablet 20 mg, 20 mg, Oral, DAILY, Darcia Ratel, NP, 20 mg at 03/15/21 0814    piperacillin-tazobactam (ZOSYN) 3.375 g in 0.9% sodium chloride (MBP/ADV) 100 mL MBP, 3.375 g, IntraVENous, Q12H, Darcia Ratel, NP, Last Rate: 25 mL/hr at 03/15/21 0548, 3.375 g at 03/15/21 5988

## 2021-03-15 NOTE — PROGRESS NOTES
Comprehensive Nutrition Assessment    Type and Reason for Visit: RD nutrition re-screen/LOS    Nutrition Recommendations/Plan:   Continue cardiac diet  [place] pt preferences  Please document %PO intakes, BMs  Obtain measured body weight    Nutrition Assessment:  Admitted for abdominal pain, SOB, COVID-19+. Intake 100% of trays on cardiac diet per EMR, pt reports intake % depending on the meal. Denies nutrition intervention at this time. Labs and meds reviewed. Malnutrition Assessment:  Malnutrition Status:  No malnutrition      Nutrition Related Findings:  NFPE not performed 2/2 isolation precautions. No c/s difficulties noted. Pt denies N/V. Large amounts watery diarrhea noted yesterday 3/14. Wounds:    None       Current Nutrition Therapies:  DIET CARDIAC Regular; 2 GM NA (House Low NA)    Anthropometric Measures:  · Height:  5' 9\" (175.3 cm)  · Current Body Wt:  91.6 kg (201 lb 15.1 oz)(3/14)   · Usual Body Wt:  83.9 kg (185 lb)     · Ideal Body Wt:  145 lbs:  139.3 %   · BMI Category: Overweight (BMI 25.0-29. 9)       Nutrition Diagnosis:   No nutrition diagnosis at this time     Nutrition Interventions:   Food and/or Nutrient Delivery: Continue current diet  Nutrition Education and Counseling: No recommendations at this time  Coordination of Nutrition Care: Continue to monitor while inpatient    Nutrition Monitoring and Evaluation:   Behavioral-Environmental Outcomes: None identified  Food/Nutrient Intake Outcomes: Food and nutrient intake  Physical Signs/Symptoms Outcomes: Weight, Diarrhea    Discharge Planning:    No discharge needs at this time     Electronically signed by Catie Maharaj RD on 3/15/2021 at 4:54 PM    Contact: Ext [phone]

## 2021-03-15 NOTE — CONSULTS
PULMONARY NOTE  [facility]    Name: Ankur Jones MRN: [id number]   : 1960 Hospital: [address] [place] [address]   Date: 3/15/2021  Admission date: 3/8/2021 Hospital Day: 8       HPI:     Hospital Problems  Never Reviewed          Codes Class Noted POA    PNA (pneumonia) ICD-10-CM: J18.9  ICD-9-CM: 070  3/8/2021 Unknown                   [x] High complexity decision making was performed  [x] See my orders for details      Subjective/Initial History:     Sitting up on side of bed. Reports watery diarrhea in large amounts without much abdominal cramping. Eating breakfast. No change in lower extremity edema from yesterday. Excerpts from admission 3/8/2021 or consult notes as follows:   80-year-old lady came in because of abdominal discomfort significant past medical history of congestive heart failure chronic kidney disease and type 2 diabetes mellitus she was complaining of shortness of breath dyspnea and feeling nauseous for the past 1 week no history of passing out so admitted and she is COVID-19 positive so pulmonary consult was called for further evaluation.       Allergies   Allergen Reactions    Doxycycline Swelling    Egg Diarrhea    Milk Containing Products Nausea and Vomiting    Tetracycline Swelling        MAR reviewed and pertinent medications noted or modified as needed     Current Facility-Administered Medications   Medication    dexamethasone (DECADRON) 4 mg/mL injection 4 mg    glucose chewable tablet 16 g    dextrose (D50W) injection syrg 12.5-25 g    glucagon (GLUCAGEN) injection 1 mg    insulin lispro (HUMALOG) injection    cloNIDine HCL (CATAPRES) tablet 0.1 mg    furosemide (LASIX) injection 80 mg    hydrALAZINE (APRESOLINE) tablet 100 mg    amLODIPine (NORVASC) tablet 10 mg    carvediloL (COREG) tablet 12.5 mg    isosorbide dinitrate (ISORDIL) tablet 20 mg    heparin (porcine) injection 5,000 Units    sodium chloride (NS) flush 5-40 mL    sodium chloride (NS) flush 5-40 mL    acetaminophen (TYLENOL) tablet 650 mg    Or    acetaminophen (TYLENOL) suppository 650 mg    polyethylene glycol (MIRALAX) packet 17 g    promethazine (PHENERGAN) tablet 12.5 mg    Or    ondansetron (ZOFRAN) injection 4 mg    famotidine (PEPCID) tablet 20 mg    piperacillin-tazobactam (ZOSYN) 3.375 g in 0.9% sodium chloride (MBP/ADV) 100 mL MBP      Patient PCP: Fariba, MD Adria  PMH:  has a past medical history of Hypertension and Morbid obesity ([facility] [address] PSH:   has a past surgical history that includes ir insert non tunl cvc over 5 yrs (3/12/2021). FHX: family history is not on file. SHX:       ROS:    Review of Systems   Constitutional: Negative. HENT: Negative. Eyes: Negative. Respiratory: Positive for shortness of breath. Cardiovascular: Positive for leg swelling. Gastrointestinal: Positive for abdominal pain and diarrhea. Negative for nausea. Genitourinary: Negative. Musculoskeletal: Negative. Skin: Negative. Neurological: Negative for weakness. Endo/Heme/Allergies: Negative. Psychiatric/Behavioral: Negative. Objective:     Vital Signs: Telemetry:    normal sinus rhythm Intake/Output:   Visit Vitals  BP (!) 177/89 (BP 1 Location: Left upper arm, BP Patient Position: At rest)   Pulse 78   Temp 97.8 °F (36.6 °C)   Resp 20   Ht 5' 8.9\" (1.75 m)   Wt 91.6 kg (202 lb)   SpO2 98%   Breastfeeding No   BMI 29.92 kg/m²       Temp (24hrs), Av °F (36.7 °C), Min:97.8 °F (36.6 °C), Max:98.4 °F (36.9 °C)        O2 Device: Room air O2 Flow Rate (L/min): 0 l/min       Wt Readings from Last 4 Encounters:   21 91.6 kg (202 lb)          Intake/Output Summary (Last 24 hours) at 3/15/2021 1035  Last data filed at 3/15/2021 0650  Gross per 24 hour   Intake 620 ml   Output 950 ml   Net -330 ml       Last shift:      No intake/output data recorded. Last 3 shifts: 1901 - 03/15 0700  In: 1200 [P.O.:760;  I.V.:440]  Out: 1600 [Urine:1600] Physical Exam:     Physical Exam   Constitutional: She is oriented to person, place, and time. She appears well-developed. No distress. HENT:   Head: Normocephalic and atraumatic. Eyes: Pupils are equal, round, and reactive to light. Conjunctivae are normal.   Neck: Normal range of motion. Neck supple. No JVD present. Cardiovascular: Normal rate, regular rhythm and normal heart sounds. Pulmonary/Chest: Effort normal and breath sounds normal.   Abdominal: Soft. Bowel sounds are normal. She exhibits no distension. There is no abdominal tenderness. Musculoskeletal: Normal range of motion. General: Edema present. No tenderness. Comments: 3-4+ pitting edema in bilateral lower extremities - chronic and unchanged from yesterday   Neurological: She is alert and oriented to person, place, and time. Skin: Skin is warm and dry. Areas of scars from healed lower extremity wounds/lesions bilaterally. Psychiatric: She has a normal mood and affect. Labs:    Recent Labs     03/14/21  0940 03/12/21  1137   WBC 7.7 9.4   HGB 9.0* 8.6*    220     Recent Labs     03/14/21  0940 03/12/21  1137     137 139  139   K 4.3  4.6 4.6  4.6     105 109*  109*   CO2 20*  19* 22  23   *  200* 127*  127*   BUN 83*  79* 80*  79*   CREA 6.33*  6.18* 6.31*  6.23*   CA 8.7  8.4* 8.3*  8.2*   PHOS 5.0* 4.1   ALB 2.1*  2.2* 1.9*  1.9*   ALT 21 20     No results for input(s): PH, PCO2, PO2, HCO3, FIO2 in the last 72 hours. No results for input(s): CPK, CKNDX, TROIQ in the last 72 hours.     No lab exists for component: CPKMB  No results found for: BNPP, BNP   Lab Results   Component Value Date/Time    Culture result: No growth 4 days 03/09/2021 01:32 AM    Culture result: No Growth (<1000 cfu/mL) 03/08/2021 07:00 PM    Culture result: No growth 6 days 03/08/2021 02:05 PM     Lab Results   Component Value Date/Time    TSH 1.57 03/08/2021 11:45 AM       Imaging:    CXR Results  (Last 48 hours)    None        Results from [place] encounter on 03/08/21   XR CHEST SNGL V INSPIR    Narrative Portable upright radiograph chest 3:51 PM compared to March 8, 2021. INDICATION: Post right IJ catheter placement. Heart size remains enlarged. Interval placement of a right IJ central line with  tip projecting over the SVC. Improved aeration in the right perihilar region. No  pneumothorax or gross effusion. Impression Status post right IJ central line placement without evidence of  complication and improved aeration in the right perihilar region. XR CHEST SNGL V    Narrative 1 new comparison June 21    Cardiomegaly with congestion. Mild interstitial edema. Localized airspace  disease right perihilar. No effusion or pneumothorax     No results found for this or any previous visit. IMPRESSION:   1. COVID-19 pneumonia  2. Acute on chronic kidney disease   3. Congestive heart failure  4. Pulmonary hypertension  5. Ex COPD  6. Anemia of chronic disease  7. Additional workup outlined below  8. Pt is requiring Drug therapy requiring intensive monitoring for toxicity  9. Prognosis guarded       RECOMMENDATIONS/PLAN:     1. She is on room air without evidence of hypoxia. 96-97% on room air  2. Zosyn therapy completes tomorrow morning. She is not a candidate for remdesivir or Actemra  3. Azithromycin discontinued and c-diff studies ordered due to new onset of multiple episodes of liquid diarrhea stools. Results pending  4. Decadron decreased to q12h  5. Chest x-ray shows cardiomegaly with congestion and infiltrate right perihilar  6. She is on Lasix 80 twice daily  7. Patient is on Isordil cannot start patient on sildenafil will discuss with cardiologist patient has EF about 47% WHO group 2 for pulmonary hypertension  8. Supplemental O2 to keep sats > 93%  9. Aspiration precautions  10. Labs to follow electrolytes, renal function and and blood counts  11.  DVT, SUP prophylaxis Adam Charles MD

## 2021-03-15 NOTE — PROGRESS NOTES
Renal Daily Progress Note    Admit Date: 3/8/2021      Subjective:   She tells me that she has been eating well. She denies dysgeusia. She denies pruritus. No shortness of breath. She complains of swelling in the legs. No headache. Current Facility-Administered Medications   Medication Dose Route Frequency    cloNIDine HCL (CATAPRES) tablet 0.1 mg  0.1 mg Oral BID    dexamethasone (DECADRON) 4 mg/mL injection 4 mg  4 mg IntraVENous Q12H    glucose chewable tablet 16 g  4 Tab Oral PRN    dextrose (D50W) injection syrg 12.5-25 g  25-50 mL IntraVENous PRN    glucagon (GLUCAGEN) injection 1 mg  1 mg IntraMUSCular PRN    insulin lispro (HUMALOG) injection   SubCUTAneous AC&HS    furosemide (LASIX) injection 80 mg  80 mg IntraVENous Q8H    hydrALAZINE (APRESOLINE) tablet 100 mg  100 mg Oral TID    amLODIPine (NORVASC) tablet 10 mg  10 mg Oral DAILY    carvediloL (COREG) tablet 12.5 mg  12.5 mg Oral BID WITH MEALS    isosorbide dinitrate (ISORDIL) tablet 20 mg  20 mg Oral TID    heparin (porcine) injection 5,000 Units  5,000 Units SubCUTAneous Q8H    sodium chloride (NS) flush 5-40 mL  5-40 mL IntraVENous Q8H    sodium chloride (NS) flush 5-40 mL  5-40 mL IntraVENous PRN    acetaminophen (TYLENOL) tablet 650 mg  650 mg Oral Q6H PRN    Or    acetaminophen (TYLENOL) suppository 650 mg  650 mg Rectal Q6H PRN    polyethylene glycol (MIRALAX) packet 17 g  17 g Oral DAILY PRN    promethazine (PHENERGAN) tablet 12.5 mg  12.5 mg Oral Q6H PRN    Or    ondansetron (ZOFRAN) injection 4 mg  4 mg IntraVENous Q6H PRN    famotidine (PEPCID) tablet 20 mg  20 mg Oral DAILY    piperacillin-tazobactam (ZOSYN) 3.375 g in 0.9% sodium chloride (MBP/ADV) 100 mL MBP  3.375 g IntraVENous Q12H        Review of Systems    Review of Systems   Respiratory: Positive for shortness of breath. Negative for cough. Cardiovascular: Positive for leg swelling. Negative for chest pain.    Gastrointestinal: Positive for abdominal pain.   Genitourinary: Negative for dysuria. Musculoskeletal: Negative for joint pain. Neurological: Negative for dizziness and headaches. Objective:     Patient Vitals for the past 8 hrs:   BP Temp Pulse Resp SpO2   03/15/21 0822 (!) 177/89 97.8 °F (36.6 °C) 78 20 98 %     No intake/output data recorded. 03/13 1901 - 03/15 0700  In: 1200 [P.O.:760; I.V.:440]  Out: 1600 [Urine:1600]    Physical Exam:   Physical Exam   Neck: No JVD present. Cardiovascular: Regular rhythm. Pulmonary/Chest: Breath sounds normal. No respiratory distress. Abdominal: Soft. Bowel sounds are normal. She exhibits no distension. Musculoskeletal:         General: Edema present. Neurological: She is alert. No asterixis            XR CHEST SNGL V INSPIR   Final Result   Status post right IJ central line placement without evidence of   complication and improved aeration in the right perihilar region. IR INSERT NON TUNL CVC OVER 5 YRS   Final Result   Successful placement of a triple-lumen central venous catheter. The catheter is   ready for use. IR US GUIDED VASCULAR ACCESS   Final Result   Successful placement of a triple-lumen central venous catheter. The catheter is   ready for use. US RETROPERITONEUM COMP   Final Result   1. This examination is negative for hydronephrosis as an etiology for the   patient's renal insufficiency. 2.  On prior sonographic imaging there were demonstrated echogenic masses within   the left kidney which are no longer present. There are now is a smaller but more   normal morphology to the patient's left kidney.       XR CHEST SNGL V   Final Result           Data Review   Recent Labs     03/14/21  0940   WBC 7.7   HGB 9.0*   HCT 28.6*        Recent Labs     03/14/21  0940     137   K 4.3  4.6     105   CO2 20*  19*   *  200*   BUN 83*  79*   CREA 6.33*  6.18*   CA 8.7  8.4*   PHOS 5.0*   ALB 2.1*  2.2*   ALT 21     No components found for: [place]  No results for input(s): PH, PCO2, PO2, HCO3, FIO2 in the last 72 hours. No results for input(s): INR, INREXT, INREXT in the last 72 hours. Assessment:           Active Problems:    PNA (pneumonia) (3/8/2021)    Stage V CKD but no uremic symptoms. She is nonoliguric with urine output of 950 mL yesterday. Nephrotic syndrome with sequela  Anemia of chronic disease  Hypertension stage II    Plan:   Clonidine dose has been increased 2.1 mg twice daily  Suspect that hypotension is volume mediated. She is on steroids. Continue furosemide 80 mg every 8 hours. Consider addition of metolazone.

## 2021-03-16 LAB
BACTERIA SPEC CULT: NORMAL
GLUCOSE BLD STRIP.AUTO-MCNC: 189 MG/DL (ref 65–100)
GLUCOSE BLD STRIP.AUTO-MCNC: 191 MG/DL (ref 65–100)
GLUCOSE BLD STRIP.AUTO-MCNC: 193 MG/DL (ref 65–100)
GLUCOSE BLD STRIP.AUTO-MCNC: 196 MG/DL (ref 65–100)
GLUCOSE BLD STRIP.AUTO-MCNC: 209 MG/DL (ref 65–100)
PERFORMED BY, TECHID: ABNORMAL
SPECIAL REQUESTS,SREQ: NORMAL

## 2021-03-16 PROCEDURE — 74011250637 HC RX REV CODE- 250/637: Performed by: FAMILY MEDICINE

## 2021-03-16 PROCEDURE — 74011636637 HC RX REV CODE- 636/637: Performed by: FAMILY MEDICINE

## 2021-03-16 PROCEDURE — 74011250637 HC RX REV CODE- 250/637: Performed by: INTERNAL MEDICINE

## 2021-03-16 PROCEDURE — 74011250636 HC RX REV CODE- 250/636: Performed by: INTERNAL MEDICINE

## 2021-03-16 PROCEDURE — 74011250637 HC RX REV CODE- 250/637: Performed by: NURSE PRACTITIONER

## 2021-03-16 PROCEDURE — 82962 GLUCOSE BLOOD TEST: CPT

## 2021-03-16 PROCEDURE — 65270000029 HC RM PRIVATE

## 2021-03-16 PROCEDURE — 74011250636 HC RX REV CODE- 250/636: Performed by: NURSE PRACTITIONER

## 2021-03-16 RX ORDER — CLONIDINE HYDROCHLORIDE 0.1 MG/1
0.1 TABLET ORAL EVERY 8 HOURS
Status: DISCONTINUED | OUTPATIENT
Start: 2021-03-16 | End: 2021-03-18 | Stop reason: HOSPADM

## 2021-03-16 RX ADMIN — ISOSORBIDE DINITRATE 20 MG: 10 TABLET ORAL at 08:34

## 2021-03-16 RX ADMIN — HEPARIN SODIUM 5000 UNITS: 5000 INJECTION INTRAVENOUS; SUBCUTANEOUS at 12:31

## 2021-03-16 RX ADMIN — DEXAMETHASONE SODIUM PHOSPHATE 4 MG: 4 INJECTION, SOLUTION INTRAMUSCULAR; INTRAVENOUS at 21:46

## 2021-03-16 RX ADMIN — HYDRALAZINE HYDROCHLORIDE 100 MG: 50 TABLET, FILM COATED ORAL at 21:45

## 2021-03-16 RX ADMIN — HEPARIN SODIUM 5000 UNITS: 5000 INJECTION INTRAVENOUS; SUBCUTANEOUS at 19:50

## 2021-03-16 RX ADMIN — DEXAMETHASONE SODIUM PHOSPHATE 4 MG: 4 INJECTION, SOLUTION INTRAMUSCULAR; INTRAVENOUS at 08:35

## 2021-03-16 RX ADMIN — ISOSORBIDE DINITRATE 20 MG: 10 TABLET ORAL at 21:46

## 2021-03-16 RX ADMIN — CLONIDINE HYDROCHLORIDE 0.1 MG: 0.1 TABLET ORAL at 08:35

## 2021-03-16 RX ADMIN — CARVEDILOL 12.5 MG: 12.5 TABLET, FILM COATED ORAL at 08:35

## 2021-03-16 RX ADMIN — CARVEDILOL 12.5 MG: 12.5 TABLET, FILM COATED ORAL at 17:21

## 2021-03-16 RX ADMIN — HEPARIN SODIUM 5000 UNITS: 5000 INJECTION INTRAVENOUS; SUBCUTANEOUS at 03:13

## 2021-03-16 RX ADMIN — FUROSEMIDE 80 MG: 10 INJECTION, SOLUTION INTRAMUSCULAR; INTRAVENOUS at 21:45

## 2021-03-16 RX ADMIN — Medication 10 ML: at 21:47

## 2021-03-16 RX ADMIN — CLONIDINE HYDROCHLORIDE 0.1 MG: 0.1 TABLET ORAL at 21:45

## 2021-03-16 RX ADMIN — INSULIN LISPRO 3 UNITS: 100 INJECTION, SOLUTION INTRAVENOUS; SUBCUTANEOUS at 16:30

## 2021-03-16 RX ADMIN — HYDRALAZINE HYDROCHLORIDE 100 MG: 50 TABLET, FILM COATED ORAL at 08:34

## 2021-03-16 RX ADMIN — FUROSEMIDE 80 MG: 10 INJECTION, SOLUTION INTRAMUSCULAR; INTRAVENOUS at 06:00

## 2021-03-16 RX ADMIN — INSULIN LISPRO 3 UNITS: 100 INJECTION, SOLUTION INTRAVENOUS; SUBCUTANEOUS at 07:30

## 2021-03-16 RX ADMIN — HYDRALAZINE HYDROCHLORIDE 100 MG: 50 TABLET, FILM COATED ORAL at 17:21

## 2021-03-16 RX ADMIN — AMLODIPINE BESYLATE 10 MG: 5 TABLET ORAL at 08:34

## 2021-03-16 RX ADMIN — Medication 10 ML: at 05:01

## 2021-03-16 RX ADMIN — FUROSEMIDE 80 MG: 10 INJECTION, SOLUTION INTRAMUSCULAR; INTRAVENOUS at 14:47

## 2021-03-16 RX ADMIN — INSULIN LISPRO 4 UNITS: 100 INJECTION, SOLUTION INTRAVENOUS; SUBCUTANEOUS at 11:30

## 2021-03-16 RX ADMIN — ISOSORBIDE DINITRATE 20 MG: 10 TABLET ORAL at 17:24

## 2021-03-16 RX ADMIN — Medication 10 ML: at 14:49

## 2021-03-16 RX ADMIN — FAMOTIDINE 20 MG: 20 TABLET, FILM COATED ORAL at 08:34

## 2021-03-16 NOTE — PROGRESS NOTES
General Daily Progress Note          Patient Name:   Ankur Jones       YOB: 1960       Age:  62 y.o. Admit Date: 3/8/2021      Subjective:       Pressure still elevated    Still significant leg edema      Objective:     Visit Vitals  BP (!) 168/87 (BP Patient Position: At rest)   Pulse 76   Temp 97.2 °F (36.2 °C)   Resp 18   Ht 5' 9\" (1.753 m)   Wt 91.6 kg (202 lb)   SpO2 99%   Breastfeeding No   BMI 29.83 kg/m²        Recent Results (from the past 24 hour(s))   GLUCOSE, POC    Collection Time: 03/15/21  4:15 PM   Result Value Ref Range    Glucose (POC) 177 (H) 65 - 100 mg/dL    Performed by [facility], POC    Collection Time: 03/15/21  8:29 PM   Result Value Ref Range    Glucose (POC) 157 (H) 65 - 100 mg/dL    Performed by [facility] [address], POC    Collection Time: 03/16/21  8:04 AM   Result Value Ref Range    Glucose (POC) 193 (H) 65 - 100 mg/dL    Performed by Katelin ALVAREZ    GLUCOSE, POC    Collection Time: 03/16/21  8:25 AM   Result Value Ref Range    Glucose (POC) 191 (H) 65 - 100 mg/dL    Performed by Patel Villegas    GLUCOSE, POC    Collection Time: 03/16/21 10:56 AM   Result Value Ref Range    Glucose (POC) 209 (H) 65 - 100 mg/dL    Performed by Patel Villegas      [unfilled]      Review of Systems    Constitutional: Negative for chills and fever. HENT: Negative. Eyes: Negative. Respiratory: Negative. Cardiovascular: Negative. Gastrointestinal: Negative for abdominal pain and nausea. Skin: Negative. Neurological: Negative. Physical Exam:      Constitutional: pt is oriented to person, place, and time. HENT:   Head: Normocephalic and atraumatic. Eyes: Pupils are equal, round, and reactive to light. EOM are normal.   Cardiovascular: Normal rate, regular rhythm and normal heart sounds. Pulmonary/Chest: Breath sounds normal. No wheezes. No rales. Exhibits no tenderness. Abdominal: Soft.  Bowel sounds are normal. There is no abdominal tenderness. There is no rebound and no guarding. Musculoskeletal: Normal range of motion. Neurological: pt is alert and oriented to person, place, and time. Extremities 2+ edema    XR CHEST SNGL V INSPIR   Final Result   Status post right IJ central line placement without evidence of   complication and improved aeration in the right perihilar region. IR INSERT NON TUNL CVC OVER 5 YRS   Final Result   Successful placement of a triple-lumen central venous catheter. The catheter is   ready for use. IR US GUIDED VASCULAR ACCESS   Final Result   Successful placement of a triple-lumen central venous catheter. The catheter is   ready for use. US RETROPERITONEUM COMP   Final Result   1. This examination is negative for hydronephrosis as an etiology for the   patient's renal insufficiency. 2.  On prior sonographic imaging there were demonstrated echogenic masses within   the left kidney which are no longer present. There are now is a smaller but more   normal morphology to the patient's left kidney.       XR CHEST SNGL V   Final Result           Recent Results (from the past 24 hour(s))   GLUCOSE, POC    Collection Time: 03/15/21  4:15 PM   Result Value Ref Range    Glucose (POC) 177 (H) 65 - 100 mg/dL    Performed by [address] [place] [address], POC    Collection Time: 03/15/21  8:29 PM   Result Value Ref Range    Glucose (POC) 157 (H) 65 - 100 mg/dL    Performed by [facility] [address], POC    Collection Time: 03/16/21  8:04 AM   Result Value Ref Range    Glucose (POC) 193 (H) 65 - 100 mg/dL    Performed by Shira ALVAREZ    GLUCOSE, POC    Collection Time: 03/16/21  8:25 AM   Result Value Ref Range    Glucose (POC) 191 (H) 65 - 100 mg/dL    Performed by Rakesh Tena    GLUCOSE, POC    Collection Time: 03/16/21 10:56 AM   Result Value Ref Range    Glucose (POC) 209 (H) 65 - 100 mg/dL    Performed by Rakesh Tena        Results Procedure Component Value Units Date/Time    C. DIFFICILE (DNA) [[id number]]     Order Status: Canceled Specimen: Stool     CULTURE, BLOOD, LINE [[id number]] Collected: 03/09/21 0132    Order Status: Completed Specimen: Blood Updated: 03/16/21 0919     Special Requests: No Special Requests        Culture result: No growth 6 days       CULTURE, URINE [[id number]] Collected: 03/08/21 1900    Order Status: Completed Specimen: Urine Updated: 03/10/21 0904     Special Requests: No Special Requests        Culture result: No Growth (<1000 cfu/mL)       CULTURE, RESPIRATORY/SPUTUM/BRONCH Izaetta Cruise STAIN [[id number]] Collected: 03/08/21 1900    Order Status: Canceled Specimen: Sputum     CULTURE, BLOOD, PAIRED [[id number]] Collected: 03/08/21 1405    Order Status: Completed Specimen: Blood Updated: 03/14/21 0850     Special Requests: No Special Requests        Culture result: No growth 6 days       COVID-19 RAPID TEST [[id number]]  (Abnormal) Collected: 03/08/21 1325    Order Status: Completed Specimen: Nasopharyngeal Updated: 03/08/21 1415     Specimen source Nasopharyngeal        Comment: Results verified, phoned to and read back by Delonte Omer AT [id number] BY JF        COVID-19 rapid test DETECTED        Comment: Rapid Abbott ID Now   The specimen is POSITIVE for SARS-CoV-2, the novel coronavirus associated with COVID-19. This test has been authorized by the FDA under an Emergency Use Authorization (EUA) for use by authorized laboratories.    Fact sheet for Healthcare Providers: ConventionUpdate.co.nz Fact sheet for Patients: ConventionUpdate.co.nz   Methodology: Isothermal Nucleic Acid Amplification              Labs:     Recent Labs     03/14/21  0940   WBC 7.7   HGB 9.0*   HCT 28.6*        Recent Labs     03/14/21  0940     137   K 4.3  4.6     105   CO2 20*  19*   BUN 83*  79*   CREA 6.33*  6.18*   *  200*   CA 8.7  8.4*   PHOS 5.0*     Recent Labs 03/14/21  0940   ALT 21   AP 89   TBILI 0.2   TP 6.2*   ALB 2.1*  2.2*   GLOB 4.1*     No results for input(s): INR, PTP, APTT, INREXT, INREXT in the last 72 hours. No results for input(s): FE, TIBC, PSAT, FERR in the last 72 hours. No results found for: FOL, RBCF   No results for input(s): PH, PCO2, PO2 in the last 72 hours. No results for input(s): CPK, CKNDX, TROIQ in the last 72 hours.     No lab exists for component: CPKMB  No results found for: CHOL, CHOLX, CHLST, CHOLV, HDL, HDLP, LDL, LDLC, DLDLP, TGLX, TRIGL, TRIGP, CHHD, CHHDX  Lab Results   Component Value Date/Time    Glucose (POC) 209 (H) 03/16/2021 10:56 AM    Glucose (POC) 191 (H) 03/16/2021 08:25 AM    Glucose (POC) 193 (H) 03/16/2021 08:04 AM    Glucose (POC) 157 (H) 03/15/2021 08:29 PM    Glucose (POC) 177 (H) 03/15/2021 04:15 PM     Lab Results   Component Value Date/Time    Color Yellow/Straw 03/08/2021 03:15 PM    Appearance Clear 03/08/2021 03:15 PM    Specific gravity 1.008 03/08/2021 03:15 PM    pH (UA) 7.0 03/08/2021 03:15 PM    Protein >300 (A) 03/08/2021 03:15 PM    Glucose 50 (A) 03/08/2021 03:15 PM    Ketone Negative 03/08/2021 03:15 PM    Bilirubin Negative 03/08/2021 03:15 PM    Urobilinogen 0.1 03/08/2021 03:15 PM    Nitrites Negative 03/08/2021 03:15 PM    Leukocyte Esterase Negative 03/08/2021 03:15 PM    Bacteria Negative 03/08/2021 03:15 PM    WBC 0-4 03/08/2021 03:15 PM    RBC 0-5 03/08/2021 03:15 PM         Assessment:     COVID-19 pneumonitis  Moderate pulmonary hypertension  Acute on chronic kidney disease stage V  Type 2 diabetes uncontrolled  Acute CHF  Anemia chronic disease  Hypokalemia  Hypoalbuminemia  Patient uncontrolled  Have significant diarrhea    Plan:     Increase clonidine to 0.1 twice daily  Patient on Norvasc 10 mg daily  Zithromax 500 mg IV daily  Coreg 12.5 twice daily  Decadron 4 mg every 6 hours  Pepcid 20 daily  On Lasix 80 mg IV every 12 hours  Hydralazine 100 mg 3 times a day  Isosorbide dinitrate 20 mg 3 times a day  IV Zosyn  Subcu heparin for DVT prophylaxis  Sliding-scale insulin  Order stool for C. difficile    Repeat the labs in the morning        Current Facility-Administered Medications:     cloNIDine HCL (CATAPRES) tablet 0.1 mg, 0.1 mg, Oral, BID, Rebecca Reyes MD, 0.1 mg at 03/16/21 0835    dexamethasone (DECADRON) 4 mg/mL injection 4 mg, 4 mg, IntraVENous, Q12H, Pepe Shrestha, NP, 4 mg at 03/16/21 2747    glucose chewable tablet 16 g, 4 Tab, Oral, PRN, Rebecca Reyes MD    dextrose (D50W) injection syrg 12.5-25 g, 25-50 mL, IntraVENous, PRN, Rebecca Reyes MD    glucagon (GLUCAGEN) injection 1 mg, 1 mg, IntraMUSCular, PRN, Rebecca Reyes MD    insulin lispro (HUMALOG) injection, , SubCUTAneous, AC&HS, Rebecca Reyes MD, 4 Units at 03/16/21 1130    furosemide (LASIX) injection 80 mg, 80 mg, IntraVENous, Q8H, Katelyn Gunter MD, 80 mg at 03/16/21 0600    hydrALAZINE (APRESOLINE) tablet 100 mg, 100 mg, Oral, TID, Ellyn Gunter MD, 100 mg at 03/16/21 0834    amLODIPine (NORVASC) tablet 10 mg, 10 mg, Oral, DAILY, Katelyn Gunter MD, 10 mg at 03/16/21 0834    carvediloL (COREG) tablet 12.5 mg, 12.5 mg, Oral, BID WITH MEALS, Katelyn Gunter MD, 12.5 mg at 03/16/21 0835    isosorbide dinitrate (ISORDIL) tablet 20 mg, 20 mg, Oral, TID, Ellyn Gunter MD, 20 mg at 03/16/21 0834    heparin (porcine) injection 5,000 Units, 5,000 Units, SubCUTAneous, Q8H, Katelyn Gunter MD, 5,000 Units at 03/16/21 1231    sodium chloride (NS) flush 5-40 mL, 5-40 mL, IntraVENous, Q8H, [facility], NP, 10 mL at 03/16/21 0501    sodium chloride (NS) flush 5-40 mL, 5-40 mL, IntraVENous, PRN, [facility] Fady, NP    acetaminophen (TYLENOL) tablet 650 mg, 650 mg, Oral, Q6H PRN **OR** acetaminophen (TYLENOL) suppository 650 mg, 650 mg, Rectal, Q6H PRN, [facility] Fady, NP    polyethylene glycol (MIRALAX) packet 17 g, 17 g, Oral, DAILY PRN, [facility], NP    promethazine (PHENERGAN) tablet 12.5 mg, 12.5 mg, Oral, Q6H PRN **OR** ondansetron (ZOFRAN) injection 4 mg, 4 mg, IntraVENous, Q6H PRN, Lyndol Rideau, NP    famotidine (PEPCID) tablet 20 mg, 20 mg, Oral, DAILY, Lyndol Ridlexusu, NP, 20 mg at 03/16/21 0851

## 2021-03-16 NOTE — PROGRESS NOTES
Renal Daily Progress Note    Admit Date: 3/8/2021      Subjective:   Patient not seen. Chart reviewed. Current Facility-Administered Medications   Medication Dose Route Frequency    cloNIDine HCL (CATAPRES) tablet 0.1 mg  0.1 mg Oral BID    dexamethasone (DECADRON) 4 mg/mL injection 4 mg  4 mg IntraVENous Q12H    glucose chewable tablet 16 g  4 Tab Oral PRN    dextrose (D50W) injection syrg 12.5-25 g  25-50 mL IntraVENous PRN    glucagon (GLUCAGEN) injection 1 mg  1 mg IntraMUSCular PRN    insulin lispro (HUMALOG) injection   SubCUTAneous AC&HS    furosemide (LASIX) injection 80 mg  80 mg IntraVENous Q8H    hydrALAZINE (APRESOLINE) tablet 100 mg  100 mg Oral TID    amLODIPine (NORVASC) tablet 10 mg  10 mg Oral DAILY    carvediloL (COREG) tablet 12.5 mg  12.5 mg Oral BID WITH MEALS    isosorbide dinitrate (ISORDIL) tablet 20 mg  20 mg Oral TID    heparin (porcine) injection 5,000 Units  5,000 Units SubCUTAneous Q8H    sodium chloride (NS) flush 5-40 mL  5-40 mL IntraVENous Q8H    sodium chloride (NS) flush 5-40 mL  5-40 mL IntraVENous PRN    acetaminophen (TYLENOL) tablet 650 mg  650 mg Oral Q6H PRN    Or    acetaminophen (TYLENOL) suppository 650 mg  650 mg Rectal Q6H PRN    polyethylene glycol (MIRALAX) packet 17 g  17 g Oral DAILY PRN    promethazine (PHENERGAN) tablet 12.5 mg  12.5 mg Oral Q6H PRN    Or    ondansetron (ZOFRAN) injection 4 mg  4 mg IntraVENous Q6H PRN    famotidine (PEPCID) tablet 20 mg  20 mg Oral DAILY        Review of Systems    ROS       Objective:     Patient Vitals for the past 8 hrs:   BP Temp Pulse Resp SpO2   03/16/21 1452 (!) 165/85 (!) 96.5 °F (35.8 °C) 65 20 100 %     No intake/output data recorded. 03/14 1901 - 03/16 0700  In: 620 [P.O.:400;  I.V.:220]  Out: 1550 [Urine:1550]    Physical Exam:   Physical Exam   As per primary service      XR CHEST SNGL V INSPIR   Final Result   Status post right IJ central line placement without evidence of   complication and improved aeration in the right perihilar region. IR INSERT NON TUNL CVC OVER 5 YRS   Final Result   Successful placement of a triple-lumen central venous catheter. The catheter is   ready for use. IR US GUIDED VASCULAR ACCESS   Final Result   Successful placement of a triple-lumen central venous catheter. The catheter is   ready for use. US RETROPERITONEUM COMP   Final Result   1. This examination is negative for hydronephrosis as an etiology for the   patient's renal insufficiency. 2.  On prior sonographic imaging there were demonstrated echogenic masses within   the left kidney which are no longer present. There are now is a smaller but more   normal morphology to the patient's left kidney. XR CHEST SNGL V   Final Result           Data Review   Recent Labs     03/14/21  0940   WBC 7.7   HGB 9.0*   HCT 28.6*        Recent Labs     03/14/21  0940     137   K 4.3  4.6     105   CO2 20*  19*   *  200*   BUN 83*  79*   CREA 6.33*  6.18*   CA 8.7  8.4*   PHOS 5.0*   ALB 2.1*  2.2*   ALT 21     No components found for: GLPOC  No results for input(s): PH, PCO2, PO2, HCO3, FIO2 in the last 72 hours. No results for input(s): INR, INREXT, INREXT, INREXT in the last 72 hours. Assessment:           Active Problems:    PNA (pneumonia) (3/8/2021)    Stage V CKD   Nephrotic syndrome with sequela  Anemia of chronic disease  Hypertension stage II    Plan:   Clonidine dose has been increased  to .1 mg twice daily- Increase to .1 mg 3 times daily  Suspect that hypertension is volume mediated. She is on steroids. Continue furosemide 80 mg every 8 hours.

## 2021-03-16 NOTE — CONSULTS
PULMONARY NOTE  [facility]    Name: Brown Chang MRN: [id number]   : 1960 Hospital: [facility]   Date: 3/16/2021  Admission date: 3/8/2021 Hospital Day: 9       HPI:     Hospital Problems  Never Reviewed          Codes Class Noted POA    PNA (pneumonia) ICD-10-CM: J18.9  ICD-9-CM: 881  3/8/2021 Unknown                   [x] High complexity decision making was performed  [x] See my orders for details      Subjective/Initial History:     Sitting up on side of bed. Reports watery diarrhea in large amounts without much abdominal cramping. Eating breakfast. No change in lower extremity edema from yesterday. Excerpts from admission 3/8/2021 or consult notes as follows:   23-year-old lady came in because of abdominal discomfort significant past medical history of congestive heart failure chronic kidney disease and type 2 diabetes mellitus she was complaining of shortness of breath dyspnea and feeling nauseous for the past 1 week no history of passing out so admitted and she is COVID-19 positive so pulmonary consult was called for further evaluation.       Allergies   Allergen Reactions    Doxycycline Swelling    Egg Diarrhea    Milk Containing Products Nausea and Vomiting    Tetracycline Swelling        MAR reviewed and pertinent medications noted or modified as needed     Current Facility-Administered Medications   Medication    cloNIDine HCL (CATAPRES) tablet 0.1 mg    dexamethasone (DECADRON) 4 mg/mL injection 4 mg    glucose chewable tablet 16 g    dextrose (D50W) injection syrg 12.5-25 g    glucagon (GLUCAGEN) injection 1 mg    insulin lispro (HUMALOG) injection    furosemide (LASIX) injection 80 mg    hydrALAZINE (APRESOLINE) tablet 100 mg    amLODIPine (NORVASC) tablet 10 mg    carvediloL (COREG) tablet 12.5 mg    isosorbide dinitrate (ISORDIL) tablet 20 mg    heparin (porcine) injection 5,000 Units    sodium chloride (NS) flush 5-40 mL    sodium chloride (NS) flush 5-40 mL    acetaminophen (TYLENOL) tablet 650 mg    Or    acetaminophen (TYLENOL) suppository 650 mg    polyethylene glycol (MIRALAX) packet 17 g    promethazine (PHENERGAN) tablet 12.5 mg    Or    ondansetron (ZOFRAN) injection 4 mg    famotidine (PEPCID) tablet 20 mg      Patient PCP: Adria Link MD  PMH:  has a past medical history of Hypertension and Morbid obesity ([address] PSH:   has a past surgical history that includes ir insert non tunl cvc over 5 yrs (3/12/2021). FHX: family history is not on file. SHX:       ROS:    Review of Systems   Constitutional: Negative. HENT: Negative. Eyes: Negative. Respiratory: Positive for shortness of breath. Cardiovascular: Positive for leg swelling. Gastrointestinal: Positive for abdominal pain and diarrhea. Negative for nausea. Genitourinary: Negative. Musculoskeletal: Negative. Skin: Negative. Neurological: Negative for weakness. Endo/Heme/Allergies: Negative. Psychiatric/Behavioral: Negative. Objective:     Vital Signs: Telemetry:    normal sinus rhythm Intake/Output:   Visit Vitals  BP (!) 168/87 (BP Patient Position: At rest)   Pulse 76   Temp 97.2 °F (36.2 °C)   Resp 18   Ht 5' 9\" (1.753 m)   Wt 91.6 kg (202 lb)   SpO2 99%   Breastfeeding No   BMI 29.83 kg/m²       Temp (24hrs), Av °F (36.7 °C), Min:97.2 °F (36.2 °C), Max:98.5 °F (36.9 °C)        O2 Device: Room air O2 Flow Rate (L/min): 0 l/min       Wt Readings from Last 4 Encounters:   21 91.6 kg (202 lb)          Intake/Output Summary (Last 24 hours) at 3/16/2021 0946  Last data filed at 3/16/2021 0208  Gross per 24 hour   Intake --   Output 600 ml   Net -600 ml       Last shift:      No intake/output data recorded. Last 3 shifts:  1901 -  0700  In: 620 [P.O.:400; I.V.:220]  Out: 1550 [Urine:1550]       Physical Exam:     Physical Exam   Constitutional: She is oriented to person, place, and time. She appears well-developed.  No distress. HENT:   Head: Normocephalic and atraumatic. Eyes: Pupils are equal, round, and reactive to light. Conjunctivae are normal.   Neck: Normal range of motion. Neck supple. No JVD present. Cardiovascular: Normal rate, regular rhythm and normal heart sounds. Pulmonary/Chest: Effort normal and breath sounds normal.   Abdominal: Soft. Bowel sounds are normal. She exhibits no distension. There is no abdominal tenderness. Musculoskeletal: Normal range of motion. General: Edema present. No tenderness. Comments: 3-4+ pitting edema in bilateral lower extremities - chronic and unchanged from yesterday   Neurological: She is alert and oriented to person, place, and time. Skin: Skin is warm and dry. Areas of scars from healed lower extremity wounds/lesions bilaterally. Psychiatric: She has a normal mood and affect. Labs:    Recent Labs     03/14/21  0940   WBC 7.7   HGB 9.0*        Recent Labs     03/14/21  0940     137   K 4.3  4.6     105   CO2 20*  19*   *  200*   BUN 83*  79*   CREA 6.33*  6.18*   CA 8.7  8.4*   PHOS 5.0*   ALB 2.1*  2.2*   ALT 21     No results for input(s): PH, PCO2, PO2, HCO3, FIO2 in the last 72 hours. No results for input(s): CPK, CKNDX, TROIQ in the last 72 hours. No lab exists for component: CPKMB  No results found for: BNPP, BNP   Lab Results   Component Value Date/Time    Culture result: No growth 6 days 03/09/2021 01:32 AM    Culture result: No Growth (<1000 cfu/mL) 03/08/2021 07:00 PM    Culture result: No growth 6 days 03/08/2021 02:05 PM     Lab Results   Component Value Date/Time    TSH 1.57 03/08/2021 11:45 AM       Imaging:    CXR Results  (Last 48 hours)    None        Results from [place] encounter on 03/08/21   XR CHEST SNGL V INSPIR    Narrative Portable upright radiograph chest 3:51 PM compared to March 8, 2021. INDICATION: Post right IJ catheter placement. Heart size remains enlarged. Interval placement of a right IJ central line with  tip projecting over the SVC. Improved aeration in the right perihilar region. No  pneumothorax or gross effusion. Impression Status post right IJ central line placement without evidence of  complication and improved aeration in the right perihilar region. XR CHEST SNGL V    Narrative 1 new comparison June 21    Cardiomegaly with congestion. Mild interstitial edema. Localized airspace  disease right perihilar. No effusion or pneumothorax     No results found for this or any previous visit. IMPRESSION:   1. COVID-19 pneumonia  2. Acute on chronic kidney disease   3. Congestive heart failure  4. Pulmonary hypertension  5. Ex COPD  6. Anemia of chronic disease  7. Prognosis guarded       RECOMMENDATIONS/PLAN:     1. She is on room air without evidence of hypoxia. 96-97% on room air  2. Off zosyn. 3. Azithromycin discontinued and c-diff studies ordered due to new onset of multiple episodes of liquid diarrhea stools. 4. Decadron decreased to q12h  5. Chest x-ray shows cardiomegaly with congestion and infiltrate right perihilar  6. She is on Lasix 80 twice daily  7. Patient is on Isordil cannot start patient on sildenafil will discuss with cardiologist patient has EF about 47% WHO group 2 for pulmonary hypertension  8. Supplemental O2 to keep sats > 93%  9.  DVT, SUP prophylaxis       Stephanie Del Cid MD

## 2021-03-17 LAB
BNP SERPL-MCNC: 7341 PG/ML
GLUCOSE BLD STRIP.AUTO-MCNC: 190 MG/DL (ref 65–100)
GLUCOSE BLD STRIP.AUTO-MCNC: 199 MG/DL (ref 65–100)
GLUCOSE BLD STRIP.AUTO-MCNC: 209 MG/DL (ref 65–100)
GLUCOSE BLD STRIP.AUTO-MCNC: 217 MG/DL (ref 65–100)
PERFORMED BY, TECHID: ABNORMAL

## 2021-03-17 PROCEDURE — 74011250636 HC RX REV CODE- 250/636: Performed by: NURSE PRACTITIONER

## 2021-03-17 PROCEDURE — 36415 COLL VENOUS BLD VENIPUNCTURE: CPT

## 2021-03-17 PROCEDURE — 74011250636 HC RX REV CODE- 250/636: Performed by: INTERNAL MEDICINE

## 2021-03-17 PROCEDURE — 83880 ASSAY OF NATRIURETIC PEPTIDE: CPT

## 2021-03-17 PROCEDURE — 94760 N-INVAS EAR/PLS OXIMETRY 1: CPT

## 2021-03-17 PROCEDURE — 74011636637 HC RX REV CODE- 636/637: Performed by: FAMILY MEDICINE

## 2021-03-17 PROCEDURE — 74011250637 HC RX REV CODE- 250/637: Performed by: NURSE PRACTITIONER

## 2021-03-17 PROCEDURE — 65270000029 HC RM PRIVATE

## 2021-03-17 PROCEDURE — 74011250637 HC RX REV CODE- 250/637: Performed by: INTERNAL MEDICINE

## 2021-03-17 PROCEDURE — 82962 GLUCOSE BLOOD TEST: CPT

## 2021-03-17 RX ORDER — FUROSEMIDE 40 MG/1
120 TABLET ORAL 2 TIMES DAILY
Status: DISCONTINUED | OUTPATIENT
Start: 2021-03-17 | End: 2021-03-18 | Stop reason: HOSPADM

## 2021-03-17 RX ADMIN — HYDRALAZINE HYDROCHLORIDE 100 MG: 50 TABLET, FILM COATED ORAL at 17:08

## 2021-03-17 RX ADMIN — CARVEDILOL 12.5 MG: 12.5 TABLET, FILM COATED ORAL at 17:08

## 2021-03-17 RX ADMIN — FUROSEMIDE 120 MG: 40 TABLET ORAL at 20:50

## 2021-03-17 RX ADMIN — HEPARIN SODIUM 5000 UNITS: 5000 INJECTION INTRAVENOUS; SUBCUTANEOUS at 20:49

## 2021-03-17 RX ADMIN — INSULIN LISPRO 4 UNITS: 100 INJECTION, SOLUTION INTRAVENOUS; SUBCUTANEOUS at 16:30

## 2021-03-17 RX ADMIN — ISOSORBIDE DINITRATE 20 MG: 10 TABLET ORAL at 17:08

## 2021-03-17 RX ADMIN — CLONIDINE HYDROCHLORIDE 0.1 MG: 0.1 TABLET ORAL at 17:08

## 2021-03-17 RX ADMIN — DEXAMETHASONE SODIUM PHOSPHATE 4 MG: 4 INJECTION, SOLUTION INTRAMUSCULAR; INTRAVENOUS at 21:31

## 2021-03-17 RX ADMIN — HEPARIN SODIUM 5000 UNITS: 5000 INJECTION INTRAVENOUS; SUBCUTANEOUS at 02:59

## 2021-03-17 RX ADMIN — INSULIN LISPRO 4 UNITS: 100 INJECTION, SOLUTION INTRAVENOUS; SUBCUTANEOUS at 07:30

## 2021-03-17 RX ADMIN — AMLODIPINE BESYLATE 10 MG: 5 TABLET ORAL at 08:21

## 2021-03-17 RX ADMIN — ISOSORBIDE DINITRATE 20 MG: 10 TABLET ORAL at 21:30

## 2021-03-17 RX ADMIN — CLONIDINE HYDROCHLORIDE 0.1 MG: 0.1 TABLET ORAL at 21:32

## 2021-03-17 RX ADMIN — CARVEDILOL 12.5 MG: 12.5 TABLET, FILM COATED ORAL at 08:21

## 2021-03-17 RX ADMIN — HYDRALAZINE HYDROCHLORIDE 100 MG: 50 TABLET, FILM COATED ORAL at 21:31

## 2021-03-17 RX ADMIN — DEXAMETHASONE SODIUM PHOSPHATE 4 MG: 4 INJECTION, SOLUTION INTRAMUSCULAR; INTRAVENOUS at 08:22

## 2021-03-17 RX ADMIN — Medication 10 ML: at 21:31

## 2021-03-17 RX ADMIN — FUROSEMIDE 80 MG: 10 INJECTION, SOLUTION INTRAMUSCULAR; INTRAVENOUS at 05:54

## 2021-03-17 RX ADMIN — Medication 10 ML: at 05:52

## 2021-03-17 RX ADMIN — ISOSORBIDE DINITRATE 20 MG: 10 TABLET ORAL at 08:21

## 2021-03-17 RX ADMIN — INSULIN LISPRO 3 UNITS: 100 INJECTION, SOLUTION INTRAVENOUS; SUBCUTANEOUS at 11:30

## 2021-03-17 RX ADMIN — Medication 10 ML: at 14:00

## 2021-03-17 RX ADMIN — FAMOTIDINE 20 MG: 20 TABLET, FILM COATED ORAL at 08:21

## 2021-03-17 RX ADMIN — HYDRALAZINE HYDROCHLORIDE 100 MG: 50 TABLET, FILM COATED ORAL at 08:21

## 2021-03-17 RX ADMIN — CLONIDINE HYDROCHLORIDE 0.1 MG: 0.1 TABLET ORAL at 05:54

## 2021-03-17 RX ADMIN — HEPARIN SODIUM 5000 UNITS: 5000 INJECTION INTRAVENOUS; SUBCUTANEOUS at 12:09

## 2021-03-17 NOTE — PROGRESS NOTES
General Daily Progress Note          Patient Name:   Jimmie Cleaning       YOB: 1960       Age:  62 y.o. Admit Date: 3/8/2021      Subjective:       Patient alert awake today blood pressure is better still on IV Lasix as per nephrology    Objective:     Visit Vitals  /76 (BP Patient Position: At rest)   Pulse 69   Temp 99 °F (37.2 °C)   Resp 18   Ht 5' 9\" (1.753 m)   Wt 91.6 kg (202 lb)   SpO2 96%   Breastfeeding No   BMI 29.83 kg/m²        Recent Results (from the past 24 hour(s))   GLUCOSE, POC    Collection Time: 03/16/21  4:18 PM   Result Value Ref Range    Glucose (POC) 189 (H) 65 - 100 mg/dL    Performed by Reji Quarles, POC    Collection Time: 03/16/21  8:07 PM   Result Value Ref Range    Glucose (POC) 196 (H) 65 - 100 mg/dL    Performed by Kisha Smith, POC    Collection Time: 03/17/21  7:48 AM   Result Value Ref Range    Glucose (POC) 209 (H) 65 - 100 mg/dL    Performed by Claudio Brooke    GLUCOSE, POC    Collection Time: 03/17/21 11:09 AM   Result Value Ref Range    Glucose (POC) 199 (H) 65 - 100 mg/dL    Performed by Claudio Brooke      [unfilled]      Review of Systems    Constitutional: Negative for chills and fever. HENT: Negative. Eyes: Negative. Respiratory: Negative. Cardiovascular: Negative. Gastrointestinal: Negative for abdominal pain and nausea. Skin: Negative. Neurological: Negative. Physical Exam:      Constitutional: pt is oriented to person, place, and time. HENT:   Head: Normocephalic and atraumatic. Eyes: Pupils are equal, round, and reactive to light. EOM are normal.   Cardiovascular: Normal rate, regular rhythm and normal heart sounds. Pulmonary/Chest: Breath sounds normal. No wheezes. No rales. Exhibits no tenderness. Abdominal: Soft. Bowel sounds are normal. There is no abdominal tenderness. There is no rebound and no guarding. Musculoskeletal: Normal range of motion. Neurological: pt is alert and oriented to person, place, and time. Extremities 2+ edema    XR CHEST SNGL V INSPIR   Final Result   Status post right IJ central line placement without evidence of   complication and improved aeration in the right perihilar region. IR INSERT NON TUNL CVC OVER 5 YRS   Final Result   Successful placement of a triple-lumen central venous catheter. The catheter is   ready for use. IR US GUIDED VASCULAR ACCESS   Final Result   Successful placement of a triple-lumen central venous catheter. The catheter is   ready for use. US RETROPERITONEUM COMP   Final Result   1. This examination is negative for hydronephrosis as an etiology for the   patient's renal insufficiency. 2.  On prior sonographic imaging there were demonstrated echogenic masses within   the left kidney which are no longer present. There are now is a smaller but more   normal morphology to the patient's left kidney.       XR CHEST SNGL V   Final Result           Recent Results (from the past 24 hour(s))   GLUCOSE, POC    Collection Time: 03/16/21  4:18 PM   Result Value Ref Range    Glucose (POC) 189 (H) 65 - 100 mg/dL    Performed by Maria Esther Parker, POC    Collection Time: 03/16/21  8:07 PM   Result Value Ref Range    Glucose (POC) 196 (H) 65 - 100 mg/dL    Performed by Kisha Cornejo 10, POC    Collection Time: 03/17/21  7:48 AM   Result Value Ref Range    Glucose (POC) 209 (H) 65 - 100 mg/dL    Performed by Darien Jose    GLUCOSE, POC    Collection Time: 03/17/21 11:09 AM   Result Value Ref Range    Glucose (POC) 199 (H) 65 - 100 mg/dL    Performed by Darien Jose        Results     Procedure Component Value Units Date/Time    C. DIFFICILE (DNA) [[id number]]     Order Status: Canceled Specimen: Stool     CULTURE, BLOOD, LINE [[id number]] Collected: 03/09/21 0132    Order Status: Completed Specimen: Blood Updated: 03/16/21 0919     Special Requests: No Special Requests        Culture result: No growth 6 days       CULTURE, URINE [[id number]] Collected: 03/08/21 1900    Order Status: Completed Specimen: Urine Updated: 03/10/21 0904     Special Requests: No Special Requests        Culture result: No Growth (<1000 cfu/mL)       CULTURE, RESPIRATORY/SPUTUM/BRONCH Jonathandonna Schaffer STAIN [[id number]] Collected: 03/08/21 1900    Order Status: Canceled Specimen: Sputum     CULTURE, BLOOD, PAIRED [[id number]] Collected: 03/08/21 1405    Order Status: Completed Specimen: Blood Updated: 03/14/21 0850     Special Requests: No Special Requests        Culture result: No growth 6 days       COVID-19 RAPID TEST [[id number]]  (Abnormal) Collected: 03/08/21 1325    Order Status: Completed Specimen: Nasopharyngeal Updated: 03/08/21 1415     Specimen source Nasopharyngeal        Comment: Results verified, phoned to and read back by Milad Rodney AT [id number] BY JF        COVID-19 rapid test DETECTED        Comment: Rapid Abbott ID Now   The specimen is POSITIVE for SARS-CoV-2, the novel coronavirus associated with COVID-19. This test has been authorized by the FDA under an Emergency Use Authorization (EUA) for use by authorized laboratories. Fact sheet for Healthcare Providers: ConventionUpdate.co.nz Fact sheet for Patients: ConventionUpdate.co.nz   Methodology: Isothermal Nucleic Acid Amplification              Labs:     No results for input(s): WBC, HGB, HCT, PLT, HGBEXT, HCTEXT, PLTEXT, HGBEXT, HCTEXT, PLTEXT in the last 72 hours. No results for input(s): NA, K, CL, CO2, BUN, CREA, GLU, CA, MG, PHOS, URICA in the last 72 hours. No results for input(s): ALT, AP, TBIL, TBILI, TP, ALB, GLOB, GGT, AML, LPSE in the last 72 hours. No lab exists for component: SGOT, GPT, AMYP, HLPSE  No results for input(s): INR, PTP, APTT, INREXT, INREXT in the last 72 hours. No results for input(s): FE, TIBC, PSAT, FERR in the last 72 hours.    No results found for: FOL, RBCF   No results for input(s): PH, PCO2, PO2 in the last 72 hours. No results for input(s): CPK, CKNDX, TROIQ in the last 72 hours.     No lab exists for component: CPKMB  No results found for: CHOL, CHOLX, CHLST, CHOLV, HDL, HDLP, LDL, LDLC, DLDLP, TGLX, TRIGL, TRIGP, CHHD, CHHDX  Lab Results   Component Value Date/Time    Glucose (POC) 199 (H) 03/17/2021 11:09 AM    Glucose (POC) 209 (H) 03/17/2021 07:48 AM    Glucose (POC) 196 (H) 03/16/2021 08:07 PM    Glucose (POC) 189 (H) 03/16/2021 04:18 PM    Glucose (POC) 209 (H) 03/16/2021 10:56 AM     Lab Results   Component Value Date/Time    Color Yellow/Straw 03/08/2021 03:15 PM    Appearance Clear 03/08/2021 03:15 PM    Specific gravity 1.008 03/08/2021 03:15 PM    pH (UA) 7.0 03/08/2021 03:15 PM    Protein >300 (A) 03/08/2021 03:15 PM    Glucose 50 (A) 03/08/2021 03:15 PM    Ketone Negative 03/08/2021 03:15 PM    Bilirubin Negative 03/08/2021 03:15 PM    Urobilinogen 0.1 03/08/2021 03:15 PM    Nitrites Negative 03/08/2021 03:15 PM    Leukocyte Esterase Negative 03/08/2021 03:15 PM    Bacteria Negative 03/08/2021 03:15 PM    WBC 0-4 03/08/2021 03:15 PM    RBC 0-5 03/08/2021 03:15 PM         Assessment:     COVID-19 pneumonitis  Moderate pulmonary hypertension  Acute on chronic kidney disease stage V  Type 2 diabetes uncontrolled  Acute CHF  Anemia chronic disease  Hypokalemia  Hypoalbuminemia  Patient uncontrolled  Have significant diarrhea    Plan:     Increase clonidine to 0.1 twice daily  Patient on Norvasc 10 mg daily  Zithromax 500 mg IV daily  Coreg 12.5 twice daily  Decadron 4 mg every 6 hours  Pepcid 20 daily  On Lasix 80 mg IV every 12 hours  Hydralazine 100 mg 3 times a day  Isosorbide dinitrate 20 mg 3 times a day  IV Zosyn  Subcu heparin for DVT prophylaxis  Sliding-scale insulin      Repeat the labs in the morning  PT OT consult        Current Facility-Administered Medications:     cloNIDine HCL (CATAPRES) tablet 0.1 mg, 0.1 mg, Oral, Q8H, Renate Davis MD, 0.1 mg at 03/17/21 0554    dexamethasone (DECADRON) 4 mg/mL injection 4 mg, 4 mg, IntraVENous, Q12H, Dao Public, NP, 4 mg at 03/17/21 0817    glucose chewable tablet 16 g, 4 Tab, Oral, PRN, Radha Reyes MD    dextrose (D50W) injection syrg 12.5-25 g, 25-50 mL, IntraVENous, PRN, Radha Reyes MD    glucagon (GLUCAGEN) injection 1 mg, 1 mg, IntraMUSCular, PRN, Radha Reyes MD    insulin lispro (HUMALOG) injection, , SubCUTAneous, AC&HS, Rebecca Reyes MD, 3 Units at 03/17/21 1130    furosemide (LASIX) injection 80 mg, 80 mg, IntraVENous, Q8H, Nettie Gunter MD, 80 mg at 03/17/21 0554    hydrALAZINE (APRESOLINE) tablet 100 mg, 100 mg, Oral, TID, Nettie Gunter MD, 100 mg at 03/17/21 0821    amLODIPine (NORVASC) tablet 10 mg, 10 mg, Oral, DAILY, Nettie Gunter MD, 10 mg at 03/17/21 4239    carvediloL (COREG) tablet 12.5 mg, 12.5 mg, Oral, BID WITH MEALS, Nettie Gunter MD, 12.5 mg at 03/17/21 3043    isosorbide dinitrate (ISORDIL) tablet 20 mg, 20 mg, Oral, TID, Nettie Gunter MD, 20 mg at 03/17/21 0821    heparin (porcine) injection 5,000 Units, 5,000 Units, SubCUTAneous, Q8H, Nettie Gunter MD, 5,000 Units at 03/17/21 1209    sodium chloride (NS) flush 5-40 mL, 5-40 mL, IntraVENous, Q8H, Dao Public, NP, 10 mL at 03/17/21 0552    sodium chloride (NS) flush 5-40 mL, 5-40 mL, IntraVENous, PRN, Dao Public, NP    acetaminophen (TYLENOL) tablet 650 mg, 650 mg, Oral, Q6H PRN **OR** acetaminophen (TYLENOL) suppository 650 mg, 650 mg, Rectal, Q6H PRN, Yolyn Public, NP    polyethylene glycol (MIRALAX) packet 17 g, 17 g, Oral, DAILY PRN, Yolyn Public, NP    promethazine (PHENERGAN) tablet 12.5 mg, 12.5 mg, Oral, Q6H PRN **OR** ondansetron (ZOFRAN) injection 4 mg, 4 mg, IntraVENous, Q6H PRN, Yolyn Public, NP    famotidine (PEPCID) tablet 20 mg, 20 mg, Oral, DAILY, Yolyn Public, NP, 20 mg at 03/17/21 8844

## 2021-03-17 NOTE — PROGRESS NOTES
Two nurse skin assessment completed by this nurse and Bertha Course RN. Skin is warm, dry, and intact.

## 2021-03-17 NOTE — PROGRESS NOTES
Renal Daily Progress Note    Admit Date: 3/8/2021      Subjective:   She is sitting up in bed without dizziness. She denies cramps. She tells me that her appetite is still good. No nausea or vomiting. Current Facility-Administered Medications   Medication Dose Route Frequency    cloNIDine HCL (CATAPRES) tablet 0.1 mg  0.1 mg Oral Q8H    dexamethasone (DECADRON) 4 mg/mL injection 4 mg  4 mg IntraVENous Q12H    glucose chewable tablet 16 g  4 Tab Oral PRN    dextrose (D50W) injection syrg 12.5-25 g  25-50 mL IntraVENous PRN    glucagon (GLUCAGEN) injection 1 mg  1 mg IntraMUSCular PRN    insulin lispro (HUMALOG) injection   SubCUTAneous AC&HS    furosemide (LASIX) injection 80 mg  80 mg IntraVENous Q8H    hydrALAZINE (APRESOLINE) tablet 100 mg  100 mg Oral TID    amLODIPine (NORVASC) tablet 10 mg  10 mg Oral DAILY    carvediloL (COREG) tablet 12.5 mg  12.5 mg Oral BID WITH MEALS    isosorbide dinitrate (ISORDIL) tablet 20 mg  20 mg Oral TID    heparin (porcine) injection 5,000 Units  5,000 Units SubCUTAneous Q8H    sodium chloride (NS) flush 5-40 mL  5-40 mL IntraVENous Q8H    sodium chloride (NS) flush 5-40 mL  5-40 mL IntraVENous PRN    acetaminophen (TYLENOL) tablet 650 mg  650 mg Oral Q6H PRN    Or    acetaminophen (TYLENOL) suppository 650 mg  650 mg Rectal Q6H PRN    polyethylene glycol (MIRALAX) packet 17 g  17 g Oral DAILY PRN    promethazine (PHENERGAN) tablet 12.5 mg  12.5 mg Oral Q6H PRN    Or    ondansetron (ZOFRAN) injection 4 mg  4 mg IntraVENous Q6H PRN    famotidine (PEPCID) tablet 20 mg  20 mg Oral DAILY        Review of Systems    Review of Systems   Respiratory: Negative for cough and shortness of breath. Cardiovascular: Positive for leg swelling. Negative for chest pain. Gastrointestinal: Negative for abdominal pain. Genitourinary: Negative for dysuria. Musculoskeletal: Negative for joint pain. Neurological: Negative for dizziness and headaches. Objective:     Patient Vitals for the past 8 hrs:   BP Temp Pulse Resp SpO2   03/17/21 0745 137/76 99 °F (37.2 °C) 69 18 96 %     No intake/output data recorded. 03/15 1901 - 03/17 0700  In: -   Out: 600 [Urine:600]    Physical Exam:   Physical Exam   Neck: No JVD present. Cardiovascular: Regular rhythm. Pulmonary/Chest: Breath sounds normal. No respiratory distress. Abdominal: Soft. Bowel sounds are normal. She exhibits no distension. Musculoskeletal:         General: Edema present. Neurological: She is alert. No asterixis   1+ presacral edema        XR CHEST SNGL V INSPIR   Final Result   Status post right IJ central line placement without evidence of   complication and improved aeration in the right perihilar region. IR INSERT NON TUNL CVC OVER 5 YRS   Final Result   Successful placement of a triple-lumen central venous catheter. The catheter is   ready for use. IR US GUIDED VASCULAR ACCESS   Final Result   Successful placement of a triple-lumen central venous catheter. The catheter is   ready for use. US RETROPERITONEUM COMP   Final Result   1. This examination is negative for hydronephrosis as an etiology for the   patient's renal insufficiency. 2.  On prior sonographic imaging there were demonstrated echogenic masses within   the left kidney which are no longer present. There are now is a smaller but more   normal morphology to the patient's left kidney. XR CHEST SNGL V   Final Result           Data Review   No results for input(s): WBC, HGB, HGBEXT, HCT, HCTEXT, PLT, PLTEXT, HGBEXT, HCTEXT, PLTEXT, HGBEXT, HCTEXT, PLTEXT in the last 72 hours. No results for input(s): NA, K, CL, CO2, GLU, BUN, CREA, CA, MG, PHOS, ALB, TBIL, ALT, INR, INREXT, INREXT, INREXT in the last 72 hours. No lab exists for component: SGOT, PTHBB  No components found for: GLPOC  No results for input(s): PH, PCO2, PO2, HCO3, FIO2 in the last 72 hours.   No results for input(s): INR, Elenita Heck, INREXT in the last 72 hours. Assessment:           Active Problems:    PNA (pneumonia) (3/8/2021)    Stage V CKD but no uremic symptoms. She is nonoliguric   Nephrotic syndrome with sequela  Anemia of chronic disease  Hypertension under better control today. Plan:   Clonidine dose has been increased   We will switch furosemide to p.o. Edema will get better only with initiation of dialysis. She wants to go on peritoneal dialysis. Will have a PD catheter placed as an outpatient once she gets over the COVID-19 infection.

## 2021-03-17 NOTE — CONSULTS
PULMONARY NOTE  [facility]    Name: Tab Rodriguez MRN: [id number]   : 1960 Hospital: [facility]   Date: 3/17/2021  Admission date: 3/8/2021 Hospital Day: 10       HPI:     Hospital Problems  Never Reviewed          Codes Class Noted POA    PNA (pneumonia) ICD-10-CM: J18.9  ICD-9-CM: 248  3/8/2021 Unknown                   [x] High complexity decision making was performed  [x] See my orders for details      Subjective/Initial History:     Sitting up on side of bed. Reports watery diarrhea in large amounts without much abdominal cramping. Eating breakfast. No change in lower extremity edema from yesterday. Excerpts from admission 3/8/2021 or consult notes as follows:   64-year-old lady came in because of abdominal discomfort significant past medical history of congestive heart failure chronic kidney disease and type 2 diabetes mellitus she was complaining of shortness of breath dyspnea and feeling nauseous for the past 1 week no history of passing out so admitted and she is COVID-19 positive so pulmonary consult was called for further evaluation.       Allergies   Allergen Reactions    Doxycycline Swelling    Egg Diarrhea    Milk Containing Products Nausea and Vomiting    Tetracycline Swelling        MAR reviewed and pertinent medications noted or modified as needed     Current Facility-Administered Medications   Medication    cloNIDine HCL (CATAPRES) tablet 0.1 mg    dexamethasone (DECADRON) 4 mg/mL injection 4 mg    glucose chewable tablet 16 g    dextrose (D50W) injection syrg 12.5-25 g    glucagon (GLUCAGEN) injection 1 mg    insulin lispro (HUMALOG) injection    furosemide (LASIX) injection 80 mg    hydrALAZINE (APRESOLINE) tablet 100 mg    amLODIPine (NORVASC) tablet 10 mg    carvediloL (COREG) tablet 12.5 mg    isosorbide dinitrate (ISORDIL) tablet 20 mg    heparin (porcine) injection 5,000 Units    sodium chloride (NS) flush 5-40 mL    sodium chloride (NS) flush 5-40 mL    acetaminophen (TYLENOL) tablet 650 mg    Or    acetaminophen (TYLENOL) suppository 650 mg    polyethylene glycol (MIRALAX) packet 17 g    promethazine (PHENERGAN) tablet 12.5 mg    Or    ondansetron (ZOFRAN) injection 4 mg    famotidine (PEPCID) tablet 20 mg      Patient PCP: Adria Link MD  PMH:  has a past medical history of Hypertension and Morbid obesity ([facility] [address] PSH:   has a past surgical history that includes ir insert non tunl cvc over 5 yrs (3/12/2021). FHX: family history is not on file. SHX:       ROS:    Review of Systems   Constitutional: Negative. HENT: Negative. Eyes: Negative. Respiratory: Positive for shortness of breath. Cardiovascular: Positive for leg swelling. Gastrointestinal: Positive for abdominal pain and diarrhea. Negative for nausea. Genitourinary: Negative. Musculoskeletal: Negative. Skin: Negative. Neurological: Negative for weakness. Endo/Heme/Allergies: Negative. Psychiatric/Behavioral: Negative. Objective:     Vital Signs: Telemetry:    normal sinus rhythm Intake/Output:   Visit Vitals  /76 (BP Patient Position: At rest)   Pulse 69   Temp 99 °F (37.2 °C)   Resp 18   Ht 5' 9\" (1.753 m)   Wt 91.6 kg (202 lb)   SpO2 96%   Breastfeeding No   BMI 29.83 kg/m²       Temp (24hrs), Av.3 °F (36.3 °C), Min:96.4 °F (35.8 °C), Max:99 °F (37.2 °C)        O2 Device: Room air O2 Flow Rate (L/min): 0 l/min       Wt Readings from Last 4 Encounters:   21 91.6 kg (202 lb)        No intake or output data in the 24 hours ending 21 1046    Last shift:      No intake/output data recorded. Last 3 shifts: 03/15 1901 -  0700  In: -   Out: 600 [Urine:600]       Physical Exam:     Physical Exam   Constitutional: She is oriented to person, place, and time. She appears well-developed. No distress. HENT:   Head: Normocephalic and atraumatic. Eyes: Pupils are equal, round, and reactive to light.  Conjunctivae are normal.   Neck: Normal range of motion. Neck supple. No JVD present. Cardiovascular: Normal rate, regular rhythm and normal heart sounds. Pulmonary/Chest: Effort normal and breath sounds normal.   Abdominal: Soft. Bowel sounds are normal. She exhibits no distension. There is no abdominal tenderness. Musculoskeletal: Normal range of motion. General: Edema present. No tenderness. Comments: 3-4+ pitting edema in bilateral lower extremities - chronic and unchanged from yesterday   Neurological: She is alert and oriented to person, place, and time. Skin: Skin is warm and dry. Areas of scars from healed lower extremity wounds/lesions bilaterally. Psychiatric: She has a normal mood and affect. Labs:    No results for input(s): WBC, HGB, PLT, INR, APTT, HGBEXT, PLTEXT, INREXT, HGBEXT, PLTEXT, INREXT in the last 72 hours. No lab exists for component: FIB, DDMER  No results for input(s): NA, K, CL, CO2, GLU, BUN, CREA, CA, MG, PHOS, LAC, ALB, TBIL, ALT, AML, LPSE in the last 72 hours. No lab exists for component: SGOT  No results for input(s): PH, PCO2, PO2, HCO3, FIO2 in the last 72 hours. No results for input(s): CPK, CKNDX, TROIQ in the last 72 hours. No lab exists for component: CPKMB  No results found for: BNPP, BNP   Lab Results   Component Value Date/Time    Culture result: No growth 6 days 03/09/2021 01:32 AM    Culture result: No Growth (<1000 cfu/mL) 03/08/2021 07:00 PM    Culture result: No growth 6 days 03/08/2021 02:05 PM     Lab Results   Component Value Date/Time    TSH 1.57 03/08/2021 11:45 AM       Imaging:    CXR Results  (Last 48 hours)    None        Results from [place] encounter on 03/08/21   XR CHEST SNGL V INSPIR    Narrative Portable upright radiograph chest 3:51 PM compared to March 8, 2021. INDICATION: Post right IJ catheter placement. Heart size remains enlarged.  Interval placement of a right IJ central line with  tip projecting over the SVC. Improved aeration in the right perihilar region. No  pneumothorax or gross effusion. Impression Status post right IJ central line placement without evidence of  complication and improved aeration in the right perihilar region. XR CHEST SNGL V    Narrative 1 new comparison June 21    Cardiomegaly with congestion. Mild interstitial edema. Localized airspace  disease right perihilar. No effusion or pneumothorax     No results found for this or any previous visit. IMPRESSION:   1. COVID-19 pneumonia  2. Acute on chronic kidney disease   3. Congestive heart failure  4. Pulmonary hypertension  5. Ex COPD  6. Anemia of chronic disease  7. Prognosis guarded       RECOMMENDATIONS/PLAN:     1. She is on room air without evidence of hypoxia. 96-97% on room air  2. Off zosyn. 3. Azithromycin discontinued and c-diff studies ordered due to new onset of multiple episodes of liquid diarrhea stools. 4. Decadron decreased to q12h  5. Chest x-ray shows cardiomegaly with congestion and infiltrate right perihilar  6. She is on Lasix 80 twice daily  7. Patient is on Isordil cannot start patient on sildenafil will discuss with cardiologist patient has EF about 47% WHO group 2 for pulmonary hypertension  8. Supplemental O2 to keep sats > 93%  9.  DVT, SUP prophylaxis       Karen Ling MD

## 2021-03-17 NOTE — PROGRESS NOTES
Spiritual Care Assessment/Progress Note  [facility]      NAME: Tab Rodriguez      MRN: [id number]  AGE: 62 y.o. SEX: female  Episcopal Affiliation: Religion   Language: English     3/17/2021     Total Time (in minutes): 9     Spiritual Assessment begun in [facility] through conversation with:         [x]Patient        [] Family    [] Friend(s)        Reason for Consult: Initial/Spiritual assessment, patient floor     Spiritual beliefs: (Please include comment if needed)     [] Identifies with a yajaira tradition:         [] Supported by a yajaira community:            [] Claims no spiritual orientation:           [] Seeking spiritual identity:                [] Adheres to an individual form of spirituality:           [x] Not able to assess:                           Identified resources for coping:      [] Prayer                               [] Music                  [] Guided Imagery     [] Family/friends                 [] Pet visits     [] Devotional reading                         [x] Unknown     [] Other:                                              Interventions offered during this visit: (See comments for more details)    Patient Interventions: Initial visit, Prayer (assurance of)           Plan of Care:     [] Support spiritual and/or cultural needs    [] Support AMD and/or advance care planning process      [] Support grieving process   [] Coordinate Rites and/or Rituals    [] Coordination with community clergy   [] No spiritual needs identified at this time   [] Detailed Plan of Care below (See Comments)  [] Make referral to Music Therapy  [] Make referral to Pet Therapy     [] Make referral to Addiction services  [] Make referral to [facility]  [] Make referral to Spiritual Care Partner  [] No future visits requested        [x] Follow up upon further referrals     Comments:  consulted pt's nurse and called her on [address], for initial spiritual assessment.  Pt, . Mary Barkley picked her phone, she indicated phone woke her up from sleep.  encouraged her to get some sleep and advised her of spiritual care availability as needed or upon staff referrals. Pt thanked  for the call. Visited by: Daryl Osullivan.    can be reached by calling the  at [facility]  [phone]

## 2021-03-18 VITALS
WEIGHT: 202 LBS | OXYGEN SATURATION: 100 % | TEMPERATURE: 97.7 F | HEIGHT: 69 IN | DIASTOLIC BLOOD PRESSURE: 77 MMHG | SYSTOLIC BLOOD PRESSURE: 157 MMHG | HEART RATE: 63 BPM | BODY MASS INDEX: 29.92 KG/M2 | RESPIRATION RATE: 18 BRPM

## 2021-03-18 LAB
GLUCOSE BLD STRIP.AUTO-MCNC: 234 MG/DL (ref 65–100)
GLUCOSE BLD STRIP.AUTO-MCNC: 344 MG/DL (ref 65–100)
PERFORMED BY, TECHID: ABNORMAL
PERFORMED BY, TECHID: ABNORMAL

## 2021-03-18 PROCEDURE — 74011636637 HC RX REV CODE- 636/637: Performed by: FAMILY MEDICINE

## 2021-03-18 PROCEDURE — 74011250637 HC RX REV CODE- 250/637: Performed by: INTERNAL MEDICINE

## 2021-03-18 PROCEDURE — 82962 GLUCOSE BLOOD TEST: CPT

## 2021-03-18 PROCEDURE — 74011250637 HC RX REV CODE- 250/637: Performed by: NURSE PRACTITIONER

## 2021-03-18 PROCEDURE — 74011250636 HC RX REV CODE- 250/636: Performed by: NURSE PRACTITIONER

## 2021-03-18 PROCEDURE — 74011250636 HC RX REV CODE- 250/636: Performed by: INTERNAL MEDICINE

## 2021-03-18 RX ORDER — FUROSEMIDE 40 MG/1
TABLET ORAL
Qty: 60 TAB | Refills: 0 | Status: SHIPPED | OUTPATIENT
Start: 2021-03-18 | End: 2021-03-18

## 2021-03-18 RX ORDER — FUROSEMIDE 80 MG/1
TABLET ORAL
Qty: 60 TAB | Refills: 0 | Status: SHIPPED | OUTPATIENT
Start: 2021-03-18 | End: 2021-03-22

## 2021-03-18 RX ORDER — CLONIDINE HYDROCHLORIDE 0.1 MG/1
0.1 TABLET ORAL EVERY 8 HOURS
Qty: 60 TAB | Refills: 0 | Status: SHIPPED | OUTPATIENT
Start: 2021-03-18 | End: 2021-03-22

## 2021-03-18 RX ORDER — FAMOTIDINE 20 MG/1
20 TABLET, FILM COATED ORAL DAILY
Qty: 60 TAB | Refills: 0 | Status: SHIPPED | OUTPATIENT
Start: 2021-03-19 | End: 2021-06-25

## 2021-03-18 RX ADMIN — HEPARIN SODIUM 5000 UNITS: 5000 INJECTION INTRAVENOUS; SUBCUTANEOUS at 03:31

## 2021-03-18 RX ADMIN — HEPARIN SODIUM 5000 UNITS: 5000 INJECTION INTRAVENOUS; SUBCUTANEOUS at 11:00

## 2021-03-18 RX ADMIN — INSULIN LISPRO 10 UNITS: 100 INJECTION, SOLUTION INTRAVENOUS; SUBCUTANEOUS at 11:30

## 2021-03-18 RX ADMIN — HYDRALAZINE HYDROCHLORIDE 100 MG: 50 TABLET, FILM COATED ORAL at 08:32

## 2021-03-18 RX ADMIN — Medication 10 ML: at 08:33

## 2021-03-18 RX ADMIN — FUROSEMIDE 120 MG: 40 TABLET ORAL at 08:32

## 2021-03-18 RX ADMIN — DEXAMETHASONE SODIUM PHOSPHATE 4 MG: 4 INJECTION, SOLUTION INTRAMUSCULAR; INTRAVENOUS at 08:33

## 2021-03-18 RX ADMIN — CLONIDINE HYDROCHLORIDE 0.1 MG: 0.1 TABLET ORAL at 06:15

## 2021-03-18 RX ADMIN — INSULIN LISPRO 4 UNITS: 100 INJECTION, SOLUTION INTRAVENOUS; SUBCUTANEOUS at 07:30

## 2021-03-18 RX ADMIN — AMLODIPINE BESYLATE 10 MG: 5 TABLET ORAL at 08:33

## 2021-03-18 RX ADMIN — FAMOTIDINE 20 MG: 20 TABLET, FILM COATED ORAL at 08:32

## 2021-03-18 RX ADMIN — CARVEDILOL 12.5 MG: 12.5 TABLET, FILM COATED ORAL at 08:00

## 2021-03-18 RX ADMIN — ISOSORBIDE DINITRATE 20 MG: 10 TABLET ORAL at 08:28

## 2021-03-18 NOTE — CONSULTS
PULMONARY NOTE  [facility]    Name: Jeancarlos Garcia MRN: [id number]   : 1960 Hospital: [address] [place] [address]   Date: 3/18/2021  Admission date: 3/8/2021 Hospital Day: 6       HPI:     Hospital Problems  Never Reviewed          Codes Class Noted POA    PNA (pneumonia) ICD-10-CM: J18.9  ICD-9-CM: 716  3/8/2021 Unknown                   [x] High complexity decision making was performed  [x] See my orders for details      Subjective/Initial History:     Sitting up on side of bed. Reports watery diarrhea in large amounts without much abdominal cramping. Eating breakfast. No change in lower extremity edema from yesterday. Excerpts from admission 3/8/2021 or consult notes as follows:   36-year-old lady came in because of abdominal discomfort significant past medical history of congestive heart failure chronic kidney disease and type 2 diabetes mellitus she was complaining of shortness of breath dyspnea and feeling nauseous for the past 1 week no history of passing out so admitted and she is COVID-19 positive so pulmonary consult was called for further evaluation.       Allergies   Allergen Reactions    Doxycycline Swelling    Egg Diarrhea    Milk Containing Products Nausea and Vomiting    Tetracycline Swelling        MAR reviewed and pertinent medications noted or modified as needed     Current Facility-Administered Medications   Medication    furosemide (LASIX) tablet 120 mg    cloNIDine HCL (CATAPRES) tablet 0.1 mg    dexamethasone (DECADRON) 4 mg/mL injection 4 mg    glucose chewable tablet 16 g    dextrose (D50W) injection syrg 12.5-25 g    glucagon (GLUCAGEN) injection 1 mg    insulin lispro (HUMALOG) injection    hydrALAZINE (APRESOLINE) tablet 100 mg    amLODIPine (NORVASC) tablet 10 mg    carvediloL (COREG) tablet 12.5 mg    isosorbide dinitrate (ISORDIL) tablet 20 mg    heparin (porcine) injection 5,000 Units    sodium chloride (NS) flush 5-40 mL    sodium chloride (NS) flush 5-40 mL    acetaminophen (TYLENOL) tablet 650 mg    Or    acetaminophen (TYLENOL) suppository 650 mg    polyethylene glycol (MIRALAX) packet 17 g    promethazine (PHENERGAN) tablet 12.5 mg    Or    ondansetron (ZOFRAN) injection 4 mg    famotidine (PEPCID) tablet 20 mg      Patient PCP: Adria Link MD  PMH:  has a past medical history of Hypertension and Morbid obesity ([facility] [address] PSH:   has a past surgical history that includes ir insert non tunl cvc over 5 yrs (3/12/2021). FHX: family history is not on file. SHX:       ROS:    Review of Systems   Constitutional: Negative. HENT: Negative. Eyes: Negative. Respiratory: Positive for shortness of breath. Cardiovascular: Positive for leg swelling. Gastrointestinal: Positive for abdominal pain and diarrhea. Negative for nausea. Genitourinary: Negative. Musculoskeletal: Negative. Skin: Negative. Neurological: Negative for weakness. Endo/Heme/Allergies: Negative. Psychiatric/Behavioral: Negative. Objective:     Vital Signs: Telemetry:    normal sinus rhythm Intake/Output:   Visit Vitals  BP (!) 157/77   Pulse 63   Temp 97.7 °F (36.5 °C)   Resp 18   Ht 5' 9\" (1.753 m)   Wt 91.6 kg (202 lb)   SpO2 100%   Breastfeeding No   BMI 29.83 kg/m²       Temp (24hrs), Av.2 °F (36.2 °C), Min:96.6 °F (35.9 °C), Max:97.7 °F (36.5 °C)        O2 Device: Room air O2 Flow Rate (L/min): 0 l/min       Wt Readings from Last 4 Encounters:   21 91.6 kg (202 lb)        No intake or output data in the 24 hours ending 21 1047    Last shift:      No intake/output data recorded. Last 3 shifts: No intake/output data recorded. Physical Exam:     Physical Exam   Constitutional: She is oriented to person, place, and time. She appears well-developed. No distress. HENT:   Head: Normocephalic and atraumatic. Eyes: Pupils are equal, round, and reactive to light. Conjunctivae are normal.   Neck: Normal range of motion. Neck supple. No JVD present. Cardiovascular: Normal rate, regular rhythm and normal heart sounds. Pulmonary/Chest: Effort normal and breath sounds normal.   Abdominal: Soft. Bowel sounds are normal. She exhibits no distension. There is no abdominal tenderness. Musculoskeletal: Normal range of motion. General: Edema present. No tenderness. Comments: 3-4+ pitting edema in bilateral lower extremities - chronic and unchanged from yesterday   Neurological: She is alert and oriented to person, place, and time. Skin: Skin is warm and dry. Areas of scars from healed lower extremity wounds/lesions bilaterally. Psychiatric: She has a normal mood and affect. Labs:    No results for input(s): WBC, HGB, PLT, INR, APTT, HGBEXT, PLTEXT, INREXT, HGBEXT, PLTEXT, INREXT in the last 72 hours. No lab exists for component: FIB, DDMER  No results for input(s): NA, K, CL, CO2, GLU, BUN, CREA, CA, MG, PHOS, LAC, ALB, TBIL, ALT, AML, LPSE in the last 72 hours. No lab exists for component: SGOT  No results for input(s): PH, PCO2, PO2, HCO3, FIO2 in the last 72 hours. No results for input(s): CPK, CKNDX, TROIQ in the last 72 hours. No lab exists for component: CPKMB  No results found for: BNPP, BNP   Lab Results   Component Value Date/Time    Culture result: No growth 6 days 03/09/2021 01:32 AM    Culture result: No Growth (<1000 cfu/mL) 03/08/2021 07:00 PM    Culture result: No growth 6 days 03/08/2021 02:05 PM     Lab Results   Component Value Date/Time    TSH 1.57 03/08/2021 11:45 AM       Imaging:    CXR Results  (Last 48 hours)    None        Results from [place] encounter on 03/08/21   XR CHEST SNGL V INSPIR    Narrative Portable upright radiograph chest 3:51 PM compared to March 8, 2021. INDICATION: Post right IJ catheter placement. Heart size remains enlarged. Interval placement of a right IJ central line with  tip projecting over the SVC.  Improved aeration in the right perihilar region. No  pneumothorax or gross effusion. Impression Status post right IJ central line placement without evidence of  complication and improved aeration in the right perihilar region. XR CHEST SNGL V    Narrative 1 new comparison June 21    Cardiomegaly with congestion. Mild interstitial edema. Localized airspace  disease right perihilar. No effusion or pneumothorax     No results found for this or any previous visit. IMPRESSION:   1. COVID-19 pneumonia  2. Acute on chronic kidney disease   3. Congestive heart failure  4. Pulmonary hypertension  5. Ex COPD  6. Anemia of chronic disease  7. Prognosis guarded       RECOMMENDATIONS/PLAN:     1. She is on room air without evidence of hypoxia. 96-97% on room air  2. Off zosyn. 3. Azithromycin discontinued and c-diff studies ordered due to new onset of multiple episodes of liquid diarrhea stools. 4. Decadron decreased to q12h  5. Chest x-ray shows cardiomegaly with congestion and infiltrate right perihilar  6. She is on Lasix 80 twice daily  7. Patient is on Isordil cannot start patient on sildenafil will discuss with cardiologist patient has EF about 47% WHO group 2 for pulmonary hypertension  8. Patient wants to go on peritoneal dialysis PD catheter as an outpatient  9. Supplemental O2 to keep sats > 93%  10.  DVT, SUP prophylaxis       Landon Nuno MD

## 2021-03-18 NOTE — DISCHARGE SUMMARY
Discharge Summary       PATIENT ID: Tab Rodriguez  MRN: [id number]   YOB: 1960    DATE OF ADMISSION: 3/8/2021 10:49 AM    DATE OF DISCHARGE:   PRIMARY CARE PROVIDER: Adria Link MD     ATTENDING PHYSICIAN: Jaret Reyes  DISCHARGING PROVIDER: Jaret Reyes      CONSULTATIONS: IP CONSULT TO CARDIOLOGY  IP CONSULT TO CARDIOLOGY  IP CONSULT TO PULMONOLOGY  IP CONSULT TO NEPHROLOGY    PROCEDURES/SURGERIES: * No surgery found *    ADMITTING DIAGNOSES:    Patient Active Problem List    Diagnosis Date Noted    PNA (pneumonia) 03/08/2021       DISCHARGE DIAGNOSES / PLAN:         COVID-19 pneumonitis  Moderate pulmonary hypertension  Acute on chronic kidney disease stage V  Acute systolic heart failure  Acute diastolic heart failure  Type 2 diabetes uncontrolled  Acute CHF  Anemia chronic disease  Hypokalemia  Hypoalbuminemia  Patient uncontrolled  Have significant diarrhea     Plan:      Increase clonidine to 0.1 twice daily  Patient on Norvasc 10 mg daily  Zithromax 500 mg IV daily  Coreg 12.5 twice daily  Decadron 4 mg every 6 hours  Pepcid 20 daily  On Lasix 80 mg IV every 12 hours  Hydralazine 100 mg 3 times a day  Isosorbide dinitrate 20 mg 3 times a day  n          DISCHARGE MEDICATIONS:  Current Discharge Medication List      START taking these medications    Details   cloNIDine HCL (CATAPRES) 0.1 mg tablet Take 1 Tab by mouth every eight (8) hours. Qty: 60 Tab, Refills: 0      famotidine (PEPCID) 20 mg tablet Take 1 Tab by mouth daily. Qty: 60 Tab, Refills: 0         CONTINUE these medications which have CHANGED    Details   furosemide (LASIX) 80 mg tablet Twice daily  Qty: 60 Tab, Refills: 0         CONTINUE these medications which have NOT CHANGED    Details   sodium bicarbonate 650 mg tablet Take  by mouth three (3) times daily. metOLazone (ZAROXOLYN) 5 mg tablet Take  by mouth daily. valsartan (DIOVAN) 160 mg tablet Take 160 mg by mouth daily.       aspirin delayed-release 81 mg tablet Take 81 mg by mouth. amLODIPine (NORVASC) 10 mg tablet Take  by mouth daily. isosorbide dinitrate (ISORDIL) 20 mg tablet Take 20 mg by mouth three (3) times daily. hydrALAZINE (APRESOLINE) 100 mg tablet Take 100 mg by mouth. docusate sodium (COLACE) 100 mg capsule Take 100 mg by mouth two (2) times a day. carvediloL (COREG) 12.5 mg tablet Take 12.5 mg by mouth two (2) times daily (with meals). glipiZIDE (GLUCOTROL) 10 mg tablet Take 10 mg by mouth two (2) times a day. NOTIFY YOUR PHYSICIAN FOR ANY OF THE FOLLOWING:   Fever over 101 degrees for 24 hours. Chest pain, shortness of breath, fever, chills, nausea, vomiting, diarrhea, change in mentation, falling, weakness, bleeding. Severe pain or pain not relieved by medications. Or, any other signs or symptoms that you may have questions about. DISPOSITION:  x  Home With:   OT  PT  HH  RN       Long term SNF/Inpatient Rehab    Independent/assisted living    Hospice    Other:       PATIENT CONDITION AT DISCHARGE: Stable      PHYSICAL EXAMINATION AT DISCHARGE:  General:          Alert, cooperative, no distress, appears stated age. HEENT:           Atraumatic, anicteric sclerae, pink conjunctivae                          No oral ulcers, mucosa moist, throat clear, dentition fair  Neck:               Supple, symmetrical  Lungs:             Clear to auscultation bilaterally. No Wheezing or Rhonchi. No rales. Chest wall:      No tenderness  No Accessory muscle use. Heart:              Regular  rhythm,  No  murmur   No edema  Abdomen:        Soft, non-tender. Not distended. Bowel sounds normal  Extremities:     No cyanosis. No clubbing,                            Skin turgor normal, Capillary refill normal  Skin:                Not pale. Not Jaundiced  No rashes   Psych:             Not anxious or agitated.   Neurologic:      Alert, moves all extremities, answers questions appropriately and responds to commands     XR CHEST SNGL V INSPIR   Final Result   Status post right IJ central line placement without evidence of   complication and improved aeration in the right perihilar region. IR INSERT NON TUNL CVC OVER 5 YRS   Final Result   Successful placement of a triple-lumen central venous catheter. The catheter is   ready for use. IR US GUIDED VASCULAR ACCESS   Final Result   Successful placement of a triple-lumen central venous catheter. The catheter is   ready for use. US RETROPERITONEUM COMP   Final Result   1. This examination is negative for hydronephrosis as an etiology for the   patient's renal insufficiency. 2.  On prior sonographic imaging there were demonstrated echogenic masses within   the left kidney which are no longer present. There are now is a smaller but more   normal morphology to the patient's left kidney.       XR CHEST SNGL V   Final Result           Recent Results (from the past 24 hour(s))   BNP    Collection Time: 03/17/21  1:21 PM   Result Value Ref Range    NT pro-BNP 7,341 (H) <125 pg/mL   GLUCOSE, POC    Collection Time: 03/17/21  3:58 PM   Result Value Ref Range    Glucose (POC) 217 (H) 65 - 100 mg/dL    Performed by María Elena Krishna ([place])    GLUCOSE, POC    Collection Time: 03/17/21  9:28 PM   Result Value Ref Range    Glucose (POC) 190 (H) 65 - 100 mg/dL    Performed by CHRISTINE MENDOZA    GLUCOSE, POC    Collection Time: 03/18/21  7:46 AM   Result Value Ref Range    Glucose (POC) 234 (H) 65 - 100 mg/dL    Performed by Dianna Eason    GLUCOSE, POC    Collection Time: 03/18/21 11:11 AM   Result Value Ref Range    Glucose (POC) 344 (H) 65 - 100 mg/dL    Performed by [facility] COURSE:  Patient is a 62y.o. year old female history of type 2 diabetes chronic kidney disease CHF chronic kidney disease came to the ER complaining of abdominal pain and some shortness of breath  Patient having some symptom for 1 week nausea and getting more worse seen by the ER physician work-up done shows acute kidney injury elevated BNP     Patient denies any fever chills cough congestion  Initial impression was COVID-19 pneumonitis acute on chronic kidney disease type 2 diabetes CHF anemia of chronic disease patient initially started on Zithromax Zosyn and Decadron also start on Lasix 40 mg IV twice daily seen by the nephrologist and the pulmonologist during this admission  Lasix increased to 80 mg IV twice daily at the nephrologist    Patient denies any chest pain or shortness of breath nausea vomiting diarrhea no constipation    Today temperature 97.7 blood pressure 157/77 oxygen saturation 100% on room air    Patient discharged home in stable condition follow-up with in 1 to 2 weeks    Labs in 1 week    Patient have echo done shows ejection fraction about 86% grade 2 diastolic dysfunction          Signed:   Mohsen Mccarty MD  3/18/2021  12:16 PM

## 2021-03-18 NOTE — PROGRESS NOTES
Problem: Patient Education: Go to Patient Education Activity  Goal: Patient/Family Education  3/18/2021 0344 by Jerry Peace RN  Outcome: Progressing Towards Goal  3/18/2021 0344 by Jerry Peace RN  Outcome: Progressing Towards Goal

## 2021-03-20 ENCOUNTER — APPOINTMENT (OUTPATIENT)
Dept: GENERAL RADIOLOGY | Age: 61
End: 2021-03-20
Attending: EMERGENCY MEDICINE
Payer: COMMERCIAL

## 2021-03-20 ENCOUNTER — APPOINTMENT (OUTPATIENT)
Dept: CT IMAGING | Age: 61
End: 2021-03-20
Attending: EMERGENCY MEDICINE
Payer: COMMERCIAL

## 2021-03-20 ENCOUNTER — APPOINTMENT (OUTPATIENT)
Dept: CT IMAGING | Age: 61
End: 2021-03-20
Attending: INTERNAL MEDICINE
Payer: COMMERCIAL

## 2021-03-20 ENCOUNTER — HOSPITAL ENCOUNTER (OUTPATIENT)
Age: 61
Setting detail: OBSERVATION
Discharge: HOME | End: 2021-03-22
Attending: EMERGENCY MEDICINE | Admitting: INTERNAL MEDICINE
Payer: COMMERCIAL

## 2021-03-20 DIAGNOSIS — R42 VERTIGO: ICD-10-CM

## 2021-03-20 DIAGNOSIS — U07.1 COVID-19: ICD-10-CM

## 2021-03-20 DIAGNOSIS — R42 DIZZINESS: Primary | ICD-10-CM

## 2021-03-20 DIAGNOSIS — Z87.01 HISTORY OF PNEUMONIA: ICD-10-CM

## 2021-03-20 PROBLEM — N18.5 STAGE 5 CHRONIC KIDNEY DISEASE (HCC): Status: ACTIVE | Noted: 2021-03-20

## 2021-03-20 PROBLEM — R11.2 INTRACTABLE NAUSEA AND VOMITING: Status: ACTIVE | Noted: 2021-03-20

## 2021-03-20 LAB
ALBUMIN SERPL-MCNC: 2.1 G/DL (ref 3.5–5)
ALBUMIN/GLOB SERPL: 0.6 {RATIO} (ref 1.1–2.2)
ALP SERPL-CCNC: 85 U/L (ref 45–117)
ALT SERPL-CCNC: 21 U/L (ref 12–78)
ANION GAP SERPL CALC-SCNC: 12 MMOL/L (ref 5–15)
AST SERPL W P-5'-P-CCNC: 15 U/L (ref 15–37)
ATRIAL RATE: 73 BPM
BASOPHILS # BLD: 0 K/UL (ref 0–0.1)
BASOPHILS NFR BLD: 0 % (ref 0–1)
BILIRUB SERPL-MCNC: 0.2 MG/DL (ref 0.2–1)
BUN SERPL-MCNC: 99 MG/DL (ref 6–20)
BUN/CREAT SERPL: 16 (ref 12–20)
CA-I BLD-MCNC: 8.7 MG/DL (ref 8.5–10.1)
CALCULATED P AXIS, ECG09: 65 DEGREES
CALCULATED R AXIS, ECG10: 61 DEGREES
CALCULATED T AXIS, ECG11: 79 DEGREES
CHLORIDE SERPL-SCNC: 105 MMOL/L (ref 97–108)
CO2 SERPL-SCNC: 21 MMOL/L (ref 21–32)
COVID-19 RAPID TEST, COVR: NOT DETECTED
CREAT SERPL-MCNC: 6.34 MG/DL (ref 0.55–1.02)
DIAGNOSIS, 93000: NORMAL
DIFFERENTIAL METHOD BLD: ABNORMAL
EOSINOPHIL # BLD: 0.1 K/UL (ref 0–0.4)
EOSINOPHIL NFR BLD: 1 % (ref 0–7)
ERYTHROCYTE [DISTWIDTH] IN BLOOD BY AUTOMATED COUNT: 14.8 % (ref 11.5–14.5)
GLOBULIN SER CALC-MCNC: 3.5 G/DL (ref 2–4)
GLUCOSE BLD STRIP.AUTO-MCNC: 114 MG/DL (ref 65–100)
GLUCOSE BLD STRIP.AUTO-MCNC: 121 MG/DL (ref 65–100)
GLUCOSE SERPL-MCNC: 134 MG/DL (ref 65–100)
HCT VFR BLD AUTO: 31.4 % (ref 35–47)
HGB BLD-MCNC: 10.5 G/DL (ref 11.5–16)
IMM GRANULOCYTES # BLD AUTO: 0.2 K/UL (ref 0–0.04)
IMM GRANULOCYTES NFR BLD AUTO: 1 % (ref 0–0.5)
LIPASE SERPL-CCNC: 189 U/L (ref 73–393)
LYMPHOCYTES # BLD: 2.4 K/UL (ref 0.8–3.5)
LYMPHOCYTES NFR BLD: 17 % (ref 12–49)
MCH RBC QN AUTO: 27.7 PG (ref 26–34)
MCHC RBC AUTO-ENTMCNC: 33.4 G/DL (ref 30–36.5)
MCV RBC AUTO: 82.8 FL (ref 80–99)
MONOCYTES # BLD: 1.7 K/UL (ref 0–1)
MONOCYTES NFR BLD: 12 % (ref 5–13)
NEUTS SEG # BLD: 9.5 K/UL (ref 1.8–8)
NEUTS SEG NFR BLD: 69 % (ref 32–75)
P-R INTERVAL, ECG05: 186 MS
PERFORMED BY, TECHID: ABNORMAL
PERFORMED BY, TECHID: ABNORMAL
PLATELET # BLD AUTO: 282 K/UL (ref 150–400)
PMV BLD AUTO: 10.6 FL (ref 8.9–12.9)
POTASSIUM SERPL-SCNC: 3.8 MMOL/L (ref 3.5–5.1)
PROT SERPL-MCNC: 5.6 G/DL (ref 6.4–8.2)
Q-T INTERVAL, ECG07: 434 MS
QRS DURATION, ECG06: 92 MS
QTC CALCULATION (BEZET), ECG08: 478 MS
RBC # BLD AUTO: 3.79 M/UL (ref 3.8–5.2)
SODIUM SERPL-SCNC: 138 MMOL/L (ref 136–145)
SPECIMEN SOURCE: NORMAL
TROPONIN I SERPL-MCNC: <0.05 NG/ML
VENTRICULAR RATE, ECG03: 73 BPM
WBC # BLD AUTO: 13.7 K/UL (ref 3.6–11)

## 2021-03-20 PROCEDURE — 96374 THER/PROPH/DIAG INJ IV PUSH: CPT

## 2021-03-20 PROCEDURE — 99285 EMERGENCY DEPT VISIT HI MDM: CPT

## 2021-03-20 PROCEDURE — 74011250636 HC RX REV CODE- 250/636: Performed by: EMERGENCY MEDICINE

## 2021-03-20 PROCEDURE — 36415 COLL VENOUS BLD VENIPUNCTURE: CPT

## 2021-03-20 PROCEDURE — 80053 COMPREHEN METABOLIC PANEL: CPT

## 2021-03-20 PROCEDURE — 96375 TX/PRO/DX INJ NEW DRUG ADDON: CPT

## 2021-03-20 PROCEDURE — 85025 COMPLETE CBC W/AUTO DIFF WBC: CPT

## 2021-03-20 PROCEDURE — 99284 EMERGENCY DEPT VISIT MOD MDM: CPT

## 2021-03-20 PROCEDURE — 96372 THER/PROPH/DIAG INJ SC/IM: CPT

## 2021-03-20 PROCEDURE — 82962 GLUCOSE BLOOD TEST: CPT

## 2021-03-20 PROCEDURE — 74176 CT ABD & PELVIS W/O CONTRAST: CPT

## 2021-03-20 PROCEDURE — 99218 HC RM OBSERVATION: CPT

## 2021-03-20 PROCEDURE — 84484 ASSAY OF TROPONIN QUANT: CPT

## 2021-03-20 PROCEDURE — 87635 SARS-COV-2 COVID-19 AMP PRB: CPT

## 2021-03-20 PROCEDURE — 70450 CT HEAD/BRAIN W/O DYE: CPT

## 2021-03-20 PROCEDURE — 74011250636 HC RX REV CODE- 250/636: Performed by: INTERNAL MEDICINE

## 2021-03-20 PROCEDURE — 83690 ASSAY OF LIPASE: CPT

## 2021-03-20 PROCEDURE — 93005 ELECTROCARDIOGRAM TRACING: CPT

## 2021-03-20 PROCEDURE — 71045 X-RAY EXAM CHEST 1 VIEW: CPT

## 2021-03-20 RX ORDER — FUROSEMIDE 40 MG/1
80 TABLET ORAL
Status: CANCELLED | OUTPATIENT
Start: 2021-03-20

## 2021-03-20 RX ORDER — SODIUM CHLORIDE 0.9 % (FLUSH) 0.9 %
5-40 SYRINGE (ML) INJECTION EVERY 8 HOURS
Status: DISCONTINUED | OUTPATIENT
Start: 2021-03-20 | End: 2021-03-22 | Stop reason: HOSPADM

## 2021-03-20 RX ORDER — MECLIZINE HCL 12.5 MG 12.5 MG/1
12.5 TABLET ORAL
Status: DISCONTINUED | OUTPATIENT
Start: 2021-03-20 | End: 2021-03-22 | Stop reason: HOSPADM

## 2021-03-20 RX ORDER — ACETAMINOPHEN 650 MG/1
650 SUPPOSITORY RECTAL
Status: DISCONTINUED | OUTPATIENT
Start: 2021-03-20 | End: 2021-03-22 | Stop reason: HOSPADM

## 2021-03-20 RX ORDER — AMLODIPINE BESYLATE 5 MG/1
10 TABLET ORAL DAILY
Status: CANCELLED | OUTPATIENT
Start: 2021-03-21

## 2021-03-20 RX ORDER — ASPIRIN 81 MG/1
81 TABLET ORAL DAILY
Status: CANCELLED | OUTPATIENT
Start: 2021-03-21

## 2021-03-20 RX ORDER — HYDRALAZINE HYDROCHLORIDE 25 MG/1
100 TABLET, FILM COATED ORAL EVERY 8 HOURS
Status: CANCELLED | OUTPATIENT
Start: 2021-03-20

## 2021-03-20 RX ORDER — INSULIN LISPRO 100 [IU]/ML
INJECTION, SOLUTION INTRAVENOUS; SUBCUTANEOUS
Status: DISCONTINUED | OUTPATIENT
Start: 2021-03-20 | End: 2021-03-22 | Stop reason: HOSPADM

## 2021-03-20 RX ORDER — MECLIZINE HYDROCHLORIDE 25 MG/1
25 TABLET ORAL
Status: COMPLETED | OUTPATIENT
Start: 2021-03-20 | End: 2021-03-20

## 2021-03-20 RX ORDER — DOCUSATE SODIUM 100 MG/1
100 CAPSULE, LIQUID FILLED ORAL 2 TIMES DAILY
Status: CANCELLED | OUTPATIENT
Start: 2021-03-20

## 2021-03-20 RX ORDER — MAGNESIUM SULFATE 100 %
4 CRYSTALS MISCELLANEOUS AS NEEDED
Status: DISCONTINUED | OUTPATIENT
Start: 2021-03-20 | End: 2021-03-22 | Stop reason: HOSPADM

## 2021-03-20 RX ORDER — CARVEDILOL 12.5 MG/1
12.5 TABLET ORAL 2 TIMES DAILY WITH MEALS
Status: CANCELLED | OUTPATIENT
Start: 2021-03-20

## 2021-03-20 RX ORDER — ONDANSETRON 2 MG/ML
4 INJECTION INTRAMUSCULAR; INTRAVENOUS
Status: COMPLETED | OUTPATIENT
Start: 2021-03-20 | End: 2021-03-20

## 2021-03-20 RX ORDER — ACETAMINOPHEN 325 MG/1
650 TABLET ORAL
Status: DISCONTINUED | OUTPATIENT
Start: 2021-03-20 | End: 2021-03-22 | Stop reason: HOSPADM

## 2021-03-20 RX ORDER — SODIUM CHLORIDE 0.9 % (FLUSH) 0.9 %
5-40 SYRINGE (ML) INJECTION AS NEEDED
Status: DISCONTINUED | OUTPATIENT
Start: 2021-03-20 | End: 2021-03-22 | Stop reason: HOSPADM

## 2021-03-20 RX ORDER — FACIAL-BODY WIPES
10 EACH TOPICAL DAILY PRN
Status: DISCONTINUED | OUTPATIENT
Start: 2021-03-20 | End: 2021-03-22 | Stop reason: HOSPADM

## 2021-03-20 RX ORDER — ISOSORBIDE DINITRATE 20 MG/1
20 TABLET ORAL 3 TIMES DAILY
Status: CANCELLED | OUTPATIENT
Start: 2021-03-20

## 2021-03-20 RX ORDER — DEXTROSE 50 % IN WATER (D50W) INTRAVENOUS SYRINGE
25-50 AS NEEDED
Status: DISCONTINUED | OUTPATIENT
Start: 2021-03-20 | End: 2021-03-22 | Stop reason: HOSPADM

## 2021-03-20 RX ORDER — HEPARIN SODIUM 5000 [USP'U]/ML
5000 INJECTION, SOLUTION INTRAVENOUS; SUBCUTANEOUS EVERY 8 HOURS
Status: DISCONTINUED | OUTPATIENT
Start: 2021-03-20 | End: 2021-03-22 | Stop reason: HOSPADM

## 2021-03-20 RX ORDER — PROMETHAZINE HYDROCHLORIDE 25 MG/1
12.5 TABLET ORAL
Status: DISCONTINUED | OUTPATIENT
Start: 2021-03-20 | End: 2021-03-22 | Stop reason: HOSPADM

## 2021-03-20 RX ORDER — LABETALOL HCL 20 MG/4 ML
10 SYRINGE (ML) INTRAVENOUS
Status: DISCONTINUED | OUTPATIENT
Start: 2021-03-20 | End: 2021-03-22 | Stop reason: HOSPADM

## 2021-03-20 RX ORDER — MORPHINE SULFATE 2 MG/ML
2 INJECTION, SOLUTION INTRAMUSCULAR; INTRAVENOUS ONCE
Status: COMPLETED | OUTPATIENT
Start: 2021-03-20 | End: 2021-03-20

## 2021-03-20 RX ORDER — SODIUM BICARBONATE 650 MG/1
650 TABLET ORAL 3 TIMES DAILY
Status: CANCELLED | OUTPATIENT
Start: 2021-03-20

## 2021-03-20 RX ORDER — FUROSEMIDE 10 MG/ML
40 INJECTION INTRAMUSCULAR; INTRAVENOUS EVERY 12 HOURS
Status: DISCONTINUED | OUTPATIENT
Start: 2021-03-20 | End: 2021-03-21

## 2021-03-20 RX ORDER — CLONIDINE HYDROCHLORIDE 0.1 MG/1
0.1 TABLET ORAL EVERY 8 HOURS
Status: CANCELLED | OUTPATIENT
Start: 2021-03-20

## 2021-03-20 RX ORDER — ONDANSETRON 2 MG/ML
4 INJECTION INTRAMUSCULAR; INTRAVENOUS
Status: DISCONTINUED | OUTPATIENT
Start: 2021-03-20 | End: 2021-03-22 | Stop reason: HOSPADM

## 2021-03-20 RX ADMIN — HEPARIN SODIUM 5000 UNITS: 5000 INJECTION INTRAVENOUS; SUBCUTANEOUS at 17:19

## 2021-03-20 RX ADMIN — Medication 10 ML: at 23:38

## 2021-03-20 RX ADMIN — PROCHLORPERAZINE EDISYLATE 10 MG: 5 INJECTION INTRAMUSCULAR; INTRAVENOUS at 18:20

## 2021-03-20 RX ADMIN — MECLIZINE HYDROCHLORIDE 25 MG: 25 TABLET ORAL at 15:57

## 2021-03-20 RX ADMIN — MORPHINE SULFATE 2 MG: 2 INJECTION, SOLUTION INTRAMUSCULAR; INTRAVENOUS at 09:56

## 2021-03-20 RX ADMIN — HEPARIN SODIUM 5000 UNITS: 5000 INJECTION INTRAVENOUS; SUBCUTANEOUS at 23:28

## 2021-03-20 RX ADMIN — Medication 10 ML: at 18:20

## 2021-03-20 RX ADMIN — FUROSEMIDE 40 MG: 10 INJECTION, SOLUTION INTRAMUSCULAR; INTRAVENOUS at 23:28

## 2021-03-20 RX ADMIN — ONDANSETRON 4 MG: 2 INJECTION INTRAMUSCULAR; INTRAVENOUS at 09:56

## 2021-03-20 NOTE — PROGRESS NOTES
Reason for Admission:   dizziness                    RUR Score:    High              PCP: First and Last name:   Johanna Johnson MD     Name of Practice:    Are you a current patient: Yes/No: yes   Approximate date of last visit:    Can you participate in a virtual visit if needed:     Do you (patient/family) have any concerns for transition/discharge? Plan for utilizing home health:   no    Current Advanced Directive/Advance Care Plan:  Prior      Healthcare Decision Maker:   Click here to complete [address] including selection of the Healthcare Decision Maker Relationship (ie \"Primary\")            Primary Decision Maker: Mariano Cartagena - Mother - [phone]    Transition of Care Plan:     Home  Will follow medical improvement and needs on discharge.

## 2021-03-20 NOTE — H&P
History & Physical    Primary Care Provider: Johanna Johnson MD  Source of Information: Patient , records, staff    History of Presenting Illness:   Hector Rashid is a 62 y.o. female who presents with multiple comorbidities, including CKD stage V, nephrotic syndrome, RTA type IV, recent Covid pneumonia discharged on 3/18/2021, hypertension, pulmonary hypertension, COPD and anemia of chronic disease. She returns to the emergency department complaining of intractable dizziness. Apparently she took her blood pressure medications this morning and became very dizzy and it has been unrelenting. Dizziness described as spinning, worse with head movement and associated with nausea and unsteady gait. Labs are reviewed, BNP is distending to 7300, white count is 13.7, creatinine stable from recent discharge around 6.3. EKG shows normal sinus rhythm. Chest x-ray is clear. Recent echocardiogram showed an EF of 47% and grade 2 diastolic dysfunction. Despite antiemetics in the ED and meclizine dizziness persisted. Patient was crying complaining of nausea. She denies any abdominal pain, bloody or dark stools, hematemesis, shortness of breath, fevers or chills. Review of Systems:  Constitutional: Negative for chills, diaphoresis, fatigue and fever. HENT: Negative for congestion, nosebleeds, sinus pressure and sinus pain. Eyes: Negative. Negative for photophobia. Respiratory: Negative for apnea, cough, choking, chest tightness, shortness of breath, wheezing and stridor. Cardiovascular: Positive for  leg swelling. Gastrointestinal:  Positive for nausea, vomiting negative for pain in the abdomen   Genitourinary: Negative. Musculoskeletal: Negative. Negative for back pain and gait problem. Skin: Negative. Allergic/Immunologic: Negative. Neurological: Positive for dizziness, syncope and headaches. Negative for weakness, light-headedness and numbness. Hematological: Negative. Psychiatric/Behavioral: Negative. Past Medical History:   Diagnosis Date    Hypertension     Morbid obesity ([address]       Past Surgical History:   Procedure Laterality Date    IR INSERT NON TUNL CVC OVER 5 YRS  3/12/2021     Prior to Admission medications    Medication Sig Start Date End Date Taking? Authorizing Provider   cloNIDine HCL (CATAPRES) 0.1 mg tablet Take 1 Tab by mouth every eight (8) hours. 3/18/21   Owen Reyes MD   famotidine (PEPCID) 20 mg tablet Take 1 Tab by mouth daily. 3/19/21   Owen Reyes MD   furosemide (LASIX) 80 mg tablet Twice daily 3/18/21   Madina Duque MD   sodium bicarbonate 650 mg tablet Take  by mouth three (3) times daily. Provider, Historical   metOLazone (ZAROXOLYN) 5 mg tablet Take  by mouth daily. Provider, Historical   valsartan (DIOVAN) 160 mg tablet Take 160 mg by mouth daily. Provider, Historical   aspirin delayed-release 81 mg tablet Take 81 mg by mouth. Provider, Historical   amLODIPine (NORVASC) 10 mg tablet Take  by mouth daily. Provider, Historical   isosorbide dinitrate (ISORDIL) 20 mg tablet Take 20 mg by mouth three (3) times daily. Provider, Historical   hydrALAZINE (APRESOLINE) 100 mg tablet Take 100 mg by mouth. Provider, Historical   docusate sodium (COLACE) 100 mg capsule Take 100 mg by mouth two (2) times a day. Provider, Historical   carvediloL (COREG) 12.5 mg tablet Take 12.5 mg by mouth two (2) times daily (with meals). Provider, Historical   glipiZIDE (GLUCOTROL) 10 mg tablet Take 10 mg by mouth two (2) times a day. Provider, Historical     Allergies   Allergen Reactions    Doxycycline Swelling    Egg Diarrhea    Milk Containing Products Nausea and Vomiting    Tetracycline Swelling      History reviewed. No pertinent family history.      SOCIAL HISTORY:  Patient resides:  Independently x   Assisted Living    SNF    With family care       Smoking history:   None    Former x Chronic      Alcohol history:   None x   Social    Chronic      Ambulates:   Independently x   w/cane    w/walker    w/wc    CODE STATUS:  DNR    Full x   Other      Objective:     Physical Exam:     Visit Vitals  BP (!) 167/81   Pulse 72   Temp 98.5 °F (36.9 °C)   Resp 18   Ht 5' 8\" (1.727 m)   Wt 95.3 kg (210 lb)   SpO2 97%   BMI 31.93 kg/m²      O2 Device: Room air    General:  Alert, cooperative, no distress, appears stated age. Head:  Normocephalic, without obvious abnormality, atraumatic. Eyes:  Conjunctivae/corneas clear. PERRL, EOMs intact. Nose: Nares normal. Septum midline. Mucosa normal. No drainage or sinus tenderness. Throat: Lips, mucosa, and tongue normal. Teeth and gums normal.   Neck: Supple, symmetrical, trachea midline, no adenopathy, thyroid: no enlargement/tenderness/nodules, no carotid bruit and no JVD. Back:   Symmetric, no curvature. ROM normal. No CVA tenderness. Lungs:   Clear to auscultation bilaterally. Chest wall:  No tenderness or deformity. Heart:  Regular rate and rhythm, S1, S2 normal, no murmur, click, rub or gallop. Abdomen:   Soft, non-tender. Bowel sounds normal. No masses,  No organomegaly. Extremities: Extremities normal, atraumatic, no cyanosis or edema. Pulses: 2+ and symmetric all extremities. Skin: Skin color, texture, turgor normal. No rashes or lesions   Neurologic: CNII-XII intact. No motor or sensory deficits. Data Review:     Recent Days:  Recent Labs     03/20/21  1000   WBC 13.7*   HGB 10.5*   HCT 31.4*        Recent Labs     03/20/21  1000      K 3.8      CO2 21   *   BUN 99*   CREA 6.34*   CA 8.7   ALB 2.1*   TBILI 0.2   ALT 21     No results for input(s): PH, PCO2, PO2, HCO3, FIO2 in the last 72 hours.     24 Hour Results:  Recent Results (from the past 24 hour(s))   EKG, 12 LEAD, INITIAL    Collection Time: 03/20/21  9:20 AM   Result Value Ref Range    Ventricular Rate 73 BPM    Atrial Rate 73 BPM P-R Interval 186 ms    QRS Duration 92 ms    Q-T Interval 434 ms    QTC Calculation (Bezet) 478 ms    Calculated P Axis 65 degrees    Calculated R Axis 61 degrees    Calculated T Axis 79 degrees    Diagnosis       Normal sinus rhythm  Normal ECG  When compared with ECG of 08-MAR-2021 11:29,  No significant change was found  Confirmed by ELIZABETH KO, Jovon Michele ([id number]) on 3/20/2021 3:39:11 PM     TROPONIN I    Collection Time: 03/20/21 10:00 AM   Result Value Ref Range    Troponin-I, Qt. <0.05 <0.05 ng/mL   CBC WITH AUTOMATED DIFF    Collection Time: 03/20/21 10:00 AM   Result Value Ref Range    WBC 13.7 (H) 3.6 - 11.0 K/uL    RBC 3.79 (L) 3.80 - 5.20 M/uL    HGB 10.5 (L) 11.5 - 16.0 g/dL    HCT 31.4 (L) 35.0 - 47.0 %    MCV 82.8 80.0 - 99.0 FL    MCH 27.7 26.0 - 34.0 PG    MCHC 33.4 30.0 - 36.5 g/dL    RDW 14.8 (H) 11.5 - 14.5 %    PLATELET 703 523 - 231 K/uL    MPV 10.6 8.9 - 12.9 FL    NEUTROPHILS 69 32 - 75 %    LYMPHOCYTES 17 12 - 49 %    MONOCYTES 12 5 - 13 %    EOSINOPHILS 1 0 - 7 %    BASOPHILS 0 0 - 1 %    IMMATURE GRANULOCYTES 1 (H) 0.0 - 0.5 %    ABS. NEUTROPHILS 9.5 (H) 1.8 - 8.0 K/UL    ABS. LYMPHOCYTES 2.4 0.8 - 3.5 K/UL    ABS. MONOCYTES 1.7 (H) 0.0 - 1.0 K/UL    ABS. EOSINOPHILS 0.1 0.0 - 0.4 K/UL    ABS. BASOPHILS 0.0 0.0 - 0.1 K/UL    ABS. IMM. GRANS. 0.2 (H) 0.00 - 0.04 K/UL    DF AUTOMATED     METABOLIC PANEL, COMPREHENSIVE    Collection Time: 03/20/21 10:00 AM   Result Value Ref Range    Sodium 138 136 - 145 mmol/L    Potassium 3.8 3.5 - 5.1 mmol/L    Chloride 105 97 - 108 mmol/L    CO2 21 21 - 32 mmol/L    Anion gap 12 5 - 15 mmol/L    Glucose 134 (H) 65 - 100 mg/dL    BUN 99 (H) 6 - 20 mg/dL    Creatinine 6.34 (H) 0.55 - 1.02 mg/dL    BUN/Creatinine ratio 16 12 - 20      GFR est AA 8 (L) >60 ml/min/1.73m2    GFR est non-AA 7 (L) >60 ml/min/1.73m2    Calcium 8.7 8.5 - 10.1 mg/dL    Bilirubin, total 0.2 0.2 - 1.0 mg/dL    AST (SGOT) 15 15 - 37 U/L    ALT (SGPT) 21 12 - 78 U/L    Alk.  phosphatase 85 45 - 117 U/L    Protein, total 5.6 (L) 6.4 - 8.2 g/dL    Albumin 2.1 (L) 3.5 - 5.0 g/dL    Globulin 3.5 2.0 - 4.0 g/dL    A-G Ratio 0.6 (L) 1.1 - 2.2           Imaging:     Assessment:     Active Problems:    Intractable nausea and vomiting (3/20/2021)      Stage 5 chronic kidney disease ([facility] [address] (3/20/2021)         -Intractable nausea and vomiting,? Uremic? Denies abdominal pain though states persistently nauseous  -Stage V CKD, follows with Dr. Mejia Gauthier recently admitted  -Recent COVID-19 pneumonia, discharged 3/18/2021 chest x-ray clear  -Uncontrolled hypertension  -Anemia of chronic disease stable hemoglobin 10.5  -Per recent admission, nephrology following, RTA type IV, nephrotic syndrome  -Diabetes mellitus, A1c was 6.1 on recent admission  -Pulmonary hypertension  Plan:     -Admit observation and regards to intractable nausea and vomiting  -We will get nephrology involved, uremic?  -CT of the abdomen and pelvis without contrast is pending  -Antiemetics as needed alternating Phenergan and Zofran  -Hold oral DM Rx, continue sliding scale insulin coverage/sensitive scale with hypoglycemic protocol  -Meclizine as needed  -Mobilize  -PT/OT  -Reviewed home medications, resumed appropriate, Lasix to IV. -Heparin subcu for DVT PPx  -Patient remains a full code  -Disposition: Observation admission, pending course.   Signed By: Nuno Mcmullen MD     March 20, 2021

## 2021-03-20 NOTE — ED PROVIDER NOTES
EMERGENCY DEPARTMENT HISTORY AND PHYSICAL EXAM      Date: 3/20/2021  Patient Name: Malissa Walker    History of Presenting Illness     Chief Complaint   Patient presents with    Dizziness       History Provided By: Patient    HPI: Malissa Walker, 62 y.o. female presents to the ED with cc of   Chief Complaint   Patient presents with    Dizziness   Recently dc from [facility] for pneumonia and + covid. Took bp medication this am and became dizzy   Patient describes the dizziness as spinning, worsen with head movement and associated with nausea and unsteady gait. There are no other complaints, changes, or physical findings at this time. PCP: Crow Phillip MD    No current facility-administered medications on file prior to encounter. Current Outpatient Medications on File Prior to Encounter   Medication Sig Dispense Refill    cloNIDine HCL (CATAPRES) 0.1 mg tablet Take 1 Tab by mouth every eight (8) hours. 60 Tab 0    famotidine (PEPCID) 20 mg tablet Take 1 Tab by mouth daily. 60 Tab 0    furosemide (LASIX) 80 mg tablet Twice daily 60 Tab 0    sodium bicarbonate 650 mg tablet Take  by mouth three (3) times daily.  metOLazone (ZAROXOLYN) 5 mg tablet Take  by mouth daily.  valsartan (DIOVAN) 160 mg tablet Take 160 mg by mouth daily.  aspirin delayed-release 81 mg tablet Take 81 mg by mouth.  amLODIPine (NORVASC) 10 mg tablet Take  by mouth daily.  isosorbide dinitrate (ISORDIL) 20 mg tablet Take 20 mg by mouth three (3) times daily.  hydrALAZINE (APRESOLINE) 100 mg tablet Take 100 mg by mouth.  docusate sodium (COLACE) 100 mg capsule Take 100 mg by mouth two (2) times a day.  carvediloL (COREG) 12.5 mg tablet Take 12.5 mg by mouth two (2) times daily (with meals).  glipiZIDE (GLUCOTROL) 10 mg tablet Take 10 mg by mouth two (2) times a day.          Past History     Past Medical History:  Past Medical History:   Diagnosis Date    Hypertension     Morbid obesity ([address]        Past Surgical History:  Past Surgical History:   Procedure Laterality Date    IR INSERT NON TUNL CVC OVER 5 YRS  3/12/2021       Family History:  History reviewed. No pertinent family history. Social History:  Social History     Tobacco Use    Smoking status: Never Smoker    Smokeless tobacco: Never Used   Substance Use Topics    Alcohol use: Not on file    Drug use: Not on file       Allergies: Allergies   Allergen Reactions    Doxycycline Swelling    Egg Diarrhea    Milk Containing Products Nausea and Vomiting    Tetracycline Swelling         Review of Systems   Review of Systems   Constitutional: Negative for chills, diaphoresis, fatigue and fever. HENT: Negative for congestion, nosebleeds, sinus pressure and sinus pain. Eyes: Negative. Negative for photophobia. Respiratory: Negative for apnea, cough, choking, chest tightness, shortness of breath, wheezing and stridor. Cardiovascular: Positive for chest pain and leg swelling. Gastrointestinal: Negative. Genitourinary: Negative. Musculoskeletal: Negative. Negative for back pain and gait problem. Skin: Negative. Allergic/Immunologic: Negative. Neurological: Positive for dizziness, syncope and headaches. Negative for weakness, light-headedness and numbness. Hematological: Negative. Psychiatric/Behavioral: Negative. Physical Exam   Physical Exam  Vitals signs and nursing note reviewed. Constitutional:       Appearance: Normal appearance. She is normal weight. HENT:      Head: Normocephalic and atraumatic. Nose: No congestion or rhinorrhea. Eyes:      Extraocular Movements: Extraocular movements intact. Pupils: Pupils are equal, round, and reactive to light. Neck:      Musculoskeletal: Normal range of motion and neck supple. Cardiovascular:      Rate and Rhythm: Normal rate and regular rhythm.    Pulmonary:      Effort: Pulmonary effort is normal.      Breath sounds: Normal breath sounds. Abdominal:      General: Abdomen is flat. Bowel sounds are normal. There is no distension. Tenderness: There is no abdominal tenderness. There is no guarding. Musculoskeletal: Normal range of motion. Skin:     General: Skin is warm and dry. Neurological:      Mental Status: She is alert and oriented to person, place, and time. Mental status is at baseline. Psychiatric:         Mood and Affect: Mood is anxious and depressed. Diagnostic Study Results     Labs -     Recent Results (from the past 12 hour(s))   TROPONIN I    Collection Time: 03/20/21 10:00 AM   Result Value Ref Range    Troponin-I, Qt. <0.05 <0.05 ng/mL   CBC WITH AUTOMATED DIFF    Collection Time: 03/20/21 10:00 AM   Result Value Ref Range    WBC 13.7 (H) 3.6 - 11.0 K/uL    RBC 3.79 (L) 3.80 - 5.20 M/uL    HGB 10.5 (L) 11.5 - 16.0 g/dL    HCT 31.4 (L) 35.0 - 47.0 %    MCV 82.8 80.0 - 99.0 FL    MCH 27.7 26.0 - 34.0 PG    MCHC 33.4 30.0 - 36.5 g/dL    RDW 14.8 (H) 11.5 - 14.5 %    PLATELET 084 890 - 183 K/uL    MPV 10.6 8.9 - 12.9 FL    NEUTROPHILS 69 32 - 75 %    LYMPHOCYTES 17 12 - 49 %    MONOCYTES 12 5 - 13 %    EOSINOPHILS 1 0 - 7 %    BASOPHILS 0 0 - 1 %    IMMATURE GRANULOCYTES 1 (H) 0.0 - 0.5 %    ABS. NEUTROPHILS 9.5 (H) 1.8 - 8.0 K/UL    ABS. LYMPHOCYTES 2.4 0.8 - 3.5 K/UL    ABS. MONOCYTES 1.7 (H) 0.0 - 1.0 K/UL    ABS. EOSINOPHILS 0.1 0.0 - 0.4 K/UL    ABS. BASOPHILS 0.0 0.0 - 0.1 K/UL    ABS. IMM.  GRANS. 0.2 (H) 0.00 - 0.04 K/UL    DF AUTOMATED     METABOLIC PANEL, COMPREHENSIVE    Collection Time: 03/20/21 10:00 AM   Result Value Ref Range    Sodium 138 136 - 145 mmol/L    Potassium 3.8 3.5 - 5.1 mmol/L    Chloride 105 97 - 108 mmol/L    CO2 21 21 - 32 mmol/L    Anion gap 12 5 - 15 mmol/L    Glucose 134 (H) 65 - 100 mg/dL    BUN 99 (H) 6 - 20 mg/dL    Creatinine 6.34 (H) 0.55 - 1.02 mg/dL    BUN/Creatinine ratio 16 12 - 20      GFR est AA 8 (L) >60 ml/min/1.73m2    GFR est non-AA 7 (L) >60 ml/min/1.73m2    Calcium 8.7 8.5 - 10.1 mg/dL    Bilirubin, total 0.2 0.2 - 1.0 mg/dL    AST (SGOT) 15 15 - 37 U/L    ALT (SGPT) 21 12 - 78 U/L    Alk. phosphatase 85 45 - 117 U/L    Protein, total 5.6 (L) 6.4 - 8.2 g/dL    Albumin 2.1 (L) 3.5 - 5.0 g/dL    Globulin 3.5 2.0 - 4.0 g/dL    A-G Ratio 0.6 (L) 1.1 - 2.2         Labs reviewed by me    Radiologic Studies -   CT HEAD WO CONT   Final Result   Negative. XR CHEST SNGL V    (Results Pending)     CT Results  (Last 48 hours)               03/20/21 1030  CT HEAD WO CONT Final result    Impression:  Negative. Narrative:  Dose Reduction:        All CT scans at this facility are performed using dose reduction optimization   techniques as appropriate to a performed exam including the following: Automated   exposure control, adjustments of the mA and/or kV according to patient size, or   use of iterative reconstruction technique. CT of the head without contrast. Axial images of the head were obtained   unenhanced and sagittal and coronal reconstructions were created. No prior films   for comparison. Ventricles are normal in size shape and position. No shift of   the midline or mass is seen. No pathologic extra-axial fluid collection is   identified. There is no evidence of acute hemorrhage or infarction. No bony   abnormality is seen. The exam is otherwise unremarkable. CXR Results  (Last 48 hours)    None            Medical Decision Making     I am the first provider for this patient. I reviewed the vital signs, available nursing notes, past medical history, past surgical history, family history and social history. RADIOLOGY report and LABS reviewed by me    Vital Signs-Reviewed the patient's vital signs.   Patient Vitals for the past 12 hrs:   Temp Pulse Resp BP SpO2   03/20/21 0926    (!) 171/80 99 %   03/20/21 0919 98.5 °F (36.9 °C) 75 18 (!) 192/98 99 %       EKG interpretation: (Preliminary)  EKG done at 1920 shows normal sinus rhythm, normal axis, ventricular rate of 73, nonspecific ST with no ectopy          Records Reviewed: Nurse's note. Provider Notes (Medical Decision Making):    Patient presents with DIFF DX : Vertigo, pneumonia, sepsis,        ED Course:   Initial assessment performed. The patients presenting problems have been discussed, and they are in agreement with the care plan formulated and outlined with them. I have encouraged them to ask questions as they arise throughout their visit. TREATMENT RESPONSE -Stable          Sunday Farrar MD      Disposition:  Admitted   Diagnostic tests were reviewed and questions answered. Diagnosis, care plan and treatment options were discussed. The patient understand instructions and will follow up as directed. Condition stable    Admitting Provider:  No admitting provider for patient encounter. Consulting Provider:  No ref. provider found       DISCHARGE PLAN:  1. Current Discharge Medication List        2. Follow-up Information    None       3. Return to ED if worse     Diagnosis     Clinical Impression:     ICD-10-CM ICD-9-CM    1. Dizziness  R42 780.4    2. Vertigo  R42 780.4    3. History of pneumonia  Z87.01 V12.61    4. COVID-19  U07.1 079.89         Attestations:    Sunday Farrar MD    Please note that this dictation was completed with [email or web address], the [email or web address] voice recognition software. Quite often unanticipated grammatical, syntax, homophones, and other interpretive errors are inadvertently transcribed by the computer software. Please disregard these errors. Please excuse any errors that have escaped final proofreading. Thank you.

## 2021-03-20 NOTE — ED TRIAGE NOTES
Recently dc from [facility] for pneumonia and + covid.  Took bp medication this am and became dizzy

## 2021-03-20 NOTE — ROUTINE PROCESS
TRANSFER - OUT REPORT: 
 
Verbal report given to Nedra RN(name) on Israel Gomez  being transferred to [facility](unit) for routine progression of care Report consisted of patients Situation, Background, Assessment and  
Recommendations(SBAR). Information from the following report(s) SBAR, ED Summary, Intake/Output, MAR, Recent Results and Cardiac Rhythm NSR was reviewed with the receiving nurse. Lines:  
Peripheral IV 03/20/21 Posterior;Right Hand (Active) Opportunity for questions and clarification was provided. Patient transported with: 
 [employer] Personal belongings

## 2021-03-21 PROBLEM — I10 UNCONTROLLED HYPERTENSION: Status: ACTIVE | Noted: 2021-03-21

## 2021-03-21 LAB
ANION GAP SERPL CALC-SCNC: 11 MMOL/L (ref 5–15)
BUN SERPL-MCNC: 88 MG/DL (ref 6–20)
BUN/CREAT SERPL: 15 (ref 12–20)
CA-I BLD-MCNC: 8.7 MG/DL (ref 8.5–10.1)
CHLORIDE SERPL-SCNC: 106 MMOL/L (ref 97–108)
CO2 SERPL-SCNC: 25 MMOL/L (ref 21–32)
CREAT SERPL-MCNC: 5.82 MG/DL (ref 0.55–1.02)
ERYTHROCYTE [DISTWIDTH] IN BLOOD BY AUTOMATED COUNT: 14.9 % (ref 11.5–14.5)
GLUCOSE BLD STRIP.AUTO-MCNC: 102 MG/DL (ref 65–100)
GLUCOSE BLD STRIP.AUTO-MCNC: 111 MG/DL (ref 65–100)
GLUCOSE BLD STRIP.AUTO-MCNC: 113 MG/DL (ref 65–100)
GLUCOSE BLD STRIP.AUTO-MCNC: 95 MG/DL (ref 65–100)
GLUCOSE SERPL-MCNC: 92 MG/DL (ref 65–100)
HCT VFR BLD AUTO: 30.1 % (ref 35–47)
HGB BLD-MCNC: 9.7 G/DL (ref 11.5–16)
MCH RBC QN AUTO: 27.2 PG (ref 26–34)
MCHC RBC AUTO-ENTMCNC: 32.2 G/DL (ref 30–36.5)
MCV RBC AUTO: 84.3 FL (ref 80–99)
PERFORMED BY, TECHID: ABNORMAL
PERFORMED BY, TECHID: NORMAL
PLATELET # BLD AUTO: 256 K/UL (ref 150–400)
PMV BLD AUTO: 11.2 FL (ref 8.9–12.9)
POTASSIUM SERPL-SCNC: 3.6 MMOL/L (ref 3.5–5.1)
RBC # BLD AUTO: 3.57 M/UL (ref 3.8–5.2)
SODIUM SERPL-SCNC: 142 MMOL/L (ref 136–145)
WBC # BLD AUTO: 9.6 K/UL (ref 3.6–11)

## 2021-03-21 PROCEDURE — 36415 COLL VENOUS BLD VENIPUNCTURE: CPT

## 2021-03-21 PROCEDURE — 74011250636 HC RX REV CODE- 250/636: Performed by: INTERNAL MEDICINE

## 2021-03-21 PROCEDURE — 96376 TX/PRO/DX INJ SAME DRUG ADON: CPT

## 2021-03-21 PROCEDURE — 85027 COMPLETE CBC AUTOMATED: CPT

## 2021-03-21 PROCEDURE — 74011250637 HC RX REV CODE- 250/637: Performed by: INTERNAL MEDICINE

## 2021-03-21 PROCEDURE — 96372 THER/PROPH/DIAG INJ SC/IM: CPT

## 2021-03-21 PROCEDURE — 82962 GLUCOSE BLOOD TEST: CPT

## 2021-03-21 PROCEDURE — 80048 BASIC METABOLIC PNL TOTAL CA: CPT

## 2021-03-21 PROCEDURE — 99218 HC RM OBSERVATION: CPT

## 2021-03-21 RX ORDER — SODIUM BICARBONATE 650 MG/1
650 TABLET ORAL 3 TIMES DAILY
Status: DISCONTINUED | OUTPATIENT
Start: 2021-03-21 | End: 2021-03-21

## 2021-03-21 RX ORDER — ISOSORBIDE DINITRATE 10 MG/1
20 TABLET ORAL 3 TIMES DAILY
Status: DISCONTINUED | OUTPATIENT
Start: 2021-03-21 | End: 2021-03-22 | Stop reason: HOSPADM

## 2021-03-21 RX ORDER — ASPIRIN 81 MG/1
81 TABLET ORAL DAILY
Status: DISCONTINUED | OUTPATIENT
Start: 2021-03-22 | End: 2021-03-22 | Stop reason: HOSPADM

## 2021-03-21 RX ORDER — FUROSEMIDE 40 MG/1
40 TABLET ORAL 2 TIMES DAILY
Status: DISCONTINUED | OUTPATIENT
Start: 2021-03-21 | End: 2021-03-22 | Stop reason: HOSPADM

## 2021-03-21 RX ORDER — FAMOTIDINE 20 MG/1
20 TABLET, FILM COATED ORAL DAILY
Status: DISCONTINUED | OUTPATIENT
Start: 2021-03-21 | End: 2021-03-22 | Stop reason: HOSPADM

## 2021-03-21 RX ORDER — SODIUM BICARBONATE 650 MG/1
650 TABLET ORAL 2 TIMES DAILY
Status: DISCONTINUED | OUTPATIENT
Start: 2021-03-21 | End: 2021-03-22 | Stop reason: HOSPADM

## 2021-03-21 RX ORDER — HYDRALAZINE HYDROCHLORIDE 50 MG/1
50 TABLET, FILM COATED ORAL 2 TIMES DAILY
Status: DISCONTINUED | OUTPATIENT
Start: 2021-03-21 | End: 2021-03-22 | Stop reason: HOSPADM

## 2021-03-21 RX ORDER — CARVEDILOL 12.5 MG/1
12.5 TABLET ORAL 2 TIMES DAILY WITH MEALS
Status: DISCONTINUED | OUTPATIENT
Start: 2021-03-21 | End: 2021-03-22 | Stop reason: HOSPADM

## 2021-03-21 RX ADMIN — HEPARIN SODIUM 5000 UNITS: 5000 INJECTION INTRAVENOUS; SUBCUTANEOUS at 21:18

## 2021-03-21 RX ADMIN — ISOSORBIDE DINITRATE 20 MG: 10 TABLET ORAL at 21:18

## 2021-03-21 RX ADMIN — HEPARIN SODIUM 5000 UNITS: 5000 INJECTION INTRAVENOUS; SUBCUTANEOUS at 09:30

## 2021-03-21 RX ADMIN — MECLIZINE 12.5 MG: 12.5 TABLET ORAL at 09:36

## 2021-03-21 RX ADMIN — HEPARIN SODIUM 5000 UNITS: 5000 INJECTION INTRAVENOUS; SUBCUTANEOUS at 14:00

## 2021-03-21 RX ADMIN — HYDRALAZINE HYDROCHLORIDE 50 MG: 50 TABLET, FILM COATED ORAL at 21:18

## 2021-03-21 RX ADMIN — FUROSEMIDE 40 MG: 40 TABLET ORAL at 21:18

## 2021-03-21 RX ADMIN — Medication 10 ML: at 14:01

## 2021-03-21 RX ADMIN — SODIUM BICARBONATE 650 MG TABLET 650 MG: at 21:18

## 2021-03-21 RX ADMIN — FUROSEMIDE 40 MG: 10 INJECTION, SOLUTION INTRAMUSCULAR; INTRAVENOUS at 09:30

## 2021-03-21 RX ADMIN — CARVEDILOL 12.5 MG: 12.5 TABLET, FILM COATED ORAL at 17:46

## 2021-03-21 RX ADMIN — FAMOTIDINE 20 MG: 20 TABLET ORAL at 10:36

## 2021-03-21 RX ADMIN — Medication 10 ML: at 05:49

## 2021-03-21 RX ADMIN — Medication 10 ML: at 21:19

## 2021-03-21 NOTE — PROGRESS NOTES
OT eval order received and acknowledged. OT eval attempted at 0854 however pt tearful and requesting therapy to hold at this time as she is not feeling well. Will continue to follow patient and attempt OT eval at a later time. Thank you.

## 2021-03-21 NOTE — PROGRESS NOTES
PT eval orders received and acknowledged. Attempted PT/OT evaluation at 0900, pt very tearful upon arrival stating she was not feeling well today and requested to rest and attempt eval tomorrow. Will continue to follow and attempt evaluation at a later time. Thank you.

## 2021-03-21 NOTE — PROGRESS NOTES
Hospitalist Progress Note               Daily Progress Note: 3/21/2021  50-year-old female multiple comorbidities including CKD stage V and recent Covid pneumonia recently discharged 3/18/2021 returns to the emergency room complaining of intractable dizziness nausea and vomiting. Subjective: The patient is seen for follow  up. She looks much less distressed today. She states that she feels much better with much less dizziness, no vomiting or nausea. BP seem to be worse after ingesting BP meds on date of presentation, it was not positional and it was accompanied by nausea and vomiting.       Problem List:  Problem List as of 3/21/2021 Never Reviewed          Codes Class Noted - Resolved    Uncontrolled hypertension ICD-10-CM: I10  ICD-9-CM: 401.9  3/21/2021 - Present        Intractable nausea and vomiting ICD-10-CM: R11.2  ICD-9-CM: 536.2  3/20/2021 - Present        Stage 5 chronic kidney disease ([facility] [address] ICD-10-CM: N18.5  ICD-9-CM: 585.5  3/20/2021 - Present              Medications reviewed  Current Facility-Administered Medications   Medication Dose Route Frequency    famotidine (PEPCID) tablet 20 mg  20 mg Oral DAILY    hydrALAZINE (APRESOLINE) tablet 50 mg  50 mg Oral BID    furosemide (LASIX) tablet 40 mg  40 mg Oral BID    sodium chloride (NS) flush 5-40 mL  5-40 mL IntraVENous Q8H    sodium chloride (NS) flush 5-40 mL  5-40 mL IntraVENous PRN    acetaminophen (TYLENOL) tablet 650 mg  650 mg Oral Q6H PRN    Or    acetaminophen (TYLENOL) suppository 650 mg  650 mg Rectal Q6H PRN    promethazine (PHENERGAN) tablet 12.5 mg  12.5 mg Oral Q6H PRN    Or    ondansetron (ZOFRAN) injection 4 mg  4 mg IntraVENous Q6H PRN    heparin (porcine) injection 5,000 Units  5,000 Units SubCUTAneous Q8H    bisacodyL (DULCOLAX) suppository 10 mg  10 mg Rectal DAILY PRN    meclizine (ANTIVERT) tablet 12.5 mg  12.5 mg Oral Q6H PRN    insulin lispro (HUMALOG) injection   SubCUTAneous AC&HS    glucose chewable tablet 16 g  4 Tab Oral PRN    glucagon (GLUCAGEN) injection 1 mg  1 mg IntraMUSCular PRN    dextrose (D50W) injection syrg 12.5-25 g  25-50 mL IntraVENous PRN    labetaloL (NORMODYNE;TRANDATE) 20 mg/4 mL (5 mg/mL) injection 10 mg  10 mg IntraVENous Q4H PRN       Review of Systems:   Constitutional: Negative for chills, diaphoresis, fatigue and fever. HENT: Negative for congestion, nosebleeds, sinus pressure and sinus pain.    Eyes: Negative.  Negative for photophobia. Respiratory: Negative for apnea, cough, choking, chest tightness, shortness of breath, wheezing and stridor.    Cardiovascular: Positive for  leg swelling. Gastrointestinal:  Positive for nausea, vomiting negative for pain in the abdomen. No further vomiting, nausea  Genitourinary: Negative.    Musculoskeletal: Negative.  Negative for back pain and gait problem. Skin: Negative.    Allergic/Immunologic: Negative.    Neurological: Positive for dizziness but is improved Negative for weakness, light-headedness and numbness. Hematological: Negative.    Psychiatric/Behavioral: Negative.      Objective:   Physical Exam:     Visit Vitals  BP (!) 182/90 (BP Patient Position: At rest)   Pulse 84   Temp 98 °F (36.7 °C)   Resp 18   Ht 5' 8\" (1.727 m)   Wt 95.3 kg (210 lb)   SpO2 98%   BMI 31.93 kg/m²      O2 Device: Room air    Temp (24hrs), Av.3 °F (36.8 °C), Min:98 °F (36.7 °C), Max:98.5 °F (36.9 °C)    No intake/output data recorded.  1901 -  0700  In: -   Out: 1500 [Urine:1500]    General:  Alert, cooperative, no distress, appears stated age. Head:  Normocephalic, without obvious abnormality, atraumatic. Eyes:  Conjunctivae/corneas clear. PERRL, EOMs intact. Throat: MMM   Neck: Supple, symmetrical, trachea midline,  no JVD. Lungs:   Clear to auscultation bilaterally. Chest wall:  No tenderness or deformity. Heart:  Regular rate and rhythm, S1, S2 normal, no murmur, click, rub or gallop. Abdomen:   Soft, non-tender. Bowel sounds normal. No masses,  No organomegaly. Extremities: Extremities normal, atraumatic, no cyanosis or edema. Pulses: 2+ and symmetric all extremities. Skin: Skin color, texture, turgor normal. No rashes or lesions   Neurologic: CNII-XII intact. No motor or sensory deficits.         Data Review:       Recent Days:  Recent Labs     03/21/21  0557 03/20/21  1000   WBC 9.6 13.7*   HGB 9.7* 10.5*   HCT 30.1* 31.4*    282     Recent Labs     03/21/21  0557 03/20/21  1000    138   K 3.6 3.8    105   CO2 25 21   GLU 92 134*   BUN 88* 99*   CREA 5.82* 6.34*   CA 8.7 8.7   ALB  --  2.1*   TBILI  --  0.2   ALT  --  21     No results for input(s): PH, PCO2, PO2, HCO3, FIO2 in the last 72 hours.     24 Hour Results:  Recent Results (from the past 24 hour(s))   GLUCOSE, POC    Collection Time: 03/20/21  5:32 PM   Result Value Ref Range    Glucose (POC) 114 (H) 65 - 100 mg/dL    Performed by Mahesh Diaz    COVID-19 RAPID TEST    Collection Time: 03/20/21  5:45 PM   Result Value Ref Range    Specimen source Nasopharyngeal      COVID-19 rapid test Not Detected Not Detected     GLUCOSE, POC    Collection Time: 03/20/21  9:54 PM   Result Value Ref Range    Glucose (POC) 121 (H) 65 - 100 mg/dL    Performed by Williams Urias    METABOLIC PANEL, BASIC    Collection Time: 03/21/21  5:57 AM   Result Value Ref Range    Sodium 142 136 - 145 mmol/L    Potassium 3.6 3.5 - 5.1 mmol/L    Chloride 106 97 - 108 mmol/L    CO2 25 21 - 32 mmol/L    Anion gap 11 5 - 15 mmol/L    Glucose 92 65 - 100 mg/dL    BUN 88 (H) 6 - 20 mg/dL    Creatinine 5.82 (H) 0.55 - 1.02 mg/dL    BUN/Creatinine ratio 15 12 - 20      GFR est AA 9 (L) >60 ml/min/1.73m2    GFR est non-AA 7 (L) >60 ml/min/1.73m2    Calcium 8.7 8.5 - 10.1 mg/dL   CBC W/O DIFF    Collection Time: 03/21/21  5:57 AM   Result Value Ref Range    WBC 9.6 3.6 - 11.0 K/uL    RBC 3.57 (L) 3.80 - 5.20 M/uL    HGB 9.7 (L) 11.5 - 16.0 g/dL    HCT 30.1 (L) 35.0 - 47.0 %    MCV 84.3 80.0 - 99.0 FL    MCH 27.2 26.0 - 34.0 PG    MCHC 32.2 30.0 - 36.5 g/dL    RDW 14.9 (H) 11.5 - 14.5 %    PLATELET 231 645 - 753 K/uL    MPV 11.2 8.9 - 12.9 FL   GLUCOSE, POC    Collection Time: 03/21/21  7:50 AM   Result Value Ref Range    Glucose (POC) 95 65 - 100 mg/dL    Performed by [address], POC    Collection Time: 03/21/21 10:58 AM   Result Value Ref Range    Glucose (POC) 111 (H) 65 - 100 mg/dL    Performed by [address], POC    Collection Time: 03/21/21  4:30 PM   Result Value Ref Range    Glucose (POC) 102 (H) 65 - 100 mg/dL    Performed by Radha Cheema            Assessment/     Patient Active Problem List   Diagnosis Code    PNA (pneumonia) J18.9    Intractable nausea and vomiting R11.2    Stage 5 chronic kidney disease ([facility] [address] N18.5         -Intractable nausea and vomiting and dizziness after taking her medications on the date of presentation. Nausea and vomiting and dizziness are improved. -Uncontrolled hypertension  -Stage V CKD, creatinine has shown some marginal improvement.  -Anemia of chronic disease stable hemoglobin  -DM2, A1c 6.1 on recent admission  -Pulmonary hypertension  Plan:  -Nephrology is appreciated  -Holding off on valsartan and amlodipine secondary to dizziness  -continue isordil, sodium bicard, pepcid, coreg.  -Continue Lasix 40 mg twice daily  -Hydralazine added 50 mg 3 times daily    VTEP: Heparin subcu  Full code  Disposition: Blood pressure remains uncontrolled, nausea vomiting and dizziness is improved, creatinine is improving. Nephrology following. Adjusting blood pressure medications. If we get some adequate control with relief of her presenting symptoms likely discharge in the morning. Care Plan discussed with: Patient/Family and Nurse    Total time spent with patient: 30 minutes. This dictation was done by dragon, computer voice recognition software.   Often unanticipated grammatical, syntax, phones and other interpretive errors are inadvertently transcribed. Please excuse errors that have escaped final proofreading.     Mary Pereira MD

## 2021-03-21 NOTE — CONSULTS
Consult Date: 3/21/2021    IP CONSULT TO NEPHROLOGY  Consult performed by: Jacqueline Howe MD  Consult ordered by: Leala Moritz, MD  Reason for consult: CKD Stage V          Subjective    60yr old lady, with hx of CKD stage V, regular follow up at [facility], presented with dizziness. Dizziness worse after ingesting BP meds. Not positional. Accompanied by nausea and vomiting (last vomit yesterday). Denies anorexia, LOC, change in taste. Labs are reviewed, BNP is distending to 7300, white count is 13.7, creatinine stable from recent discharge around 6.3. EKG shows normal sinus rhythm. Chest x-ray is clear. Recent echocardiogram showed an EF of 47% and grade 2 diastolic dysfunction    In the ER, patient's dizziness was severe. Though today, she reports it has improved. Past Medical History:   Diagnosis Date    Hypertension     Morbid obesity ([address]     Renal failure       Past Surgical History:   Procedure Laterality Date    IR INSERT NON TUNL CVC OVER 5 YRS  3/12/2021     History reviewed. No pertinent family history.    Social History     Tobacco Use    Smoking status: Never Smoker    Smokeless tobacco: Never Used   Substance Use Topics    Alcohol use: Not on file       Current Facility-Administered Medications   Medication Dose Route Frequency Provider Last Rate Last Admin    famotidine (PEPCID) tablet 20 mg  20 mg Oral DAILY Alexis Almonte MD   20 mg at 03/21/21 1036    sodium chloride (NS) flush 5-40 mL  5-40 mL IntraVENous Q8H Alexis Wilks MD   10 mL at 03/21/21 1401    sodium chloride (NS) flush 5-40 mL  5-40 mL IntraVENous PRN Leala Moritz, MD        acetaminophen (TYLENOL) tablet 650 mg  650 mg Oral Q6H PRN Leala Moritz, MD        Or    acetaminophen (TYLENOL) suppository 650 mg  650 mg Rectal Q6H PRN Leala Moritz, MD        promethazine (PHENERGAN) tablet 12.5 mg  12.5 mg Oral Q6H PRN Leala Moritz, MD        Or    ondansetron [facility] injection 4 mg  4 mg IntraVENous Q6H PRYESY Jeffries MD        heparin (porcine) injection 5,000 Units  5,000 Units SubCUTAneous Q8H Corinne GUERRERO MD   5,000 Units at 03/21/21 1400    bisacodyL (DULCOLAX) suppository 10 mg  10 mg Rectal DAILY PRN Treasure Jeffries MD        meclizine (ANTIVERT) tablet 12.5 mg  12.5 mg Oral Q6H PRN Corinne GUERRERO MD   12.5 mg at 03/21/21 8578    insulin lispro (HUMALOG) injection   SubCUTAneous AC&HS Treasure Jeffries MD   Stopped at 03/21/21 0730    glucose chewable tablet 16 g  4 Tab Oral PRN Treasure Jeffries MD        glucagon [facility]) injection 1 mg  1 mg IntraMUSCular PRYESY Jeffries MD        dextrose (D50W) injection syrg 12.5-25 g  25-50 mL IntraVENous PRN Treasure Jeffries MD        labetaloL (NORMODYNE;TRANDATE) 20 mg/4 mL (5 mg/mL) injection 10 mg  10 mg IntraVENous Q4H PRN Treasure Jeffries MD        furosemide (LASIX) injection 40 mg  40 mg IntraVENous Q12H Corinne GUERRERO MD   40 mg at 03/21/21 0930        Review of Systems    Objective     Vital signs for last 24 hours:  Visit Vitals  BP (!) 166/89   Pulse 78   Temp 98.4 °F (36.9 °C)   Resp 16   Ht 5' 8\" (1.727 m)   Wt 95.3 kg (210 lb)   SpO2 97%   BMI 31.93 kg/m²       Intake/Output this shift:  Current Shift: No intake/output data recorded.   Last 3 Shifts: 03/19 1901 - 03/21 0700  In: -   Out: 1500 [Urine:1500]    Data Review:   Recent Results (from the past 24 hour(s))   GLUCOSE, POC    Collection Time: 03/20/21  5:32 PM   Result Value Ref Range    Glucose (POC) 114 (H) 65 - 100 mg/dL    Performed by Geraldine HADLEYID-19 RAPID TEST    Collection Time: 03/20/21  5:45 PM   Result Value Ref Range    Specimen source Nasopharyngeal      COVID-19 rapid test Not Detected Not Detected     GLUCOSE, POC    Collection Time: 03/20/21  9:54 PM   Result Value Ref Range    Glucose (POC) 121 (H) 65 - 100 mg/dL    Performed by Jessica Gomez, BASIC    Collection Time: 03/21/21  5:57 AM   Result Value Ref Range    Sodium 142 136 - 145 mmol/L    Potassium 3.6 3.5 - 5.1 mmol/L    Chloride 106 97 - 108 mmol/L    CO2 25 21 - 32 mmol/L    Anion gap 11 5 - 15 mmol/L    Glucose 92 65 - 100 mg/dL    BUN 88 (H) 6 - 20 mg/dL    Creatinine 5.82 (H) 0.55 - 1.02 mg/dL    BUN/Creatinine ratio 15 12 - 20      GFR est AA 9 (L) >60 ml/min/1.73m2    GFR est non-AA 7 (L) >60 ml/min/1.73m2    Calcium 8.7 8.5 - 10.1 mg/dL   CBC W/O DIFF    Collection Time: 03/21/21  5:57 AM   Result Value Ref Range    WBC 9.6 3.6 - 11.0 K/uL    RBC 3.57 (L) 3.80 - 5.20 M/uL    HGB 9.7 (L) 11.5 - 16.0 g/dL    HCT 30.1 (L) 35.0 - 47.0 %    MCV 84.3 80.0 - 99.0 FL    MCH 27.2 26.0 - 34.0 PG    MCHC 32.2 30.0 - 36.5 g/dL    RDW 14.9 (H) 11.5 - 14.5 %    PLATELET 199 440 - 187 K/uL    MPV 11.2 8.9 - 12.9 FL   GLUCOSE, POC    Collection Time: 03/21/21  7:50 AM   Result Value Ref Range    Glucose (POC) 95 65 - 100 mg/dL    Performed by [place]    GLUCOSE, POC    Collection Time: 03/21/21 10:58 AM   Result Value Ref Range    Glucose (POC) 111 (H) 65 - 100 mg/dL    Performed by Amisha Situ        Physical Exam    General:  Alert, cooperative, no distress, appears stated age. Head:  Normocephalic, without obvious abnormality, atraumatic. Eyes:  Conjunctivae/corneas clear. PERRL, EOMs intact. Back:   Symmetric, no curvature. ROM normal. No CVA tenderness. Lungs:   Clear to auscultation bilaterally. Chest wall:  No tenderness or deformity. Heart:  Regular rate and rhythm, S1, S2 normal, no murmur, click, rub or gallop. Abdomen:   Soft, non-tender. Bowel sounds normal. No masses,  No organomegaly. Extremities: Extremities normal, atraumatic, no cyanosis or edema.        ASSESSMENT AND PLAN  # CKD STAGE V  # Metabolic Bone Disease  # Nephrotic Range proteinuria  # Hypertension    Plan:   No indication for dialysis at this point  Continued symptomatic treatment for dizziness  Add Hydralazine 50 bid for hypertension  Hold valsartan and amlodine at this time given severe dizziness  Start furosemide 40mg bid

## 2021-03-21 NOTE — ROUTINE PROCESS
Two  Person admission skin assessment done by Agustin Bob and Anjelica Valle. The patient has dry skin with an old healed wound on top of her upper left foot and old healed wound in a [place] on her lower left leg. No other skin breakdown noted.

## 2021-03-22 VITALS
SYSTOLIC BLOOD PRESSURE: 136 MMHG | DIASTOLIC BLOOD PRESSURE: 71 MMHG | HEIGHT: 68 IN | OXYGEN SATURATION: 98 % | BODY MASS INDEX: 31.83 KG/M2 | RESPIRATION RATE: 20 BRPM | HEART RATE: 76 BPM | WEIGHT: 210 LBS | TEMPERATURE: 98.6 F

## 2021-03-22 PROBLEM — R11.2 INTRACTABLE NAUSEA AND VOMITING: Status: RESOLVED | Noted: 2021-03-20 | Resolved: 2021-03-22

## 2021-03-22 PROBLEM — N04.9 NEPHROTIC SYNDROME: Chronic | Status: ACTIVE | Noted: 2021-03-22

## 2021-03-22 PROBLEM — Z86.39: Status: ACTIVE | Noted: 2021-03-22

## 2021-03-22 PROBLEM — I27.20 PULMONARY HYPERTENSION (HCC): Chronic | Status: ACTIVE | Noted: 2021-03-22

## 2021-03-22 PROBLEM — E11.9 DM2 (DIABETES MELLITUS, TYPE 2) (HCC): Chronic | Status: ACTIVE | Noted: 2021-03-22

## 2021-03-22 PROBLEM — D63.8 ANEMIA, CHRONIC DISEASE: Status: ACTIVE | Noted: 2021-03-22

## 2021-03-22 LAB
ANION GAP SERPL CALC-SCNC: 9 MMOL/L (ref 5–15)
BASOPHILS # BLD: 0 K/UL (ref 0–0.1)
BASOPHILS NFR BLD: 0 % (ref 0–1)
BUN SERPL-MCNC: 85 MG/DL (ref 6–20)
BUN/CREAT SERPL: 15 (ref 12–20)
CA-I BLD-MCNC: 8.4 MG/DL (ref 8.5–10.1)
CHLORIDE SERPL-SCNC: 105 MMOL/L (ref 97–108)
CO2 SERPL-SCNC: 23 MMOL/L (ref 21–32)
CREAT SERPL-MCNC: 5.75 MG/DL (ref 0.55–1.02)
DIFFERENTIAL METHOD BLD: ABNORMAL
EOSINOPHIL # BLD: 0.2 K/UL (ref 0–0.4)
EOSINOPHIL NFR BLD: 2 % (ref 0–7)
ERYTHROCYTE [DISTWIDTH] IN BLOOD BY AUTOMATED COUNT: 15.4 % (ref 11.5–14.5)
GLUCOSE BLD STRIP.AUTO-MCNC: 124 MG/DL (ref 65–100)
GLUCOSE BLD STRIP.AUTO-MCNC: 165 MG/DL (ref 65–100)
GLUCOSE SERPL-MCNC: 132 MG/DL (ref 65–100)
HCT VFR BLD AUTO: 26.8 % (ref 35–47)
HGB BLD-MCNC: 8.5 G/DL (ref 11.5–16)
IMM GRANULOCYTES # BLD AUTO: 0 K/UL (ref 0–0.04)
IMM GRANULOCYTES NFR BLD AUTO: 0 % (ref 0–0.5)
LYMPHOCYTES # BLD: 1.5 K/UL (ref 0.8–3.5)
LYMPHOCYTES NFR BLD: 17 % (ref 12–49)
MCH RBC QN AUTO: 27.2 PG (ref 26–34)
MCHC RBC AUTO-ENTMCNC: 31.7 G/DL (ref 30–36.5)
MCV RBC AUTO: 85.6 FL (ref 80–99)
MONOCYTES # BLD: 1.2 K/UL (ref 0–1)
MONOCYTES NFR BLD: 13 % (ref 5–13)
NEUTS SEG # BLD: 6.3 K/UL (ref 1.8–8)
NEUTS SEG NFR BLD: 68 % (ref 32–75)
PERFORMED BY, TECHID: ABNORMAL
PERFORMED BY, TECHID: ABNORMAL
PLATELET # BLD AUTO: 200 K/UL (ref 150–400)
PMV BLD AUTO: 11.7 FL (ref 8.9–12.9)
POTASSIUM SERPL-SCNC: 4.1 MMOL/L (ref 3.5–5.1)
RBC # BLD AUTO: 3.13 M/UL (ref 3.8–5.2)
SODIUM SERPL-SCNC: 137 MMOL/L (ref 136–145)
WBC # BLD AUTO: 9.1 K/UL (ref 3.6–11)

## 2021-03-22 PROCEDURE — 97161 PT EVAL LOW COMPLEX 20 MIN: CPT

## 2021-03-22 PROCEDURE — 74011250637 HC RX REV CODE- 250/637: Performed by: INTERNAL MEDICINE

## 2021-03-22 PROCEDURE — 96372 THER/PROPH/DIAG INJ SC/IM: CPT

## 2021-03-22 PROCEDURE — 80048 BASIC METABOLIC PNL TOTAL CA: CPT

## 2021-03-22 PROCEDURE — 74011636637 HC RX REV CODE- 636/637: Performed by: INTERNAL MEDICINE

## 2021-03-22 PROCEDURE — 85025 COMPLETE CBC W/AUTO DIFF WBC: CPT

## 2021-03-22 PROCEDURE — 99218 HC RM OBSERVATION: CPT

## 2021-03-22 PROCEDURE — 74011250636 HC RX REV CODE- 250/636: Performed by: INTERNAL MEDICINE

## 2021-03-22 PROCEDURE — 97165 OT EVAL LOW COMPLEX 30 MIN: CPT

## 2021-03-22 PROCEDURE — 36415 COLL VENOUS BLD VENIPUNCTURE: CPT

## 2021-03-22 PROCEDURE — 82962 GLUCOSE BLOOD TEST: CPT

## 2021-03-22 RX ORDER — GLIPIZIDE 10 MG/1
10 TABLET ORAL
Qty: 30 TAB | Refills: 0 | Status: SHIPPED | OUTPATIENT
Start: 2021-03-22 | End: 2021-04-21

## 2021-03-22 RX ORDER — ONDANSETRON 4 MG/1
4 TABLET, ORALLY DISINTEGRATING ORAL
Qty: 21 TAB | Refills: 0 | Status: SHIPPED | OUTPATIENT
Start: 2021-03-22 | End: 2022-02-23

## 2021-03-22 RX ORDER — MECLIZINE HCL 12.5 MG 12.5 MG/1
12.5 TABLET ORAL
Qty: 21 TAB | Refills: 0 | Status: SHIPPED | OUTPATIENT
Start: 2021-03-22 | End: 2021-04-01

## 2021-03-22 RX ORDER — HYDRALAZINE HYDROCHLORIDE 50 MG/1
50 TABLET, FILM COATED ORAL 2 TIMES DAILY
Qty: 60 TAB | Refills: 0 | Status: SHIPPED | OUTPATIENT
Start: 2021-03-22 | End: 2021-06-21

## 2021-03-22 RX ORDER — ACETAMINOPHEN 325 MG/1
650 TABLET ORAL
Qty: 30 TAB | Refills: 0 | Status: SHIPPED | OUTPATIENT
Start: 2021-03-22 | End: 2022-06-16

## 2021-03-22 RX ORDER — FUROSEMIDE 40 MG/1
40 TABLET ORAL 2 TIMES DAILY
Qty: 60 TAB | Refills: 0 | Status: SHIPPED | OUTPATIENT
Start: 2021-03-22 | End: 2021-06-21

## 2021-03-22 RX ADMIN — FAMOTIDINE 20 MG: 20 TABLET ORAL at 09:11

## 2021-03-22 RX ADMIN — CARVEDILOL 12.5 MG: 12.5 TABLET, FILM COATED ORAL at 09:11

## 2021-03-22 RX ADMIN — FUROSEMIDE 40 MG: 40 TABLET ORAL at 09:11

## 2021-03-22 RX ADMIN — HEPARIN SODIUM 5000 UNITS: 5000 INJECTION INTRAVENOUS; SUBCUTANEOUS at 05:19

## 2021-03-22 RX ADMIN — SODIUM BICARBONATE 650 MG TABLET 650 MG: at 09:11

## 2021-03-22 RX ADMIN — ISOSORBIDE DINITRATE 20 MG: 10 TABLET ORAL at 09:11

## 2021-03-22 RX ADMIN — ASPIRIN 81 MG: 81 TABLET, COATED ORAL at 09:10

## 2021-03-22 RX ADMIN — HEPARIN SODIUM 5000 UNITS: 5000 INJECTION INTRAVENOUS; SUBCUTANEOUS at 13:58

## 2021-03-22 RX ADMIN — INSULIN LISPRO 1 UNITS: 100 INJECTION, SOLUTION INTRAVENOUS; SUBCUTANEOUS at 11:30

## 2021-03-22 RX ADMIN — Medication 10 ML: at 05:19

## 2021-03-22 RX ADMIN — HYDRALAZINE HYDROCHLORIDE 50 MG: 50 TABLET, FILM COATED ORAL at 09:11

## 2021-03-22 NOTE — PROGRESS NOTES
Observation notice provided in writing to patient and/or caregiver as well as verbal explanation of the policy. Patients who are in outpatient status also receive the Observation notice.         Crispin Cheatham, LOREN

## 2021-03-22 NOTE — PROGRESS NOTES
OCCUPATIONAL THERAPY EVALUATION/DISCHARGE  Patient: Wing Clifford (22 y.o. female)  Date: 3/22/2021  Primary Diagnosis: Intractable nausea and vomiting [R11.2]  Stage 5 chronic kidney disease (HCC) [N18.5]       Precautions: none       ASSESSMENT  Pt is a 63 y/o F with PMH of CKD stage V, nephrotic syndrome, RTA type IV, recent Covid PNA discharged from [facility] on 3/18/2021, hypertension, pulmonary hypertension, COPD and anemia of chronic disease, presenting to [facility] with c/o intractable dizziness. Per medical chart, apparently pt took her blood pressure medications and became very dizzy and it has been unrelenting. Dizziness described as spinning, worse with head movement and associated with nausea and unsteady gait. EKG shows normal sinus rhythm. Chest x-ray is clear. Recent echocardiogram showed an EF of 47% and grade 2 diastolic dysfunction. Despite antiemetics in the ED and meclizine dizziness persisted. Patient was crying complaining of nausea. Pt admitted 3/20/21 and being treated for intractable N/V, stage 5 CKD. Pt received seated EOB eating soup upon arrival, AXO x4, and agreeable to OT/PT evaluations at this time. 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 Pt reports does not currently own any DME.     Based on current observations, pt demonstrates good functional activity tolerance, AROM/strength, coordination, bed mobility, and static/dyanamic sitting/standing balance with no LOB observed. Pt demonstrates sit><stand transfers to/from EOB and toilet IND with good safety awareness, no dizziness reported with functional mobility. Pt able to doff/don socks by bending forward at waist without difficulty and noted to eat her breakfast with good GMC/FMC. Pt reports improvement in dizziness since hospital admission and denies any difficulty completing ADLs at this time.  No further skilled OT services indicated as pt is reported and observed to be at functional baseline, or IND. Will d/c OT order at this time. Pt in agreement. Recommend d/c home with family once medically appropriate. PLAN :  Recommendation for discharge: (in order for the patient to meet his/her long term goals)  No skilled occupational therapy/ follow up rehabilitation needs identified at this time. This discharge recommendation:  Has been made in collaboration with the attending provider and/or case management    IF patient discharges home will need the following DME: none       SUBJECTIVE:   Patient stated I'm feeling a lot better.     OBJECTIVE DATA SUMMARY:   HISTORY:   Past Medical History:   Diagnosis Date    Hypertension     Morbid obesity ([facility] [address]     Renal failure      Past Surgical History:   Procedure Laterality Date    IR INSERT NON TUNL CVC OVER 5 YRS  3/12/2021       Prior Level of Function/Environment/Context: IND with ADLs/IADLs and mobility without AD  Expanded or extensive additional review of patient history:   Home Situation  Home Environment: Private residence  # Steps to Enter: 8  Rails to Enter: Yes  Hand Rails : Bilateral  One/Two Story Residence: One story  Living Alone: No(Per pt, daughter lives with her)  Support Systems: Family member(s)  Patient Expects to be Discharged to[de-identified] Private residence  Current DME Used/Available at Home: None    EXAMINATION OF PERFORMANCE DEFICITS:  Cognitive/Behavioral Status:  Neurologic State: Alert  Orientation Level: Oriented X4  Cognition: Follows commands; Appropriate decision making    Hearing: Auditory  Auditory Impairment: None    Vision/Perceptual:     WFL      Range of Motion:  AROM: Within functional limits    Strength:  Strength: Within functional limits    Coordination:  Coordination: Within functional limits  Fine Motor Skills-Upper: Left Intact; Right Intact    Gross Motor Skills-Upper: Left Intact; Right Intact    Balance:  Sitting: Intact; Without support  Standing: Intact; Without support    Functional Mobility and Transfers for ADLs:  Bed Mobility:  Scooting: Independent    Transfers:  Sit to Stand: Independent  Stand to Sit: Independent  Bathroom Mobility: Independent  Toilet Transfer : Independent    ADL Assessment:  Feeding: Independent    Lower Body Dressing: Independent    Toileting: Independent    ADL Intervention and task modifications:  Feeding  Feeding Assistance: Independent  Container Management: Independent  Food to Mouth: Independent  Drink to Mouth: Independent    Lower Body Dressing Assistance  Socks: Independent  Position Performed: Bending forward method;Seated edge of bed    Functional Measure:    [facility] [employer] AM-PACTM \"6 Clicks\"                                                       Daily Activity Inpatient Short Form  How much help from another person does the patient currently need. .. Total; A Lot A Little None   1. Putting on and taking off regular lower body clothing? []  1 []  2 []  3 [x]  4   2. Bathing (including washing, rinsing, drying)? []  1 []  2 []  3 [x]  4   3. Toileting, which includes using toilet, bedpan or urinal? [] 1 []  2 []  3 [x]  4   4. Putting on and taking off regular upper body clothing? []  1 []  2 []  3 [x]  4   5. Taking care of personal grooming such as brushing teeth? []  1 []  2 []  3 [x]  4   6. Eating meals? []  1 []  2 []  3 [x]  4   © 2007, Trustees of [facility] [employer], under license to [employer]. All rights reserved     Score: 24/24     Interpretation of Tool:  Represents clinically-significant functional categories (i.e. Activities of daily living).   Percentage of Impairment CH    0%   CI    1-19% CJ    20-39% CK    40-59% CL    60-79% CM    80-99% CN     100%   [facility]  Score 6-24 24 23 20-22 15-19 10-14 7-9 6       Occupational Therapy Evaluation Charge Determination   History Examination Decision-Making   LOW Complexity : Brief history review  LOW Complexity : 1-3 performance deficits relating to physical, cognitive , or psychosocial skils that result in activity limitations and / or participation restrictions  LOW Complexity : No comorbidities that affect functional and no verbal or physical assistance needed to complete eval tasks       Based on the above components, the patient evaluation is determined to be of the following complexity level: LOW   Pain Ratin/10    Activity Tolerance:   WNL and tolerates ADLs without rest breaks  Please refer to the flowsheet for vital signs taken during this treatment. After treatment patient left in no apparent distress:    Seated EOB with call bell/needs in reach.      COMMUNICATION/EDUCATION:   The patients plan of care was discussed with: Physical therapist.     OT/PT sessions occurred together for increased patient and clinician safety with initial OOB ADL/mobility    Thank you for this referral.  Donald Guard  Time Calculation: 14 mins

## 2021-03-22 NOTE — PROGRESS NOTES
Hospitalist Progress Note               Daily Progress Note: 3/22/2021  50-year-old female multiple comorbidities including CKD stage V and recent Covid pneumonia recently discharged 3/18/2021 returns to the emergency room complaining of intractable dizziness nausea and vomiting. Subjective: The patient is seen for follow  up. Looks well this am.  No nausea, vomiting or dizziness. Tolerating clears.         Problem List:  Problem List as of 3/22/2021 Never Reviewed          Codes Class Noted - Resolved    Uncontrolled hypertension ICD-10-CM: I10  ICD-9-CM: 401.9  3/21/2021 - Present        Intractable nausea and vomiting ICD-10-CM: R11.2  ICD-9-CM: 536.2  3/20/2021 - Present        Stage 5 chronic kidney disease ([facility] [address] ICD-10-CM: N18.5  ICD-9-CM: 585.5  3/20/2021 - Present              Medications reviewed  Current Facility-Administered Medications   Medication Dose Route Frequency    famotidine (PEPCID) tablet 20 mg  20 mg Oral DAILY    hydrALAZINE (APRESOLINE) tablet 50 mg  50 mg Oral BID    furosemide (LASIX) tablet 40 mg  40 mg Oral BID    aspirin delayed-release tablet 81 mg  81 mg Oral DAILY    isosorbide dinitrate (ISORDIL) tablet 20 mg  20 mg Oral TID    carvediloL (COREG) tablet 12.5 mg  12.5 mg Oral BID WITH MEALS    sodium bicarbonate tablet 650 mg  650 mg Oral BID    sodium chloride (NS) flush 5-40 mL  5-40 mL IntraVENous Q8H    sodium chloride (NS) flush 5-40 mL  5-40 mL IntraVENous PRN    acetaminophen (TYLENOL) tablet 650 mg  650 mg Oral Q6H PRN    Or    acetaminophen (TYLENOL) suppository 650 mg  650 mg Rectal Q6H PRN    promethazine (PHENERGAN) tablet 12.5 mg  12.5 mg Oral Q6H PRN    Or    ondansetron (ZOFRAN) injection 4 mg  4 mg IntraVENous Q6H PRN    heparin (porcine) injection 5,000 Units  5,000 Units SubCUTAneous Q8H    bisacodyL (DULCOLAX) suppository 10 mg  10 mg Rectal DAILY PRN    meclizine (ANTIVERT) tablet 12.5 mg  12.5 mg Oral Q6H PRN    insulin lispro (HUMALOG) injection   SubCUTAneous AC&HS    glucose chewable tablet 16 g  4 Tab Oral PRN    glucagon (GLUCAGEN) injection 1 mg  1 mg IntraMUSCular PRN    dextrose (D50W) injection syrg 12.5-25 g  25-50 mL IntraVENous PRN    labetaloL (NORMODYNE;TRANDATE) 20 mg/4 mL (5 mg/mL) injection 10 mg  10 mg IntraVENous Q4H PRN       Review of Systems:   Review of Systems   Constitutional: Negative for chills, fever and malaise/fatigue. HENT: Negative. Eyes: Negative. Respiratory: Negative. Cardiovascular: Positive for leg swelling. Negative for chest pain, orthopnea and claudication. Gastrointestinal: Positive for heartburn. Genitourinary: Negative. Musculoskeletal: Negative. Skin: Negative. Neurological: Negative. Negative for dizziness. Endo/Heme/Allergies: Negative. Psychiatric/Behavioral: Negative. Objective:   Physical Exam:     Visit Vitals  BP (!) 160/79 (BP 1 Location: Left upper arm, BP Patient Position: Supine)   Pulse 82   Temp 98.3 °F (36.8 °C)   Resp 16   Ht 5' 8\" (1.727 m)   Wt 95.3 kg (210 lb)   SpO2 99%   BMI 31.93 kg/m²      O2 Device: Room air    Temp (24hrs), Av.3 °F (36.8 °C), Min:98 °F (36.7 °C), Max:98.7 °F (37.1 °C)    No intake/output data recorded.  1901 -  0700  In: -   Out: 1700 [Urine:1700]    General:  Alert, cooperative, no distress, appears stated age. Head:  Normocephalic, without obvious abnormality, atraumatic. Eyes:  Conjunctivae/corneas clear. PERRL, EOMs intact. Throat: MMM   Neck: Supple, symmetrical, trachea midline,  no JVD. Lungs:   Clear to auscultation bilaterally. Chest wall:  No tenderness or deformity. Heart:  Regular rate and rhythm, S1, S2 normal, no murmur, click, rub or gallop. Abdomen:   Soft, non-tender. Bowel sounds normal. No masses,  No organomegaly. Extremities: Extremities normal, atraumatic, no cyanosis or edema. Pulses: 2+ and symmetric all extremities.    Skin: Skin color, texture, turgor normal. No rashes or lesions   Neurologic: CNII-XII intact. No motor or sensory deficits.         Data Review:       Recent Days:  Recent Labs     03/22/21  0707 03/21/21  0557 03/20/21  1000   WBC 9.1 9.6 13.7*   HGB 8.5* 9.7* 10.5*   HCT 26.8* 30.1* 31.4*    256 282     Recent Labs     03/21/21  0557 03/20/21  1000    138   K 3.6 3.8    105   CO2 25 21   GLU 92 134*   BUN 88* 99*   CREA 5.82* 6.34*   CA 8.7 8.7   ALB  --  2.1*   TBILI  --  0.2   ALT  --  21     No results for input(s): PH, PCO2, PO2, HCO3, FIO2 in the last 72 hours. 24 Hour Results:  Recent Results (from the past 24 hour(s))   GLUCOSE, POC    Collection Time: 03/21/21 10:58 AM   Result Value Ref Range    Glucose (POC) 111 (H) 65 - 100 mg/dL    Performed by Dieudonne Lerma    GLUCOSE, POC    Collection Time: 03/21/21  4:30 PM   Result Value Ref Range    Glucose (POC) 102 (H) 65 - 100 mg/dL    Performed by Wilton Alarcon    GLUCOSE, POC    Collection Time: 03/21/21  9:09 PM   Result Value Ref Range    Glucose (POC) 113 (H) 65 - 100 mg/dL    Performed by Lio Diggs ([place])    CBC WITH AUTOMATED DIFF    Collection Time: 03/22/21  7:07 AM   Result Value Ref Range    WBC 9.1 3.6 - 11.0 K/uL    RBC 3.13 (L) 3.80 - 5.20 M/uL    HGB 8.5 (L) 11.5 - 16.0 g/dL    HCT 26.8 (L) 35.0 - 47.0 %    MCV 85.6 80.0 - 99.0 FL    MCH 27.2 26.0 - 34.0 PG    MCHC 31.7 30.0 - 36.5 g/dL    RDW 15.4 (H) 11.5 - 14.5 %    PLATELET 585 891 - 966 K/uL    MPV 11.7 8.9 - 12.9 FL    NEUTROPHILS 68 32 - 75 %    LYMPHOCYTES 17 12 - 49 %    MONOCYTES 13 5 - 13 %    EOSINOPHILS 2 0 - 7 %    BASOPHILS 0 0 - 1 %    IMMATURE GRANULOCYTES 0 0.0 - 0.5 %    ABS. NEUTROPHILS 6.3 1.8 - 8.0 K/UL    ABS. LYMPHOCYTES 1.5 0.8 - 3.5 K/UL    ABS. MONOCYTES 1.2 (H) 0.0 - 1.0 K/UL    ABS. EOSINOPHILS 0.2 0.0 - 0.4 K/UL    ABS. BASOPHILS 0.0 0.0 - 0.1 K/UL    ABS. IMM.  GRANS. 0.0 0.00 - 0.04 K/UL    DF AUTOMATED     GLUCOSE, POC    Collection Time: 03/22/21  7:33 AM   Result Value Ref Range Glucose (POC) 124 (H) 65 - 100 mg/dL    Performed by Vidal Pierce            Assessment/     Patient Active Problem List   Diagnosis Code    Intractable nausea and vomiting R11.2    Stage 5 chronic kidney disease ([facility] [address] N18.5    Uncontrolled hypertension I10         -Intractable nausea and vomiting and dizziness after taking her medications on the date of presentation. Nausea and vomiting and dizziness are resolved- rx/hypovolemia contributed it appears.  -Uncontrolled hypertension- improved trend- rx adjusted  -Stage V CKD, creatinine has shown some marginal improvement. Am lab pending  -Anemia of chronic disease stable hemoglobin- prob some hemoconcentration on presentation.   -DM2, A1c 6.1 on recent admission- well controlled  -Pulmonary hypertension  Plan:  -Nephrology is appreciated  -Holding off on valsartan and amlodipine secondary to dizziness- stopped  -continue isordil, sodium bicard, pepcid, coreg.  -Continue Lasix 40 mg twice daily  -Hydralazine added 50 mg 3 times daily  -advance diet  -anticipate dc today. VTEP: Heparin subcu  Full code  Disposition: Blood pressure remains uncontrolled, nausea vomiting and dizziness is resolved. creatinine is improving. Nephrology following. Adjusting blood pressure medications. Improved trend. Advancing diet. Likely to dc home today. Renal function panel pending. Care Plan discussed with: Patient/Family and Nurse    Total time spent with patient: 30 minutes. This dictation was done by dragon, computer voice recognition software. Often unanticipated grammatical, syntax, phones and other interpretive errors are inadvertently transcribed. Please excuse errors that have escaped final proofreading.     Maggie Lang MD

## 2021-03-22 NOTE — PROGRESS NOTES
PHYSICAL THERAPY EVALUATION  Patient: Liat Mandujano (48 y.o. female)  Date: 3/22/2021  Primary Diagnosis: Intractable nausea and vomiting [R11.2]  Stage 5 chronic kidney disease ([facility] [address] [N18.5]        Precautions: fall  ASSESSMENT  Pt is a 61 y/o F with PMH of CKD stage V, nephrotic syndrome, RTA type IV, recent Covid PNA discharged from [facility] on 3/18/2021, hypertension, pulmonary hypertension, COPD and anemia of chronic disease, presenting to [facility] with c/o intractable dizziness. Per medical chart, apparently pt took her blood pressure medications and became very dizzy and it has been unrelenting. Dizziness described as spinning, worse with head movement and associated with nausea and unsteady gait. EKG shows normal sinus rhythm. Chest x-ray is clear. Recent echocardiogram showed an EF of 47% and grade 2 diastolic dysfunction. Despite antiemetics in the ED and meclizine dizziness persisted. Patient was crying complaining of nausea. Pt admitted 3/20/21 and being treated for intractable N/V, stage 5 CKD. Pt received seated EOB eating soup upon arrival, AXO x4, and agreeable to PT/OT evaluations at this time. 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. Pt reports does not currently own any DME. Based on current observations, pt demonstrates good functional activity tolerance, AROM/strength, coordination, bed mobility, and static/dyanamic sitting/standing balance with no LOB observed. Pt demonstrates sit><stand transfers to/from EOB and toilet IND with good safety awareness, no dizziness reported with functional mobility. Pt reports improvement in dizziness since hospital admission and denies any difficulty ambulating in the room at this time. No further skilled PT services indicated as pt is reported and observed to be at functional baseline, or IND.  Will d/c PT order at this time. Pt in agreement. Recommend d/c home with family once medically appropriate. PLAN :  Recommendations and Planned Interventions: DC PT after eval  Frequency/Duration: Patient will be followed by physical therapy:  eval only  Recommendation for discharge: (in order for the patient to meet his/her long term goals)  No skilled physical therapy/ follow up rehabilitation needs identified at this time. This discharge recommendation:  Has been made in collaboration with the attending provider and/or case management    IF patient discharges home will need the following DME: none         SUBJECTIVE:   Patient stated I am feeling much better.     OBJECTIVE DATA SUMMARY:   HISTORY:    Past Medical History:   Diagnosis Date    Anemia, chronic disease 3/22/2021    DM2 (diabetes mellitus, type 2) ([facility] [address] 6/73/3866    H/O metabolic acidosis 2/11/3954    Hypertension     Morbid obesity ([facility] [address]     Nephrotic syndrome 3/22/2021    Pulmonary hypertension ([facility] [address] 3/22/2021    Renal failure      Past Surgical History:   Procedure Laterality Date    IR INSERT NON TUNL CVC OVER 5 YRS  3/12/2021       Personal factors and/or comorbidities impacting plan of care:   Home Situation  Home Environment: Private residence  # Steps to Enter: 8  Rails to Enter: Yes  Hand Rails : Bilateral  One/Two Story Residence: One story  Living Alone: No(Per pt, daughter lives with her)  Support Systems: Family member(s)  Patient Expects to be Discharged to[de-identified] Private residence  Current DME Used/Available at Home: None    EXAMINATION/PRESENTATION/DECISION MAKING:   Critical Behavior:  Neurologic State: Alert  Orientation Level: Oriented X4  Cognition: Follows commands, Appropriate decision making     Hearing: Auditory  Auditory Impairment: None  Range Of Motion:  AROM: Within functional limits                       Strength:    Strength:  Within functional limits                    Tone & Sensation: intact Coordination:  Coordination: Within functional limits  Vision:      Functional Mobility:  Bed Mobility:           Scooting: Independent  Transfers:  Sit to Stand: Independent  Stand to Sit: Independent                       Balance:   Sitting: Intact; Without support  Standing: Intact; Without support  Ambulation/Gait Training:  Distance (ft): 45 Feet (ft)  Assistive Device: Other (comment)(none)  Ambulation - Level of Assistance: Independent     Gait Description (WDL): Within defined limits(patient amb in room independently w/o any AD, no lLOB. no diz)           Base of Support: Widened     Speed/Marita: Fluctuations                   Functional Measure:  [facility] AM-PAC 6 Clicks         Basic Mobility Inpatient Short Form  How much difficulty does the patient currently have. .. Unable A Lot A Little None   1. Turning over in bed (including adjusting bedclothes, sheets and blankets)? [] 1   [] 2   [] 3   [x] 4   2. Sitting down on and standing up from a chair with arms ( e.g., wheelchair, bedside commode, etc.)   [] 1   [] 2   [] 3   [x] 4   3. Moving from lying on back to sitting on the side of the bed? [] 1   [] 2   [] 3   [x] 4          How much help from another person does the patient currently need. .. Total A Lot A Little None   4. Moving to and from a bed to a chair (including a wheelchair)? [] 1   [] 2   [] 3   [x] 4   5. Need to walk in hospital room? [] 1   [] 2   [] 3   [x] 4   6. Climbing 3-5 steps with a railing? [] 1   [] 2   [] 3   [x] 4   © 2007, Trustees of [facility], under license to [employer]. All rights reserved     Score:  Initial: 24/24 Most Recent: X (Date: 03/22/2021 )   Interpretation of Tool:  Represents activities that are increasingly more difficult (i.e. Bed mobility, Transfers, Gait).   Score 24 23 22-20 19-15 14-10 9-7 6   Modifier CH CI CJ CK CL CM CN           Physical Therapy Evaluation Charge Determination   History Examination Presentation Decision-Making   LOW Complexity : Zero comorbidities / personal factors that will impact the outcome / POC LOW Complexity : 1-2 Standardized tests and measures addressing body structure, function, activity limitation and / or participation in recreation  LOW Complexity : Stable, uncomplicated  LOW Complexity : FOTO score of       Based on the above components, the patient evaluation is determined to be of the following complexity level: LOW     Pain Ratin/10    Activity Tolerance: WNL  Please refer to the flowsheet for vital signs taken during this treatment. After treatment patient left in no apparent distress:   Sitting at eob, patient defred sitting in the chair. COMMUNICATION/EDUCATION:   The patients plan of care was discussed with: Occupational therapist and Registered nurse. Patient/family have participated as able in goal setting and plan of care.     Thank you for this referral.  Davina Mcdermott PT   Time Calculation: 14 mins

## 2021-03-22 NOTE — PROGRESS NOTES
3/22/2021 11:35 AM      Admit Date: 3/20/2021    Admit Diagnosis: Intractable nausea and vomiting [R11.2]  Stage 5 chronic kidney disease ([address] [N18.5]      Subjective:   States that she is doing well. Eating well. Leg swelling has gone down. Not dizzy now. Informs me that the reason for her hospitalization was an episode of dizziness when she took her medications. ROS   Full review of system is done and is otherwise negative. Objective:      Visit Vitals  BP (!) 160/79 (BP 1 Location: Left upper arm, BP Patient Position: Supine)   Pulse 82   Temp 98.3 °F (36.8 °C)   Resp 16   Ht 5' 8\" (1.727 m)   Wt 95.3 kg (210 lb)   SpO2 99%   BMI 31.93 kg/m²       Physical Exam:  Delos Galas, Well Nourished, No Acute Distress, Alert & Oriented x 3, appropriate mood. Neck- supple, no JVD  CV- regular rate and rhythm no MRG  Lung- clear bilaterally  Abd- soft, nontender, nondistended  Ext- no edema bilaterally. Skin-wrinkling of the skin over her shinedema is much reduced    Recent Results (from the past 24 hour(s))   GLUCOSE, POC    Collection Time: 03/21/21  4:30 PM   Result Value Ref Range    Glucose (POC) 102 (H) 65 - 100 mg/dL    Performed by Henri Gunn    GLUCOSE, POC    Collection Time: 03/21/21  9:09 PM   Result Value Ref Range    Glucose (POC) 113 (H) 65 - 100 mg/dL    Performed by Heydi Young (Float Pool)    CBC WITH AUTOMATED DIFF    Collection Time: 03/22/21  7:07 AM   Result Value Ref Range    WBC 9.1 3.6 - 11.0 K/uL    RBC 3.13 (L) 3.80 - 5.20 M/uL    HGB 8.5 (L) 11.5 - 16.0 g/dL    HCT 26.8 (L) 35.0 - 47.0 %    MCV 85.6 80.0 - 99.0 FL    MCH 27.2 26.0 - 34.0 PG    MCHC 31.7 30.0 - 36.5 g/dL    RDW 15.4 (H) 11.5 - 14.5 %    PLATELET 421 835 - 543 K/uL    MPV 11.7 8.9 - 12.9 FL    NEUTROPHILS 68 32 - 75 %    LYMPHOCYTES 17 12 - 49 %    MONOCYTES 13 5 - 13 %    EOSINOPHILS 2 0 - 7 %    BASOPHILS 0 0 - 1 %    IMMATURE GRANULOCYTES 0 0.0 - 0.5 %    ABS.  NEUTROPHILS 6.3 1.8 - 8.0 K/UL ABS. LYMPHOCYTES 1.5 0.8 - 3.5 K/UL    ABS. MONOCYTES 1.2 (H) 0.0 - 1.0 K/UL    ABS. EOSINOPHILS 0.2 0.0 - 0.4 K/UL    ABS. BASOPHILS 0.0 0.0 - 0.1 K/UL    ABS. IMM. GRANS. 0.0 0.00 - 0.04 K/UL    DF AUTOMATED     GLUCOSE, POC    Collection Time: 03/22/21  7:33 AM   Result Value Ref Range    Glucose (POC) 124 (H) 65 - 100 mg/dL    Performed by Nicci Bah    GLUCOSE, POC    Collection Time: 03/22/21 11:07 AM   Result Value Ref Range    Glucose (POC) 165 (H) 65 - 100 mg/dL    Performed by Maggie ALVAREZ           Intake/Output Summary (Last 24 hours) at 3/22/2021 1135  Last data filed at 3/21/2021 1400  Gross per 24 hour   Intake    Output 900 ml   Net -900 ml          Data Review:   Recent Labs     03/22/21  0707 03/21/21  0557   NA  --  142   K  --  3.6   BUN  --  88*   CREA  --  5.82*   WBC 9.1 9.6   HGB 8.5* 9.7*   HCT 26.8* 30.1*    256       No current facility-administered medications on file prior to encounter. Current Outpatient Medications on File Prior to Encounter   Medication Sig Dispense Refill    amLODIPine (NORVASC) 10 mg tablet Take  by mouth daily.  cloNIDine HCL (CATAPRES) 0.1 mg tablet Take 1 Tab by mouth every eight (8) hours. 60 Tab 0    famotidine (PEPCID) 20 mg tablet Take 1 Tab by mouth daily. 60 Tab 0    furosemide (LASIX) 80 mg tablet Twice daily 60 Tab 0    sodium bicarbonate 650 mg tablet Take  by mouth three (3) times daily.  metOLazone (ZAROXOLYN) 5 mg tablet Take  by mouth daily.  valsartan (DIOVAN) 160 mg tablet Take 160 mg by mouth daily.  aspirin delayed-release 81 mg tablet Take 81 mg by mouth.  isosorbide dinitrate (ISORDIL) 20 mg tablet Take 20 mg by mouth three (3) times daily.  hydrALAZINE (APRESOLINE) 100 mg tablet Take 100 mg by mouth.  docusate sodium (COLACE) 100 mg capsule Take 100 mg by mouth two (2) times a day.  carvediloL (COREG) 12.5 mg tablet Take 12.5 mg by mouth two (2) times daily (with meals).  glipiZIDE (GLUCOTROL) 10 mg tablet Take 10 mg by mouth two (2) times a day. Assessment/Plan:     Active Problems:    Intractable nausea and vomiting (3/20/2021)      Stage 5 chronic kidney disease ([address] (3/20/2021)      Uncontrolled hypertension (3/21/2021)        DIAGNOSES:  1. CKD stage V  2. Nephrotic syndrome  3. Anemia chronic, possibly related to kidney disease  4. Diabetes mellitus with complications including diabetic nephropathy  5. Chronic metabolic acidosis    DISCUSSION:   She is not uremic and does not need to start dialysis at this time.  However, it is clear that she needs to be initiated on dialysis if she starts demonstrating diuretic failure and is hospitalized for fluid overload   Last hemoglobin 8.5, does not meet indications for transfusion    PLAN:   Clinically stable and I would agree with the discharge plan.  Outpatient follow-up with [facility] is important        Thanks for consulting me. Please don't hesitate to contact me if any questions arise of if I can assist in any manner. This dictation was done by dragon, computer voice recognition software. Often unanticipated grammatical, syntax, phones and other interpretive errors are inadvertently transcribed. Please excuse errors that have escaped final proofreading. Please contact me if you suspect dictation or transcription errors.   Dr Humphrey Bernard  [address], [address] [place], [address] [facility]    Telephone: [phone]  Fax: [phone]

## 2021-03-22 NOTE — DISCHARGE SUMMARY
Physician Discharge Summary     Patient ID:    Peter Peres  [id number]  62 y.o.  1960    Admit date: 3/20/2021    Discharge date : 3/22/2021    Chronic Diagnoses:    Problem List as of 3/22/2021 Date Reviewed: 3/22/2021          Codes Class Noted - Resolved    Nephrotic syndrome (Chronic) ICD-10-CM: N04.9  ICD-9-CM: 581.9  3/22/2021 - Present        Anemia, chronic disease ICD-10-CM: D63.8  ICD-9-CM: 285.29  3/22/2021 - Present        H/O metabolic acidosis KTV-73-AQ: Z86.39  ICD-9-CM: V12.29  3/22/2021 - Present        Pulmonary hypertension ([facility] (Chronic) ICD-10-CM: I27.20  ICD-9-CM: 416.8  3/22/2021 - Present        DM2 (diabetes mellitus, type 2) ([facility] [address] (Chronic) ICD-10-CM: E11.9  ICD-9-CM: 250.00  3/22/2021 - Present        Uncontrolled hypertension ICD-10-CM: I10  ICD-9-CM: 401.9  3/21/2021 - Present        Stage 5 chronic kidney disease ([facility] ICD-10-CM: N18.5  ICD-9-CM: 585.5  3/20/2021 - Present        RESOLVED: Intractable nausea and vomiting ICD-10-CM: R11.2  ICD-9-CM: 536.2  3/20/2021 - 3/22/2021          22    Final Diagnoses:   Intractable nausea and vomiting [R11.2]  Stage 5 chronic kidney disease ([facility] [address] [N18.5]    Reason for Hospitalization:        Hospital Course:   56-year-old female presenting with stage V CKD, nephrotic syndrome, recent Covid pneumonia discharged 3/18/2021, hypertension pulmonary hypertension COPD and anemia of chronic disease. She returns the emergency department complaining of intractable dizziness, nausea and vomiting after she took her medications on the morning of presentation. She was admitted for observation and [place] for nausea and vomiting and dizziness. She was initially kept on a clear liquid diet. She did look a little volume depleted and her creatinine bumped up from recent discharge. Her Lasix was changed to IV. Followed by nephrology.   Held off on amlodipine and valsartan, decreasing hydralazine to 50 mg 3 times daily otherwise continue her home medications. Meclizine was given as needed as well as antiemetics. Her dizziness, nausea and vomiting resolved. Her diet was advanced and tolerated. She was anxious to go home. Nephrology input and recommendations below. Medication changes per med rec:   -hold off on resuming clinidine and amlodipine.   -Lasix decreased to 40 mg bid   -holding metolazone- re eval for resumption on nephrology/pcp follow up.   -resuming Glipizide as before. She was previously on Ongyza but ran out. Bg's controlled her, will defer resumption to pcp. Hypoglycemia sx aware.    -meclizine 12.5 mg q8 prn for dizziness   -Zofran 4 mg q8 prn nausea. Nephro 3/22/21:  DIAGNOSES:  1. CKD stage V  2. Nephrotic syndrome  3. Anemia chronic, possibly related to kidney disease  4. Diabetes mellitus with complications including diabetic nephropathy  5. Chronic metabolic acidosis     DISCUSSION:  · She is not uremic and does not need to start dialysis at this time. · However, it is clear that she needs to be initiated on dialysis if she starts demonstrating diuretic failure and is hospitalized for fluid overload  · Last hemoglobin 8.5, does not meet indications for transfusion     PLAN:  · Clinically stable and I would agree with the discharge plan. · Outpatient follow-up with [facility] is important      Discharge Medications:   Current Discharge Medication List      START taking these medications    Details   acetaminophen (TYLENOL) 325 mg tablet Take 2 Tabs by mouth every six (6) hours as needed for Pain or Fever. Qty: 30 Tab, Refills: 0      meclizine (ANTIVERT) 12.5 mg tablet Take 1 Tab by mouth three (3) times daily as needed for Dizziness for up to 10 days. Qty: 21 Tab, Refills: 0      ondansetron (Zofran ODT) 4 mg disintegrating tablet Take 1 Tab by mouth every eight (8) hours as needed for Nausea or Vomiting.   Qty: 21 Tab, Refills: 0         CONTINUE these medications which have CHANGED Details   furosemide (LASIX) 40 mg tablet Take 1 Tab by mouth two (2) times a day. Qty: 60 Tab, Refills: 0      hydrALAZINE (APRESOLINE) 50 mg tablet Take 1 Tab by mouth two (2) times a day. Qty: 60 Tab, Refills: 0      glipiZIDE (GLUCOTROL) 10 mg tablet Take 1 Tab by mouth Daily (before breakfast) for 30 days. Qty: 30 Tab, Refills: 0    Comments: Take 30 minutes before breakfast or 1sr meal of day. Do not take if not eating subsequently. CONTINUE these medications which have NOT CHANGED    Details   famotidine (PEPCID) 20 mg tablet Take 1 Tab by mouth daily. Qty: 60 Tab, Refills: 0      sodium bicarbonate 650 mg tablet Take  by mouth three (3) times daily. valsartan (DIOVAN) 160 mg tablet Take 160 mg by mouth daily. aspirin delayed-release 81 mg tablet Take 81 mg by mouth.      isosorbide dinitrate (ISORDIL) 20 mg tablet Take 20 mg by mouth three (3) times daily. docusate sodium (COLACE) 100 mg capsule Take 100 mg by mouth two (2) times a day. carvediloL (COREG) 12.5 mg tablet Take 12.5 mg by mouth two (2) times daily (with meals). STOP taking these medications       amLODIPine (NORVASC) 10 mg tablet Comments:   Reason for Stopping:         cloNIDine HCL (CATAPRES) 0.1 mg tablet Comments:   Reason for Stopping:         metOLazone (ZAROXOLYN) 5 mg tablet Comments:   Reason for Stopping: Follow up Care:    1. Anjelica Lopez MD in 1-2 weeks. Please call to set up an appointment shortly after discharge. 2. Nephrology Dr Mirtha Youngblood 1 week. Epo + medication/bp check. Diet:  Diabetic Diet, Renal Diet and fluids ~ 32 oz/day. Disposition:  Home. Advanced Directive:   FULL x   DNR      Discharge Exam:  See today's note. CONSULTATIONS: Nephrology and Hospitalist    Significant Diagnostic Studies:   3/20/2021: BUN 99 mg/dL* (Ref range: 6 - 20 mg/dL); Calcium 8.7 mg/dL (Ref range: 8.5 - 10.1 mg/dL); CO2 21 mmol/L (Ref range: 21 - 32 mmol/L);  Creatinine 6.34 mg/dL* (Ref range: 0.55 - 1.02 mg/dL); Glucose 134 mg/dL* (Ref range: 65 - 100 mg/dL); HCT 31.4 %* (Ref range: 35.0 - 47.0 %); HGB 10.5 g/dL* (Ref range: 11.5 - 16.0 g/dL); Potassium 3.8 mmol/L (Ref range: 3.5 - 5.1 mmol/L); Sodium 138 mmol/L (Ref range: 136 - 145 mmol/L)  3/21/2021: BUN 88 mg/dL* (Ref range: 6 - 20 mg/dL); Calcium 8.7 mg/dL (Ref range: 8.5 - 10.1 mg/dL); CO2 25 mmol/L (Ref range: 21 - 32 mmol/L); Creatinine 5.82 mg/dL* (Ref range: 0.55 - 1.02 mg/dL); Glucose 92 mg/dL (Ref range: 65 - 100 mg/dL); HCT 30.1 %* (Ref range: 35.0 - 47.0 %); HGB 9.7 g/dL* (Ref range: 11.5 - 16.0 g/dL); Potassium 3.6 mmol/L (Ref range: 3.5 - 5.1 mmol/L); Sodium 142 mmol/L (Ref range: 136 - 145 mmol/L)  Recent Labs     03/22/21  0707 03/21/21  0557   WBC 9.1 9.6   HGB 8.5* 9.7*   HCT 26.8* 30.1*    256     Recent Labs     03/22/21  0707 03/21/21  0557 03/20/21  1000    142 138   K 4.1 3.6 3.8    106 105   CO2 23 25 21   BUN 85* 88* 99*   CREA 5.75* 5.82* 6.34*   * 92 134*   CA 8.4* 8.7 8.7     Recent Labs     03/20/21  1000   ALT 21   AP 85   TBILI 0.2   TP 5.6*   ALB 2.1*   GLOB 3.5   LPSE 189     No results for input(s): INR, PTP, APTT, INREXT in the last 72 hours. No results for input(s): FE, TIBC, PSAT, FERR in the last 72 hours. No results for input(s): PH, PCO2, PO2 in the last 72 hours. No results for input(s): CPK, CKMB in the last 72 hours. No lab exists for component: TROPONINI  Lab Results   Component Value Date/Time    Glucose (POC) 165 (H) 03/22/2021 11:07 AM    Glucose (POC) 124 (H) 03/22/2021 07:33 AM    Glucose (POC) 113 (H) 03/21/2021 09:09 PM    Glucose (POC) 102 (H) 03/21/2021 04:30 PM    Glucose (POC) 111 (H) 03/21/2021 10:58 AM           Signed:  Perico Ceron MD  3/22/2021  12:17 PM    Time 35 minutes.

## 2021-06-21 ENCOUNTER — APPOINTMENT (OUTPATIENT)
Dept: CT IMAGING | Age: 61
End: 2021-06-21
Attending: STUDENT IN AN ORGANIZED HEALTH CARE EDUCATION/TRAINING PROGRAM
Payer: COMMERCIAL

## 2021-06-21 ENCOUNTER — APPOINTMENT (OUTPATIENT)
Dept: GENERAL RADIOLOGY | Age: 61
End: 2021-06-21
Attending: EMERGENCY MEDICINE
Payer: COMMERCIAL

## 2021-06-21 ENCOUNTER — HOSPITAL ENCOUNTER (OUTPATIENT)
Age: 61
Setting detail: OBSERVATION
Discharge: HOME | End: 2021-06-25
Attending: STUDENT IN AN ORGANIZED HEALTH CARE EDUCATION/TRAINING PROGRAM | Admitting: FAMILY MEDICINE
Payer: COMMERCIAL

## 2021-06-21 DIAGNOSIS — I16.0 HYPERTENSIVE URGENCY: ICD-10-CM

## 2021-06-21 DIAGNOSIS — R55 SYNCOPE AND COLLAPSE: Primary | ICD-10-CM

## 2021-06-21 PROBLEM — N18.6 ESRD (END STAGE RENAL DISEASE) ON DIALYSIS (HCC): Status: ACTIVE | Noted: 2021-06-21

## 2021-06-21 PROBLEM — Z99.2 ESRD (END STAGE RENAL DISEASE) ON DIALYSIS (HCC): Status: ACTIVE | Noted: 2021-06-21

## 2021-06-21 LAB
ALBUMIN SERPL-MCNC: 2.8 G/DL (ref 3.5–5)
ALBUMIN/GLOB SERPL: 0.7 {RATIO} (ref 1.1–2.2)
ALP SERPL-CCNC: 161 U/L (ref 45–117)
ALT SERPL-CCNC: 53 U/L (ref 12–78)
ANION GAP SERPL CALC-SCNC: 8 MMOL/L (ref 5–15)
AST SERPL W P-5'-P-CCNC: 117 U/L (ref 15–37)
BASOPHILS # BLD: 0.1 K/UL (ref 0–0.1)
BASOPHILS NFR BLD: 1 % (ref 0–1)
BILIRUB SERPL-MCNC: 0.7 MG/DL (ref 0.2–1)
BNP SERPL-MCNC: ABNORMAL PG/ML
BUN SERPL-MCNC: 46 MG/DL (ref 6–20)
BUN/CREAT SERPL: 9 (ref 12–20)
CA-I BLD-MCNC: 9.1 MG/DL (ref 8.5–10.1)
CHLORIDE SERPL-SCNC: 105 MMOL/L (ref 97–108)
CK MB CFR SERPL CALC: 1.1 %
CK MB SERPL-MCNC: 3.6 NG/ML (ref 5–25)
CK SERPL-CCNC: 340.4 NG/ML (ref 26–192)
CO2 SERPL-SCNC: 25 MMOL/L (ref 21–32)
CREAT SERPL-MCNC: 5.24 MG/DL (ref 0.55–1.02)
DIFFERENTIAL METHOD BLD: ABNORMAL
EOSINOPHIL # BLD: 0.1 K/UL (ref 0–0.4)
EOSINOPHIL NFR BLD: 1 % (ref 0–7)
ERYTHROCYTE [DISTWIDTH] IN BLOOD BY AUTOMATED COUNT: 16.1 % (ref 11.5–14.5)
GLOBULIN SER CALC-MCNC: 4.1 G/DL (ref 2–4)
GLUCOSE BLD STRIP.AUTO-MCNC: 146 MG/DL (ref 65–117)
GLUCOSE SERPL-MCNC: 151 MG/DL (ref 65–100)
HCT VFR BLD AUTO: 36.4 % (ref 35–47)
HGB BLD-MCNC: 11.3 G/DL (ref 11.5–16)
IMM GRANULOCYTES # BLD AUTO: 0 K/UL (ref 0–0.04)
IMM GRANULOCYTES NFR BLD AUTO: 0 % (ref 0–0.5)
LACTATE SERPL-SCNC: 0.7 MMOL/L (ref 0.4–2)
LYMPHOCYTES # BLD: 0.7 K/UL (ref 0.8–3.5)
LYMPHOCYTES NFR BLD: 8 % (ref 12–49)
MCH RBC QN AUTO: 26.7 PG (ref 26–34)
MCHC RBC AUTO-ENTMCNC: 31 G/DL (ref 30–36.5)
MCV RBC AUTO: 85.8 FL (ref 80–99)
MONOCYTES # BLD: 0.6 K/UL (ref 0–1)
MONOCYTES NFR BLD: 8 % (ref 5–13)
NEUTS SEG # BLD: 6.6 K/UL (ref 1.8–8)
NEUTS SEG NFR BLD: 82 % (ref 32–75)
NRBC # BLD: 0 K/UL (ref 0–0.01)
NRBC BLD-RTO: 0 PER 100 WBC
PERFORMED BY, TECHID: ABNORMAL
PLATELET # BLD AUTO: 137 K/UL (ref 150–400)
PMV BLD AUTO: 12.2 FL (ref 8.9–12.9)
POTASSIUM SERPL-SCNC: 4.9 MMOL/L (ref 3.5–5.1)
PROT SERPL-MCNC: 6.9 G/DL (ref 6.4–8.2)
RBC # BLD AUTO: 4.24 M/UL (ref 3.8–5.2)
SODIUM SERPL-SCNC: 138 MMOL/L (ref 136–145)
TROPONIN I SERPL-MCNC: <0.05 NG/ML
TROPONIN I SERPL-MCNC: <0.05 NG/ML
WBC # BLD AUTO: 8.1 K/UL (ref 3.6–11)

## 2021-06-21 PROCEDURE — 96376 TX/PRO/DX INJ SAME DRUG ADON: CPT

## 2021-06-21 PROCEDURE — 83605 ASSAY OF LACTIC ACID: CPT

## 2021-06-21 PROCEDURE — 36415 COLL VENOUS BLD VENIPUNCTURE: CPT

## 2021-06-21 PROCEDURE — 70450 CT HEAD/BRAIN W/O DYE: CPT

## 2021-06-21 PROCEDURE — 82550 ASSAY OF CK (CPK): CPT

## 2021-06-21 PROCEDURE — 99218 HC RM OBSERVATION: CPT

## 2021-06-21 PROCEDURE — 85025 COMPLETE CBC W/AUTO DIFF WBC: CPT

## 2021-06-21 PROCEDURE — 93005 ELECTROCARDIOGRAM TRACING: CPT

## 2021-06-21 PROCEDURE — 82553 CREATINE MB FRACTION: CPT

## 2021-06-21 PROCEDURE — 83880 ASSAY OF NATRIURETIC PEPTIDE: CPT

## 2021-06-21 PROCEDURE — 71045 X-RAY EXAM CHEST 1 VIEW: CPT

## 2021-06-21 PROCEDURE — 74011000250 HC RX REV CODE- 250: Performed by: STUDENT IN AN ORGANIZED HEALTH CARE EDUCATION/TRAINING PROGRAM

## 2021-06-21 PROCEDURE — 80053 COMPREHEN METABOLIC PANEL: CPT

## 2021-06-21 PROCEDURE — 96374 THER/PROPH/DIAG INJ IV PUSH: CPT

## 2021-06-21 PROCEDURE — 74011250636 HC RX REV CODE- 250/636: Performed by: STUDENT IN AN ORGANIZED HEALTH CARE EDUCATION/TRAINING PROGRAM

## 2021-06-21 PROCEDURE — 96375 TX/PRO/DX INJ NEW DRUG ADDON: CPT

## 2021-06-21 PROCEDURE — 84484 ASSAY OF TROPONIN QUANT: CPT

## 2021-06-21 PROCEDURE — 82962 GLUCOSE BLOOD TEST: CPT

## 2021-06-21 PROCEDURE — 74011250637 HC RX REV CODE- 250/637: Performed by: STUDENT IN AN ORGANIZED HEALTH CARE EDUCATION/TRAINING PROGRAM

## 2021-06-21 PROCEDURE — 99285 EMERGENCY DEPT VISIT HI MDM: CPT

## 2021-06-21 RX ORDER — METOPROLOL TARTRATE 5 MG/5ML
2.5 INJECTION INTRAVENOUS
Status: DISCONTINUED | OUTPATIENT
Start: 2021-06-21 | End: 2021-06-21

## 2021-06-21 RX ORDER — ONDANSETRON 2 MG/ML
4 INJECTION INTRAMUSCULAR; INTRAVENOUS
Status: DISCONTINUED | OUTPATIENT
Start: 2021-06-21 | End: 2021-06-25 | Stop reason: HOSPADM

## 2021-06-21 RX ORDER — MORPHINE SULFATE 2 MG/ML
2 INJECTION, SOLUTION INTRAMUSCULAR; INTRAVENOUS
Status: COMPLETED | OUTPATIENT
Start: 2021-06-21 | End: 2021-06-21

## 2021-06-21 RX ORDER — HYDRALAZINE HYDROCHLORIDE 20 MG/ML
10 INJECTION INTRAMUSCULAR; INTRAVENOUS ONCE
Status: COMPLETED | OUTPATIENT
Start: 2021-06-21 | End: 2021-06-21

## 2021-06-21 RX ORDER — LORAZEPAM 2 MG/ML
2 INJECTION INTRAMUSCULAR ONCE
Status: COMPLETED | OUTPATIENT
Start: 2021-06-21 | End: 2021-06-21

## 2021-06-21 RX ORDER — POLYETHYLENE GLYCOL 3350 17 G/17G
17 POWDER, FOR SOLUTION ORAL DAILY PRN
Status: DISCONTINUED | OUTPATIENT
Start: 2021-06-21 | End: 2021-06-25 | Stop reason: HOSPADM

## 2021-06-21 RX ORDER — INSULIN LISPRO 100 [IU]/ML
INJECTION, SOLUTION INTRAVENOUS; SUBCUTANEOUS
Status: DISCONTINUED | OUTPATIENT
Start: 2021-06-21 | End: 2021-06-25 | Stop reason: HOSPADM

## 2021-06-21 RX ORDER — DEXTROSE 50 % IN WATER (D50W) INTRAVENOUS SYRINGE
25-50 AS NEEDED
Status: DISCONTINUED | OUTPATIENT
Start: 2021-06-21 | End: 2021-06-25 | Stop reason: HOSPADM

## 2021-06-21 RX ORDER — HYDRALAZINE HYDROCHLORIDE 20 MG/ML
20 INJECTION INTRAMUSCULAR; INTRAVENOUS ONCE
Status: COMPLETED | OUTPATIENT
Start: 2021-06-21 | End: 2021-06-21

## 2021-06-21 RX ORDER — ACETAMINOPHEN 650 MG/1
650 SUPPOSITORY RECTAL
Status: DISCONTINUED | OUTPATIENT
Start: 2021-06-21 | End: 2021-06-25 | Stop reason: HOSPADM

## 2021-06-21 RX ORDER — CARVEDILOL 12.5 MG/1
12.5 TABLET ORAL 2 TIMES DAILY WITH MEALS
Status: DISCONTINUED | OUTPATIENT
Start: 2021-06-21 | End: 2021-06-25 | Stop reason: HOSPADM

## 2021-06-21 RX ORDER — HYDRALAZINE HYDROCHLORIDE 25 MG/1
50 TABLET, FILM COATED ORAL 2 TIMES DAILY
Status: DISCONTINUED | OUTPATIENT
Start: 2021-06-21 | End: 2021-06-21

## 2021-06-21 RX ORDER — METOPROLOL TARTRATE 5 MG/5ML
5 INJECTION INTRAVENOUS ONCE
Status: COMPLETED | OUTPATIENT
Start: 2021-06-21 | End: 2021-06-21

## 2021-06-21 RX ORDER — SODIUM CHLORIDE 0.9 % (FLUSH) 0.9 %
5-40 SYRINGE (ML) INJECTION EVERY 8 HOURS
Status: DISCONTINUED | OUTPATIENT
Start: 2021-06-21 | End: 2021-06-24

## 2021-06-21 RX ORDER — ONDANSETRON 2 MG/ML
4 INJECTION INTRAMUSCULAR; INTRAVENOUS ONCE
Status: COMPLETED | OUTPATIENT
Start: 2021-06-21 | End: 2021-06-21

## 2021-06-21 RX ORDER — FAMOTIDINE 20 MG/1
20 TABLET, FILM COATED ORAL DAILY
Status: DISCONTINUED | OUTPATIENT
Start: 2021-06-22 | End: 2021-06-23

## 2021-06-21 RX ORDER — PANTOPRAZOLE SODIUM 40 MG/1
1 TABLET, DELAYED RELEASE ORAL DAILY
COMMUNITY
Start: 2021-06-09 | End: 2021-06-25

## 2021-06-21 RX ORDER — HYDRALAZINE HYDROCHLORIDE 100 MG/1
1 TABLET, FILM COATED ORAL 3 TIMES DAILY
COMMUNITY
Start: 2021-06-16 | End: 2021-06-25

## 2021-06-21 RX ORDER — DOCUSATE SODIUM 100 MG/1
100 CAPSULE, LIQUID FILLED ORAL 2 TIMES DAILY
Status: DISCONTINUED | OUTPATIENT
Start: 2021-06-21 | End: 2021-06-25 | Stop reason: HOSPADM

## 2021-06-21 RX ORDER — HYDRALAZINE HYDROCHLORIDE 50 MG/1
100 TABLET, FILM COATED ORAL 2 TIMES DAILY
Status: DISCONTINUED | OUTPATIENT
Start: 2021-06-21 | End: 2021-06-25 | Stop reason: HOSPADM

## 2021-06-21 RX ORDER — MAGNESIUM SULFATE 100 %
4 CRYSTALS MISCELLANEOUS AS NEEDED
Status: DISCONTINUED | OUTPATIENT
Start: 2021-06-21 | End: 2021-06-25 | Stop reason: HOSPADM

## 2021-06-21 RX ORDER — ACETAMINOPHEN 325 MG/1
650 TABLET ORAL
Status: DISCONTINUED | OUTPATIENT
Start: 2021-06-21 | End: 2021-06-25 | Stop reason: HOSPADM

## 2021-06-21 RX ORDER — ENOXAPARIN SODIUM 100 MG/ML
30 INJECTION SUBCUTANEOUS DAILY
Status: DISCONTINUED | OUTPATIENT
Start: 2021-06-22 | End: 2021-06-22

## 2021-06-21 RX ORDER — ISOSORBIDE DINITRATE 10 MG/1
20 TABLET ORAL 3 TIMES DAILY
Status: DISCONTINUED | OUTPATIENT
Start: 2021-06-21 | End: 2021-06-25 | Stop reason: HOSPADM

## 2021-06-21 RX ORDER — SODIUM CHLORIDE 0.9 % (FLUSH) 0.9 %
5-40 SYRINGE (ML) INJECTION AS NEEDED
Status: DISCONTINUED | OUTPATIENT
Start: 2021-06-21 | End: 2021-06-25 | Stop reason: HOSPADM

## 2021-06-21 RX ORDER — METOPROLOL TARTRATE 5 MG/5ML
2.5 INJECTION INTRAVENOUS
Status: DISCONTINUED | OUTPATIENT
Start: 2021-06-21 | End: 2021-06-25 | Stop reason: HOSPADM

## 2021-06-21 RX ORDER — ONDANSETRON 4 MG/1
4 TABLET, ORALLY DISINTEGRATING ORAL
Status: DISCONTINUED | OUTPATIENT
Start: 2021-06-21 | End: 2021-06-25 | Stop reason: HOSPADM

## 2021-06-21 RX ADMIN — LORAZEPAM 2 MG: 2 INJECTION INTRAMUSCULAR; INTRAVENOUS at 16:59

## 2021-06-21 RX ADMIN — ISOSORBIDE DINITRATE 20 MG: 10 TABLET ORAL at 19:27

## 2021-06-21 RX ADMIN — DOCUSATE SODIUM 100 MG: 100 CAPSULE, LIQUID FILLED ORAL at 21:26

## 2021-06-21 RX ADMIN — HYDRALAZINE HYDROCHLORIDE 100 MG: 50 TABLET, FILM COATED ORAL at 21:25

## 2021-06-21 RX ADMIN — MORPHINE SULFATE 2 MG: 2 INJECTION, SOLUTION INTRAMUSCULAR; INTRAVENOUS at 14:16

## 2021-06-21 RX ADMIN — ISOSORBIDE DINITRATE 20 MG: 10 TABLET ORAL at 22:26

## 2021-06-21 RX ADMIN — ONDANSETRON 4 MG: 2 INJECTION INTRAMUSCULAR; INTRAVENOUS at 19:27

## 2021-06-21 RX ADMIN — SODIUM CHLORIDE 250 ML: 9 INJECTION, SOLUTION INTRAVENOUS at 17:05

## 2021-06-21 RX ADMIN — ONDANSETRON 4 MG: 4 TABLET, ORALLY DISINTEGRATING ORAL at 21:26

## 2021-06-21 RX ADMIN — CARVEDILOL 12.5 MG: 12.5 TABLET, FILM COATED ORAL at 16:57

## 2021-06-21 RX ADMIN — ONDANSETRON 4 MG: 2 INJECTION INTRAMUSCULAR; INTRAVENOUS at 14:15

## 2021-06-21 RX ADMIN — HYDRALAZINE HYDROCHLORIDE 10 MG: 20 INJECTION INTRAMUSCULAR; INTRAVENOUS at 15:34

## 2021-06-21 RX ADMIN — Medication 10 ML: at 17:00

## 2021-06-21 RX ADMIN — METOPROLOL TARTRATE 5 MG: 5 INJECTION INTRAVENOUS at 16:57

## 2021-06-21 RX ADMIN — HYDRALAZINE HYDROCHLORIDE 20 MG: 20 INJECTION INTRAMUSCULAR; INTRAVENOUS at 16:59

## 2021-06-21 RX ADMIN — Medication 10 ML: at 21:27

## 2021-06-21 RX ADMIN — METOPROLOL TARTRATE 2.5 MG: 1 INJECTION, SOLUTION INTRAVENOUS at 19:27

## 2021-06-21 RX ADMIN — ONDANSETRON 4 MG: 2 INJECTION INTRAMUSCULAR; INTRAVENOUS at 16:57

## 2021-06-21 NOTE — ED PROVIDER NOTES
EMERGENCY DEPARTMENT HISTORY AND PHYSICAL EXAM      Date: 6/21/2021  Patient Name: Lily Cardozo    History of Presenting Illness     Chief Complaint   Patient presents with    Syncope    Vomiting    Nausea    Dizziness    Abdominal Pain       History Provided By: Patient    HPI: Lily Cardozo, 62 y.o. female with a past medical history significant diabetes, hypertension and End-stage renal disease, currently on dialysis. presents to the ED with cc of weakness, syncopal episode during dialysis, nausea vomiting. Patient recently started dialysis approximately 1 month ago, has not had any complications thus far. Patient states today during dialysis she suddenly lost consciousness, upon awakening felt very nauseous vomiting. As per EMS reports dialysis had started shortly thereafter patient became unresponsive, EMS was called, patient to be hypotensive. Vomiting. Patient brought to emergency department. On evaluation patient tearful, is anxious, states she does not remember what happened, states she was watching TV and next each remembers she was in the hospital nauseous and vomiting. Patient denies any chest pain denies any shortness of breath, denies any recent fevers chills weakness dizziness. There are no other complaints, changes, or physical findings at this time. PCP: Vikas Roberts MD    Current Facility-Administered Medications   Medication Dose Route Frequency Provider Last Rate Last Admin    hydrALAZINE (APRESOLINE) 20 mg/mL injection 20 mg  20 mg IntraVENous ONCE Dennisdedrick Paniagua MD         Current Outpatient Medications   Medication Sig Dispense Refill    furosemide (LASIX) 40 mg tablet Take 1 Tab by mouth two (2) times a day. 60 Tab 0    hydrALAZINE (APRESOLINE) 50 mg tablet Take 1 Tab by mouth two (2) times a day. 60 Tab 0    acetaminophen (TYLENOL) 325 mg tablet Take 2 Tabs by mouth every six (6) hours as needed for Pain or Fever.  30 Tab 0    ondansetron (Zofran ODT) 4 mg disintegrating tablet Take 1 Tab by mouth every eight (8) hours as needed for Nausea or Vomiting. 21 Tab 0    famotidine (PEPCID) 20 mg tablet Take 1 Tab by mouth daily. 60 Tab 0    sodium bicarbonate 650 mg tablet Take  by mouth three (3) times daily.  valsartan (DIOVAN) 160 mg tablet Take 160 mg by mouth daily.  aspirin delayed-release 81 mg tablet Take 81 mg by mouth.  isosorbide dinitrate (ISORDIL) 20 mg tablet Take 20 mg by mouth three (3) times daily.  docusate sodium (COLACE) 100 mg capsule Take 100 mg by mouth two (2) times a day.  carvediloL (COREG) 12.5 mg tablet Take 12.5 mg by mouth two (2) times daily (with meals). Past History     Past Medical History:  Past Medical History:   Diagnosis Date    Anemia, chronic disease 3/22/2021    DM2 (diabetes mellitus, type 2) ([facility] [address] 9/89/6615    H/O metabolic acidosis 2/31/3676    Hypertension     Morbid obesity ([facility] [address]     Nephrotic syndrome 3/22/2021    Pulmonary hypertension ([facility] [address] 3/22/2021    Renal failure        Past Surgical History:  Past Surgical History:   Procedure Laterality Date    IR INSERT NON TUNL CVC OVER 5 YRS  3/12/2021       Family History:  No family history on file. Social History:  Social History     Tobacco Use    Smoking status: Never Smoker    Smokeless tobacco: Never Used   Substance Use Topics    Alcohol use: Not on file    Drug use: Not on file       Allergies: Allergies   Allergen Reactions    Doxycycline Swelling    Egg Diarrhea    Milk Containing Products Nausea and Vomiting    Tetracycline Swelling         Review of Systems     Review of Systems   Constitutional: Negative for activity change, chills, fatigue and fever. HENT: Negative for congestion and trouble swallowing. Eyes: Negative for photophobia and visual disturbance. Respiratory: Negative for cough, chest tightness and shortness of breath. Cardiovascular: Negative for chest pain and palpitations. Gastrointestinal: Positive for nausea and vomiting. Negative for abdominal distention, abdominal pain and diarrhea. Genitourinary: Negative for dysuria, flank pain and frequency. Musculoskeletal: Negative for arthralgias, back pain and myalgias. Skin: Negative for color change, pallor and rash. Neurological: Positive for syncope. Negative for dizziness, tremors, weakness and headaches. Psychiatric/Behavioral: Negative for confusion. Physical Exam     Physical Exam  Vitals and nursing note reviewed. Constitutional:       General: She is not in acute distress. Appearance: Normal appearance. She is normal weight. She is not ill-appearing. HENT:      Head: Normocephalic and atraumatic. Nose: Nose normal.      Mouth/Throat:      Mouth: Mucous membranes are moist.   Eyes:      Extraocular Movements: Extraocular movements intact. Pupils: Pupils are equal, round, and reactive to light. Cardiovascular:      Rate and Rhythm: Normal rate and regular rhythm. Pulses: Normal pulses. Heart sounds: Normal heart sounds. Pulmonary:      Effort: Pulmonary effort is normal.   Abdominal:      General: Abdomen is flat. Bowel sounds are normal.      Palpations: Abdomen is soft. Tenderness: There is no abdominal tenderness. There is no guarding. Musculoskeletal:         General: No tenderness. Normal range of motion. Cervical back: Normal range of motion and neck supple. No muscular tenderness. Right lower leg: Edema present. Left lower leg: Edema present. Skin:     General: Skin is warm and dry. Neurological:      General: No focal deficit present. Mental Status: She is alert and oriented to person, place, and time. Cranial Nerves: No cranial nerve deficit. Sensory: No sensory deficit. Motor: No weakness.    Psychiatric:         Mood and Affect: Mood normal.         Behavior: Behavior normal.         Diagnostic Study Results     Labs - Recent Results (from the past 12 hour(s))   CBC WITH AUTOMATED DIFF    Collection Time: 06/21/21  2:12 PM   Result Value Ref Range    WBC 8.1 3.6 - 11.0 K/uL    RBC 4.24 3.80 - 5.20 M/uL    HGB 11.3 (L) 11.5 - 16.0 g/dL    HCT 36.4 35.0 - 47.0 %    MCV 85.8 80.0 - 99.0 FL    MCH 26.7 26.0 - 34.0 PG    MCHC 31.0 30.0 - 36.5 g/dL    RDW 16.1 (H) 11.5 - 14.5 %    PLATELET 770 (L) 146 - 400 K/uL    MPV 12.2 8.9 - 12.9 FL    NRBC 0.0 0.0  WBC    ABSOLUTE NRBC 0.00 0.00 - 0.01 K/uL    NEUTROPHILS 82 (H) 32 - 75 %    LYMPHOCYTES 8 (L) 12 - 49 %    MONOCYTES 8 5 - 13 %    EOSINOPHILS 1 0 - 7 %    BASOPHILS 1 0 - 1 %    IMMATURE GRANULOCYTES 0 0 - 0.5 %    ABS. NEUTROPHILS 6.6 1.8 - 8.0 K/UL    ABS. LYMPHOCYTES 0.7 (L) 0.8 - 3.5 K/UL    ABS. MONOCYTES 0.6 0.0 - 1.0 K/UL    ABS. EOSINOPHILS 0.1 0.0 - 0.4 K/UL    ABS. BASOPHILS 0.1 0.0 - 0.1 K/UL    ABS. IMM. GRANS. 0.0 0.00 - 0.04 K/UL    DF AUTOMATED     METABOLIC PANEL, COMPREHENSIVE    Collection Time: 06/21/21  2:12 PM   Result Value Ref Range    Sodium 138 136 - 145 mmol/L    Potassium 4.9 3.5 - 5.1 mmol/L    Chloride 105 97 - 108 mmol/L    CO2 25 21 - 32 mmol/L    Anion gap 8 5 - 15 mmol/L    Glucose 151 (H) 65 - 100 mg/dL    BUN 46 (H) 6 - 20 mg/dL    Creatinine 5.24 (H) 0.55 - 1.02 mg/dL    BUN/Creatinine ratio 9 (L) 12 - 20      GFR est AA 10 (L) >60 ml/min/1.73m2    GFR est non-AA 8 (L) >60 ml/min/1.73m2    Calcium 9.1 8.5 - 10.1 mg/dL    Bilirubin, total 0.7 0.2 - 1.0 mg/dL    AST (SGOT) 117 (H) 15 - 37 U/L    ALT (SGPT) 53 12 - 78 U/L    Alk.  phosphatase 161 (H) 45 - 117 U/L    Protein, total 6.9 6.4 - 8.2 g/dL    Albumin 2.8 (L) 3.5 - 5.0 g/dL    Globulin 4.1 (H) 2.0 - 4.0 g/dL    A-G Ratio 0.7 (L) 1.1 - 2.2     CK W/ REFLX CKMB    Collection Time: 06/21/21  2:12 PM   Result Value Ref Range    .4 (H) 26 - 192 ng/mL   TROPONIN I    Collection Time: 06/21/21  2:12 PM   Result Value Ref Range    Troponin-I, Qt. <0.05 <0.05 ng/mL   BNP    Collection Time: 06/21/21  2:12 PM   Result Value Ref Range    NT pro-BNP 12,879 (H) <125 pg/mL   CK-MB,QUANT. Collection Time: 06/21/21  2:12 PM   Result Value Ref Range    CK - MB 3.6 (H) <3.6 ng/mL    CK-MB Index 1.1         Radiologic Studies -   [unfilled]  CT Results  (Last 48 hours)    None        CXR Results  (Last 48 hours)               06/21/21 1220  XR CHEST PORT Final result    Impression:  Findings compatible with CHF. Narrative:  Exam: Frontal chest       Comparison: None available       Number of views: 1       Findings: Indwelling apparent right internal jugular central venous catheter tip   rejected over the expected distribution of the distal superior vena cava. (Please note that anterior-posterior radiography is a one dimensional exam and   cannot accurately determine the exact position of support apparatus. If there is   need to better localize structures in space, a lateral radiograph may prove   helpful, as clinically indicated). The configuration of the cardiopericardial   silhouette suggests enlarged cardiac chambers. There is pulmonary venous   cephalization, compatible with chronic pulmonary venous hypertension. Pulmonary   vascular shadows are poorly defined, there is peribronchial thickening and there   is interlobular septal thickening (Kerley B lines), compatible with pulmonary   edema and less likely inflammation, atelectasis, or other. The findings are of   uncertain chronicity. No convincing pleural effusions. Medical Decision Making and ED Course   I am the first provider for this patient. I reviewed the vital signs, available nursing notes, past medical history, past surgical history, family history and social history. Vital Signs-Reviewed the patient's vital signs.   Patient Vitals for the past 12 hrs:   Temp Pulse Resp BP SpO2   06/21/21 1534 -- -- -- (!) 220/101 --   06/21/21 1520 -- 70 17 (!) 212/95 95 %   06/21/21 1418 -- 71 16 (!) 226/107 -- 06/21/21 1300 -- 67 16 (!) 201/155 96 %   06/21/21 1204 97.7 °F (36.5 °C) 71 16 (!) 207/104 99 %       EKG interpretation: (Preliminary)  Normal sinus rhythm seventy, no ST elevations, normal axis. Records Reviewed: Nursing Notes and Old Medical Records    The patient presents with syncope, nausea with a differential diagnosis of cardiac arrhythmia, acute cardiac event, fluid shift and dialysis, sepsis, hypertensive urgency      Provider Notes (Medical Decision Making):     MDM   45-year-old female, history of hypertension, end-stage renal disease, recently started dialysis, was undergoing dialysis today when suddenly lost consciousness lasting several seconds, EMS was called on arrival patient was hypotensive and vomiting. Patient currently states she still feels nauseous, complaining of some abdominal pain, denies any chest pains or shortness of breath. Physical exam shows uncomfortable female, no distress, hypertensive on arrival 200/100, clear breath sounds, bilateral lower extremity edema    We will draw basic lab work, cardiac enzymes, chest x-ray, administer hydralazine 10 mg IV. ED Course:   Initial assessment performed. The patients presenting problems have been discussed, and they are in agreement with the care plan formulated and outlined with them. I have encouraged them to ask questions as they arise throughout their visit. ED Course as of Jun 21 1610   Mon Jun 21, 2021   1534 Pts blood pressure not improved after hydralazine 10 mg given, will give hydralazine 20 IV. [PZ]   [phone] Patient's lab work resulting, troponin negative, CBC shows no leukocytosis, stable hemoglobin, elevated BNP at 12,000 which may be baseline metabolic panel shows chronic renal failure however potassium 4.9, bicarb 25. Given syncopal episode and persistent hypertension will admit patient for syncope work-up, hypertensive urgency. Philip case with Margo Walker.      [PZ]      ED Course User Index  [PZ] Cassy Weiner MD         Procedures       Yanique Fortune MD  Procedures               CRITICAL CARE NOTE :  3:04 PM  Amount of Critical Care Time: _30___(minutes)__    IMPENDING DETERIORATION -Cardiovascular  ASSOCIATED RISK FACTORS - Metabolic changes and Vascular Compromise  MANAGEMENT- Bedside Assessment and Supervision of Care  INTERPRETATION -  Blood Pressure  INTERVENTIONS - IV medications  CASE REVIEW - Hospitalist/Intensivist  TREATMENT RESPONSE -Stable  PERFORMED BY - Self    NOTES   :  I have spent critical care time involved in lab review, consultations with specialist, family decision- making, bedside attention and documentation. This time excludes time spent in any separate billed procedures. During this entire length of time I was immediately available to the patient . Yanique Fortune MD        Disposition     Admit patient  Admitted        Diagnosis     Clinical Impression:   1. Syncope and collapse    2. Hypertensive urgency        Attestations:    Yanique Fortune MD    Please note that this dictation was completed with [employer], the computer voice recognition software. Quite often unanticipated grammatical, syntax, homophones, and other interpretive errors are inadvertently transcribed by the computer software. Please disregard these errors. Please excuse any errors that have escaped final proofreading. Thank you.

## 2021-06-21 NOTE — H&P
History and Physical    Patient: Carlos Mercado MRN: [id number]  SSN: [id number]    YOB: 1960  Age: 62 y.o. Sex: female      Subjective:      Carlos Mercado is a 62 y.o. AA female with a past medical history significant for for ESRD recently started on hemodialysis, hypertension, diabetes mellitus, and pulmonary hypertension who presented to the ED from dialysis for syncopal episode, nausea, vomiting, and weakness during dialysis this morning. She was recently started on dialysis approximately 1 month ago. No complications so far. EMS reports patient became unresponsive shortly after arrival.  She was noted to be hypotensive, vomiting. In the ED, patient became more responsive. She was noted to be hypertensive with blood pressure 226/107. Significant lab work-up showing  and BNP 12,879. She was given IV hydralazine without improvement in blood pressure. EKG showing normal sinus rhythm with prolonged QT. Chest x-ray showing peribronchial thickening and Kerley B lines compatible with pulmonary edema. Nephrology consult. CT of head given continuous vomiting and dizziness. She had a recent echocardiogram in March 2021 showing LVEF 47%, grade 2 LV diastolic dysfunction and RVSP 57 mmHg. Past Medical History:   Diagnosis Date    Anemia, chronic disease 3/22/2021    DM2 (diabetes mellitus, type 2) ([facility] [address] 4/06/9240    H/O metabolic acidosis 6/68/1258    Hypertension     Morbid obesity ([address]     Nephrotic syndrome 3/22/2021    Pulmonary hypertension ([facility] [address] 3/22/2021    Renal failure      Past Surgical History:   Procedure Laterality Date    IR INSERT NON TUNL CVC OVER 5 YRS  3/12/2021      No family history on file. Social History     Tobacco Use    Smoking status: Never Smoker    Smokeless tobacco: Never Used   Substance Use Topics    Alcohol use: Not on file      Prior to Admission medications    Medication Sig Start Date End Date Taking?  Authorizing Provider furosemide (LASIX) 40 mg tablet Take 1 Tab by mouth two (2) times a day. 3/22/21   King Ta MD   hydrALAZINE (APRESOLINE) 50 mg tablet Take 1 Tab by mouth two (2) times a day. 3/22/21   King Ta MD   acetaminophen (TYLENOL) 325 mg tablet Take 2 Tabs by mouth every six (6) hours as needed for Pain or Fever. 3/22/21   King Ta MD   ondansetron (Zofran ODT) 4 mg disintegrating tablet Take 1 Tab by mouth every eight (8) hours as needed for Nausea or Vomiting. 3/22/21   King Ta MD   famotidine (PEPCID) 20 mg tablet Take 1 Tab by mouth daily. 3/19/21   Mayra Reyes MD   sodium bicarbonate 650 mg tablet Take  by mouth three (3) times daily. Provider, Historical   valsartan (DIOVAN) 160 mg tablet Take 160 mg by mouth daily. Provider, Historical   aspirin delayed-release 81 mg tablet Take 81 mg by mouth. Provider, Historical   isosorbide dinitrate (ISORDIL) 20 mg tablet Take 20 mg by mouth three (3) times daily. Provider, Historical   docusate sodium (COLACE) 100 mg capsule Take 100 mg by mouth two (2) times a day. Provider, Historical   carvediloL (COREG) 12.5 mg tablet Take 12.5 mg by mouth two (2) times daily (with meals).     Provider, Historical        Allergies   Allergen Reactions    Doxycycline Swelling    Egg Diarrhea    Milk Containing Products Nausea and Vomiting    Tetracycline Swelling       Review of Systems:  Constitutional: + generalized weakness, No fevers, No chills  Eyes: No visual disturbance  ENT: No nasal congestion, No sore throat  Respiratory: No cough, No sputum, No wheezing, No SOB  Cardiovascular: No chest pain, No lower extremity edema, No Palpitations   Gastrointestinal: + nausea, + vomiting, No diarrhea, No constipation, No abdominal pain  Genitourinary: No frequency, No dysuria, No hematuria  Integument/Breast: No rash, No skin lesion(s), No dryness  Musculoskeletal: No arthralgias, No neck pain, No back pain  Neurological: + headaches, + dizziness, No confusion,  No seizures  Behavioral/Psychiatric: No anxiety, No depression      Objective:     Vitals:    06/21/21 1300 06/21/21 1418 06/21/21 1520 06/21/21 1534   BP: (!) 201/155 (!) 226/107 (!) 212/95 (!) 220/101   Pulse: 67 71 70    Resp: 16 16 17    Temp:       SpO2: 96%  95%    Weight:       Height:            Physical Exam:  General: Middle-aged AA female. In moderate distress. Alert. Eye: conjunctivae/corneas clear. PERRL, EOM's intact. Throat and Neck: Right IJ catheter. Normal and no erythema or exudates noted. No mass   Lung: Symmetric chest wall expansion. Decreased air entry bilateral bases with few crackles audible bilaterally. No wheezing or rhonchi. On room air. Heart: regular rate and rhythm,   Abdomen: soft, non-tender, nondistended. Bowel sounds normal. No masses. Extremities: 1+ pitting edema bilateral lower extremities. Able to move all extremities normal, atraumatic  Skin: Warm, dry, intact. Neurologic: AOx3. Cranial nerves 2-12 and sensation grossly intact. Psychiatric: Distressed. Recent Results (from the past 24 hour(s))   CBC WITH AUTOMATED DIFF    Collection Time: 06/21/21  2:12 PM   Result Value Ref Range    WBC 8.1 3.6 - 11.0 K/uL    RBC 4.24 3.80 - 5.20 M/uL    HGB 11.3 (L) 11.5 - 16.0 g/dL    HCT 36.4 35.0 - 47.0 %    MCV 85.8 80.0 - 99.0 FL    MCH 26.7 26.0 - 34.0 PG    MCHC 31.0 30.0 - 36.5 g/dL    RDW 16.1 (H) 11.5 - 14.5 %    PLATELET 692 (L) 868 - 400 K/uL    MPV 12.2 8.9 - 12.9 FL    NRBC 0.0 0.0  WBC    ABSOLUTE NRBC 0.00 0.00 - 0.01 K/uL    NEUTROPHILS 82 (H) 32 - 75 %    LYMPHOCYTES 8 (L) 12 - 49 %    MONOCYTES 8 5 - 13 %    EOSINOPHILS 1 0 - 7 %    BASOPHILS 1 0 - 1 %    IMMATURE GRANULOCYTES 0 0 - 0.5 %    ABS. NEUTROPHILS 6.6 1.8 - 8.0 K/UL    ABS. LYMPHOCYTES 0.7 (L) 0.8 - 3.5 K/UL    ABS. MONOCYTES 0.6 0.0 - 1.0 K/UL    ABS. EOSINOPHILS 0.1 0.0 - 0.4 K/UL    ABS. BASOPHILS 0.1 0.0 - 0.1 K/UL    ABS. IMM.  GRANS. 0.0 0.00 - 0.04 K/UL    DF AUTOMATED     METABOLIC PANEL, COMPREHENSIVE    Collection Time: 06/21/21  2:12 PM   Result Value Ref Range    Sodium 138 136 - 145 mmol/L    Potassium 4.9 3.5 - 5.1 mmol/L    Chloride 105 97 - 108 mmol/L    CO2 25 21 - 32 mmol/L    Anion gap 8 5 - 15 mmol/L    Glucose 151 (H) 65 - 100 mg/dL    BUN 46 (H) 6 - 20 mg/dL    Creatinine 5.24 (H) 0.55 - 1.02 mg/dL    BUN/Creatinine ratio 9 (L) 12 - 20      GFR est AA 10 (L) >60 ml/min/1.73m2    GFR est non-AA 8 (L) >60 ml/min/1.73m2    Calcium 9.1 8.5 - 10.1 mg/dL    Bilirubin, total 0.7 0.2 - 1.0 mg/dL    AST (SGOT) 117 (H) 15 - 37 U/L    ALT (SGPT) 53 12 - 78 U/L    Alk. phosphatase 161 (H) 45 - 117 U/L    Protein, total 6.9 6.4 - 8.2 g/dL    Albumin 2.8 (L) 3.5 - 5.0 g/dL    Globulin 4.1 (H) 2.0 - 4.0 g/dL    A-G Ratio 0.7 (L) 1.1 - 2.2     CK W/ REFLX CKMB    Collection Time: 06/21/21  2:12 PM   Result Value Ref Range    .4 (H) 26 - 192 ng/mL   TROPONIN I    Collection Time: 06/21/21  2:12 PM   Result Value Ref Range    Troponin-I, Qt. <0.05 <0.05 ng/mL   BNP    Collection Time: 06/21/21  2:12 PM   Result Value Ref Range    NT pro-BNP 12,879 (H) <125 pg/mL   CK-MB,QUANT. Collection Time: 06/21/21  2:12 PM   Result Value Ref Range    CK - MB 3.6 (H) <3.6 ng/mL    CK-MB Index 1.1         XR Results (maximum last 3): Results from Hospital Encounter encounter on 06/21/21    XR CHEST PORT    Narrative  Exam: Frontal chest    Comparison: None available    Number of views: 1    Findings: Indwelling apparent right internal jugular central venous catheter tip  rejected over the expected distribution of the distal superior vena cava. (Please note that anterior-posterior radiography is a one dimensional exam and  cannot accurately determine the exact position of support apparatus. If there is  need to better localize structures in space, a lateral radiograph may prove  helpful, as clinically indicated).  The configuration of the cardiopericardial  silhouette suggests enlarged cardiac chambers. There is pulmonary venous  cephalization, compatible with chronic pulmonary venous hypertension. Pulmonary  vascular shadows are poorly defined, there is peribronchial thickening and there  is interlobular septal thickening (Kerley B lines), compatible with pulmonary  edema and less likely inflammation, atelectasis, or other. The findings are of  uncertain chronicity. No convincing pleural effusions. Impression  Findings compatible with CHF. Results from [place] encounter on 03/20/21    XR CHEST SNGL V    Narrative  AP semiupright view of the chest compared to 3/12/2021. Right IJ catheter seen  previously has been removed. Anatomy is somewhat distorted by significant  patient rotation. Cardiac silhouette appears prominent. The lungs are clear and  no focal airspace disease pneumothorax or pleural effusion is seen. Impression  Cardiac enlargement. No acute abnormality demonstrated. Results from [place] encounter on 03/08/21    XR CHEST SNGL V INSPIR    Narrative  Portable upright radiograph chest 3:51 PM compared to March 8, 2021. INDICATION: Post right IJ catheter placement. Heart size remains enlarged. Interval placement of a right IJ central line with  tip projecting over the SVC. Improved aeration in the right perihilar region. No  pneumothorax or gross effusion. Impression  Status post right IJ central line placement without evidence of  complication and improved aeration in the right perihilar region. CT Results (maximum last 3): Results from [place] encounter on 03/20/21    CT ABD PELV WO CONT    Narrative  CT ABDOMEN PELVIS    CLINICAL: Abdominal pain. COMPARISON: CT abdomen pelvis 8/24/2020 images without report available on the  PACS archive at this time. TECHNIQUE: Axial imaging abdomen pelvis without IV contrast, multiplanar  reformatting.   Dose reduction: All CT scans at this facility are performed using dose reduction  optimization techniques as appropriate to a performed exam including the  following: Automated exposure control, adjustments of the mA and/or kV according  to patient's size, or use of iterative reconstruction technique. FINDINGS:  LUNG BASES: Diminished volume trace right pleural effusion. Previous left  pleural effusion has resolved. Right lower lobe patchy infiltrate. LIVER: Unremarkable for noncontrast technique. GALLBLADDER AND BILIARY TREE: No evident gallstones, gallbladder wall  thickening, or biliary ductal dilation. PANCREAS: Unremarkable for noncontrast technique. SPLEEN: Unremarkable for noncontrast technique. ADRENALS: Unremarkable. KIDNEYS AND URETERS: Symmetric renal size. No urinary stone or hydronephrosis or  hydroureter. BLADDER: Mildly distended. REPRODUCTIVE ORGANS: No pelvic masses. BOWEL: Nondilated. Appendix is not separately identified. LYMPH NODES: Small inguinal lymphadenopathy unchanged, the largest lymph node in  the left inguinal region 2.3 x 1.6 cm. .  PERITONEUM: Small  ascites. Bilateral perinephric stranding. No free air. VESSELS: Calcified abdominal aorta and branch vessels. No abdominal aortic  aneurysm. ABDOMINAL WALL: 4 cm umbilical defect contains noninflamed fat. Diffuse soft  tissue anasarca, diminished. BONES: Unremarkable for age. Impression  1. Mildly distended bladder. No hydronephrosis or hydroureter. .  2.  No bowel dilation. 3.  Similar small ascites. 4.  Bilateral perinephric inflammation suggests medical renal disease. 5.  Diminished right pleural effusion and resolved left pleural effusion. 6.  Umbilical defect contains noninflamed fat. 7.  Diminished soft tissue anasarca. 8.  Unchanging bilateral lymphadenopathy.       CT HEAD WO CONT    Narrative  Dose Reduction:    All CT scans at this facility are performed using dose reduction optimization  techniques as appropriate to a performed exam including the following: Automated  exposure control, adjustments of the mA and/or kV according to patient size, or  use of iterative reconstruction technique. CT of the head without contrast. Axial images of the head were obtained  unenhanced and sagittal and coronal reconstructions were created. No prior films  for comparison. Ventricles are normal in size shape and position. No shift of  the midline or mass is seen. No pathologic extra-axial fluid collection is  identified. There is no evidence of acute hemorrhage or infarction. No bony  abnormality is seen. The exam is otherwise unremarkable. Impression  Negative. MRI Results (maximum last 3): No results found for this or any previous visit. Nuclear Medicine Results (maximum last 3): No results found for this or any previous visit. US Results (maximum last 3): Results from [place] encounter on 03/08/21    US RETROPERITONEUM COMP    Narrative  The study is a retroperitoneal sonographic evaluation dated 3/9/2021. HISTORY: Acute kidney injury in a patient was Covid 19 positive. TECHNIQUE: This examination was interpreted from the static hardcopy images  obtained by the sonographer and correlated with the sonographers notes. Real-time imaging was not included. COMPARISON: Prior sonographic evaluation dated June 22, 2020. FINDINGS: The right kidney measures 11.5 cm in its craniocaudal dimension by 4.4  cm in its AP dimension by 6 cm in its transverse dimension. The left kidney  measures 10.3 cm in its craniocaudal dimension by 5.5 cm in its AP dimension by  6.7 cm in its transverse dimension. The previously described echogenic masses  within the left kidney are no longer demonstrated. This examination is negative  for hydronephrosis. This examination is negative for a focal renal mass,  perinephric fluid, or other abnormalities of the kidneys. The urinary bladder images in a normal fashion.  There is normal caliber to the  abdominal aorta and the inferior vena cava. Impression  1. This examination is negative for hydronephrosis as an etiology for the  patient's renal insufficiency. 2.  On prior sonographic imaging there were demonstrated echogenic masses within  the left kidney which are no longer present. There are now is a smaller but more  normal morphology to the patient's left kidney. Active Problems:    Uncontrolled hypertension (3/21/2021)      Syncope (6/21/2021)      ESRD (end stage renal disease) on dialysis ([facility] [address] (6/21/2021)        Assessment/Plan:     1. Syncope  2. Nausea, vomiting  - Etiology unclear. Pt was at dialysis during episode. Fluctuating blood pressure. - Will get CT of head  - IV Zofran  - Cardiac monitoring  - May need CT abdomen if she continues to have emesis    2. Hypertensive Urgency  - S/p  IV hydralazine 30 mg in ED without improvement. - Will give IV metoprolol 5 mg x1  - Resume home carvedilol and hydralazine 50 mg BID  - Will increase home hydralazine to 100 mg BID  -  EKG showing normal sinus rhythm with prolonged QT.    - Troponin negative x1. Trend serial troponins.  - CK elevated at 340.   - Chest x-ray showing peribronchial thickening and Kerley B lines compatible with pulmonary edema.    - Recent echocardiogram in March 2021 showing LVEF 47%, grade 2 LV diastolic dysfunction and RVSP 57 mmHg  - Cardiac monitoring    2. Fluid overload  3. ESRD on hemodialysis  - BNP 12,879  - Resume home Lasix  - Nephrology consult. 4. Diabetes mellitus  - ISS and accu-checks    5.  Pulmonary HTN      Code Status: FULL      Total Time >55 minutes      Signed By: Marylou Houston PA-C     June 21, 2021

## 2021-06-21 NOTE — ROUTINE PROCESS
TRANSFER - OUT REPORT:    Verbal report given to Mitchell Dodson RN(name) on Lily Cardozo  being transferred to 56(unit) for routine progression of care       Report consisted of patients Situation, Background, Assessment and   Recommendations(SBAR). Information from the following report(s) SBAR was reviewed with the receiving nurse. Lines:   Peripheral IV 06/21/21 Anterior;Proximal;Right Forearm (Active)        Opportunity for questions and clarification was provided.       Patient transported with:   [employer]

## 2021-06-21 NOTE — PROGRESS NOTES
6/21/21. Pt informed of SON/OBS notice, verbalized understanding, & signed. Copy to pt,copy in chart, & original to HIM for scanning into EMR.

## 2021-06-21 NOTE — ED TRIAGE NOTES
[facility] pt just started HD  With sbp 150 then pt's sbp dropped to 70; pt then started c/o nausea and started to vomit and then had a syncopal episode; dialysis center then administered 1000 ml bolus; upon arrival pt c/o ABD pain and dizziness

## 2021-06-22 LAB
ALBUMIN SERPL-MCNC: 2.5 G/DL (ref 3.5–5)
ALBUMIN/GLOB SERPL: 0.8 {RATIO} (ref 1.1–2.2)
ALP SERPL-CCNC: 128 U/L (ref 45–117)
ALT SERPL-CCNC: 35 U/L (ref 12–78)
ANION GAP SERPL CALC-SCNC: 8 MMOL/L (ref 5–15)
AST SERPL W P-5'-P-CCNC: 28 U/L (ref 15–37)
ATRIAL RATE: 70 BPM
ATRIAL RATE: 72 BPM
BASOPHILS # BLD: 0.1 K/UL (ref 0–0.1)
BASOPHILS NFR BLD: 2 % (ref 0–1)
BILIRUB SERPL-MCNC: 0.4 MG/DL (ref 0.2–1)
BNP SERPL-MCNC: ABNORMAL PG/ML
BUN SERPL-MCNC: 52 MG/DL (ref 6–20)
BUN/CREAT SERPL: 9 (ref 12–20)
CA-I BLD-MCNC: 8.8 MG/DL (ref 8.5–10.1)
CALCULATED P AXIS, ECG09: 44 DEGREES
CALCULATED P AXIS, ECG09: 73 DEGREES
CALCULATED R AXIS, ECG10: 67 DEGREES
CALCULATED R AXIS, ECG10: 72 DEGREES
CALCULATED T AXIS, ECG11: 82 DEGREES
CALCULATED T AXIS, ECG11: 85 DEGREES
CHLORIDE SERPL-SCNC: 107 MMOL/L (ref 97–108)
CK SERPL-CCNC: 158 U/L (ref 26–192)
CO2 SERPL-SCNC: 24 MMOL/L (ref 21–32)
CREAT SERPL-MCNC: 5.54 MG/DL (ref 0.55–1.02)
DIAGNOSIS, 93000: NORMAL
DIAGNOSIS, 93000: NORMAL
DIFFERENTIAL METHOD BLD: ABNORMAL
EOSINOPHIL # BLD: 0.1 K/UL (ref 0–0.4)
EOSINOPHIL NFR BLD: 1 % (ref 0–7)
ERYTHROCYTE [DISTWIDTH] IN BLOOD BY AUTOMATED COUNT: 16.1 % (ref 11.5–14.5)
GLOBULIN SER CALC-MCNC: 3.2 G/DL (ref 2–4)
GLUCOSE BLD STRIP.AUTO-MCNC: 104 MG/DL (ref 65–117)
GLUCOSE BLD STRIP.AUTO-MCNC: 126 MG/DL (ref 65–117)
GLUCOSE BLD STRIP.AUTO-MCNC: 127 MG/DL (ref 65–117)
GLUCOSE BLD STRIP.AUTO-MCNC: 151 MG/DL (ref 65–117)
GLUCOSE SERPL-MCNC: 132 MG/DL (ref 65–100)
HCT VFR BLD AUTO: 33.1 % (ref 35–47)
HGB BLD-MCNC: 9.9 G/DL (ref 11.5–16)
IMM GRANULOCYTES # BLD AUTO: 0 K/UL (ref 0–0.04)
IMM GRANULOCYTES NFR BLD AUTO: 0 % (ref 0–0.5)
LYMPHOCYTES # BLD: 1 K/UL (ref 0.8–3.5)
LYMPHOCYTES NFR BLD: 18 % (ref 12–49)
MCH RBC QN AUTO: 26.2 PG (ref 26–34)
MCHC RBC AUTO-ENTMCNC: 29.9 G/DL (ref 30–36.5)
MCV RBC AUTO: 87.6 FL (ref 80–99)
MONOCYTES # BLD: 0.6 K/UL (ref 0–1)
MONOCYTES NFR BLD: 10 % (ref 5–13)
NEUTS SEG # BLD: 3.9 K/UL (ref 1.8–8)
NEUTS SEG NFR BLD: 69 % (ref 32–75)
NRBC # BLD: 0 K/UL (ref 0–0.01)
NRBC BLD-RTO: 0 PER 100 WBC
P-R INTERVAL, ECG05: 176 MS
P-R INTERVAL, ECG05: 192 MS
PERFORMED BY, TECHID: ABNORMAL
PERFORMED BY, TECHID: NORMAL
PLATELET # BLD AUTO: 177 K/UL (ref 150–400)
PMV BLD AUTO: 12.2 FL (ref 8.9–12.9)
POTASSIUM SERPL-SCNC: 4.2 MMOL/L (ref 3.5–5.1)
PROT SERPL-MCNC: 5.7 G/DL (ref 6.4–8.2)
Q-T INTERVAL, ECG07: 444 MS
Q-T INTERVAL, ECG07: 452 MS
QRS DURATION, ECG06: 84 MS
QRS DURATION, ECG06: 90 MS
QTC CALCULATION (BEZET), ECG08: 486 MS
QTC CALCULATION (BEZET), ECG08: 488 MS
RBC # BLD AUTO: 3.78 M/UL (ref 3.8–5.2)
SODIUM SERPL-SCNC: 139 MMOL/L (ref 136–145)
TROPONIN I SERPL-MCNC: <0.05 NG/ML
VENTRICULAR RATE, ECG03: 70 BPM
VENTRICULAR RATE, ECG03: 72 BPM
WBC # BLD AUTO: 5.7 K/UL (ref 3.6–11)

## 2021-06-22 PROCEDURE — 92610 EVALUATE SWALLOWING FUNCTION: CPT

## 2021-06-22 PROCEDURE — 93005 ELECTROCARDIOGRAM TRACING: CPT

## 2021-06-22 PROCEDURE — 96375 TX/PRO/DX INJ NEW DRUG ADDON: CPT

## 2021-06-22 PROCEDURE — 96372 THER/PROPH/DIAG INJ SC/IM: CPT

## 2021-06-22 PROCEDURE — 84484 ASSAY OF TROPONIN QUANT: CPT

## 2021-06-22 PROCEDURE — 36415 COLL VENOUS BLD VENIPUNCTURE: CPT

## 2021-06-22 PROCEDURE — 83880 ASSAY OF NATRIURETIC PEPTIDE: CPT

## 2021-06-22 PROCEDURE — 80053 COMPREHEN METABOLIC PANEL: CPT

## 2021-06-22 PROCEDURE — 74011636637 HC RX REV CODE- 636/637: Performed by: STUDENT IN AN ORGANIZED HEALTH CARE EDUCATION/TRAINING PROGRAM

## 2021-06-22 PROCEDURE — 74011250637 HC RX REV CODE- 250/637: Performed by: HOSPITALIST

## 2021-06-22 PROCEDURE — 99218 HC RM OBSERVATION: CPT

## 2021-06-22 PROCEDURE — 74011250636 HC RX REV CODE- 250/636: Performed by: STUDENT IN AN ORGANIZED HEALTH CARE EDUCATION/TRAINING PROGRAM

## 2021-06-22 PROCEDURE — 85025 COMPLETE CBC W/AUTO DIFF WBC: CPT

## 2021-06-22 PROCEDURE — 74011250636 HC RX REV CODE- 250/636: Performed by: INTERNAL MEDICINE

## 2021-06-22 PROCEDURE — 82550 ASSAY OF CK (CPK): CPT

## 2021-06-22 PROCEDURE — 74011250637 HC RX REV CODE- 250/637: Performed by: STUDENT IN AN ORGANIZED HEALTH CARE EDUCATION/TRAINING PROGRAM

## 2021-06-22 PROCEDURE — 96376 TX/PRO/DX INJ SAME DRUG ADON: CPT

## 2021-06-22 PROCEDURE — 82962 GLUCOSE BLOOD TEST: CPT

## 2021-06-22 RX ORDER — HEPARIN SODIUM 5000 [USP'U]/ML
5000 INJECTION, SOLUTION INTRAVENOUS; SUBCUTANEOUS EVERY 8 HOURS
Status: DISCONTINUED | OUTPATIENT
Start: 2021-06-22 | End: 2021-06-25 | Stop reason: HOSPADM

## 2021-06-22 RX ORDER — FUROSEMIDE 10 MG/ML
80 INJECTION INTRAMUSCULAR; INTRAVENOUS ONCE
Status: COMPLETED | OUTPATIENT
Start: 2021-06-22 | End: 2021-06-22

## 2021-06-22 RX ADMIN — NITROGLYCERIN 2 INCH: 20 OINTMENT TOPICAL at 09:13

## 2021-06-22 RX ADMIN — CARVEDILOL 12.5 MG: 12.5 TABLET, FILM COATED ORAL at 16:46

## 2021-06-22 RX ADMIN — INSULIN LISPRO 2 UNITS: 100 INJECTION, SOLUTION INTRAVENOUS; SUBCUTANEOUS at 12:30

## 2021-06-22 RX ADMIN — Medication 10 ML: at 16:54

## 2021-06-22 RX ADMIN — CARVEDILOL 12.5 MG: 12.5 TABLET, FILM COATED ORAL at 09:12

## 2021-06-22 RX ADMIN — NITROGLYCERIN 2 INCH: 20 OINTMENT TOPICAL at 18:00

## 2021-06-22 RX ADMIN — HEPARIN SODIUM 5000 UNITS: 5000 INJECTION INTRAVENOUS; SUBCUTANEOUS at 16:45

## 2021-06-22 RX ADMIN — HYDRALAZINE HYDROCHLORIDE 100 MG: 50 TABLET, FILM COATED ORAL at 09:12

## 2021-06-22 RX ADMIN — ISOSORBIDE DINITRATE 20 MG: 10 TABLET ORAL at 09:12

## 2021-06-22 RX ADMIN — ISOSORBIDE DINITRATE 20 MG: 10 TABLET ORAL at 21:41

## 2021-06-22 RX ADMIN — FUROSEMIDE 80 MG: 10 INJECTION, SOLUTION INTRAMUSCULAR; INTRAVENOUS at 12:34

## 2021-06-22 RX ADMIN — DOCUSATE SODIUM 100 MG: 100 CAPSULE, LIQUID FILLED ORAL at 09:12

## 2021-06-22 RX ADMIN — ISOSORBIDE DINITRATE 20 MG: 10 TABLET ORAL at 16:46

## 2021-06-22 RX ADMIN — HYDRALAZINE HYDROCHLORIDE 100 MG: 50 TABLET, FILM COATED ORAL at 21:41

## 2021-06-22 RX ADMIN — Medication 10 ML: at 05:28

## 2021-06-22 RX ADMIN — ENOXAPARIN SODIUM 30 MG: 30 INJECTION SUBCUTANEOUS at 09:13

## 2021-06-22 RX ADMIN — ONDANSETRON 4 MG: 2 INJECTION INTRAMUSCULAR; INTRAVENOUS at 16:44

## 2021-06-22 RX ADMIN — NITROGLYCERIN 2 INCH: 20 OINTMENT TOPICAL at 02:45

## 2021-06-22 RX ADMIN — Medication 10 ML: at 22:00

## 2021-06-22 RX ADMIN — DOCUSATE SODIUM 100 MG: 100 CAPSULE, LIQUID FILLED ORAL at 21:40

## 2021-06-22 RX ADMIN — ONDANSETRON 4 MG: 4 TABLET, ORALLY DISINTEGRATING ORAL at 12:29

## 2021-06-22 RX ADMIN — FAMOTIDINE 20 MG: 20 TABLET ORAL at 09:12

## 2021-06-22 RX ADMIN — ONDANSETRON 4 MG: 2 INJECTION INTRAMUSCULAR; INTRAVENOUS at 07:29

## 2021-06-22 NOTE — PROGRESS NOTES
Hospitalist Progress Note               Daily Progress Note: 6/22/2021  60 f esrd on hd x 1 month, dm2 on glipizide, hypertension presents 2/2 syncope, n/v, weakness during hd.EMS reportedly encountered hypotension and vomiting. BP elevated on arrival to ED sbp~ 226. Ekg shows sr, qt prolonged. cxr suggests chf. Echo 3/2021 lvef 47%, drade 2 dd. Subjective: The patient is seen for follow  up. Chart reviewed, gianna at bedside, lachrymous as she states doesn't know what happened. She is lucid now. Nausea this am but states every am before she eats and bp above goal prior to morning meds. No dizziness, repeat syncope, chest pain or sob. She denies prior similar episode.     Problem List:  Problem List as of 6/22/2021 Date Reviewed: 3/22/2021        Codes Class Noted - Resolved    Syncope ICD-10-CM: R55  ICD-9-CM: 780.2  6/21/2021 - Present        ESRD (end stage renal disease) on dialysis [facility]) ICD-10-CM: N18.6, Z99.2  ICD-9-CM: 585.6, V45.11  6/21/2021 - Present        Nephrotic syndrome (Chronic) ICD-10-CM: N04.9  ICD-9-CM: 581.9  3/22/2021 - Present        Anemia, chronic disease ICD-10-CM: D63.8  ICD-9-CM: 285.29  3/22/2021 - Present        H/O metabolic acidosis LPV-01-GO: Z86.39  ICD-9-CM: V12.29  3/22/2021 - Present        Pulmonary hypertension ([facility] (Chronic) ICD-10-CM: I27.20  ICD-9-CM: 416.8  3/22/2021 - Present        DM2 (diabetes mellitus, type 2) ([facility] (Chronic) ICD-10-CM: E11.9  ICD-9-CM: 250.00  3/22/2021 - Present        Uncontrolled hypertension ICD-10-CM: I10  ICD-9-CM: 401.9  3/21/2021 - Present        Stage 5 chronic kidney disease ([facility] ICD-10-CM: N18.5  ICD-9-CM: 585.5  3/20/2021 - Present        RESOLVED: Intractable nausea and vomiting ICD-10-CM: R11.2  ICD-9-CM: 536.2  3/20/2021 - 3/22/2021              Medications reviewed  Current Facility-Administered Medications   Medication Dose Route Frequency    nitroglycerin (NITROBID) 2 % ointment 2 Inch  2 Inch Topical Q8H    ondansetron [facility]) injection 4 mg  4 mg IntraVENous Q4H PRN    sodium chloride (NS) flush 5-40 mL  5-40 mL IntraVENous Q8H    sodium chloride (NS) flush 5-40 mL  5-40 mL IntraVENous PRN    acetaminophen (TYLENOL) tablet 650 mg  650 mg Oral Q6H PRN    Or    acetaminophen (TYLENOL) suppository 650 mg  650 mg Rectal Q6H PRN    polyethylene glycol (MIRALAX) packet 17 g  17 g Oral DAILY PRN    ondansetron (ZOFRAN ODT) tablet 4 mg  4 mg Oral Q8H PRN    Or    ondansetron (ZOFRAN) injection 4 mg  4 mg IntraVENous Q6H PRN    enoxaparin (LOVENOX) injection 30 mg  30 mg SubCUTAneous DAILY    carvediloL (COREG) tablet 12.5 mg  12.5 mg Oral BID WITH MEALS    docusate sodium (COLACE) capsule 100 mg  100 mg Oral BID    famotidine (PEPCID) tablet 20 mg  20 mg Oral DAILY    isosorbide dinitrate (ISORDIL) tablet 20 mg  20 mg Oral TID    insulin lispro (HUMALOG) injection   SubCUTAneous AC&HS    glucose chewable tablet 16 g  4 Tablet Oral PRN    glucagon (GLUCAGEN) injection 1 mg  1 mg IntraMUSCular PRN    dextrose (D50W) injection syrg 12.5-25 g  25-50 mL IntraVENous PRN    hydrALAZINE (APRESOLINE) tablet 100 mg  100 mg Oral BID    metoprolol (LOPRESSOR) injection 2.5 mg  2.5 mg IntraVENous Q6H PRN       Review of Systems:   Review of Systems   Constitutional: Positive for malaise/fatigue. Negative for chills and fever. HENT: Negative. Eyes: Negative. Respiratory: Negative for shortness of breath. Cardiovascular: Positive for leg swelling. Negative for chest pain and palpitations. Gastrointestinal: Negative. Genitourinary: Negative. Musculoskeletal: Negative. Skin: Negative. Neurological: Negative. Endo/Heme/Allergies: Negative. Psychiatric/Behavioral: The patient is nervous/anxious.         Objective:   Physical Exam:     Visit Vitals  BP (!) 182/89   Pulse 71   Temp 98.6 °F (37 °C)   Resp 18   Ht 5' 8\" (1.727 m)   Wt 115 kg (253 lb 8.5 oz)   SpO2 100%   Breastfeeding No   BMI 38.55 kg/m² O2 Device: None (Room air)    Temp (24hrs), Av.9 °F (36.6 °C), Min:97.5 °F (36.4 °C), Max:98.6 °F (37 °C)    No intake/output data recorded. No intake/output data recorded. General:  Alert, cooperative, no distress, appears stated age. Lachymous. Lungs:   Clear to auscultation bilaterally. not labored. Chest wall:  No tenderness or deformity. Heart:  Regular rate and rhythm, S1, S2 normal, no murmur, click, rub or gallop. Abdomen:   Soft, non-tender. Bowel sounds normal. No masses,  No organomegaly. Extremities: Extremities normal, atraumatic, no cyanosis. + edema distal le's. Pulses: 2+ and symmetric all extremities. Skin: Skin color, texture, turgor normal. No rashes or lesions   Neurologic: CNII-XII intact. No gross sensory or motor deficits. aao x 3. Data Review:       Recent Days:  Recent Labs     21  0614 21  1412   WBC 5.7 8.1   HGB 9.9* 11.3*   HCT 33.1* 36.4    137*     Recent Labs     21  0614 21  1412    138   K 4.2 4.9    105   CO2 24 25   * 151*   BUN 52* 46*   CREA 5.54* 5.24*   CA 8.8 9.1   ALB 2.5* 2.8*   TBILI 0.4 0.7   ALT 35 53     No results for input(s): PH, PCO2, PO2, HCO3, FIO2 in the last 72 hours. 24 Hour Results:  Recent Results (from the past 24 hour(s))   CBC WITH AUTOMATED DIFF    Collection Time: 21  2:12 PM   Result Value Ref Range    WBC 8.1 3.6 - 11.0 K/uL    RBC 4.24 3.80 - 5.20 M/uL    HGB 11.3 (L) 11.5 - 16.0 g/dL    HCT 36.4 35.0 - 47.0 %    MCV 85.8 80.0 - 99.0 FL    MCH 26.7 26.0 - 34.0 PG    MCHC 31.0 30.0 - 36.5 g/dL    RDW 16.1 (H) 11.5 - 14.5 %    PLATELET 910 (L) 458 - 400 K/uL    MPV 12.2 8.9 - 12.9 FL    NRBC 0.0 0.0  WBC    ABSOLUTE NRBC 0.00 0.00 - 0.01 K/uL    NEUTROPHILS 82 (H) 32 - 75 %    LYMPHOCYTES 8 (L) 12 - 49 %    MONOCYTES 8 5 - 13 %    EOSINOPHILS 1 0 - 7 %    BASOPHILS 1 0 - 1 %    IMMATURE GRANULOCYTES 0 0 - 0.5 %    ABS. NEUTROPHILS 6.6 1.8 - 8.0 K/UL    ABS. LYMPHOCYTES 0.7 (L) 0.8 - 3.5 K/UL    ABS. MONOCYTES 0.6 0.0 - 1.0 K/UL    ABS. EOSINOPHILS 0.1 0.0 - 0.4 K/UL    ABS. BASOPHILS 0.1 0.0 - 0.1 K/UL    ABS. IMM. GRANS. 0.0 0.00 - 0.04 K/UL    DF AUTOMATED     METABOLIC PANEL, COMPREHENSIVE    Collection Time: 06/21/21  2:12 PM   Result Value Ref Range    Sodium 138 136 - 145 mmol/L    Potassium 4.9 3.5 - 5.1 mmol/L    Chloride 105 97 - 108 mmol/L    CO2 25 21 - 32 mmol/L    Anion gap 8 5 - 15 mmol/L    Glucose 151 (H) 65 - 100 mg/dL    BUN 46 (H) 6 - 20 mg/dL    Creatinine 5.24 (H) 0.55 - 1.02 mg/dL    BUN/Creatinine ratio 9 (L) 12 - 20      GFR est AA 10 (L) >60 ml/min/1.73m2    GFR est non-AA 8 (L) >60 ml/min/1.73m2    Calcium 9.1 8.5 - 10.1 mg/dL    Bilirubin, total 0.7 0.2 - 1.0 mg/dL    AST (SGOT) 117 (H) 15 - 37 U/L    ALT (SGPT) 53 12 - 78 U/L    Alk. phosphatase 161 (H) 45 - 117 U/L    Protein, total 6.9 6.4 - 8.2 g/dL    Albumin 2.8 (L) 3.5 - 5.0 g/dL    Globulin 4.1 (H) 2.0 - 4.0 g/dL    A-G Ratio 0.7 (L) 1.1 - 2.2     CK W/ REFLX CKMB    Collection Time: 06/21/21  2:12 PM   Result Value Ref Range    .4 (H) 26 - 192 ng/mL   TROPONIN I    Collection Time: 06/21/21  2:12 PM   Result Value Ref Range    Troponin-I, Qt. <0.05 <0.05 ng/mL   BNP    Collection Time: 06/21/21  2:12 PM   Result Value Ref Range    NT pro-BNP 12,879 (H) <125 pg/mL   CK-MB,QUANT.     Collection Time: 06/21/21  2:12 PM   Result Value Ref Range    CK - MB 3.6 (H) <3.6 ng/mL    CK-MB Index 1.1     GLUCOSE, POC    Collection Time: 06/21/21  8:27 PM   Result Value Ref Range    Glucose (POC) 146 (H) 65 - 117 mg/dL    Performed by Kan Palma    LACTIC ACID    Collection Time: 06/21/21  8:42 PM   Result Value Ref Range    Lactic acid 0.7 0.4 - 2.0 mmol/L   TROPONIN I    Collection Time: 06/21/21  8:42 PM   Result Value Ref Range    Troponin-I, Qt. <0.05 <4.58 ng/mL   METABOLIC PANEL, COMPREHENSIVE    Collection Time: 06/22/21  6:14 AM   Result Value Ref Range    Sodium 139 136 - 145 mmol/L    Potassium 4.2 3.5 - 5.1 mmol/L    Chloride 107 97 - 108 mmol/L    CO2 24 21 - 32 mmol/L    Anion gap 8 5 - 15 mmol/L    Glucose 132 (H) 65 - 100 mg/dL    BUN 52 (H) 6 - 20 mg/dL    Creatinine 5.54 (H) 0.55 - 1.02 mg/dL    BUN/Creatinine ratio 9 (L) 12 - 20      GFR est AA 10 (L) >60 ml/min/1.73m2    GFR est non-AA 8 (L) >60 ml/min/1.73m2    Calcium 8.8 8.5 - 10.1 mg/dL    Bilirubin, total 0.4 0.2 - 1.0 mg/dL    AST (SGOT) 28 15 - 37 U/L    ALT (SGPT) 35 12 - 78 U/L    Alk. phosphatase 128 (H) 45 - 117 U/L    Protein, total 5.7 (L) 6.4 - 8.2 g/dL    Albumin 2.5 (L) 3.5 - 5.0 g/dL    Globulin 3.2 2.0 - 4.0 g/dL    A-G Ratio 0.8 (L) 1.1 - 2.2     CBC WITH AUTOMATED DIFF    Collection Time: 06/22/21  6:14 AM   Result Value Ref Range    WBC 5.7 3.6 - 11.0 K/uL    RBC 3.78 (L) 3.80 - 5.20 M/uL    HGB 9.9 (L) 11.5 - 16.0 g/dL    HCT 33.1 (L) 35.0 - 47.0 %    MCV 87.6 80.0 - 99.0 FL    MCH 26.2 26.0 - 34.0 PG    MCHC 29.9 (L) 30.0 - 36.5 g/dL    RDW 16.1 (H) 11.5 - 14.5 %    PLATELET 045 054 - 571 K/uL    MPV 12.2 8.9 - 12.9 FL    NRBC 0.0 0.0  WBC    ABSOLUTE NRBC 0.00 0.00 - 0.01 K/uL    NEUTROPHILS 69 32 - 75 %    LYMPHOCYTES 18 12 - 49 %    MONOCYTES 10 5 - 13 %    EOSINOPHILS 1 0 - 7 %    BASOPHILS 2 (H) 0 - 1 %    IMMATURE GRANULOCYTES 0 0 - 0.5 %    ABS. NEUTROPHILS 3.9 1.8 - 8.0 K/UL    ABS. LYMPHOCYTES 1.0 0.8 - 3.5 K/UL    ABS. MONOCYTES 0.6 0.0 - 1.0 K/UL    ABS. EOSINOPHILS 0.1 0.0 - 0.4 K/UL    ABS. BASOPHILS 0.1 0.0 - 0.1 K/UL    ABS. IMM.  GRANS. 0.0 0.00 - 0.04 K/UL    DF AUTOMATED     BNP    Collection Time: 06/22/21  6:14 AM   Result Value Ref Range    NT pro-BNP 20,092 (H) <125 pg/mL   CK    Collection Time: 06/22/21  6:14 AM   Result Value Ref Range     26 - 192 U/L   TROPONIN I    Collection Time: 06/22/21  6:14 AM   Result Value Ref Range    Troponin-I, Qt. <0.05 <0.05 ng/mL   EKG, 12 LEAD, SUBSEQUENT    Collection Time: 06/22/21  7:33 AM   Result Value Ref Range    Ventricular Rate 72 BPM    Atrial Rate 72 BPM    P-R Interval 192 ms    QRS Duration 90 ms    Q-T Interval 444 ms    QTC Calculation (Bezet) 486 ms    Calculated P Axis 73 degrees    Calculated R Axis 72 degrees    Calculated T Axis 85 degrees    Diagnosis       Normal sinus rhythm  Prolonged QT  Abnormal ECG  When compared with ECG of 21-JUN-2021 12:02, (Unconfirmed)  No significant change was found  Confirmed by Rayo Goyal MD, Mariangel Heard ([id number]) on 6/22/2021 8:48:26 AM     GLUCOSE, POC    Collection Time: 06/22/21  7:50 AM   Result Value Ref Range    Glucose (POC) 127 (H) 65 - 117 mg/dL    Performed by LINDA CHISHOLM            Assessment/     Patient Active Problem List   Diagnosis Code    Stage 5 chronic kidney disease ([facility] N18.5    Uncontrolled hypertension I10    Nephrotic syndrome N04.9    Anemia, chronic disease V71.5    H/O metabolic acidosis J88.81    Pulmonary hypertension (HCC) I27.20    DM2 (diabetes mellitus, type 2) ([facility] E11.9    Syncope R55    ESRD (end stage renal disease) on dialysis ([facility]) N18.6, Z99.2         -syncope- during hd, found hypotensive per ems, suspect orthostatic/hypotension possibly contributed. -Htpn uncontrolled, not clear if took meds prior to hd event. Nonetheless, bp uncontrolled since arrival to ED. Will see how responds to am meds + lasix 80 mg x 1. Pta meds resumed.   -chfref/fluid overload, hd  Last 6/21, unknown details, nephrology consulted, giving 1 dose lasix now. Bnp markedly elevated from prior, cxr suggests chf.  -esrd on hd x 1 month, cx nephrology, continue per nephrology recs.  -Dm2, takes oral glipizide once daily, denies low blood sugars preceding event, i'm unaware of bg at the scene but has not been hypoglycemic here. Plan:  Lasix 80 mg x 1, livia's. Nephrology cx'd. Check orthostatics,  Follow bp trend, rx adjustment? Vtep:heparin  Full code  Dispo: obs admit, pending nephrology cx, repeat cxr and monitor response to iv lasix. Follow orthostatic bp.     Care Plan discussed with: Patient/Family, Nurse and Consultant . Total time spent with patient: 30 minutes. This dictation was done by dragon, computer voice recognition software. Often unanticipated grammatical, syntax, phones and other interpretive errors are inadvertently transcribed. Please excuse errors that have escaped final proofreading.     Madhuri Escalera MD

## 2021-06-22 NOTE — PROGRESS NOTES
SPEECH LANGUAGE PATHOLOGY BEDSIDE SWALLOW EVALUATIONS  Patient: Lily Cardozo  (53 y.o. )  Date: 6/22/2021  Primary Diagnosis: Syncope   Precautions:  Fall    ASSESSMENT :  Patient is alert, oriented x4, and follows basic commands. She reports nausea earlier today improved following breakfast.   She presents w/ wfl oropharyngeal swallow fxn. Oral phase c/b effective and timely mastication and A-P tranasit. Pharyngeal phase c/b timely swallow initiation and HLE is wfl upon palpation. No overt s/s of pen/asp observed. PLAN :  Recommendations and Planned Interventions:  Rec diet upgrade to regular diet w/ thin liquids and general asp/GERD precautions. No further ST intervention at this time. Please re-consult as medically indicated. SUBJECTIVE:   Patient denies dysphagia. Reports vomiting in am if does not eat as soon as wakes up. She reports nausea for unidentified time. OBJECTIVE:   Patient admitted following syncopal episode w/ n/v while at HD. Patient reports recently started HD x1 month. Past Medical History:   Diagnosis Date    Anemia, chronic disease 3/22/2021    DM2 (diabetes mellitus, type 2) ([address] 9/28/1066    H/O metabolic acidosis 4/87/5141    Hypertension     Morbid obesity ([address]     Nephrotic syndrome 3/22/2021    Pulmonary hypertension ([address] 3/22/2021    Renal failure        CXR Results  (Last 48 hours)               06/21/21 1220  XR CHEST PORT Final result    Impression:  Findings compatible with CHF. Narrative:  Exam: Frontal chest       Comparison: None available       Number of views: 1       Findings: Indwelling apparent right internal jugular central venous catheter tip   rejected over the expected distribution of the distal superior vena cava. (Please note that anterior-posterior radiography is a one dimensional exam and   cannot accurately determine the exact position of support apparatus.  If there is   need to better localize structures in space, a lateral radiograph may prove   helpful, as clinically indicated). The configuration of the cardiopericardial   silhouette suggests enlarged cardiac chambers. There is pulmonary venous   cephalization, compatible with chronic pulmonary venous hypertension. Pulmonary   vascular shadows are poorly defined, there is peribronchial thickening and there   is interlobular septal thickening (Kerley B lines), compatible with pulmonary   edema and less likely inflammation, atelectasis, or other. The findings are of   uncertain chronicity. No convincing pleural effusions. Diet prior to admission: Regular  Current Diet:  DIET ADULT     Cognitive and Communication Status:  Neurologic State: Alert  Orientation Level: Oriented X4  Cognition: Appropriate decision making  Perception: Appears intact  Perseveration: No perseveration noted  Safety/Judgement: Awareness of environment  Swallowing Evaluation:   Oral Assessment:  Oral Assessment  Labial: No impairment  Dentition: Natural  Oral Hygiene: caries  Lingual: No impairment  Velum: No impairment  Mandible: No impairment  P.O. Trials:  Patient Position: upright in bed  Vocal quality prior to P.O.: No impairment  Consistency Presented: Puree; Solid; Thin liquid  How Presented: Self-fed/presented;Cup/sip; Successive swallows     Bolus Acceptance: No impairment  Bolus Formation/Control: No impairment     Propulsion: No impairment  Oral Residue: None  Initiation of Swallow: No impairment  Laryngeal Elevation: Functional  Aspiration Signs/Symptoms: None  Pharyngeal Phase Characteristics: No impairment, issues, or problems              Oral Phase Severity: No impairment  Pharyngeal Phase Severity : No impairment  Voice:  Vocal Quality: No impairment          Pain:  Pain Scale 1: Numeric (0 - 10)  Pain Intensity 1: 0         After treatment:   Patient left in no apparent distress in bed, Call bell within reach and Nursing notified    COMMUNICATION/EDUCATION:   Patient receptive of education regarding s/s aspiration and aspiration precautions. The patient's plan of care including recommendations, planned interventions, and recommended diet changes were discussed with: Registered nurse.        Thank you for this referral.  Antonina Meeks M.S., M.Lalit., CCC-SLP  Time Calculation: 10 mins

## 2021-06-22 NOTE — CONSULTS
Consult Date: 6/22/2021    IP CONSULT TO NEPHROLOGY  Consult performed by: Chino Bautista MD  Consult ordered by: Arturo French MD          Subjective   HISTORY OF PRESENTING ILLNESS :  Patient is a 23-year-old -American female with history of ESRD on hemodialysis, diabetes mellitus with complications, hypertension, morbid obesity, nephrotic syndrome, anasarca and fluid overload passed out while at dialysis yesterday. Patient does not recall the events preceding this episode. At this time, she is anxious and tearful but does not have any symptoms of lightheadedness dizziness or passing out. She was initiated on dialysis 1 month ago and is still taking glipizide for management of diabetes. Past Medical History:   Diagnosis Date    Anemia, chronic disease 3/22/2021    DM2 (diabetes mellitus, type 2) ([facility] [address] 2/16/8897    H/O metabolic acidosis 8/68/5376    Hypertension     Morbid obesity ([facility] [address]     Nephrotic syndrome 3/22/2021    Pulmonary hypertension ([facility] [address] 3/22/2021    Renal failure       Past Surgical History:   Procedure Laterality Date    IR INSERT NON TUNL CVC OVER 5 YRS  3/12/2021     No family history on file.    Social History     Tobacco Use    Smoking status: Never Smoker    Smokeless tobacco: Never Used   Substance Use Topics    Alcohol use: Not on file       Current Facility-Administered Medications   Medication Dose Route Frequency Provider Last Rate Last Admin    nitroglycerin (NITROBID) 2 % ointment 2 Inch  2 Inch Topical Q8H Olman Lennon MD   2 Inch at 06/22/21 0913    furosemide (LASIX) injection 80 mg  80 mg IntraVENous ONCE Alexis Wilks MD        heparin (porcine) injection 5,000 Units  5,000 Units SubCUTAneous Q8H Alexis Wilks MD        ondansetron [facility]) injection 4 mg  4 mg IntraVENous Q4H PRN Spencer Easley PA-C   4 mg at 06/22/21 0729    sodium chloride (NS) flush 5-40 mL  5-40 mL IntraVENous Q8H Marie Tracee, PA-C   10 mL at 06/22/21 0528    sodium chloride (NS) flush 5-40 mL  5-40 mL IntraVENous PRN Caron Gardner PA-C        acetaminophen (TYLENOL) tablet 650 mg  650 mg Oral Q6H PRN Caron Gardner PA-C        Or    acetaminophen (TYLENOL) suppository 650 mg  650 mg Rectal Q6H PRN Caron Gardner PA-C        polyethylene glycol (MIRALAX) packet 17 g  17 g Oral DAILY PRN Caron Gardner PA-C        ondansetron (ZOFRAN ODT) tablet 4 mg  4 mg Oral Q8H PRN Caron Gardner PA-C   4 mg at 06/21/21 2126    Or    ondansetron (ZOFRAN) injection 4 mg  4 mg IntraVENous Q6H PRN Caron Gardner PA-C        carvediloL (COREG) tablet 12.5 mg  12.5 mg Oral BID WITH MEALS Caron Gardner PA-C   12.5 mg at 06/22/21 0912    docusate sodium (COLACE) capsule 100 mg  100 mg Oral BID Caron Gardner PA-C   100 mg at 06/22/21 0912    famotidine (PEPCID) tablet 20 mg  20 mg Oral DAILY Caron Gardner PA-C   20 mg at 06/22/21 8396    isosorbide dinitrate (ISORDIL) tablet 20 mg  20 mg Oral TID Caron Gardner PA-C   20 mg at 06/22/21 0912    insulin lispro (HUMALOG) injection   SubCUTAneous AC&HS Caron Gardner PA-C        glucose chewable tablet 16 g  4 Tablet Oral PRN Caron Gardner PA-C        glucagon (GLUCAGEN) injection 1 mg  1 mg IntraMUSCular PRN Caron Gardner PA-C        dextrose (D50W) injection syrg 12.5-25 g  25-50 mL IntraVENous PRN Caron Gardner PA-C        hydrALAZINE (APRESOLINE) tablet 100 mg  100 mg Oral BID Caron Gardner PA-C   100 mg at 06/22/21 0912    metoprolol (LOPRESSOR) injection 2.5 mg  2.5 mg IntraVENous Q6H PRN Caron Gardner PA-C   2.5 mg at 06/21/21 1927        Review of Systems   Constitutional: Negative for activity change, appetite change, fatigue, fever and unexpected weight change. HENT: Negative for congestion, facial swelling, postnasal drip, sinus pressure and trouble swallowing.     Eyes: Negative for discharge, redness and visual disturbance. Respiratory: Negative for cough, chest tightness, shortness of breath and wheezing. Cardiovascular: Negative for chest pain, palpitations and leg swelling. Gastrointestinal: Negative for abdominal distention, abdominal pain, constipation, diarrhea, nausea and vomiting. Endocrine: Negative for cold intolerance, heat intolerance and polyuria. Genitourinary: Negative for difficulty urinating and hematuria. Musculoskeletal: Negative for arthralgias, back pain, gait problem, joint swelling and myalgias. Skin: Negative for color change and pallor. Allergic/Immunologic: Negative for environmental allergies and food allergies. Neurological: Positive for dizziness and syncope. Negative for tremors, seizures, facial asymmetry and headaches. Hematological: Negative for adenopathy. Does not bruise/bleed easily. Psychiatric/Behavioral: Negative for agitation, behavioral problems and confusion. The patient is nervous/anxious. All other systems reviewed and are negative. Objective     Vital signs for last 24 hours:  Visit Vitals  /67   Pulse 78   Temp 98.6 °F (37 °C)   Resp 18   Ht 5' 8\" (1.727 m)   Wt 115 kg (253 lb 8.5 oz)   SpO2 100%   Breastfeeding No   BMI 38.55 kg/m²         General-Well Developed, Well Nourished,   No Acute Distress,   Alert & Oriented x 3, appropriate affect  HEENT - atraumatic normocephalic  No pallor or icterus  Neck- supple, no JVD  CV- regular rate and rhythm   no MRG  Lung- clear bilaterally  Abd- soft, nontender, nondistended  Ext- no edema bilaterally.   Skin- warm and dry; rash  Access-right chest tunneled hemodialysis catheter with clean exit site under sterile dressing; left upper arm AV fistula-immature    Recent Results (from the past 24 hour(s))   CBC WITH AUTOMATED DIFF    Collection Time: 06/21/21  2:12 PM   Result Value Ref Range    WBC 8.1 3.6 - 11.0 K/uL    RBC 4.24 3.80 - 5.20 M/uL    HGB 11.3 (L) 11.5 - 16.0 g/dL    HCT 36.4 35.0 - 47.0 % MCV 85.8 80.0 - 99.0 FL    MCH 26.7 26.0 - 34.0 PG    MCHC 31.0 30.0 - 36.5 g/dL    RDW 16.1 (H) 11.5 - 14.5 %    PLATELET 227 (L) 882 - 400 K/uL    MPV 12.2 8.9 - 12.9 FL    NRBC 0.0 0.0  WBC    ABSOLUTE NRBC 0.00 0.00 - 0.01 K/uL    NEUTROPHILS 82 (H) 32 - 75 %    LYMPHOCYTES 8 (L) 12 - 49 %    MONOCYTES 8 5 - 13 %    EOSINOPHILS 1 0 - 7 %    BASOPHILS 1 0 - 1 %    IMMATURE GRANULOCYTES 0 0 - 0.5 %    ABS. NEUTROPHILS 6.6 1.8 - 8.0 K/UL    ABS. LYMPHOCYTES 0.7 (L) 0.8 - 3.5 K/UL    ABS. MONOCYTES 0.6 0.0 - 1.0 K/UL    ABS. EOSINOPHILS 0.1 0.0 - 0.4 K/UL    ABS. BASOPHILS 0.1 0.0 - 0.1 K/UL    ABS. IMM. GRANS. 0.0 0.00 - 0.04 K/UL    DF AUTOMATED     METABOLIC PANEL, COMPREHENSIVE    Collection Time: 06/21/21  2:12 PM   Result Value Ref Range    Sodium 138 136 - 145 mmol/L    Potassium 4.9 3.5 - 5.1 mmol/L    Chloride 105 97 - 108 mmol/L    CO2 25 21 - 32 mmol/L    Anion gap 8 5 - 15 mmol/L    Glucose 151 (H) 65 - 100 mg/dL    BUN 46 (H) 6 - 20 mg/dL    Creatinine 5.24 (H) 0.55 - 1.02 mg/dL    BUN/Creatinine ratio 9 (L) 12 - 20      GFR est AA 10 (L) >60 ml/min/1.73m2    GFR est non-AA 8 (L) >60 ml/min/1.73m2    Calcium 9.1 8.5 - 10.1 mg/dL    Bilirubin, total 0.7 0.2 - 1.0 mg/dL    AST (SGOT) 117 (H) 15 - 37 U/L    ALT (SGPT) 53 12 - 78 U/L    Alk. phosphatase 161 (H) 45 - 117 U/L    Protein, total 6.9 6.4 - 8.2 g/dL    Albumin 2.8 (L) 3.5 - 5.0 g/dL    Globulin 4.1 (H) 2.0 - 4.0 g/dL    A-G Ratio 0.7 (L) 1.1 - 2.2     CK W/ REFLX CKMB    Collection Time: 06/21/21  2:12 PM   Result Value Ref Range    .4 (H) 26 - 192 ng/mL   TROPONIN I    Collection Time: 06/21/21  2:12 PM   Result Value Ref Range    Troponin-I, Qt. <0.05 <0.05 ng/mL   BNP    Collection Time: 06/21/21  2:12 PM   Result Value Ref Range    NT pro-BNP 12,879 (H) <125 pg/mL   CK-MB,QUANT.     Collection Time: 06/21/21  2:12 PM   Result Value Ref Range    CK - MB 3.6 (H) <3.6 ng/mL    CK-MB Index 1.1     GLUCOSE, POC    Collection Time: 06/21/21  8:27 PM   Result Value Ref Range    Glucose (POC) 146 (H) 65 - 117 mg/dL    Performed by Mindy Ragland    LACTIC ACID    Collection Time: 06/21/21  8:42 PM   Result Value Ref Range    Lactic acid 0.7 0.4 - 2.0 mmol/L   TROPONIN I    Collection Time: 06/21/21  8:42 PM   Result Value Ref Range    Troponin-I, Qt. <0.05 <3.86 ng/mL   METABOLIC PANEL, COMPREHENSIVE    Collection Time: 06/22/21  6:14 AM   Result Value Ref Range    Sodium 139 136 - 145 mmol/L    Potassium 4.2 3.5 - 5.1 mmol/L    Chloride 107 97 - 108 mmol/L    CO2 24 21 - 32 mmol/L    Anion gap 8 5 - 15 mmol/L    Glucose 132 (H) 65 - 100 mg/dL    BUN 52 (H) 6 - 20 mg/dL    Creatinine 5.54 (H) 0.55 - 1.02 mg/dL    BUN/Creatinine ratio 9 (L) 12 - 20      GFR est AA 10 (L) >60 ml/min/1.73m2    GFR est non-AA 8 (L) >60 ml/min/1.73m2    Calcium 8.8 8.5 - 10.1 mg/dL    Bilirubin, total 0.4 0.2 - 1.0 mg/dL    AST (SGOT) 28 15 - 37 U/L    ALT (SGPT) 35 12 - 78 U/L    Alk. phosphatase 128 (H) 45 - 117 U/L    Protein, total 5.7 (L) 6.4 - 8.2 g/dL    Albumin 2.5 (L) 3.5 - 5.0 g/dL    Globulin 3.2 2.0 - 4.0 g/dL    A-G Ratio 0.8 (L) 1.1 - 2.2     CBC WITH AUTOMATED DIFF    Collection Time: 06/22/21  6:14 AM   Result Value Ref Range    WBC 5.7 3.6 - 11.0 K/uL    RBC 3.78 (L) 3.80 - 5.20 M/uL    HGB 9.9 (L) 11.5 - 16.0 g/dL    HCT 33.1 (L) 35.0 - 47.0 %    MCV 87.6 80.0 - 99.0 FL    MCH 26.2 26.0 - 34.0 PG    MCHC 29.9 (L) 30.0 - 36.5 g/dL    RDW 16.1 (H) 11.5 - 14.5 %    PLATELET 045 627 - 419 K/uL    MPV 12.2 8.9 - 12.9 FL    NRBC 0.0 0.0  WBC    ABSOLUTE NRBC 0.00 0.00 - 0.01 K/uL    NEUTROPHILS 69 32 - 75 %    LYMPHOCYTES 18 12 - 49 %    MONOCYTES 10 5 - 13 %    EOSINOPHILS 1 0 - 7 %    BASOPHILS 2 (H) 0 - 1 %    IMMATURE GRANULOCYTES 0 0 - 0.5 %    ABS. NEUTROPHILS 3.9 1.8 - 8.0 K/UL    ABS. LYMPHOCYTES 1.0 0.8 - 3.5 K/UL    ABS. MONOCYTES 0.6 0.0 - 1.0 K/UL    ABS. EOSINOPHILS 0.1 0.0 - 0.4 K/UL    ABS. BASOPHILS 0.1 0.0 - 0.1 K/UL    ABS. IMM. GRANS. 0.0 0.00 - 0.04 K/UL    DF AUTOMATED     BNP    Collection Time: 06/22/21  6:14 AM   Result Value Ref Range    NT pro-BNP 20,092 (H) <125 pg/mL   CK    Collection Time: 06/22/21  6:14 AM   Result Value Ref Range     26 - 192 U/L   TROPONIN I    Collection Time: 06/22/21  6:14 AM   Result Value Ref Range    Troponin-I, Qt. <0.05 <0.05 ng/mL   EKG, 12 LEAD, SUBSEQUENT    Collection Time: 06/22/21  7:33 AM   Result Value Ref Range    Ventricular Rate 72 BPM    Atrial Rate 72 BPM    P-R Interval 192 ms    QRS Duration 90 ms    Q-T Interval 444 ms    QTC Calculation (Bezet) 486 ms    Calculated P Axis 73 degrees    Calculated R Axis 72 degrees    Calculated T Axis 85 degrees    Diagnosis       Normal sinus rhythm  Prolonged QT  Abnormal ECG  When compared with ECG of 21-JUN-2021 12:02, (Unconfirmed)  No significant change was found  Confirmed by Madeline Freeman MD, Deanna Mcpherson ([id number]) on 6/22/2021 8:48:26 AM     GLUCOSE, POC    Collection Time: 06/22/21  7:50 AM   Result Value Ref Range    Glucose (POC) 127 (H) 65 - 117 mg/dL    Performed by LINDA CHISHOLM         No intake or output data in the 24 hours ending 06/22/21 1054   Current Shift: No intake/output data recorded. Last 3 Shifts: No intake/output data recorded. Physical Exam     Data Review:   Recent Results (from the past 24 hour(s))   CBC WITH AUTOMATED DIFF    Collection Time: 06/21/21  2:12 PM   Result Value Ref Range    WBC 8.1 3.6 - 11.0 K/uL    RBC 4.24 3.80 - 5.20 M/uL    HGB 11.3 (L) 11.5 - 16.0 g/dL    HCT 36.4 35.0 - 47.0 %    MCV 85.8 80.0 - 99.0 FL    MCH 26.7 26.0 - 34.0 PG    MCHC 31.0 30.0 - 36.5 g/dL    RDW 16.1 (H) 11.5 - 14.5 %    PLATELET 467 (L) 363 - 400 K/uL    MPV 12.2 8.9 - 12.9 FL    NRBC 0.0 0.0  WBC    ABSOLUTE NRBC 0.00 0.00 - 0.01 K/uL    NEUTROPHILS 82 (H) 32 - 75 %    LYMPHOCYTES 8 (L) 12 - 49 %    MONOCYTES 8 5 - 13 %    EOSINOPHILS 1 0 - 7 %    BASOPHILS 1 0 - 1 %    IMMATURE GRANULOCYTES 0 0 - 0.5 %    ABS.  NEUTROPHILS 6.6 1.8 - 8.0 K/UL    ABS. LYMPHOCYTES 0.7 (L) 0.8 - 3.5 K/UL    ABS. MONOCYTES 0.6 0.0 - 1.0 K/UL    ABS. EOSINOPHILS 0.1 0.0 - 0.4 K/UL    ABS. BASOPHILS 0.1 0.0 - 0.1 K/UL    ABS. IMM. GRANS. 0.0 0.00 - 0.04 K/UL    DF AUTOMATED     METABOLIC PANEL, COMPREHENSIVE    Collection Time: 06/21/21  2:12 PM   Result Value Ref Range    Sodium 138 136 - 145 mmol/L    Potassium 4.9 3.5 - 5.1 mmol/L    Chloride 105 97 - 108 mmol/L    CO2 25 21 - 32 mmol/L    Anion gap 8 5 - 15 mmol/L    Glucose 151 (H) 65 - 100 mg/dL    BUN 46 (H) 6 - 20 mg/dL    Creatinine 5.24 (H) 0.55 - 1.02 mg/dL    BUN/Creatinine ratio 9 (L) 12 - 20      GFR est AA 10 (L) >60 ml/min/1.73m2    GFR est non-AA 8 (L) >60 ml/min/1.73m2    Calcium 9.1 8.5 - 10.1 mg/dL    Bilirubin, total 0.7 0.2 - 1.0 mg/dL    AST (SGOT) 117 (H) 15 - 37 U/L    ALT (SGPT) 53 12 - 78 U/L    Alk. phosphatase 161 (H) 45 - 117 U/L    Protein, total 6.9 6.4 - 8.2 g/dL    Albumin 2.8 (L) 3.5 - 5.0 g/dL    Globulin 4.1 (H) 2.0 - 4.0 g/dL    A-G Ratio 0.7 (L) 1.1 - 2.2     CK W/ REFLX CKMB    Collection Time: 06/21/21  2:12 PM   Result Value Ref Range    .4 (H) 26 - 192 ng/mL   TROPONIN I    Collection Time: 06/21/21  2:12 PM   Result Value Ref Range    Troponin-I, Qt. <0.05 <0.05 ng/mL   BNP    Collection Time: 06/21/21  2:12 PM   Result Value Ref Range    NT pro-BNP 12,879 (H) <125 pg/mL   CK-MB,QUANT.     Collection Time: 06/21/21  2:12 PM   Result Value Ref Range    CK - MB 3.6 (H) <3.6 ng/mL    CK-MB Index 1.1     GLUCOSE, POC    Collection Time: 06/21/21  8:27 PM   Result Value Ref Range    Glucose (POC) 146 (H) 65 - 117 mg/dL    Performed by Kan Palma    LACTIC ACID    Collection Time: 06/21/21  8:42 PM   Result Value Ref Range    Lactic acid 0.7 0.4 - 2.0 mmol/L   TROPONIN I    Collection Time: 06/21/21  8:42 PM   Result Value Ref Range    Troponin-I, Qt. <0.05 <3.88 ng/mL   METABOLIC PANEL, COMPREHENSIVE    Collection Time: 06/22/21  6:14 AM   Result Value Ref Range Sodium 139 136 - 145 mmol/L    Potassium 4.2 3.5 - 5.1 mmol/L    Chloride 107 97 - 108 mmol/L    CO2 24 21 - 32 mmol/L    Anion gap 8 5 - 15 mmol/L    Glucose 132 (H) 65 - 100 mg/dL    BUN 52 (H) 6 - 20 mg/dL    Creatinine 5.54 (H) 0.55 - 1.02 mg/dL    BUN/Creatinine ratio 9 (L) 12 - 20      GFR est AA 10 (L) >60 ml/min/1.73m2    GFR est non-AA 8 (L) >60 ml/min/1.73m2    Calcium 8.8 8.5 - 10.1 mg/dL    Bilirubin, total 0.4 0.2 - 1.0 mg/dL    AST (SGOT) 28 15 - 37 U/L    ALT (SGPT) 35 12 - 78 U/L    Alk. phosphatase 128 (H) 45 - 117 U/L    Protein, total 5.7 (L) 6.4 - 8.2 g/dL    Albumin 2.5 (L) 3.5 - 5.0 g/dL    Globulin 3.2 2.0 - 4.0 g/dL    A-G Ratio 0.8 (L) 1.1 - 2.2     CBC WITH AUTOMATED DIFF    Collection Time: 06/22/21  6:14 AM   Result Value Ref Range    WBC 5.7 3.6 - 11.0 K/uL    RBC 3.78 (L) 3.80 - 5.20 M/uL    HGB 9.9 (L) 11.5 - 16.0 g/dL    HCT 33.1 (L) 35.0 - 47.0 %    MCV 87.6 80.0 - 99.0 FL    MCH 26.2 26.0 - 34.0 PG    MCHC 29.9 (L) 30.0 - 36.5 g/dL    RDW 16.1 (H) 11.5 - 14.5 %    PLATELET 633 356 - 679 K/uL    MPV 12.2 8.9 - 12.9 FL    NRBC 0.0 0.0  WBC    ABSOLUTE NRBC 0.00 0.00 - 0.01 K/uL    NEUTROPHILS 69 32 - 75 %    LYMPHOCYTES 18 12 - 49 %    MONOCYTES 10 5 - 13 %    EOSINOPHILS 1 0 - 7 %    BASOPHILS 2 (H) 0 - 1 %    IMMATURE GRANULOCYTES 0 0 - 0.5 %    ABS. NEUTROPHILS 3.9 1.8 - 8.0 K/UL    ABS. LYMPHOCYTES 1.0 0.8 - 3.5 K/UL    ABS. MONOCYTES 0.6 0.0 - 1.0 K/UL    ABS. EOSINOPHILS 0.1 0.0 - 0.4 K/UL    ABS. BASOPHILS 0.1 0.0 - 0.1 K/UL    ABS. IMM.  GRANS. 0.0 0.00 - 0.04 K/UL    DF AUTOMATED     BNP    Collection Time: 06/22/21  6:14 AM   Result Value Ref Range    NT pro-BNP 20,092 (H) <125 pg/mL   CK    Collection Time: 06/22/21  6:14 AM   Result Value Ref Range     26 - 192 U/L   TROPONIN I    Collection Time: 06/22/21  6:14 AM   Result Value Ref Range    Troponin-I, Qt. <0.05 <0.05 ng/mL   EKG, 12 LEAD, SUBSEQUENT    Collection Time: 06/22/21  7:33 AM   Result Value Ref Range    Ventricular Rate 72 BPM    Atrial Rate 72 BPM    P-R Interval 192 ms    QRS Duration 90 ms    Q-T Interval 444 ms    QTC Calculation (Bezet) 486 ms    Calculated P Axis 73 degrees    Calculated R Axis 72 degrees    Calculated T Axis 85 degrees    Diagnosis       Normal sinus rhythm  Prolonged QT  Abnormal ECG  When compared with ECG of 21-JUN-2021 12:02, (Unconfirmed)  No significant change was found  Confirmed by Thad Tyson MD, Tamela Hammans ([id number]) on 6/22/2021 8:48:26 AM     GLUCOSE, POC    Collection Time: 06/22/21  7:50 AM   Result Value Ref Range    Glucose (POC) 127 (H) 65 - 117 mg/dL    Performed by LINDA CHISHOLM          CT HEAD WO CONT   Final Result   1. No acute intracranial findings. XR CHEST PORT   Final Result   Findings compatible with CHF. Patient Active Problem List   Diagnosis Code    Stage 5 chronic kidney disease ([facility] [address] N18.5    Uncontrolled hypertension I10    Nephrotic syndrome N04.9    Anemia, chronic disease T02.5    H/O metabolic acidosis H34.07    Pulmonary hypertension (HCC) I27.20    DM2 (diabetes mellitus, type 2) ([facility] [address] E11.9    Syncope R55    ESRD (end stage renal disease) on dialysis ([facility] [address] N18.6, Z99.2        DIAGNOSES:  1. ESRD on hemodialysis-dialysis on Monday, Wednesday and Friday schedule. 2. Intradialytic hypotension  3. Diabetes mellitus with complications-rule out hypoglycemia  4. Syncope while on dialysis most likely due to hypotension but need to rule out hypoglycemia as well. 5. Secondary hyperparathyroidism  6. History of nephrotic syndrome  7. History of anemia of chronic disease  8. History of secondary hyperparathyroidism  9. Anxiety    DISCUSSION:   Excellent idea to check standing/sitting blood pressures-patient could have orthostatic hypotension which can be masking her blood pressure.  Would check HbA1c and put glipizide on hold. PLAN:   Blood pressure is already well controlled-continue with oral Coreg.    Can hold IV antihypertensives   Dialysis will be arranged tomorrow.  Fistula precautions          Thanks for consulting me. Please don't hesitate to contact me if any questions arise of if I can assist in any manner. This dictation was done by dragon, computer voice recognition software. Often unanticipated grammatical, syntax, phones and other interpretive errors are inadvertently transcribed. Please excuse errors that have escaped final proofreading. Please contact me if you suspect dictation or transcription errors.   Dr Judy Rios  [address] [place] [address], [facility], [facility]  Cell Phone: [phone]  Office phone: [phone]  Fax: [phone]

## 2021-06-22 NOTE — PROGRESS NOTES
Two person skin assessment performed by Augustine Roth RN and Abby Ayers RN. Patient has a blister on the inside of her right foot; patient has a scar on the front part of her left leg. No other skin breakdown is noted at this time.

## 2021-06-23 ENCOUNTER — APPOINTMENT (OUTPATIENT)
Dept: GENERAL RADIOLOGY | Age: 61
End: 2021-06-23
Attending: INTERNAL MEDICINE
Payer: COMMERCIAL

## 2021-06-23 ENCOUNTER — APPOINTMENT (OUTPATIENT)
Dept: ULTRASOUND IMAGING | Age: 61
End: 2021-06-23
Attending: INTERNAL MEDICINE
Payer: COMMERCIAL

## 2021-06-23 PROBLEM — R55 SYNCOPE AND COLLAPSE: Status: ACTIVE | Noted: 2021-06-23

## 2021-06-23 PROBLEM — N18.6 ESRD ON DIALYSIS (HCC): Status: ACTIVE | Noted: 2021-06-23

## 2021-06-23 PROBLEM — Z99.2 ESRD ON DIALYSIS (HCC): Status: ACTIVE | Noted: 2021-06-23

## 2021-06-23 PROBLEM — E87.70 FLUID OVERLOAD: Status: ACTIVE | Noted: 2021-06-23

## 2021-06-23 LAB
BASOPHILS # BLD: 0.1 K/UL (ref 0–0.1)
BASOPHILS NFR BLD: 1 % (ref 0–1)
CK SERPL-CCNC: 123 U/L (ref 26–192)
DIFFERENTIAL METHOD BLD: ABNORMAL
EOSINOPHIL # BLD: 0.2 K/UL (ref 0–0.4)
EOSINOPHIL NFR BLD: 4 % (ref 0–7)
ERYTHROCYTE [DISTWIDTH] IN BLOOD BY AUTOMATED COUNT: 16 % (ref 11.5–14.5)
GLUCOSE BLD STRIP.AUTO-MCNC: 121 MG/DL (ref 65–117)
GLUCOSE BLD STRIP.AUTO-MCNC: 123 MG/DL (ref 65–117)
GLUCOSE BLD STRIP.AUTO-MCNC: 132 MG/DL (ref 65–117)
HCT VFR BLD AUTO: 33.1 % (ref 35–47)
HGB BLD-MCNC: 10 G/DL (ref 11.5–16)
IMM GRANULOCYTES # BLD AUTO: 0 K/UL (ref 0–0.04)
IMM GRANULOCYTES NFR BLD AUTO: 0 % (ref 0–0.5)
LYMPHOCYTES # BLD: 1.1 K/UL (ref 0.8–3.5)
LYMPHOCYTES NFR BLD: 22 % (ref 12–49)
MCH RBC QN AUTO: 26.2 PG (ref 26–34)
MCHC RBC AUTO-ENTMCNC: 30.2 G/DL (ref 30–36.5)
MCV RBC AUTO: 86.6 FL (ref 80–99)
MONOCYTES # BLD: 0.7 K/UL (ref 0–1)
MONOCYTES NFR BLD: 13 % (ref 5–13)
NEUTS SEG # BLD: 3 K/UL (ref 1.8–8)
NEUTS SEG NFR BLD: 60 % (ref 32–75)
NRBC # BLD: 0 K/UL (ref 0–0.01)
NRBC BLD-RTO: 0 PER 100 WBC
PERFORMED BY, TECHID: ABNORMAL
PLATELET # BLD AUTO: 181 K/UL (ref 150–400)
PMV BLD AUTO: 12.2 FL (ref 8.9–12.9)
RBC # BLD AUTO: 3.82 M/UL (ref 3.8–5.2)
WBC # BLD AUTO: 5.1 K/UL (ref 3.6–11)

## 2021-06-23 PROCEDURE — 96372 THER/PROPH/DIAG INJ SC/IM: CPT

## 2021-06-23 PROCEDURE — 74011250637 HC RX REV CODE- 250/637: Performed by: STUDENT IN AN ORGANIZED HEALTH CARE EDUCATION/TRAINING PROGRAM

## 2021-06-23 PROCEDURE — 82962 GLUCOSE BLOOD TEST: CPT

## 2021-06-23 PROCEDURE — 90935 HEMODIALYSIS ONE EVALUATION: CPT

## 2021-06-23 PROCEDURE — 96376 TX/PRO/DX INJ SAME DRUG ADON: CPT

## 2021-06-23 PROCEDURE — 74011250636 HC RX REV CODE- 250/636: Performed by: STUDENT IN AN ORGANIZED HEALTH CARE EDUCATION/TRAINING PROGRAM

## 2021-06-23 PROCEDURE — 83036 HEMOGLOBIN GLYCOSYLATED A1C: CPT

## 2021-06-23 PROCEDURE — 85025 COMPLETE CBC W/AUTO DIFF WBC: CPT

## 2021-06-23 PROCEDURE — 76705 ECHO EXAM OF ABDOMEN: CPT

## 2021-06-23 PROCEDURE — 82550 ASSAY OF CK (CPK): CPT

## 2021-06-23 PROCEDURE — 74011250636 HC RX REV CODE- 250/636: Performed by: INTERNAL MEDICINE

## 2021-06-23 PROCEDURE — 74018 RADEX ABDOMEN 1 VIEW: CPT

## 2021-06-23 PROCEDURE — 74011250637 HC RX REV CODE- 250/637: Performed by: HOSPITALIST

## 2021-06-23 PROCEDURE — 99218 HC RM OBSERVATION: CPT

## 2021-06-23 PROCEDURE — 36415 COLL VENOUS BLD VENIPUNCTURE: CPT

## 2021-06-23 PROCEDURE — 97161 PT EVAL LOW COMPLEX 20 MIN: CPT | Performed by: PHYSICAL THERAPIST

## 2021-06-23 PROCEDURE — 65270000029 HC RM PRIVATE

## 2021-06-23 PROCEDURE — 97165 OT EVAL LOW COMPLEX 30 MIN: CPT

## 2021-06-23 PROCEDURE — 97530 THERAPEUTIC ACTIVITIES: CPT | Performed by: PHYSICAL THERAPIST

## 2021-06-23 PROCEDURE — 71045 X-RAY EXAM CHEST 1 VIEW: CPT

## 2021-06-23 RX ORDER — LOSARTAN POTASSIUM 50 MG/1
50 TABLET ORAL DAILY
Status: DISCONTINUED | OUTPATIENT
Start: 2021-06-24 | End: 2021-06-24

## 2021-06-23 RX ORDER — HEPARIN SODIUM 1000 [USP'U]/ML
INJECTION, SOLUTION INTRAVENOUS; SUBCUTANEOUS
Status: DISPENSED
Start: 2021-06-23 | End: 2021-06-24

## 2021-06-23 RX ORDER — PANTOPRAZOLE SODIUM 40 MG/1
40 TABLET, DELAYED RELEASE ORAL
Status: DISCONTINUED | OUTPATIENT
Start: 2021-06-24 | End: 2021-06-23

## 2021-06-23 RX ORDER — HEPARIN SODIUM 1000 [USP'U]/ML
3600 INJECTION, SOLUTION INTRAVENOUS; SUBCUTANEOUS
Status: DISCONTINUED | OUTPATIENT
Start: 2021-06-23 | End: 2021-06-25 | Stop reason: HOSPADM

## 2021-06-23 RX ORDER — MAG HYDROX/ALUMINUM HYD/SIMETH 200-200-20
30 SUSPENSION, ORAL (FINAL DOSE FORM) ORAL
Status: DISCONTINUED | OUTPATIENT
Start: 2021-06-23 | End: 2021-06-25 | Stop reason: HOSPADM

## 2021-06-23 RX ORDER — PANTOPRAZOLE SODIUM 40 MG/1
40 TABLET, DELAYED RELEASE ORAL
Status: DISCONTINUED | OUTPATIENT
Start: 2021-06-24 | End: 2021-06-25 | Stop reason: HOSPADM

## 2021-06-23 RX ADMIN — CARVEDILOL 12.5 MG: 12.5 TABLET, FILM COATED ORAL at 19:22

## 2021-06-23 RX ADMIN — HYDRALAZINE HYDROCHLORIDE 100 MG: 50 TABLET, FILM COATED ORAL at 21:15

## 2021-06-23 RX ADMIN — HEPARIN SODIUM 3600 UNITS: 1000 INJECTION, SOLUTION INTRAVENOUS; SUBCUTANEOUS at 17:15

## 2021-06-23 RX ADMIN — NITROGLYCERIN 2 INCH: 20 OINTMENT TOPICAL at 11:10

## 2021-06-23 RX ADMIN — DOCUSATE SODIUM 100 MG: 100 CAPSULE, LIQUID FILLED ORAL at 21:15

## 2021-06-23 RX ADMIN — Medication 10 ML: at 19:23

## 2021-06-23 RX ADMIN — HYDRALAZINE HYDROCHLORIDE 100 MG: 50 TABLET, FILM COATED ORAL at 08:09

## 2021-06-23 RX ADMIN — Medication 10 ML: at 06:02

## 2021-06-23 RX ADMIN — ISOSORBIDE DINITRATE 20 MG: 10 TABLET ORAL at 08:09

## 2021-06-23 RX ADMIN — ONDANSETRON 4 MG: 2 INJECTION INTRAMUSCULAR; INTRAVENOUS at 08:10

## 2021-06-23 RX ADMIN — CARVEDILOL 12.5 MG: 12.5 TABLET, FILM COATED ORAL at 08:10

## 2021-06-23 RX ADMIN — HEPARIN SODIUM 5000 UNITS: 5000 INJECTION INTRAVENOUS; SUBCUTANEOUS at 02:00

## 2021-06-23 RX ADMIN — FAMOTIDINE 20 MG: 20 TABLET ORAL at 08:09

## 2021-06-23 RX ADMIN — NITROGLYCERIN 2 INCH: 20 OINTMENT TOPICAL at 02:00

## 2021-06-23 RX ADMIN — ISOSORBIDE DINITRATE 20 MG: 10 TABLET ORAL at 19:22

## 2021-06-23 RX ADMIN — ONDANSETRON 4 MG: 2 INJECTION INTRAMUSCULAR; INTRAVENOUS at 02:45

## 2021-06-23 RX ADMIN — Medication 10 ML: at 21:53

## 2021-06-23 RX ADMIN — HEPARIN SODIUM 5000 UNITS: 5000 INJECTION INTRAVENOUS; SUBCUTANEOUS at 19:23

## 2021-06-23 RX ADMIN — HEPARIN SODIUM 5000 UNITS: 5000 INJECTION INTRAVENOUS; SUBCUTANEOUS at 08:10

## 2021-06-23 NOTE — PROGRESS NOTES
Problem: Mobility Impaired (Adult and Pediatric)  Goal: *Acute Goals and Plan of Care (Insert Text)  Outcome: Not Met     Problem: Patient Education: Go to Patient Education Activity  Goal: Patient/Family Education  Outcome: Not Met     PHYSICAL THERAPY EVALUATION  Patient: Zeus Romero (52 y.o. female)  Date: 6/23/2021  Primary Diagnosis: Syncope [R55]  Fluid overload [E87.70]  Syncope and collapse [R55]  ESRD on dialysis ([facility] [N18.6, Z99.2]        Precautions:      ASSESSMENT  62 yof who came to the hospital on 6/21/21. Pt recently started on HD about 1 month ago. She presented to the ED from dialysis for syncopal episode, nausea, vomiting, and weakness during dialysis this morning. PMHx: anamis, DM II, HTN, obesity, Nephrotic syndrome, Renal failure, ESRD. Pt lives in [address] [facility] with 8 JENNIFER with B railing. IND at OF without AD. had both a walk in shower and shower/tub combo. Pt A&O x4. Found semi-supine in bed. Pt in 7/10 abdominal pain and experiencing nausea. Pt was able to semi-supine to partial sit EOB at IND. Due to her pain and nausea, pt did not get up from bed. Pt demos pain, nausea, weakness, and decreased balance which are impacting her ability to perform transfers, and ambulation at this time. Pt would benefit from acute PT to address her limitations. Pt seems to be most limited at this time due to n/v. Once this improves, PT suspects that her functional mobility will improve greatly. Pt rec DC with HH PT or home IND pending progress. Patient will benefit from skilled therapy intervention to address the above noted impairments. PLAN :  Recommendations and Planned Interventions: bed mobility training, transfer training, gait training, therapeutic exercises, neuromuscular re-education, and therapeutic activities      Frequency/Duration: Patient will be followed by physical therapy:  3 times a week to address goals.     Recommendation for discharge: (in order for the patient to meet his/her long term goals)  HH PT vs home IND pending progress     This discharge recommendation:  Has not yet been discussed the attending provider and/or case management    IF patient discharges home will need the following DME: none         SUBJECTIVE:   Patient stated i'm nauseous.     OBJECTIVE DATA SUMMARY:   HISTORY:    Past Medical History:   Diagnosis Date    Anemia, chronic disease 3/22/2021    DM2 (diabetes mellitus, type 2) ([facility] 5961    H/O metabolic acidosis     Hypertension     Morbid obesity ([facility]     Nephrotic syndrome 3/22/2021    [facility] 3/22/2021    Renal failure      Past Surgical History:   Procedure Laterality Date    IR INSERT NON TUNL CVC OVER 5 YRS  3/12/2021       Personal factors and/or comorbidities impacting plan of care: see pt chart    Home Situation  Home Environment: Private residence  # Steps to Enter: 8  Rails to Enter: Yes  Hand Rails : Bilateral  One/Two Story Residence: One story  Living Alone: No  Support Systems: Family member(s)  Patient Expects to be Discharged to[de-identified] House  Current DME Used/Available at Home: None  Tub or Shower Type: Shower    PLOF: Pt IND for ADLS/IADLS, IND with mobility prior to admission. EXAMINATION/PRESENTATION/DECISION MAKING:   Critical Behavior:  Neurologic State: Alert  Orientation Level: Oriented X4  Cognition: Appropriate decision making, Appropriate for age attention/concentration, Appropriate safety awareness, Follows commands  Safety/Judgement: Awareness of environment  Hearing:   Auditory  Auditory Impairment: None    Range Of Motion:      WFL                    Strength:        Functional                 Functional Mobility:  Bed Mobility:  Rolling: Independent  Supine to Sit: Independent  Sit to Supine: Independent     Transfers:                             Balance:   Sitting: Intact  Ambulation/Gait Trainin N Liz Conte Short Form  How much difficulty does the patient currently have. .. Unable A Lot A Little None   1. Turning over in bed (including adjusting bedclothes, sheets and blankets)? [] 1   [] 2   [] 3   [x] 4   2. Sitting down on and standing up from a chair with arms ( e.g., wheelchair, bedside commode, etc.)   [] 1   [] 2   [x] 3   [] 4   3. Moving from lying on back to sitting on the side of the bed? [] 1   [] 2   [] 3   [x] 4          How much help from another person does the patient currently need. .. Total A Lot A Little None   4. Moving to and from a bed to a chair (including a wheelchair)? [] 1   [] 2   [x] 3   [] 4   5. Need to walk in hospital room? [] 1   [] 2   [x] 3   [] 4   6. Climbing 3-5 steps with a railing? [] 1   [] 2   [x] 3   [] 4   © , Trustees of [facility] [employer], under license to [employer]. All rights reserved     Score:  Initial:  Most Recent: X (Date: -- )   Interpretation of Tool:  Represents activities that are increasingly more difficult (i.e. Bed mobility, Transfers, Gait). Score 24 23 22-20 19-15 14-10 9-7 6   Modifier CH CI CJ CK CL CM CN          Physical Therapy Evaluation Charge Determination   History Examination Presentation Decision-Making   LOW Complexity : Zero comorbidities / personal factors that will impact the outcome / POC LOW Complexity : 1-2 Standardized tests and measures addressing body structure, function, activity limitation and / or participation in recreation  LOW Complexity : Stable, uncomplicated  Other Functional Measure AMPA 6       Based on the above components, the patient evaluation is determined to be of the following complexity level: LOW     Pain Ratin/10    Activity Tolerance:   Poor  Please refer to the flowsheet for vital signs taken during this treatment.     After treatment patient left in no apparent distress:   Supine in bed and Call bell within reach    COMMUNICATION/EDUCATION:   The patients plan of care was discussed with: Occupational therapist and Registered nurse. Patient/family agree to work toward stated goals and plan of care.     Thank you for this referral.  Leticia Wood, PT, DPT   Time Calculation: 25 mins

## 2021-06-23 NOTE — PROGRESS NOTES
Two person skin assessment performed by myself and Nilsa Collins RN. Patient has small blister on the interior right foot as well as a healed scar on the anterior shin. All other skin is warm, dry and intact.

## 2021-06-23 NOTE — PROGRESS NOTES
Spiritual Care Assessment/Progress Note  [facility]      NAME: Chris Alberts      MRN: [id number]  AGE: 62 y.o.  SEX: female  Christianity Affiliation: Christianity   Language: English     6/23/2021     Total Time (in minutes): 30     Spiritual Assessment begun in [facility] through conversation with:         [x]Patient        [] Family    [] Friend(s)        Reason for Consult: Initial/Spiritual assessment, patient floor, Request by staff     Spiritual beliefs: (Please include comment if needed)     [x] Identifies with a yajaira tradition:         [] Supported by a yajaira community:            [] Claims no spiritual orientation:           [] Seeking spiritual identity:                [x] Adheres to an individual form of spirituality:           [] Not able to assess:                           Identified resources for coping:      [x] Prayer                               [] Music                  [x] Guided Imagery     [x] Family/friends                 [] Pet visits     [] Devotional reading                         [] Unknown     [] Other:                                               Interventions offered during this visit: (See comments for more details)    Patient Interventions: Affirmation of yajaira, Affirmation of emotions/emotional suffering, Catharsis/review of pertinent events in supportive environment, Coping skills reviewed/reinforced, Guidance concerning next steps/process to be expected, Iconic (affirming the presence of God/Higher Power), Initial/Spiritual assessment, patient floor, Normalization of emotional/spiritual concerns, Prayer (assurance of), Christianity beliefs/image of God discussed           Plan of Care:     [] Support spiritual and/or cultural needs    [] Support AMD and/or advance care planning process      [] Support grieving process   [] Coordinate Rites and/or Rituals    [] Coordination with community clergy   [] No spiritual needs identified at this time   [] Detailed Plan of Care below (See Comments)  [] Make referral to Music Therapy  [] Make referral to Pet Therapy     [] Make referral to Addiction services  [] Make referral to [facility]  [] Make referral to Spiritual Care Partner  [] No future visits requested        [x] Follow up upon further referrals     Comments: The purpose of the visit was in response to staff request to visit the patient and do a spiritual assessment on the patient. The patient mentioned that she was afraid of doing dialysis today sine she had a bad experience the last treatment. She shared tearfully how it bothers her since she is a new dialysis patient she is nervous and she has doubt about the treatment and her safety. She mentioned having her yajaira in God. As well she expressed trusting God to see her through and comfort her. The tyler provided the ministry of presence and the comfort of spiritual care. The  listened empathically and reflectively. [address] [facility] NABEEL Talbert.Div.    can be reached by calling the  at [facility]  [phone]

## 2021-06-23 NOTE — PROGRESS NOTES
Hospitalist Progress Note               Daily Progress Note: 6/23/2021  60 f esrd on hd x 1 month, dm2 on glipizide, hypertension presents 2/2 syncope, n/v, weakness during hd.EMS reportedly encountered hypotension and vomiting. BP elevated on arrival to ED sbp~ 226. Ekg shows sr, qt prolonged. cxr suggests chf. Echo 3/2021 lvef 47%, drade 2 dd. Suspect intradialytic hypotension. Nephrology following. Subjective: The patient is seen for follow  up. Chart reviewed, gianna at bedside,   Tearful as she complains of nausea again. States no pain just nauseous every morning. Reports gerd hx. Going for HD. No orthostasis observed. No hypoglycemia observed. No dizziness,no repeat syncope, no chest pain or sob.      Problem List:  Problem List as of 6/23/2021 Date Reviewed: 3/22/2021        Codes Class Noted - Resolved    Syncope and collapse ICD-10-CM: R55  ICD-9-CM: 780.2  6/23/2021 - Present        ESRD on dialysis [facility]) ICD-10-CM: N18.6, Z99.2  ICD-9-CM: 585.6, V45.11  6/23/2021 - Present        Fluid overload ICD-10-CM: E87.70  ICD-9-CM: 276.69  6/23/2021 - Present        Syncope ICD-10-CM: R55  ICD-9-CM: 780.2  6/21/2021 - Present        ESRD (end stage renal disease) on dialysis [facility]) ICD-10-CM: N18.6, Z99.2  ICD-9-CM: 585.6, V45.11  6/21/2021 - Present        Nephrotic syndrome (Chronic) ICD-10-CM: N04.9  ICD-9-CM: 581.9  3/22/2021 - Present        Anemia, chronic disease ICD-10-CM: D63.8  ICD-9-CM: 285.29  3/22/2021 - Present        H/O metabolic acidosis EBV-57-VB: Z86.39  ICD-9-CM: V12.29  3/22/2021 - Present        Pulmonary hypertension (HCC) (Chronic) ICD-10-CM: I27.20  ICD-9-CM: 416.8  3/22/2021 - Present        DM2 (diabetes mellitus, type 2) (HCC) (Chronic) ICD-10-CM: E11.9  ICD-9-CM: 250.00  3/22/2021 - Present        Uncontrolled hypertension ICD-10-CM: I10  ICD-9-CM: 401.9  3/21/2021 - Present        Stage 5 [facility] kidney [facility] ([facility] ICD-10-CM: N18.5  ICD-9-CM: 585.5  3/20/2021 - Present RESOLVED: Intractable nausea and vomiting ICD-10-CM: R11.2  ICD-9-CM: 536.2  3/20/2021 - 3/22/2021              Medications reviewed  Current Facility-Administered Medications   Medication Dose Route Frequency    [START ON 6/24/2021] pantoprazole (PROTONIX) tablet 40 mg  40 mg Oral ACB    alum-mag hydroxide-simeth (MYLANTA) oral suspension 30 mL  30 mL Oral Q4H PRN    [START ON 6/24/2021] losartan (COZAAR) tablet 50 mg  50 mg Oral DAILY    heparin (porcine) injection 5,000 Units  5,000 Units SubCUTAneous Q8H    ondansetron (ZOFRAN) injection 4 mg  4 mg IntraVENous Q4H PRN    sodium chloride (NS) flush 5-40 mL  5-40 mL IntraVENous Q8H    sodium chloride (NS) flush 5-40 mL  5-40 mL IntraVENous PRN    acetaminophen (TYLENOL) tablet 650 mg  650 mg Oral Q6H PRN    Or    acetaminophen (TYLENOL) suppository 650 mg  650 mg Rectal Q6H PRN    polyethylene glycol (MIRALAX) packet 17 g  17 g Oral DAILY PRN    ondansetron (ZOFRAN ODT) tablet 4 mg  4 mg Oral Q8H PRN    Or    ondansetron (ZOFRAN) injection 4 mg  4 mg IntraVENous Q6H PRN    carvediloL (COREG) tablet 12.5 mg  12.5 mg Oral BID WITH MEALS    docusate sodium (COLACE) capsule 100 mg  100 mg Oral BID    isosorbide dinitrate (ISORDIL) tablet 20 mg  20 mg Oral TID    insulin lispro (HUMALOG) injection   SubCUTAneous AC&HS    glucose chewable tablet 16 g  4 Tablet Oral PRN    glucagon (GLUCAGEN) injection 1 mg  1 mg IntraMUSCular PRN    dextrose (D50W) injection syrg 12.5-25 g  25-50 mL IntraVENous PRN    hydrALAZINE (APRESOLINE) tablet 100 mg  100 mg Oral BID    metoprolol (LOPRESSOR) injection 2.5 mg  2.5 mg IntraVENous Q6H PRN       Review of Systems:   Review of Systems   Constitutional: Negative for chills, fever and malaise/fatigue. HENT: Negative. Eyes: Negative. Respiratory: Negative for shortness of breath. Cardiovascular: Positive for leg swelling. Negative for chest pain and palpitations.    Gastrointestinal: Positive for heartburn and nausea. Reflux     Genitourinary: Negative. Musculoskeletal: Negative. Skin: Negative. Neurological: Negative. Negative for dizziness. Endo/Heme/Allergies: Negative. Psychiatric/Behavioral: The patient is nervous/anxious. Objective:   Physical Exam:     Visit Vitals  BP (!) 155/80   Pulse 71   Temp 98.4 °F (36.9 °C)   Resp 18   Ht 5' 8\" (1.727 m)   Wt 115 kg (253 lb 8.5 oz)   SpO2 98%   Breastfeeding No   BMI 38.55 kg/m²      O2 Device: None (Room air)    Temp (24hrs), Av.3 °F (36.8 °C), Min:98 °F (36.7 °C), Max:98.7 °F (37.1 °C)    No intake/output data recorded. No intake/output data recorded. General:  Alert, cooperative, no distress, appears stated age. Tearful. Lungs:   Clear to auscultation bilaterally. not labored. Chest wall:  No tenderness or deformity. Heart:  Regular rate and rhythm, S1, S2 normal, no murmur, click, rub or gallop. Abdomen:   Soft, non-tender. Bowel sounds normal. No masses,  No organomegaly. Extremities: Extremities normal, atraumatic, no cyanosis. + edema distal le's. Pulses: 2+ and symmetric all extremities. Skin: Skin color, texture, turgor normal. No rashes or lesions   Neurologic: CNII-XII intact. No gross sensory or motor deficits. aao x 3. Data Review:       Recent Days:  Recent Labs     21  0614 21  1412   WBC 5.7 8.1   HGB 9.9* 11.3*   HCT 33.1* 36.4    137*     Recent Labs     21  0614 21  1412    138   K 4.2 4.9    105   CO2 24 25   * 151*   BUN 52* 46*   CREA 5.54* 5.24*   CA 8.8 9.1   ALB 2.5* 2.8*   TBILI 0.4 0.7   ALT 35 53     No results for input(s): PH, PCO2, PO2, HCO3, FIO2 in the last 72 hours.     24 Hour Results:  Recent Results (from the past 24 hour(s))   GLUCOSE, POC    Collection Time: 21  3:23 PM   Result Value Ref Range    Glucose (POC) 104 65 - 117 mg/dL    Performed by LINDA CHISHOLM    GLUCOSE, POC    Collection Time: 21  8:20 PM Result Value Ref Range    Glucose (POC) 126 (H) 65 - 117 mg/dL    Performed by Catherine Delacruz, POC    Collection Time: 06/23/21  7:34 AM   Result Value Ref Range    Glucose (POC) 121 (H) 65 - 117 mg/dL    Performed by LINDA CHISHOLM    GLUCOSE, POC    Collection Time: 06/23/21 11:15 AM   Result Value Ref Range    Glucose (POC) 132 (H) 65 - 117 mg/dL    Performed by LINDA CHISHOLM            Assessment/     Patient Active Problem List   Diagnosis Code    Stage 5 chronic kidney disease ([facility]) N18.5    Uncontrolled hypertension I10    Nephrotic syndrome N04.9    Anemia, chronic disease Y58.3    H/O metabolic acidosis A16.17    Pulmonary hypertension ([facility]) I27.20    DM2 (diabetes mellitus, type 2) ([facility]) E11.9    Syncope R55    ESRD (end stage renal disease) on dialysis ([facility]) N18.6, Z99.2    Syncope and collapse R55    ESRD on dialysis ([facility] N18.6, Z99.2    Fluid overload E87.70         -syncope- during hd, found hypotensive per ems, intradialytic hypotension surmised. Not orthostatic. No evidence of hypoglycemia but off glipizide during hospitalization-possibly contributed. -Htpn uncontrolled, not clear if took meds prior to hd event. Nonetheless, bp uncontrolled since arrival to ED- better trend overnight. Will see how responds to am meds + lasix 80 mg x 1. .  nephrology consulted, lasix x 1 on 6/22. Admit Bnp markedly elevated from prior, cxr suggests chf. Await repeat today.  -esrd on hd x 1 month, cx nephrology, continue per nephrology recs.  -Dm2, takes oral glipizide once daily, denies low blood sugars preceding event, i'm unaware of bg at the scene but has not been hypoglycemic here. A1c was 6.1 in 3/2021. She has been on glipizide 10 mg acb, will decrease this on dc and awaiting repeat a1c.  -Recurrent nausea/dyspepsia/gerd: check kub, resume ppi, follow us rug. Plan: Hd per nephrology-- will follow to see bp respone. Glipizide held and will decrease to 5mg acb. Follow a1c.   Resume home bp meds, losartan in place of valsartan. Follow bp trend, rx adjustment? Follow kub/ruq us  Continue ppi  Follow cxr. Vtep:heparin  Full code  Dispo: obs admit converted to ip. HD today. If tolerates and pending cxr better will likely dc home later today. Care Plan discussed with: Patient/Family, Nurse and Consultant . Total time spent with patient: 30 minutes. This dictation was done by dragon, computer voice recognition software. Often unanticipated grammatical, syntax, phones and other interpretive errors are inadvertently transcribed. Please excuse errors that have escaped final proofreading.     Mary Guillen MD

## 2021-06-23 NOTE — PROGRESS NOTES
Consult Date: 6/23/2021      Subjective   HISTORY OF PRESENTING ILLNESS :  Patient was seen and examined. No new complaints to me.   still anxious and crying  For dialysis today      Past Medical History:   Diagnosis Date    Anemia, chronic disease 3/22/2021    DM2 (diabetes mellitus, type 2) ([facility] 5/36/9109    H/O metabolic acidosis 1/85/4424    Hypertension     Morbid obesity ([facility]     Nephrotic syndrome 3/22/2021    Pulmonary hypertension ([facility] 3/22/2021    Renal failure       Past Surgical History:   Procedure Laterality Date    IR INSERT NON TUNL CVC OVER 5 YRS  3/12/2021     No family history on file.    Social History     Tobacco Use    Smoking status: Never Smoker    Smokeless tobacco: Never Used   Substance Use Topics    Alcohol use: Not on file       Current Facility-Administered Medications   Medication Dose Route Frequency Provider Last Rate Last Admin    [START ON 6/24/2021] pantoprazole (PROTONIX) tablet 40 mg  40 mg Oral ACB Alexis Blancas MD        alum-mag hydroxide-simeth (MYLANTA) oral suspension 30 mL  30 mL Oral Q4H PRN Flaquita Bhardwaj MD        [START ON 6/24/2021] losartan (COZAAR) tablet 50 mg  50 mg Oral DAILY Alexis Blancas MD        heparin (porcine) 1,000 unit/mL injection 3,600 Units  3,600 Units Hemodialysis DIALYSIS PRN Giuliana Briceño MD   3,600 Units at 06/23/21 1715    heparin (porcine) 1,000 unit/mL injection             heparin (porcine) injection 5,000 Units  5,000 Units SubCUTAneous Q8H Yolis GUERRERO MD   5,000 Units at 06/23/21 0810    ondansetron (ZOFRAN) injection 4 mg  4 mg IntraVENous Q4H PRN Louise Phan, PA-C   4 mg at 06/22/21 0729    sodium chloride (NS) flush 5-40 mL  5-40 mL IntraVENous Q8H Louise Phna, PA-C   10 mL at 06/23/21 0602    sodium chloride (NS) flush 5-40 mL  5-40 mL IntraVENous PRN Louise Phan, PA-C        acetaminophen (TYLENOL) tablet 650 mg  650 mg Oral Q6H PRN Louise Phan, PA-C Or    acetaminophen (TYLENOL) suppository 650 mg  650 mg Rectal Q6H PRN Sonda Aid, PA-C        polyethylene glycol (MIRALAX) packet 17 g  17 g Oral DAILY PRN Sonda Aid, PA-C        ondansetron (ZOFRAN ODT) tablet 4 mg  4 mg Oral Q8H PRN Sonda Aid, PA-C   4 mg at 06/22/21 1229    Or    ondansetron (ZOFRAN) injection 4 mg  4 mg IntraVENous Q6H PRN Sonda Aid, PA-C   4 mg at 06/23/21 0810    carvediloL (COREG) tablet 12.5 mg  12.5 mg Oral BID WITH MEALS Sonda Aid, PA-C   12.5 mg at 06/23/21 0810    docusate sodium (COLACE) capsule 100 mg  100 mg Oral BID Sonda Aid, PA-C   100 mg at 06/22/21 2140    isosorbide dinitrate (ISORDIL) tablet 20 mg  20 mg Oral TID Sonda Aid, PA-C   20 mg at 06/23/21 0809    insulin lispro (HUMALOG) injection   SubCUTAneous AC&HS Sonda Aid, PA-C   2 Units at 06/22/21 1230    glucose chewable tablet 16 g  4 Tablet Oral PRN Sonda Aid, PA-C        glucagon (GLUCAGEN) injection 1 mg  1 mg IntraMUSCular PRN Sonda Aid, PA-C        dextrose (D50W) injection syrg 12.5-25 g  25-50 mL IntraVENous PRN Sonda Aid, PA-C        hydrALAZINE (APRESOLINE) tablet 100 mg  100 mg Oral BID Sonda Aid, PA-C   100 mg at 06/23/21 0809    metoprolol (LOPRESSOR) injection 2.5 mg  2.5 mg IntraVENous Q6H PRN Sonda Aid, PA-C   2.5 mg at 06/21/21 1927        Review of Systems   Constitutional: Negative for activity change, appetite change, fatigue, fever and unexpected weight change. HENT: Negative for congestion, facial swelling, postnasal drip, sinus pressure and trouble swallowing. Eyes: Negative for discharge, redness and visual disturbance. Respiratory: Negative for cough, chest tightness, shortness of breath and wheezing. Cardiovascular: Negative for chest pain, palpitations and leg swelling.    Gastrointestinal: Negative for abdominal distention, abdominal pain, constipation, diarrhea, nausea and vomiting. Endocrine: Negative for cold intolerance, heat intolerance and polyuria. Genitourinary: Negative for difficulty urinating and hematuria. Musculoskeletal: Negative for arthralgias, back pain, gait problem, joint swelling and myalgias. Skin: Negative for color change and pallor. Allergic/Immunologic: Negative for environmental allergies and food allergies. Neurological: Positive for dizziness and syncope. Negative for tremors, seizures, facial asymmetry and headaches. Hematological: Negative for adenopathy. Does not bruise/bleed easily. Psychiatric/Behavioral: Negative for agitation, behavioral problems and confusion. The patient is nervous/anxious. All other systems reviewed and are negative. Objective     Vital signs for last 24 hours:  Visit Vitals  BP (!) 182/91   Pulse 71   Temp 98.4 °F (36.9 °C) (Oral)   Resp 18   Ht 5' 8\" (1.727 m)   Wt 115 kg (253 lb 8.5 oz)   SpO2 98%   Breastfeeding No   BMI 38.55 kg/m²         General-Well Developed, Well Nourished,   No Acute Distress,   Alert & Oriented x 3, appropriate affect  HEENT - atraumatic normocephalic  No pallor or icterus  Neck- supple, no JVD  CV- regular rate and rhythm   no MRG  Lung- clear bilaterally  Abd- soft, nontender, nondistended  Ext- no edema bilaterally.   Skin- warm and dry; rash  Access-right chest tunneled hemodialysis catheter with clean exit site under sterile dressing; left upper arm AV fistula-immature    Recent Results (from the past 24 hour(s))   GLUCOSE, POC    Collection Time: 06/22/21  8:20 PM   Result Value Ref Range    Glucose (POC) 126 (H) 65 - 117 mg/dL    Performed by [address] [place] [address], POC    Collection Time: 06/23/21  7:34 AM   Result Value Ref Range    Glucose (POC) 121 (H) 65 - 117 mg/dL    Performed by LINDA CHISHOLM    GLUCOSE, POC    Collection Time: 06/23/21 11:15 AM   Result Value Ref Range    Glucose (POC) 132 (H) 65 - 117 mg/dL    Performed by LINDA CHISHOLM Intake/Output Summary (Last 24 hours) at 6/23/2021 1722  Last data filed at 6/23/2021 1340  Gross per 24 hour   Intake --   Output 0 ml   Net 0 ml      Current Shift: No intake/output data recorded. Last 3 Shifts: No intake/output data recorded. Physical Exam     Data Review:   Recent Results (from the past 24 hour(s))   GLUCOSE, POC    Collection Time: 06/22/21  8:20 PM   Result Value Ref Range    Glucose (POC) 126 (H) 65 - 117 mg/dL    Performed by Umair Lockett    GLUCOSE, POC    Collection Time: 06/23/21  7:34 AM   Result Value Ref Range    Glucose (POC) 121 (H) 65 - 117 mg/dL    Performed by LINDA CHISHOLM    GLUCOSE, POC    Collection Time: 06/23/21 11:15 AM   Result Value Ref Range    Glucose (POC) 132 (H) 65 - 117 mg/dL    Performed by LINDA CHISHOLM          XR ABD (KUB)   Final Result   Study limited by the patient's body habitus. No evidence of   mechanical bowel obstruction. CT HEAD WO CONT   Final Result   1. No acute intracranial findings. XR CHEST PORT   Final Result   Findings compatible with CHF. US RUQ    (Results Pending)   XR CHEST PORT    (Results Pending)        Patient Active Problem List   Diagnosis Code    Stage 5 chronic kidney disease ([facility] N18.5    Uncontrolled hypertension I10    Nephrotic syndrome N04.9    Anemia, chronic disease A52.6    H/O metabolic acidosis J05.90    Pulmonary hypertension (HCC) I27.20    DM2 (diabetes mellitus, type 2) ([facility] E11.9    Syncope R55    ESRD (end stage renal disease) on dialysis ([facility] N18.6, Z99.2    Syncope and collapse R55    ESRD on dialysis ([facility] N18.6, Z99.2    Fluid overload E87.70        DIAGNOSES:  1. ESRD on hemodialysis-dialysis on Monday, Wednesday and Friday schedule. 2. Intradialytic hypotension  3. Diabetes mellitus 2 with complications   4. Syncope  On dialysis- cardiogenic  5. Secondary hyperparathyroidism  6. History of nephrotic syndrome  7. History of anemia of chronic disease  8.  History of secondary hyperparathyroidism  9. Anxiety    DISCUSSION:   NT proBNP levels rising prior to dialysis   Fluid removal as much feasible without causing hypotension    PLAN:    ?  HbA1c level not back-hold glipizide if HbA1c is 7 on lower  Precautions to avoid intradialytic hypotension      Thanks for consulting me. Please don't hesitate to contact me if any questions arise of if I can assist in any manner. This dictation was done by dragon, computer voice recognition software. Often unanticipated grammatical, syntax, phones and other interpretive errors are inadvertently transcribed. Please excuse errors that have escaped final proofreading. Please contact me if you suspect dictation or transcription errors.   Dr Hue Montilla  [address], [address] [facility], [address] [facility]  Cell Phone: [phone]  Office phone: [phone]  Fax: [phone]

## 2021-06-23 NOTE — PROGRESS NOTES
Pt seen after hd. She is tearful complaining of epigastric burning and nausea. KUB unrevealing and us ruq pending. Will continue ppi, bland diet, ask gi for further recs 2/2 dyspepsia.

## 2021-06-23 NOTE — PROGRESS NOTES
OCCUPATIONAL THERAPY EVALUATION  Patient: Yoselyn Joe (28 y.o. female)  Date: 6/23/2021  Primary Diagnosis: Syncope [R55]  Fluid overload [E87.70]  Syncope and collapse [R55]  ESRD on dialysis ([facility] [N18.6, Z99.2]        Precautions: severe abdominal pain    ASSESSMENT  Patient is a 62year old female, who came to the ED with syncope, nausea, vomiting, dizziness, and abdominal pain during dialysis treatment and admitted for syncope and collapse, uncontrolled HTN on 6/21/2021. PMH includes: ESRD on dialysis x1 month, anemia, DM2, metabolic acidosis, HTN, morbid obesity, nephrotic syndrome, pulmonary HTN, renal failure. Based on the objective data described below, the patient presents with severe abdominal pain, tearful and nauseous throughout session, limited ability to participate in therapy session, decreased activity tolerance and increased need for A with self care and functional mobility/transfers. Patient semi supine in bed upon OT/PT arrival and agreeable to working with therapy. Patient A&O x4 and per pt report, pt lives with daughter in a single story house with 8 JENNIFER and jean carlos handrails. Patient reports no DME in the home and independent for ADLs/IADLs PTA including driving. Patient independent bed mobility, sup <> sit and scooting. Declining further mobility at this time due to nausea and abd pain, tearful and EOB. Mod I for self care tasks simulated at EOB, pt declining participation at this time as already completed grooming and in too much pain. Patient would benefit from continued skilled OT services to address above deficits and improve safety and independence with self care and functional mobility/transfers. Recommend discharge to home with [address] [place] [address] vs self care pending progress when medically appropriate. Patient is likely close to functional baseline and may not need skilled therapy services once medically appropriate for increased participation with therapy.     Current Level of Function Impacting Discharge (ADLs/self-care): mod I simulated self care. Other factors to consider for discharge: severity of pain, medical course        PLAN :  Recommendations and Planned Interventions: self care training, functional mobility training, therapeutic exercise, balance training, therapeutic activities, endurance activities, patient education, home safety training and family training/education    Frequency/Duration: Patient will be followed by occupational therapy 3 times a week to address goals. Recommendation for discharge: (in order for the patient to meet his/her long term goals)  HHOT vs self care    This discharge recommendation:  Has been made in collaboration with the attending provider and/or case management    IF patient discharges home will need the following DME: TBD       SUBJECTIVE:   Patient stated I feel so nauseous.     OBJECTIVE DATA SUMMARY:   HISTORY:   Past Medical History:   Diagnosis Date    Anemia, chronic disease 3/22/2021    DM2 (diabetes mellitus, type 2) ([facility] 7/92/1626    H/O metabolic acidosis 0/68/4418    Hypertension     Morbid obesity ([facility]     Nephrotic syndrome 3/22/2021    Pulmonary hypertension ([facility] 3/22/2021    Renal failure      Past Surgical History:   Procedure Laterality Date    IR INSERT NON TUNL CVC OVER 5 YRS  3/12/2021       Expanded or extensive additional review of patient history:     Home Situation  Home Environment: Private residence  # Steps to Enter: 8  Rails to Enter: Yes  Hand Rails : Bilateral  One/Two Story Residence: One story  Living Alone: No  Support Systems: Child(kyree) (lives with daughter)  Patient Expects to be Discharged to[de-identified] House  Current DME Used/Available at Home: None  Tub or Shower Type: Shower    PLOF: Pt I for ADLS/IADLS, I with mobility prior to admission.      EXAMINATION OF PERFORMANCE DEFICITS:  Cognitive/Behavioral Status:  Neurologic State: Alert  Orientation Level: Oriented X4  Cognition: Follows commands    Skin: intact where visible    Edema: none noted    Hearing: Auditory  Auditory Impairment: None    Vision/Perceptual:    No deficits noted at this time    Range of Motion:  AROM: Within functional limits    Strength:  Generally WFL, not formally assessed due to pain and nausea    Coordination:  Generally intact    Tone & Sensation:  Tone: Normal    Balance:  Sitting: Intact    Functional Mobility and Transfers for ADLs:  Bed Mobility:  Rolling: Independent  Supine to Sit: Independent  Sit to Supine: Independent  Scooting: Independent    Transfers:       ADL Assessment:    Oral Facial Hygiene/Grooming: Modified Independent (simulated EOB)    Lower Body Dressing: Modified independent    ADL Intervention and task modifications:    Grooming  Grooming Assistance: Modified independent (simulated)  Position Performed: Seated edge of bed    Lower Body Dressing Assistance  Dressing Assistance: Modified independent (simulated)  Position Performed: Seated edge of bed    Therapeutic Exercise:  No UE HEP needs at this time     Functional Measure:    [address] [place] [address] AM-PAC \"6 Clicks\"                                                       Daily Activity Inpatient Short Form  How much help from another person does the patient currently need. .. Total; A Lot A Little None   1. Putting on and taking off regular lower body clothing? []  1 []  2 [x]  3 []  4   2. Bathing (including washing, rinsing, drying)? []  1 []  2 [x]  3 []  4   3. Toileting, which includes using toilet, bedpan or urinal? [] 1 []  2 [x]  3 []  4   4. Putting on and taking off regular upper body clothing? []  1 []  2 []  3 [x]  4   5. Taking care of personal grooming such as brushing teeth? []  1 []  2 [x]  3 []  4   6. Eating meals? []  1 []  2 []  3 [x]  4   © 2007, Trustees of [address] [place] [address], under license to [employer].  All rights reserved     Score: 20/24     Interpretation of Tool:  Represents clinically-significant functional categories (i.e. Activities of daily living). Percentage of Impairment CH    0%   CI    1-19% CJ    20-39% CK    40-59% CL    60-79% CM    80-99% CN     100%   [facility]  Score 6-24 24 23 20-22 15-19 10-14 7-9 6        Occupational Therapy Evaluation Charge Determination   History Examination Decision-Making   LOW Complexity : Brief history review  MEDIUM Complexity : 3-5 performance deficits relating to physical, cognitive , or psychosocial skils that result in activity limitations and / or participation restrictions LOW Complexity : No comorbidities that affect functional and no verbal or physical assistance needed to complete eval tasks       Based on the above components, the patient evaluation is determined to be of the following complexity level: LOW     Pain Ratin/10 abdominal pain and nausea    Activity Tolerance:   Poor and requires frequent rest breaks    After treatment patient left in no apparent distress:    Supine in bed, Call bell within reach and Side rails x 3    COMMUNICATION/EDUCATION:   The patients plan of care was discussed with: Physical therapist, Registered nurse and Case management. Home safety education was provided and the patient/caregiver indicated understanding., Patient/family have participated as able in goal setting and plan of care. and Patient/family agree to work toward stated goals and plan of care. This patients plan of care is appropriate for delegation to [facility].     Thank you for this referral.  KATARINA Morfin/L  Time Calculation: 16 mins

## 2021-06-23 NOTE — PROGRESS NOTES
Reason for Admission:  Syncope                     RUR Score:          N/A           Plan for utilizing home health:      Open if needed. PCP: First and Last name:  Parviz St MD     Name of Practice:    Are you a current patient: Yes/No: yes   Approximate date of last visit: 2 weeks ago   Can you participate in a virtual visit with your PCP:                     Current Advanced Directive/Advance Care Plan: Full Code      Healthcare Decision Maker:   Click here to complete [address] including selection of the Healthcare Decision Maker Relationship (ie \"Primary\")           [facility] [phone]                  Transition of Care Plan:    Patient lives at home independently and still drives herself. Patient is a dialysis patient at [facility] in [place], [place] on MWF at 11am.  Current Dispo: Home/Self. Patient will coordinate her own ride at discharge.

## 2021-06-24 ENCOUNTER — APPOINTMENT (OUTPATIENT)
Dept: ENDOSCOPY | Age: 61
End: 2021-06-24
Attending: INTERNAL MEDICINE
Payer: COMMERCIAL

## 2021-06-24 LAB
BASOPHILS # BLD: 0.1 K/UL (ref 0–0.1)
BASOPHILS NFR BLD: 1 % (ref 0–1)
BNP SERPL-MCNC: ABNORMAL PG/ML
CK SERPL-CCNC: 92 U/L (ref 26–192)
DIFFERENTIAL METHOD BLD: ABNORMAL
EOSINOPHIL # BLD: 0.2 K/UL (ref 0–0.4)
EOSINOPHIL NFR BLD: 3 % (ref 0–7)
ERYTHROCYTE [DISTWIDTH] IN BLOOD BY AUTOMATED COUNT: 16 % (ref 11.5–14.5)
EST. AVERAGE GLUCOSE BLD GHB EST-MCNC: 114 MG/DL
GLUCOSE BLD STRIP.AUTO-MCNC: 137 MG/DL (ref 65–117)
GLUCOSE BLD STRIP.AUTO-MCNC: 149 MG/DL (ref 65–117)
GLUCOSE BLD STRIP.AUTO-MCNC: 154 MG/DL (ref 65–117)
GLUCOSE BLD STRIP.AUTO-MCNC: 203 MG/DL (ref 65–117)
HBA1C MFR BLD: 5.6 % (ref 4–5.6)
HCT VFR BLD AUTO: 33.6 % (ref 35–47)
HGB BLD-MCNC: 10.2 G/DL (ref 11.5–16)
IMM GRANULOCYTES # BLD AUTO: 0 K/UL (ref 0–0.04)
IMM GRANULOCYTES NFR BLD AUTO: 0 % (ref 0–0.5)
LYMPHOCYTES # BLD: 1 K/UL (ref 0.8–3.5)
LYMPHOCYTES NFR BLD: 19 % (ref 12–49)
MCH RBC QN AUTO: 26.4 PG (ref 26–34)
MCHC RBC AUTO-ENTMCNC: 30.4 G/DL (ref 30–36.5)
MCV RBC AUTO: 86.8 FL (ref 80–99)
MONOCYTES # BLD: 0.7 K/UL (ref 0–1)
MONOCYTES NFR BLD: 13 % (ref 5–13)
NEUTS SEG # BLD: 3.6 K/UL (ref 1.8–8)
NEUTS SEG NFR BLD: 64 % (ref 32–75)
NRBC # BLD: 0 K/UL (ref 0–0.01)
NRBC BLD-RTO: 0 PER 100 WBC
PERFORMED BY, TECHID: ABNORMAL
PLATELET # BLD AUTO: 200 K/UL (ref 150–400)
PMV BLD AUTO: 12.3 FL (ref 8.9–12.9)
RBC # BLD AUTO: 3.87 M/UL (ref 3.8–5.2)
WBC # BLD AUTO: 5.5 K/UL (ref 3.6–11)

## 2021-06-24 PROCEDURE — 74011250637 HC RX REV CODE- 250/637: Performed by: STUDENT IN AN ORGANIZED HEALTH CARE EDUCATION/TRAINING PROGRAM

## 2021-06-24 PROCEDURE — 36415 COLL VENOUS BLD VENIPUNCTURE: CPT

## 2021-06-24 PROCEDURE — 96372 THER/PROPH/DIAG INJ SC/IM: CPT

## 2021-06-24 PROCEDURE — 74011250636 HC RX REV CODE- 250/636: Performed by: STUDENT IN AN ORGANIZED HEALTH CARE EDUCATION/TRAINING PROGRAM

## 2021-06-24 PROCEDURE — 96376 TX/PRO/DX INJ SAME DRUG ADON: CPT

## 2021-06-24 PROCEDURE — 65270000029 HC RM PRIVATE

## 2021-06-24 PROCEDURE — 74011636637 HC RX REV CODE- 636/637: Performed by: STUDENT IN AN ORGANIZED HEALTH CARE EDUCATION/TRAINING PROGRAM

## 2021-06-24 PROCEDURE — 74011250637 HC RX REV CODE- 250/637: Performed by: INTERNAL MEDICINE

## 2021-06-24 PROCEDURE — 99218 HC RM OBSERVATION: CPT

## 2021-06-24 PROCEDURE — 83880 ASSAY OF NATRIURETIC PEPTIDE: CPT

## 2021-06-24 PROCEDURE — 74011250636 HC RX REV CODE- 250/636: Performed by: INTERNAL MEDICINE

## 2021-06-24 PROCEDURE — 74011250637 HC RX REV CODE- 250/637: Performed by: FAMILY MEDICINE

## 2021-06-24 PROCEDURE — 82962 GLUCOSE BLOOD TEST: CPT

## 2021-06-24 PROCEDURE — 85025 COMPLETE CBC W/AUTO DIFF WBC: CPT

## 2021-06-24 PROCEDURE — 82550 ASSAY OF CK (CPK): CPT

## 2021-06-24 RX ORDER — LOSARTAN POTASSIUM 50 MG/1
50 TABLET ORAL ONCE
Status: COMPLETED | OUTPATIENT
Start: 2021-06-24 | End: 2021-06-24

## 2021-06-24 RX ORDER — LOSARTAN POTASSIUM 50 MG/1
100 TABLET ORAL DAILY
Status: DISCONTINUED | OUTPATIENT
Start: 2021-06-25 | End: 2021-06-25 | Stop reason: HOSPADM

## 2021-06-24 RX ORDER — SUCRALFATE 1 G/1
1 TABLET ORAL
Status: DISCONTINUED | OUTPATIENT
Start: 2021-06-24 | End: 2021-06-25 | Stop reason: HOSPADM

## 2021-06-24 RX ADMIN — HYDRALAZINE HYDROCHLORIDE 100 MG: 50 TABLET, FILM COATED ORAL at 21:19

## 2021-06-24 RX ADMIN — INSULIN LISPRO 4 UNITS: 100 INJECTION, SOLUTION INTRAVENOUS; SUBCUTANEOUS at 21:19

## 2021-06-24 RX ADMIN — ONDANSETRON 4 MG: 2 INJECTION INTRAMUSCULAR; INTRAVENOUS at 09:58

## 2021-06-24 RX ADMIN — SUCRALFATE 1 G: 1 TABLET ORAL at 21:19

## 2021-06-24 RX ADMIN — HEPARIN SODIUM 5000 UNITS: 5000 INJECTION INTRAVENOUS; SUBCUTANEOUS at 00:04

## 2021-06-24 RX ADMIN — SUCRALFATE 1 G: 1 TABLET ORAL at 17:33

## 2021-06-24 RX ADMIN — PANTOPRAZOLE SODIUM 40 MG: 40 TABLET, DELAYED RELEASE ORAL at 17:33

## 2021-06-24 RX ADMIN — LOSARTAN POTASSIUM 50 MG: 50 TABLET, FILM COATED ORAL at 21:19

## 2021-06-24 RX ADMIN — HYDRALAZINE HYDROCHLORIDE 100 MG: 50 TABLET, FILM COATED ORAL at 08:01

## 2021-06-24 RX ADMIN — Medication 10 ML: at 05:22

## 2021-06-24 RX ADMIN — SUCRALFATE 1 G: 1 TABLET ORAL at 11:40

## 2021-06-24 RX ADMIN — PANTOPRAZOLE SODIUM 40 MG: 40 TABLET, DELAYED RELEASE ORAL at 08:01

## 2021-06-24 RX ADMIN — ISOSORBIDE DINITRATE 20 MG: 10 TABLET ORAL at 17:32

## 2021-06-24 RX ADMIN — ISOSORBIDE DINITRATE 20 MG: 10 TABLET ORAL at 21:19

## 2021-06-24 RX ADMIN — CARVEDILOL 12.5 MG: 12.5 TABLET, FILM COATED ORAL at 17:33

## 2021-06-24 RX ADMIN — HEPARIN SODIUM 5000 UNITS: 5000 INJECTION INTRAVENOUS; SUBCUTANEOUS at 08:01

## 2021-06-24 RX ADMIN — LOSARTAN POTASSIUM 50 MG: 50 TABLET, FILM COATED ORAL at 08:01

## 2021-06-24 RX ADMIN — HEPARIN SODIUM 5000 UNITS: 5000 INJECTION INTRAVENOUS; SUBCUTANEOUS at 17:34

## 2021-06-24 RX ADMIN — Medication 10 ML: at 14:21

## 2021-06-24 RX ADMIN — DOCUSATE SODIUM 100 MG: 100 CAPSULE, LIQUID FILLED ORAL at 08:01

## 2021-06-24 RX ADMIN — ISOSORBIDE DINITRATE 20 MG: 10 TABLET ORAL at 08:01

## 2021-06-24 RX ADMIN — CARVEDILOL 12.5 MG: 12.5 TABLET, FILM COATED ORAL at 08:01

## 2021-06-24 NOTE — PROGRESS NOTES
OT tx attempted at 9:54 however patient tearful and in increased abdominal pain after eating breakfast. Patient requesting to hold therapy treatment at this time. Will continue to follow patient and attempt OT at a later time. Thank you.

## 2021-06-24 NOTE — PROGRESS NOTES
Consult Date: 6/24/2021      Subjective   Improved nausea and vomiting  Tolerated dialysis well yesterday  No breathing difficulty    Past Medical History:   Diagnosis Date    Anemia, chronic disease 3/22/2021    DM2 (diabetes mellitus, type 2) ([facility] 2/39/7980    H/O metabolic acidosis 0/44/1992    Hypertension     Morbid obesity ([facility]     Nephrotic syndrome 3/22/2021    Pulmonary hypertension ([facility] 3/22/2021    Renal failure       Past Surgical History:   Procedure Laterality Date    IR INSERT NON TUNL CVC OVER 5 YRS  3/12/2021     No family history on file.    Social History     Tobacco Use    Smoking status: Never Smoker    Smokeless tobacco: Never Used   Substance Use Topics    Alcohol use: Not on file       Current Facility-Administered Medications   Medication Dose Route Frequency Provider Last Rate Last Admin    sucralfate (CARAFATE) tablet 1 g  1 g Oral AC&HS Yudith Hendrickson MD   1 g at 06/24/21 1140    alum-mag hydroxide-simeth (MYLANTA) oral suspension 30 mL  30 mL Oral Q4H PRN Anjana GUERRERO MD        losartan (COZAAR) tablet 50 mg  50 mg Oral DAILY Alexis Ontiveros MD   50 mg at 06/24/21 0801    heparin (porcine) 1,000 unit/mL injection 3,600 Units  3,600 Units Hemodialysis DIALYSIS PRN Yue Zuniga MD   3,600 Units at 06/23/21 1715    pantoprazole (PROTONIX) tablet 40 mg  40 mg Oral ACB&D Anjana GUERRERO MD   40 mg at 06/24/21 0801    heparin (porcine) injection 5,000 Units  5,000 Units SubCUTAneous Q8H Asia Pope MD   5,000 Units at 06/24/21 0801    ondansetron (ZOFRAN) injection 4 mg  4 mg IntraVENous Q4H PRN Remonia Shawna, PA-C   4 mg at 06/22/21 0729    sodium chloride (NS) flush 5-40 mL  5-40 mL IntraVENous Q8H Remonia Shawna, PA-C   10 mL at 06/24/21 0522    sodium chloride (NS) flush 5-40 mL  5-40 mL IntraVENous PRN Remonia Shawna, PA-C        acetaminophen (TYLENOL) tablet 650 mg  650 mg Oral Q6H PRN Remonia Shawna, PA-C        Or   [facility] acetaminophen (TYLENOL) suppository 650 mg  650 mg Rectal Q6H PRN Taiwo Viera PA-C        polyethylene glycol (MIRALAX) packet 17 g  17 g Oral DAILY PRN Taiwo Viera PA-C        ondansetron (ZOFRAN ODT) tablet 4 mg  4 mg Oral Q8H PRN JAMES IbarraC   4 mg at 06/22/21 1229    Or    ondansetron (ZOFRAN) injection 4 mg  4 mg IntraVENous Q6H PRN JAMES IbarraC   4 mg at 06/24/21 0958    carvediloL (COREG) tablet 12.5 mg  12.5 mg Oral BID WITH MEALS Taiwo Viera PA-C   12.5 mg at 06/24/21 0801    docusate sodium (COLACE) capsule 100 mg  100 mg Oral BID LIBIA Ibarra-C   100 mg at 06/24/21 0801    isosorbide dinitrate (ISORDIL) tablet 20 mg  20 mg Oral TID JAMES IbarraC   20 mg at 06/24/21 0801    insulin lispro (HUMALOG) injection   SubCUTAneous AC&HS Taiwo Viera PA-C   2 Units at 06/22/21 1230    glucose chewable tablet 16 g  4 Tablet Oral PRN Taiwo Viera PA-C        glucagon (GLUCAGEN) injection 1 mg  1 mg IntraMUSCular PRN Taiwo Viera PA-C        dextrose (D50W) injection syrg 12.5-25 g  25-50 mL IntraVENous PRN Taiwo Viera PA-C        hydrALAZINE (APRESOLINE) tablet 100 mg  100 mg Oral BID LIBIA Ibarra-C   100 mg at 06/24/21 0801    metoprolol (LOPRESSOR) injection 2.5 mg  2.5 mg IntraVENous Q6H PRN LIBIA Ibarra-C   2.5 mg at 06/21/21 1927        Review of Systems   Constitutional: Negative for activity change, appetite change, fatigue, fever and unexpected weight change. HENT: Negative for congestion, facial swelling, postnasal drip, sinus pressure and trouble swallowing. Eyes: Negative for discharge, redness and visual disturbance. Respiratory: Negative for cough, chest tightness, shortness of breath and wheezing. Cardiovascular: Negative for chest pain, palpitations and leg swelling.    Gastrointestinal: Negative for abdominal distention, abdominal pain, constipation, diarrhea, nausea and vomiting. Endocrine: Negative for cold intolerance, heat intolerance and polyuria. Genitourinary: Negative for difficulty urinating and hematuria. Musculoskeletal: Negative for arthralgias, back pain, gait problem, joint swelling and myalgias. Skin: Negative for color change and pallor. Allergic/Immunologic: Negative for environmental allergies and food allergies. Neurological: Positive for dizziness and syncope. Negative for tremors, seizures, facial asymmetry and headaches. Hematological: Negative for adenopathy. Does not bruise/bleed easily. Psychiatric/Behavioral: Negative for agitation, behavioral problems and confusion. The patient is nervous/anxious. All other systems reviewed and are negative. Objective     Vital signs for last 24 hours:  Visit Vitals  BP (!) 142/72 (BP 1 Location: Right upper arm, BP Patient Position: Sitting)   Pulse 68   Temp 98.8 °F (37.1 °C)   Resp 18   Ht 5' 8\" (1.727 m)   Wt 115 kg (253 lb 8.5 oz)   SpO2 98%   Breastfeeding No   BMI 38.55 kg/m²         General-Well Developed, Well Nourished,   No Acute Distress,   Alert & Oriented x 3, appropriate affect  HEENT - atraumatic normocephalic  No pallor or icterus  Neck- supple, no JVD  CV- regular rate and rhythm   no MRG  Lung- clear bilaterally  Abd- soft, nontender, nondistended  Ext- no edema bilaterally.   Skin- warm and dry; rash  Access-right chest tunneled hemodialysis catheter with clean exit site under sterile dressing; left upper arm AV fistula-immature    Recent Results (from the past 24 hour(s))   CBC WITH AUTOMATED DIFF    Collection Time: 06/23/21  7:11 PM   Result Value Ref Range    WBC 5.1 3.6 - 11.0 K/uL    RBC 3.82 3.80 - 5.20 M/uL    HGB 10.0 (L) 11.5 - 16.0 g/dL    HCT 33.1 (L) 35.0 - 47.0 %    MCV 86.6 80.0 - 99.0 FL    MCH 26.2 26.0 - 34.0 PG    MCHC 30.2 30.0 - 36.5 g/dL    RDW 16.0 (H) 11.5 - 14.5 %    PLATELET 524 584 - 573 K/uL    MPV 12.2 8.9 - 12.9 FL    NRBC 0.0 0.0  WBC    ABSOLUTE NRBC 0.00 0.00 - 0.01 K/uL    NEUTROPHILS 60 32 - 75 %    LYMPHOCYTES 22 12 - 49 %    MONOCYTES 13 5 - 13 %    EOSINOPHILS 4 0 - 7 %    BASOPHILS 1 0 - 1 %    IMMATURE GRANULOCYTES 0 0 - 0.5 %    ABS. NEUTROPHILS 3.0 1.8 - 8.0 K/UL    ABS. LYMPHOCYTES 1.1 0.8 - 3.5 K/UL    ABS. MONOCYTES 0.7 0.0 - 1.0 K/UL    ABS. EOSINOPHILS 0.2 0.0 - 0.4 K/UL    ABS. BASOPHILS 0.1 0.0 - 0.1 K/UL    ABS. IMM. GRANS. 0.0 0.00 - 0.04 K/UL    DF AUTOMATED     CK    Collection Time: 06/23/21  7:11 PM   Result Value Ref Range     26 - 192 U/L   HEMOGLOBIN A1C WITH EAG    Collection Time: 06/23/21  7:11 PM   Result Value Ref Range    Hemoglobin A1c 5.6 4.0 - 5.6 %    Est. average glucose 114 mg/dL   GLUCOSE, POC    Collection Time: 06/23/21  7:18 PM   Result Value Ref Range    Glucose (POC) 123 (H) 65 - 117 mg/dL    Performed by Julita Pepper    GLUCOSE, POC    Collection Time: 06/24/21  8:04 AM   Result Value Ref Range    Glucose (POC) 154 (H) 65 - 117 mg/dL    Performed by Yonny Ellison ([place])    CBC WITH AUTOMATED DIFF    Collection Time: 06/24/21  8:50 AM   Result Value Ref Range    WBC 5.5 3.6 - 11.0 K/uL    RBC 3.87 3.80 - 5.20 M/uL    HGB 10.2 (L) 11.5 - 16.0 g/dL    HCT 33.6 (L) 35.0 - 47.0 %    MCV 86.8 80.0 - 99.0 FL    MCH 26.4 26.0 - 34.0 PG    MCHC 30.4 30.0 - 36.5 g/dL    RDW 16.0 (H) 11.5 - 14.5 %    PLATELET 419 270 - 706 K/uL    MPV 12.3 8.9 - 12.9 FL    NRBC 0.0 0.0  WBC    ABSOLUTE NRBC 0.00 0.00 - 0.01 K/uL    NEUTROPHILS 64 32 - 75 %    LYMPHOCYTES 19 12 - 49 %    MONOCYTES 13 5 - 13 %    EOSINOPHILS 3 0 - 7 %    BASOPHILS 1 0 - 1 %    IMMATURE GRANULOCYTES 0 0 - 0.5 %    ABS. NEUTROPHILS 3.6 1.8 - 8.0 K/UL    ABS. LYMPHOCYTES 1.0 0.8 - 3.5 K/UL    ABS. MONOCYTES 0.7 0.0 - 1.0 K/UL    ABS. EOSINOPHILS 0.2 0.0 - 0.4 K/UL    ABS. BASOPHILS 0.1 0.0 - 0.1 K/UL    ABS. IMM.  GRANS. 0.0 0.00 - 0.04 K/UL    DF AUTOMATED     CK    Collection Time: 06/24/21  8:50 AM   Result Value Ref Range CK 92 26 - 192 U/L   BNP    Collection Time: 06/24/21  8:50 AM   Result Value Ref Range    NT pro-BNP 10,186 (H) <125 pg/mL   GLUCOSE, POC    Collection Time: 06/24/21 11:37 AM   Result Value Ref Range    Glucose (POC) 137 (H) 65 - 117 mg/dL    Performed by Rere Langston           Intake/Output Summary (Last 24 hours) at 6/24/2021 1219  Last data filed at 6/23/2021 1710  Gross per 24 hour   Intake --   Output 1000 ml   Net -1000 ml      Current Shift: No intake/output data recorded. Last 3 Shifts: 06/22 1901 - 06/24 0700  In: -   Out: 1000   Physical Exam  Constitutional:       Appearance: Normal appearance. Cardiovascular:      Rate and Rhythm: Normal rate and regular rhythm. Pulses: Normal pulses. Heart sounds: Normal heart sounds. Pulmonary:      Effort: Pulmonary effort is normal.      Breath sounds: Normal breath sounds. Abdominal:      Palpations: Abdomen is soft. Musculoskeletal:         General: Swelling present. Normal range of motion. Neurological:      Mental Status: She is alert. Data Review:   Recent Results (from the past 24 hour(s))   CBC WITH AUTOMATED DIFF    Collection Time: 06/23/21  7:11 PM   Result Value Ref Range    WBC 5.1 3.6 - 11.0 K/uL    RBC 3.82 3.80 - 5.20 M/uL    HGB 10.0 (L) 11.5 - 16.0 g/dL    HCT 33.1 (L) 35.0 - 47.0 %    MCV 86.6 80.0 - 99.0 FL    MCH 26.2 26.0 - 34.0 PG    MCHC 30.2 30.0 - 36.5 g/dL    RDW 16.0 (H) 11.5 - 14.5 %    PLATELET 555 402 - 077 K/uL    MPV 12.2 8.9 - 12.9 FL    NRBC 0.0 0.0  WBC    ABSOLUTE NRBC 0.00 0.00 - 0.01 K/uL    NEUTROPHILS 60 32 - 75 %    LYMPHOCYTES 22 12 - 49 %    MONOCYTES 13 5 - 13 %    EOSINOPHILS 4 0 - 7 %    BASOPHILS 1 0 - 1 %    IMMATURE GRANULOCYTES 0 0 - 0.5 %    ABS. NEUTROPHILS 3.0 1.8 - 8.0 K/UL    ABS. LYMPHOCYTES 1.1 0.8 - 3.5 K/UL    ABS. MONOCYTES 0.7 0.0 - 1.0 K/UL    ABS. EOSINOPHILS 0.2 0.0 - 0.4 K/UL    ABS. BASOPHILS 0.1 0.0 - 0.1 K/UL    ABS. IMM.  GRANS. 0.0 0.00 - 0.04 K/UL    DF AUTOMATED     CK    Collection Time: 06/23/21  7:11 PM   Result Value Ref Range     26 - 192 U/L   HEMOGLOBIN A1C WITH EAG    Collection Time: 06/23/21  7:11 PM   Result Value Ref Range    Hemoglobin A1c 5.6 4.0 - 5.6 %    Est. average glucose 114 mg/dL   GLUCOSE, POC    Collection Time: 06/23/21  7:18 PM   Result Value Ref Range    Glucose (POC) 123 (H) 65 - 117 mg/dL    Performed by Sharlot Leyden    GLUCOSE, POC    Collection Time: 06/24/21  8:04 AM   Result Value Ref Range    Glucose (POC) 154 (H) 65 - 117 mg/dL    Performed by Marciano Peace ([place])    CBC WITH AUTOMATED DIFF    Collection Time: 06/24/21  8:50 AM   Result Value Ref Range    WBC 5.5 3.6 - 11.0 K/uL    RBC 3.87 3.80 - 5.20 M/uL    HGB 10.2 (L) 11.5 - 16.0 g/dL    HCT 33.6 (L) 35.0 - 47.0 %    MCV 86.8 80.0 - 99.0 FL    MCH 26.4 26.0 - 34.0 PG    MCHC 30.4 30.0 - 36.5 g/dL    RDW 16.0 (H) 11.5 - 14.5 %    PLATELET 427 258 - 840 K/uL    MPV 12.3 8.9 - 12.9 FL    NRBC 0.0 0.0  WBC    ABSOLUTE NRBC 0.00 0.00 - 0.01 K/uL    NEUTROPHILS 64 32 - 75 %    LYMPHOCYTES 19 12 - 49 %    MONOCYTES 13 5 - 13 %    EOSINOPHILS 3 0 - 7 %    BASOPHILS 1 0 - 1 %    IMMATURE GRANULOCYTES 0 0 - 0.5 %    ABS. NEUTROPHILS 3.6 1.8 - 8.0 K/UL    ABS. LYMPHOCYTES 1.0 0.8 - 3.5 K/UL    ABS. MONOCYTES 0.7 0.0 - 1.0 K/UL    ABS. EOSINOPHILS 0.2 0.0 - 0.4 K/UL    ABS. BASOPHILS 0.1 0.0 - 0.1 K/UL    ABS. IMM. GRANS. 0.0 0.00 - 0.04 K/UL    DF AUTOMATED     CK    Collection Time: 06/24/21  8:50 AM   Result Value Ref Range    CK 92 26 - 192 U/L   BNP    Collection Time: 06/24/21  8:50 AM   Result Value Ref Range    NT pro-BNP 10,186 (H) <125 pg/mL   GLUCOSE, POC    Collection Time: 06/24/21 11:37 AM   Result Value Ref Range    Glucose (POC) 137 (H) 65 - 117 mg/dL    Performed by [address] [place] [address],[address] RUQ   Final Result   No ultrasound evidence for infectious/inflammatory process involving   the gallbladder.       XR CHEST PORT   Final Result      XR ABD (KUB) Final Result   Study limited by the patient's body habitus. No evidence of   mechanical bowel obstruction. CT HEAD WO CONT   Final Result   1. No acute intracranial findings. XR CHEST PORT   Final Result   Findings compatible with CHF. Patient Active Problem List   Diagnosis Code    Stage 5 chronic kidney disease ([facility] N18.5    Uncontrolled hypertension I10    Nephrotic syndrome N04.9    Anemia, chronic disease C20.9    H/O metabolic acidosis D73.67    Pulmonary hypertension (HCC) I27.20    DM2 (diabetes mellitus, type 2) ([facility] E11.9    Syncope R55    ESRD (end stage renal disease) on dialysis ([facility] N18.6, Z99.2    Syncope and collapse R55    ESRD on dialysis ([facility] N18.6, Z99.2    Fluid overload E87.70        DIAGNOSES:  1. ESRD on hemodialysis-dialysis on Monday, Wednesday and Friday schedule. 2. Intradialytic hypotension  3. Diabetes mellitus 2 with complications   4. Syncope  On dialysis- cardiogenic  5. Secondary hyperparathyroidism  6. History of nephrotic syndrome  7. History of anemia of chronic disease  8. History of secondary hyperparathyroidism  9. Anxiety    Plan:   Hold glipizide  Continued HD as per F schedule.   Fu with GI workup for nausea/vomiting

## 2021-06-24 NOTE — PROGRESS NOTES
Hospitalist Progress Note               Daily Progress Note: 6/24/2021  60 f esrd on hd x 1 month, dm2 on glipizide, hypertension presents 2/2 syncope, n/v, weakness during hd.EMS reportedly encountered hypotension and vomiting. BP elevated on arrival to ED sbp~ 226. Ekg shows sr, qt prolonged. cxr suggests chf. Echo 3/2021 lvef 47%, drade 2 dd. Suspect intradialytic hypotension. Nephrology following. Getting HD while here and appears fluid overload is improved but she developed severe dyspepsis not responsive to ppi. Gi consulted and planning on EGD Friday. Carafate added and there does seem to be some sx improvement. Hgb stable. Subjective: The patient is seen for follow  up. Chart reviewed, gianna at bedside,   Today was first day she dwas not tearful talking to me. States carafat helped, still nauseous. Gi rec egd tomorrow. With improvement discussed potential op egd but she is not in favor as she is here now. Denies sob. No recurrent syncope and reportedly tolerated HD. No orthostasis observed. No hypoglycemia observed. No dizziness,no repeat syncope, no chest pain or sob.      Problem List:  Problem List as of 6/24/2021 Date Reviewed: 3/22/2021        Codes Class Noted - Resolved    Syncope and collapse ICD-10-CM: R55  ICD-9-CM: 780.2  6/23/2021 - Present        ESRD on dialysis [facility]) ICD-10-CM: N18.6, Z99.2  ICD-9-CM: 585.6, V45.11  6/23/2021 - Present        Fluid overload ICD-10-CM: E87.70  ICD-9-CM: 276.69  6/23/2021 - Present        Syncope ICD-10-CM: R55  ICD-9-CM: 780.2  6/21/2021 - Present        ESRD (end stage renal disease) on dialysis [facility]) ICD-10-CM: N18.6, Z99.2  ICD-9-CM: 585.6, V45.11  6/21/2021 - Present        Nephrotic syndrome (Chronic) ICD-10-CM: N04.9  ICD-9-CM: 581.9  3/22/2021 - Present        Anemia, chronic disease ICD-10-CM: D63.8  ICD-9-CM: 285.29  3/22/2021 - Present        H/O metabolic acidosis HHD-33-PT: Z86.39  ICD-9-CM: V12.29  3/22/2021 - Present Pulmonary hypertension (HCC) (Chronic) ICD-10-CM: I27.20  ICD-9-CM: 416.8  3/22/2021 - Present        DM2 (diabetes mellitus, type 2) (HCC) (Chronic) ICD-10-CM: E11.9  ICD-9-CM: 250.00  3/22/2021 - Present        Uncontrolled hypertension ICD-10-CM: I10  ICD-9-CM: 401.9  3/21/2021 - Present        Stage 5 chronic kidney disease ([facility] [address] ICD-10-CM: N18.5  ICD-9-CM: 585.5  3/20/2021 - Present        RESOLVED: Intractable nausea and vomiting ICD-10-CM: R11.2  ICD-9-CM: 536.2  3/20/2021 - 3/22/2021              Medications reviewed  Current Facility-Administered Medications   Medication Dose Route Frequency    sucralfate (CARAFATE) tablet 1 g  1 g Oral AC&HS    alum-mag hydroxide-simeth (MYLANTA) oral suspension 30 mL  30 mL Oral Q4H PRN    losartan (COZAAR) tablet 50 mg  50 mg Oral DAILY    heparin (porcine) 1,000 unit/mL injection 3,600 Units  3,600 Units Hemodialysis DIALYSIS PRN    pantoprazole (PROTONIX) tablet 40 mg  40 mg Oral ACB&D    heparin (porcine) injection 5,000 Units  5,000 Units SubCUTAneous Q8H    ondansetron (ZOFRAN) injection 4 mg  4 mg IntraVENous Q4H PRN    sodium chloride (NS) flush 5-40 mL  5-40 mL IntraVENous Q8H    sodium chloride (NS) flush 5-40 mL  5-40 mL IntraVENous PRN    acetaminophen (TYLENOL) tablet 650 mg  650 mg Oral Q6H PRN    Or    acetaminophen (TYLENOL) suppository 650 mg  650 mg Rectal Q6H PRN    polyethylene glycol (MIRALAX) packet 17 g  17 g Oral DAILY PRN    ondansetron (ZOFRAN ODT) tablet 4 mg  4 mg Oral Q8H PRN    Or    ondansetron (ZOFRAN) injection 4 mg  4 mg IntraVENous Q6H PRN    carvediloL (COREG) tablet 12.5 mg  12.5 mg Oral BID WITH MEALS    docusate sodium (COLACE) capsule 100 mg  100 mg Oral BID    isosorbide dinitrate (ISORDIL) tablet 20 mg  20 mg Oral TID    insulin lispro (HUMALOG) injection   SubCUTAneous AC&HS    glucose chewable tablet 16 g  4 Tablet Oral PRN    glucagon (GLUCAGEN) injection 1 mg  1 mg IntraMUSCular PRN    dextrose (D50W) injection syrg 12.5-25 g  25-50 mL IntraVENous PRN    hydrALAZINE (APRESOLINE) tablet 100 mg  100 mg Oral BID    metoprolol (LOPRESSOR) injection 2.5 mg  2.5 mg IntraVENous Q6H PRN       Review of Systems:   Review of Systems   Constitutional: Negative for chills, fever and malaise/fatigue. HENT: Negative. Eyes: Negative. Respiratory: Negative for shortness of breath. Cardiovascular: Positive for leg swelling. Negative for chest pain and palpitations. Gastrointestinal: Positive for heartburn and nausea. Reflux     Genitourinary: Negative. Musculoskeletal: Negative. Skin: Negative. Neurological: Negative. Negative for dizziness. Endo/Heme/Allergies: Negative. Psychiatric/Behavioral: The patient is not nervous/anxious. Objective:   Physical Exam:     Visit Vitals  BP (!) 161/81 (BP 1 Location: Right lower arm, BP Patient Position: At rest;Lying right side)   Pulse 72   Temp 97.9 °F (36.6 °C)   Resp 18   Ht 5' 8\" (1.727 m)   Wt 119.7 kg (263 lb 14.3 oz)   SpO2 100%   Breastfeeding No   BMI 40.12 kg/m²      O2 Device: None (Room air)    Temp (24hrs), Av.5 °F (36.9 °C), Min:97.9 °F (36.6 °C), Max:98.8 °F (37.1 °C)    No intake/output data recorded.  1901 -  0700  In: -   Out: 1000     General:  Alert, cooperative, no distress, appears stated age. Lungs:   Clear to auscultation bilaterally. not labored. Chest wall:  No tenderness or deformity. Heart:  Regular rate and rhythm, S1, S2 normal, no murmur, click, rub or gallop. Abdomen:   Soft, non-tender. Bowel sounds normal. No masses,  No organomegaly. Extremities: Extremities normal, atraumatic, no cyanosis. + edema distal le's. Pulses: 2+ and symmetric all extremities. Skin: Skin color, texture, turgor normal. No rashes or lesions   Neurologic: CNII-XII intact. No gross sensory or motor deficits. aao x 3.      Data Review:       Recent Days:  Recent Labs     21  0850 21  191 06/22/21  0614   WBC 5.5 5.1 5.7   HGB 10.2* 10.0* 9.9*   HCT 33.6* 33.1* 33.1*    181 177     Recent Labs     06/22/21  0614      K 4.2      CO2 24   *   BUN 52*   CREA 5.54*   CA 8.8   ALB 2.5*   TBILI 0.4   ALT 35     No results for input(s): PH, PCO2, PO2, HCO3, FIO2 in the last 72 hours. 24 Hour Results:  Recent Results (from the past 24 hour(s))   CBC WITH AUTOMATED DIFF    Collection Time: 06/23/21  7:11 PM   Result Value Ref Range    WBC 5.1 3.6 - 11.0 K/uL    RBC 3.82 3.80 - 5.20 M/uL    HGB 10.0 (L) 11.5 - 16.0 g/dL    HCT 33.1 (L) 35.0 - 47.0 %    MCV 86.6 80.0 - 99.0 FL    MCH 26.2 26.0 - 34.0 PG    MCHC 30.2 30.0 - 36.5 g/dL    RDW 16.0 (H) 11.5 - 14.5 %    PLATELET 832 122 - 718 K/uL    MPV 12.2 8.9 - 12.9 FL    NRBC 0.0 0.0  WBC    ABSOLUTE NRBC 0.00 0.00 - 0.01 K/uL    NEUTROPHILS 60 32 - 75 %    LYMPHOCYTES 22 12 - 49 %    MONOCYTES 13 5 - 13 %    EOSINOPHILS 4 0 - 7 %    BASOPHILS 1 0 - 1 %    IMMATURE GRANULOCYTES 0 0 - 0.5 %    ABS. NEUTROPHILS 3.0 1.8 - 8.0 K/UL    ABS. LYMPHOCYTES 1.1 0.8 - 3.5 K/UL    ABS. MONOCYTES 0.7 0.0 - 1.0 K/UL    ABS. EOSINOPHILS 0.2 0.0 - 0.4 K/UL    ABS. BASOPHILS 0.1 0.0 - 0.1 K/UL    ABS. IMM.  GRANS. 0.0 0.00 - 0.04 K/UL    DF AUTOMATED     CK    Collection Time: 06/23/21  7:11 PM   Result Value Ref Range     26 - 192 U/L   HEMOGLOBIN A1C WITH EAG    Collection Time: 06/23/21  7:11 PM   Result Value Ref Range    Hemoglobin A1c 5.6 4.0 - 5.6 %    Est. average glucose 114 mg/dL   GLUCOSE, POC    Collection Time: 06/23/21  7:18 PM   Result Value Ref Range    Glucose (POC) 123 (H) 65 - 117 mg/dL    Performed by Alton Harper    GLUCOSE, POC    Collection Time: 06/24/21  8:04 AM   Result Value Ref Range    Glucose (POC) 154 (H) 65 - 117 mg/dL    Performed by Evelin White ([place])    CBC WITH AUTOMATED DIFF    Collection Time: 06/24/21  8:50 AM   Result Value Ref Range    WBC 5.5 3.6 - 11.0 K/uL    RBC 3.87 3.80 - 5.20 M/uL HGB 10.2 (L) 11.5 - 16.0 g/dL    HCT 33.6 (L) 35.0 - 47.0 %    MCV 86.8 80.0 - 99.0 FL    MCH 26.4 26.0 - 34.0 PG    MCHC 30.4 30.0 - 36.5 g/dL    RDW 16.0 (H) 11.5 - 14.5 %    PLATELET 219 303 - 822 K/uL    MPV 12.3 8.9 - 12.9 FL    NRBC 0.0 0.0  WBC    ABSOLUTE NRBC 0.00 0.00 - 0.01 K/uL    NEUTROPHILS 64 32 - 75 %    LYMPHOCYTES 19 12 - 49 %    MONOCYTES 13 5 - 13 %    EOSINOPHILS 3 0 - 7 %    BASOPHILS 1 0 - 1 %    IMMATURE GRANULOCYTES 0 0 - 0.5 %    ABS. NEUTROPHILS 3.6 1.8 - 8.0 K/UL    ABS. LYMPHOCYTES 1.0 0.8 - 3.5 K/UL    ABS. MONOCYTES 0.7 0.0 - 1.0 K/UL    ABS. EOSINOPHILS 0.2 0.0 - 0.4 K/UL    ABS. BASOPHILS 0.1 0.0 - 0.1 K/UL    ABS. IMM. GRANS. 0.0 0.00 - 0.04 K/UL    DF AUTOMATED     CK    Collection Time: 06/24/21  8:50 AM   Result Value Ref Range    CK 92 26 - 192 U/L   BNP    Collection Time: 06/24/21  8:50 AM   Result Value Ref Range    NT pro-BNP 10,186 (H) <125 pg/mL   GLUCOSE, POC    Collection Time: 06/24/21 11:37 AM   Result Value Ref Range    Glucose (POC) 137 (H) 65 - 117 mg/dL    Performed by Joe Wood    GLUCOSE, POC    Collection Time: 06/24/21  4:18 PM   Result Value Ref Range    Glucose (POC) 149 (H) 65 - 117 mg/dL    Performed by Joe Wood            Assessment/     Patient Active Problem List   Diagnosis Code    Stage 5 chronic kidney disease ([facility] [address] N18.5    Uncontrolled hypertension I10    Nephrotic syndrome N04.9    Anemia, chronic disease S00.8    H/O metabolic acidosis W40.52    Pulmonary hypertension (HCC) I27.20    DM2 (diabetes mellitus, type 2) (HCC) E11.9    Syncope R55    ESRD (end stage renal disease) on dialysis ([facility]) N18.6, Z99.2    Syncope and collapse R55    ESRD on dialysis ([facility] [address] N18.6, Z99.2    Fluid overload E87.70         -syncope- during hd, found hypotensive per ems, intradialytic hypotension surmised. Not orthostatic. No evidence of hypoglycemia but off glipizide during hospitalization-possibly contributed.   -Htpn uncontrolled, not clear if took meds prior to hd event. Nonetheless, bp uncontrolled since arrival to ED- better trend overnight. ARB resumed and titrating. Resumed hydralazine, coreg.   -esrd on hd x 1 month, cx nephrology, continue per nephrology recs.  -Dm2, takes oral glipizide once daily, denies low blood sugars preceding event, i'm unaware of bg at the scene but has not been hypoglycemic here. A1c was 6.1 in 3/2021 now 5.6. She has been on glipizide 10 mg acb, held since arrival and bg's are/have been acceptable here. I suspect this could have possibly contributed to her syncope- perhaps hypoglycemia- but no proof. She likely will not need to resume glipizide on dc.    -Recurrent nausea/dyspepsia/gerd: check kub, resume ppi, added carafate with some improvement. Gi appreciated and anticipate egd Friday. Plan: Hd per nephrology-- will follow to see bp respone. Anticipate not continuing glipizide on dc. ARB resumed, losartan 50 mg in place of valsartan- titrate to equivalent dose 100 mg daily. Continue ppi  Follow cxr. Anticipate egd in am.    Vtep:heparin  Full code  Dispo: obs admit converted to ip. EGD anticipated Friday, hopefully can go home subsequently. Care Plan discussed with: Patient/Family, Nurse and Consultant . Total time spent with patient: 30 minutes. This dictation was done by dragon, computer voice recognition software. Often unanticipated grammatical, syntax, phones and other interpretive errors are inadvertently transcribed. Please excuse errors that have escaped final proofreading.     Delisa Tang MD

## 2021-06-25 ENCOUNTER — ANESTHESIA EVENT (OUTPATIENT)
Dept: ENDOSCOPY | Age: 61
End: 2021-06-25
Payer: COMMERCIAL

## 2021-06-25 ENCOUNTER — ANESTHESIA (OUTPATIENT)
Dept: ENDOSCOPY | Age: 61
End: 2021-06-25
Payer: COMMERCIAL

## 2021-06-25 VITALS
WEIGHT: 263.89 LBS | DIASTOLIC BLOOD PRESSURE: 80 MMHG | BODY MASS INDEX: 39.99 KG/M2 | OXYGEN SATURATION: 100 % | HEART RATE: 74 BPM | TEMPERATURE: 98.3 F | RESPIRATION RATE: 17 BRPM | HEIGHT: 68 IN | SYSTOLIC BLOOD PRESSURE: 160 MMHG

## 2021-06-25 LAB
CK SERPL-CCNC: 93 U/L (ref 26–192)
GLUCOSE BLD STRIP.AUTO-MCNC: 122 MG/DL (ref 65–117)
GLUCOSE BLD STRIP.AUTO-MCNC: 83 MG/DL (ref 65–117)
PERFORMED BY, TECHID: ABNORMAL
PERFORMED BY, TECHID: NORMAL

## 2021-06-25 PROCEDURE — 74011250637 HC RX REV CODE- 250/637: Performed by: STUDENT IN AN ORGANIZED HEALTH CARE EDUCATION/TRAINING PROGRAM

## 2021-06-25 PROCEDURE — 74011250636 HC RX REV CODE- 250/636: Performed by: STUDENT IN AN ORGANIZED HEALTH CARE EDUCATION/TRAINING PROGRAM

## 2021-06-25 PROCEDURE — 82550 ASSAY OF CK (CPK): CPT

## 2021-06-25 PROCEDURE — 90935 HEMODIALYSIS ONE EVALUATION: CPT

## 2021-06-25 PROCEDURE — 74011250636 HC RX REV CODE- 250/636: Performed by: NURSE ANESTHETIST, CERTIFIED REGISTERED

## 2021-06-25 PROCEDURE — 76060000032 HC ANESTHESIA 0.5 TO 1 HR: Performed by: INTERNAL MEDICINE

## 2021-06-25 PROCEDURE — 2709999900 HC NON-CHARGEABLE SUPPLY: Performed by: INTERNAL MEDICINE

## 2021-06-25 PROCEDURE — 74011250637 HC RX REV CODE- 250/637: Performed by: INTERNAL MEDICINE

## 2021-06-25 PROCEDURE — 96376 TX/PRO/DX INJ SAME DRUG ADON: CPT

## 2021-06-25 PROCEDURE — 82962 GLUCOSE BLOOD TEST: CPT

## 2021-06-25 PROCEDURE — 74011250636 HC RX REV CODE- 250/636

## 2021-06-25 PROCEDURE — 74011250637 HC RX REV CODE- 250/637: Performed by: FAMILY MEDICINE

## 2021-06-25 PROCEDURE — 97530 THERAPEUTIC ACTIVITIES: CPT

## 2021-06-25 PROCEDURE — 99218 HC RM OBSERVATION: CPT

## 2021-06-25 PROCEDURE — 76040000007: Performed by: INTERNAL MEDICINE

## 2021-06-25 PROCEDURE — 74011000250 HC RX REV CODE- 250: Performed by: NURSE ANESTHETIST, CERTIFIED REGISTERED

## 2021-06-25 PROCEDURE — 36415 COLL VENOUS BLD VENIPUNCTURE: CPT

## 2021-06-25 RX ORDER — PANTOPRAZOLE SODIUM 40 MG/1
40 TABLET, DELAYED RELEASE ORAL
Qty: 30 TABLET | Refills: 0 | Status: ON HOLD | OUTPATIENT
Start: 2021-06-26 | End: 2022-06-16

## 2021-06-25 RX ORDER — LIDOCAINE HYDROCHLORIDE 20 MG/ML
INJECTION, SOLUTION EPIDURAL; INFILTRATION; INTRACAUDAL; PERINEURAL AS NEEDED
Status: DISCONTINUED | OUTPATIENT
Start: 2021-06-25 | End: 2021-06-25 | Stop reason: HOSPADM

## 2021-06-25 RX ORDER — POLYETHYLENE GLYCOL 3350 17 G/17G
17 POWDER, FOR SOLUTION ORAL DAILY
Qty: 30 PACKET | Refills: 0 | Status: ON HOLD | OUTPATIENT
Start: 2021-06-25 | End: 2022-06-16

## 2021-06-25 RX ORDER — PROPOFOL 10 MG/ML
INJECTION, EMULSION INTRAVENOUS AS NEEDED
Status: DISCONTINUED | OUTPATIENT
Start: 2021-06-25 | End: 2021-06-25 | Stop reason: HOSPADM

## 2021-06-25 RX ORDER — SODIUM CHLORIDE 9 MG/ML
INJECTION, SOLUTION INTRAVENOUS
Status: DISCONTINUED | OUTPATIENT
Start: 2021-06-25 | End: 2021-06-25 | Stop reason: HOSPADM

## 2021-06-25 RX ORDER — ISOSORBIDE DINITRATE 20 MG/1
20 TABLET ORAL 3 TIMES DAILY
Qty: 90 TABLET | Refills: 0 | Status: SHIPPED | OUTPATIENT
Start: 2021-06-25

## 2021-06-25 RX ORDER — HEPARIN SODIUM 1000 [USP'U]/ML
INJECTION, SOLUTION INTRAVENOUS; SUBCUTANEOUS
Status: COMPLETED
Start: 2021-06-25 | End: 2021-06-25

## 2021-06-25 RX ORDER — GLYCOPYRROLATE 0.2 MG/ML
INJECTION INTRAMUSCULAR; INTRAVENOUS AS NEEDED
Status: DISCONTINUED | OUTPATIENT
Start: 2021-06-25 | End: 2021-06-25 | Stop reason: HOSPADM

## 2021-06-25 RX ORDER — HYDRALAZINE HYDROCHLORIDE 100 MG/1
100 TABLET, FILM COATED ORAL 3 TIMES DAILY
Qty: 90 TABLET | Refills: 0 | Status: SHIPPED | OUTPATIENT
Start: 2021-06-25

## 2021-06-25 RX ORDER — SUCRALFATE 1 G/1
1 TABLET ORAL 4 TIMES DAILY
Qty: 120 TABLET | Refills: 0 | Status: SHIPPED | OUTPATIENT
Start: 2021-06-25 | End: 2022-09-14

## 2021-06-25 RX ORDER — ONDANSETRON 2 MG/ML
INJECTION INTRAMUSCULAR; INTRAVENOUS
Status: DISCONTINUED
Start: 2021-06-25 | End: 2021-06-25 | Stop reason: HOSPADM

## 2021-06-25 RX ADMIN — PANTOPRAZOLE SODIUM 40 MG: 40 TABLET, DELAYED RELEASE ORAL at 08:37

## 2021-06-25 RX ADMIN — SODIUM CHLORIDE: 9 INJECTION, SOLUTION INTRAVENOUS at 13:45

## 2021-06-25 RX ADMIN — CARVEDILOL 12.5 MG: 12.5 TABLET, FILM COATED ORAL at 08:37

## 2021-06-25 RX ADMIN — HEPARIN SODIUM 3600 UNITS: 1000 INJECTION, SOLUTION INTRAVENOUS; SUBCUTANEOUS at 11:52

## 2021-06-25 RX ADMIN — ISOSORBIDE DINITRATE 20 MG: 10 TABLET ORAL at 16:16

## 2021-06-25 RX ADMIN — LOSARTAN POTASSIUM 100 MG: 50 TABLET, FILM COATED ORAL at 08:37

## 2021-06-25 RX ADMIN — ONDANSETRON 4 MG: 2 INJECTION INTRAMUSCULAR; INTRAVENOUS at 08:36

## 2021-06-25 RX ADMIN — PROPOFOL 100 MG: 10 INJECTION, EMULSION INTRAVENOUS at 14:11

## 2021-06-25 RX ADMIN — PANTOPRAZOLE SODIUM 40 MG: 40 TABLET, DELAYED RELEASE ORAL at 16:16

## 2021-06-25 RX ADMIN — HYDRALAZINE HYDROCHLORIDE 100 MG: 50 TABLET, FILM COATED ORAL at 08:36

## 2021-06-25 RX ADMIN — CARVEDILOL 12.5 MG: 12.5 TABLET, FILM COATED ORAL at 16:16

## 2021-06-25 RX ADMIN — ISOSORBIDE DINITRATE 20 MG: 10 TABLET ORAL at 08:36

## 2021-06-25 RX ADMIN — ONDANSETRON 4 MG: 2 INJECTION INTRAMUSCULAR; INTRAVENOUS at 11:46

## 2021-06-25 RX ADMIN — LIDOCAINE HYDROCHLORIDE 40 MG: 20 INJECTION, SOLUTION EPIDURAL; INFILTRATION; INTRACAUDAL; PERINEURAL at 14:11

## 2021-06-25 RX ADMIN — SUCRALFATE 1 G: 1 TABLET ORAL at 16:16

## 2021-06-25 RX ADMIN — GLYCOPYRROLATE 0.2 MG: 0.2 INJECTION, SOLUTION INTRAMUSCULAR; INTRAVENOUS at 14:10

## 2021-06-25 RX ADMIN — DOCUSATE SODIUM 100 MG: 100 CAPSULE, LIQUID FILLED ORAL at 08:37

## 2021-06-25 NOTE — PROGRESS NOTES
Consult Date: 6/25/2021      Subjective   HISTORY OF PRESENTING ILLNESS :  Patient was seen and examined. She was being dialyzed and tolerated treatment well. She was not happy that she had to fast prior to endoscopy      Past Medical History:   Diagnosis Date    Anemia, chronic disease 3/22/2021    DM2 (diabetes mellitus, type 2) ([facility] 7/30/6460    H/O metabolic acidosis 8/42/5495    Hypertension     Morbid obesity ([facility]     Nephrotic syndrome 3/22/2021    Pulmonary hypertension ([facility] 3/22/2021    Renal failure       Past Surgical History:   Procedure Laterality Date    IR INSERT NON TUNL CVC OVER 5 YRS  3/12/2021     History reviewed. No pertinent family history.    Social History     Tobacco Use    Smoking status: Never Smoker    Smokeless tobacco: Never Used   Substance Use Topics    Alcohol use: Not on file       Current Facility-Administered Medications   Medication Dose Route Frequency Provider Last Rate Last Admin    ondansetron (ZOFRAN) 4 mg/2 mL injection             sucralfate (CARAFATE) tablet 1 g  1 g Oral AC&HS Jessie Juan MD   1 g at 06/25/21 1616    losartan (COZAAR) tablet 100 mg  100 mg Oral DAILY Cachorro GUERRERO MD   100 mg at 06/25/21 0837    alum-mag hydroxide-simeth (MYLANTA) oral suspension 30 mL  30 mL Oral Q4H PRN Cachorro GUERRERO MD        heparin (porcine) 1,000 unit/mL injection 3,600 Units  3,600 Units Hemodialysis DIALYSIS PRN Marcin Mars MD   3,600 Units at 06/25/21 1152    pantoprazole (PROTONIX) tablet 40 mg  40 mg Oral ACB&D Nia Moss MD   40 mg at 06/25/21 1616    heparin (porcine) injection 5,000 Units  5,000 Units SubCUTAneous Q8H Nia Moss MD   5,000 Units at 06/24/21 1734    ondansetron (ZOFRAN) injection 4 mg  4 mg IntraVENous Q4H PRN Krysten Hinkle PA-C   4 mg at 06/25/21 0836    sodium chloride (NS) flush 5-40 mL  5-40 mL IntraVENous PRN Krysten Hinkle PA-C        acetaminophen (TYLENOL) tablet 650 mg  650 mg Oral Q6H PRN Bora Jensen PA-C        Or    acetaminophen (TYLENOL) suppository 650 mg  650 mg Rectal Q6H PRN Bora Jensen PA-C        polyethylene glycol (MIRALAX) packet 17 g  17 g Oral DAILY PRN Bora Jensen PA-C        ondansetron (ZOFRAN ODT) tablet 4 mg  4 mg Oral Q8H PRN Bora Jensen PA-C   4 mg at 06/22/21 1229    Or    ondansetron (ZOFRAN) injection 4 mg  4 mg IntraVENous Q6H PRN Bora Jensen PA-C   4 mg at 06/25/21 1146    carvediloL (COREG) tablet 12.5 mg  12.5 mg Oral BID WITH MEALS Bora Jensen PA-C   12.5 mg at 06/25/21 1616    docusate sodium (COLACE) capsule 100 mg  100 mg Oral BID Bora Jensen PA-C   100 mg at 06/25/21 1253    isosorbide dinitrate (ISORDIL) tablet 20 mg  20 mg Oral TID Bora Jensen PA-C   20 mg at 06/25/21 1616    insulin lispro (HUMALOG) injection   SubCUTAneous AC&HS Bora Jensen PA-C   4 Units at 06/24/21 2119    glucose chewable tablet 16 g  4 Tablet Oral PRN Bora Jensen PA-C        glucagon (GLUCAGEN) injection 1 mg  1 mg IntraMUSCular PRN Bora Jensen PA-C        dextrose (D50W) injection syrg 12.5-25 g  25-50 mL IntraVENous PRN Bora Jensen PA-C        hydrALAZINE (APRESOLINE) tablet 100 mg  100 mg Oral BID Bora Jensen PA-C   100 mg at 06/25/21 0836    metoprolol (LOPRESSOR) injection 2.5 mg  2.5 mg IntraVENous Q6H PRN Bora Jensen PA-C   2.5 mg at 06/21/21 1927        Review of Systems   Constitutional: Negative for activity change, appetite change, fatigue, fever and unexpected weight change. HENT: Negative for congestion, facial swelling, postnasal drip, sinus pressure and trouble swallowing. Eyes: Negative for discharge, redness and visual disturbance. Respiratory: Negative for cough, chest tightness, shortness of breath and wheezing. Cardiovascular: Negative for chest pain, palpitations and leg swelling.    Gastrointestinal: Negative for abdominal distention, abdominal pain, constipation, diarrhea, nausea and vomiting. Endocrine: Negative for cold intolerance, heat intolerance and polyuria. Genitourinary: Negative for difficulty urinating and hematuria. Musculoskeletal: Negative for arthralgias, back pain, gait problem, joint swelling and myalgias. Skin: Negative for color change and pallor. Allergic/Immunologic: Negative for environmental allergies and food allergies. Neurological: Positive for dizziness and syncope. Negative for tremors, seizures, facial asymmetry and headaches. Hematological: Negative for adenopathy. Does not bruise/bleed easily. Psychiatric/Behavioral: Negative for agitation, behavioral problems and confusion. The patient is nervous/anxious. All other systems reviewed and are negative. Objective     Vital signs for last 24 hours:  Visit Vitals  BP (!) 160/80   Pulse 74   Temp 98.3 °F (36.8 °C)   Resp 17   Ht 5' 8\" (1.727 m)   Wt 119.7 kg (263 lb 14.3 oz)   SpO2 100%   Breastfeeding No   BMI 40.12 kg/m²         General-Well Developed, Well Nourished,   No Acute Distress,   Alert & Oriented x 3, appropriate affect  HEENT - atraumatic normocephalic  No pallor or icterus  Neck- supple, no JVD  CV- regular rate and rhythm   no MRG  Lung- clear bilaterally  Abd- soft, nontender, nondistended  Ext- no edema bilaterally.   Skin- warm and dry; rash  Access-right chest tunneled hemodialysis catheter with clean exit site under sterile dressing; left upper arm AV fistula-immature    Recent Results (from the past 24 hour(s))   GLUCOSE, POC    Collection Time: 06/24/21  8:03 PM   Result Value Ref Range    Glucose (POC) 203 (H) 65 - 117 mg/dL    Performed by Laurent Arshad    CK    Collection Time: 06/25/21  2:44 AM   Result Value Ref Range    CK 93 26 - 192 U/L   GLUCOSE, POC    Collection Time: 06/25/21  8:30 AM   Result Value Ref Range    Glucose (POC) 122 (H) 65 - 117 mg/dL    Performed by Ian Whelan Intake/Output Summary (Last 24 hours) at 6/25/2021 1621  Last data filed at 6/25/2021 1417  Gross per 24 hour   Intake 75 ml   Output --   Net 75 ml      Current Shift: 06/25 0701 - 06/25 1900  In: 75 [I.V.:75]  Out: -   Last 3 Shifts: No intake/output data recorded. Physical Exam     Data Review:   Recent Results (from the past 24 hour(s))   GLUCOSE, POC    Collection Time: 06/24/21  8:03 PM   Result Value Ref Range    Glucose (POC) 203 (H) 65 - 117 mg/dL    Performed by Maryellen Chua    CK    Collection Time: 06/25/21  2:44 AM   Result Value Ref Range    CK 93 26 - 192 U/L   GLUCOSE, POC    Collection Time: 06/25/21  8:30 AM   Result Value Ref Range    Glucose (POC) 122 (H) 65 - 117 mg/dL    Performed by Regina Codywesuhas [address]   Final Result   No ultrasound evidence for infectious/inflammatory process involving   the gallbladder. XR CHEST PORT   Final Result      XR ABD (KUB)   Final Result   Study limited by the patient's body habitus. No evidence of   mechanical bowel obstruction. CT HEAD WO CONT   Final Result   1. No acute intracranial findings. XR CHEST PORT   Final Result   Findings compatible with CHF. Patient Active Problem List   Diagnosis Code    Stage 5 chronic kidney disease ([facility] [address] N18.5    Uncontrolled hypertension I10    Nephrotic syndrome N04.9    Anemia, chronic disease P67.7    H/O metabolic acidosis X48.89    Pulmonary hypertension (HCC) I27.20    DM2 (diabetes mellitus, type 2) ([facility] [address] E11.9    Syncope R55    ESRD (end stage renal disease) on dialysis ([facility] [address] N18.6, Z99.2    Syncope and collapse R55    ESRD on dialysis ([facility] [address] N18.6, Z99.2    Fluid overload E87.70        DIAGNOSES:  1. ESRD on hemodialysis-dialysis on Monday, Wednesday and Friday schedule. 2. Intradialytic hypotension  3. Diabetes mellitus 2 with complications   4. Syncope  On dialysis-  5. Secondary hyperparathyroidism  6. History of nephrotic syndrome  7.  History of anemia of chronic disease  8. History of secondary hyperparathyroidism  9. Anxiety    DISCUSSION:  HbA1c level was 5.6; raises the possibility that syncope was due to neuroglycopenia    PLAN:  Discontinue glipizide and cautioned patient about hypoglycemia. Appreciate GI input        Thanks for consulting me. Please don't hesitate to contact me if any questions arise of if I can assist in any manner. This dictation was done by dragon, computer voice recognition software. Often unanticipated grammatical, syntax, phones and other interpretive errors are inadvertently transcribed. Please excuse errors that have escaped final proofreading. Please contact me if you suspect dictation or transcription errors.   Dr Cisco Leslie  [address], [address] [facility], [address] [facility]  Cell Phone: [phone]  Office phone: [phone]  Fax: [phone]

## 2021-06-25 NOTE — PROGRESS NOTES
Problem: Pressure Injury - Risk of  Goal: *Prevention of pressure injury  Description: Document Albert Scale and appropriate interventions in the flowsheet.   Outcome: Progressing Towards Goal  Note: Pressure Injury Interventions:  Sensory Interventions: Assess changes in LOC, Discuss PT/OT consult with provider, Maintain/enhance activity level    Moisture Interventions: Absorbent underpads, Internal/External urinary devices    Activity Interventions: Increase time out of bed    Mobility Interventions: Assess need for specialty bed    Nutrition Interventions: Offer support with meals,snacks and hydration    Friction and Shear Interventions: Minimize layers

## 2021-06-25 NOTE — CONSULTS
Consult    Patient: Jayleen Kumari MRN: [id number]  SSN: [id number]    YOB: 1960  Age: 62 y.o. Sex: female      Subjective:      Jayleen Kumari is a 62 y.o. female who is being seen for severe heartburn, abdominal pain, chest pain,. Patient brought to the emergency room with hypotension and vomiting during the hemodialysis, patient was seen in emergency room, Getting HD while here and appears fluid overload is improved but she developed severe dyspepsis, heartburn, intermittent chest pain, not responsive to ppi. Gi consulted has been on PPI and Carafate, symptom persists, no hematemesis, no melena, Hgb stable. Has had similar episode before, but not if he remembered had  EGD before    Past Medical History:   Diagnosis Date    Anemia, chronic disease 3/22/2021    DM2 (diabetes mellitus, type 2) ([facility] [address] 6/07/8231    H/O metabolic acidosis 4/29/5504    Hypertension     Morbid obesity ([facility] [address]     Nephrotic syndrome 3/22/2021    Pulmonary hypertension ([facility] [address] 3/22/2021    Renal failure      Past Surgical History:   Procedure Laterality Date    IR INSERT NON TUNL CVC OVER 5 YRS  3/12/2021      No family history on file.   Social History     Tobacco Use    Smoking status: Never Smoker    Smokeless tobacco: Never Used   Substance Use Topics    Alcohol use: Not on file      Current Facility-Administered Medications   Medication Dose Route Frequency Provider Last Rate Last Admin    sucralfate (CARAFATE) tablet 1 g  1 g Oral AC&HS Michele Kearney MD   1 g at 06/24/21 2119    [START ON 6/25/2021] losartan (COZAAR) tablet 100 mg  100 mg Oral DAILY Bk Loera MD        alum-mag hydroxide-simeth (MYLANTA) oral suspension 30 mL  30 mL Oral Q4H PRN Russel GUERRERO MD        heparin (porcine) 1,000 unit/mL injection 3,600 Units  3,600 Units Hemodialysis DIALYSIS PRN Madge Lesches, MD   3,600 Units at 06/23/21 1715    pantoprazole (PROTONIX) tablet 40 mg  40 mg Oral ACB&D Luigi Borjas MD   40 mg at 06/24/21 1733    heparin (porcine) injection 5,000 Units  5,000 Units SubCUTAneous Q8H Paula GUERRERO MD   5,000 Units at 06/24/21 1734    ondansetron (ZOFRAN) injection 4 mg  4 mg IntraVENous Q4H PRN Lindsay Both, PA-C   4 mg at 06/22/21 0729    sodium chloride (NS) flush 5-40 mL  5-40 mL IntraVENous PRN Lindsay Both, PA-C        acetaminophen (TYLENOL) tablet 650 mg  650 mg Oral Q6H PRN Lindsay Both, PA-C        Or    acetaminophen (TYLENOL) suppository 650 mg  650 mg Rectal Q6H PRN Lindsay Both, PA-C        polyethylene glycol (MIRALAX) packet 17 g  17 g Oral DAILY PRN Lindsay Both, PA-C        ondansetron (ZOFRAN ODT) tablet 4 mg  4 mg Oral Q8H PRN Lindsay Both, PA-C   4 mg at 06/22/21 1229    Or    ondansetron (ZOFRAN) injection 4 mg  4 mg IntraVENous Q6H PRN Lindsay Both, PA-C   4 mg at 06/24/21 0958    carvediloL (COREG) tablet 12.5 mg  12.5 mg Oral BID WITH MEALS Lindsay Both, PA-C   12.5 mg at 06/24/21 1733    docusate sodium (COLACE) capsule 100 mg  100 mg Oral BID Lindsay Both, PA-C   100 mg at 06/24/21 0801    isosorbide dinitrate (ISORDIL) tablet 20 mg  20 mg Oral TID Lindsay Both, PA-C   20 mg at 06/24/21 2119    insulin lispro (HUMALOG) injection   SubCUTAneous AC&HS [facility], PA-C   4 Units at 06/24/21 2119    glucose chewable tablet 16 g  4 Tablet Oral PRN Lindsay Both, PA-C        glucagon (GLUCAGEN) injection 1 mg  1 mg IntraMUSCular PRN Lindsay Both, PA-C        dextrose (D50W) injection syrg 12.5-25 g  25-50 mL IntraVENous PRN Lindsay Both, PA-C        hydrALAZINE (APRESOLINE) tablet 100 mg  100 mg Oral BID Lindsay Both, PA-C   100 mg at 06/24/21 2119    metoprolol (LOPRESSOR) injection 2.5 mg  2.5 mg IntraVENous Q6H PRN Lindsay Weaver PA-C   2.5 mg at 06/21/21 1927        Allergies   Allergen Reactions    Doxycycline Swelling    Egg Diarrhea  Milk Containing Products Nausea and Vomiting    Tetracycline Swelling       Review of Systems:  Review of Systems   Eyes: Negative for photophobia and redness. Respiratory: Positive for cough. Cardiovascular: Positive for chest pain. Gastrointestinal: Positive for abdominal pain, diarrhea, heartburn and nausea. Negative for blood in stool and constipation. Genitourinary: Positive for urgency. Musculoskeletal: Negative for myalgias. Skin: Negative. Neurological: Positive for dizziness. Negative for sensory change. Psychiatric/Behavioral: Negative for depression. Objective:     Vitals:    06/24/21 1205 06/24/21 1420 06/24/21 1427 06/24/21 2000   BP:   (!) 161/81 (!) 185/88   Pulse: 68  72 74   Resp:   18 18   Temp:   97.9 °F (36.6 °C) 98.5 °F (36.9 °C)   TempSrc:       SpO2:   100%    Weight:  119.7 kg (263 lb 14.3 oz)     Height:            Physical Exam:  Physical Exam  Vitals reviewed. Constitutional:       Appearance: She is obese. She is ill-appearing and diaphoretic. HENT:      Head: Normocephalic and atraumatic. Mouth/Throat:      Mouth: Mucous membranes are dry. Pharynx: Posterior oropharyngeal erythema present. Eyes:      Extraocular Movements: Extraocular movements intact. Conjunctiva/sclera: Conjunctivae normal.      Pupils: Pupils are equal, round, and reactive to light. Neck:      Vascular: No carotid bruit. Cardiovascular:      Pulses: Normal pulses. Pulmonary:      Effort: Pulmonary effort is normal.   Abdominal:      General: Abdomen is flat. Bowel sounds are normal. There is no distension. Palpations: There is no mass. Tenderness: There is abdominal tenderness. Musculoskeletal:         General: No swelling or deformity. Normal range of motion. Cervical back: No tenderness. Skin:     General: Skin is warm. Coloration: Skin is not jaundiced. Findings: No bruising. Neurological:      General: No focal deficit present. Mental Status: She is alert. Cranial Nerves: No cranial nerve deficit. Motor: No weakness. Psychiatric:         Mood and Affect: Mood normal.          Recent Results (from the past 24 hour(s))   GLUCOSE, POC    Collection Time: 06/24/21  8:04 AM   Result Value Ref Range    Glucose (POC) 154 (H) 65 - 117 mg/dL    Performed by Adam Lynch ([place])    CBC WITH AUTOMATED DIFF    Collection Time: 06/24/21  8:50 AM   Result Value Ref Range    WBC 5.5 3.6 - 11.0 K/uL    RBC 3.87 3.80 - 5.20 M/uL    HGB 10.2 (L) 11.5 - 16.0 g/dL    HCT 33.6 (L) 35.0 - 47.0 %    MCV 86.8 80.0 - 99.0 FL    MCH 26.4 26.0 - 34.0 PG    MCHC 30.4 30.0 - 36.5 g/dL    RDW 16.0 (H) 11.5 - 14.5 %    PLATELET 102 159 - 563 K/uL    MPV 12.3 8.9 - 12.9 FL    NRBC 0.0 0.0  WBC    ABSOLUTE NRBC 0.00 0.00 - 0.01 K/uL    NEUTROPHILS 64 32 - 75 %    LYMPHOCYTES 19 12 - 49 %    MONOCYTES 13 5 - 13 %    EOSINOPHILS 3 0 - 7 %    BASOPHILS 1 0 - 1 %    IMMATURE GRANULOCYTES 0 0 - 0.5 %    ABS. NEUTROPHILS 3.6 1.8 - 8.0 K/UL    ABS. LYMPHOCYTES 1.0 0.8 - 3.5 K/UL    ABS. MONOCYTES 0.7 0.0 - 1.0 K/UL    ABS. EOSINOPHILS 0.2 0.0 - 0.4 K/UL    ABS. BASOPHILS 0.1 0.0 - 0.1 K/UL    ABS. IMM.  GRANS. 0.0 0.00 - 0.04 K/UL    DF AUTOMATED     CK    Collection Time: 06/24/21  8:50 AM   Result Value Ref Range    CK 92 26 - 192 U/L   BNP    Collection Time: 06/24/21  8:50 AM   Result Value Ref Range    NT pro-BNP 10,186 (H) <125 pg/mL   GLUCOSE, POC    Collection Time: 06/24/21 11:37 AM   Result Value Ref Range    Glucose (POC) 137 (H) 65 - 117 mg/dL    Performed by Kaylene Middleton    GLUCOSE, POC    Collection Time: 06/24/21  4:18 PM   Result Value Ref Range    Glucose (POC) 149 (H) 65 - 117 mg/dL    Performed by Kaylene Middleton    GLUCOSE, POC    Collection Time: 06/24/21  8:03 PM   Result Value Ref Range    Glucose (POC) 203 (H) 65 - 117 mg/dL    Performed by [address]Q   Final Result   No ultrasound evidence for infectious/inflammatory process involving   the gallbladder. XR CHEST PORT   Final Result      XR ABD (KUB)   Final Result   Study limited by the patient's body habitus. No evidence of   mechanical bowel obstruction. CT HEAD WO CONT   Final Result   1. No acute intracranial findings. XR CHEST PORT   Final Result   Findings compatible with CHF.          Assessment:     Hospital Problems  Date Reviewed: 3/22/2021        Codes Class Noted POA    Syncope and collapse ICD-10-CM: R55  ICD-9-CM: 780.2  6/23/2021 Unknown        ESRD on dialysis [facility]) ICD-10-CM: N18.6, Z99.2  ICD-9-CM: 585.6, V45.11  6/23/2021 Unknown        Fluid overload ICD-10-CM: E87.70  ICD-9-CM: 276.69  6/23/2021 Unknown        Syncope ICD-10-CM: R55  ICD-9-CM: 780.2  6/21/2021 Unknown        ESRD (end stage renal disease) on dialysis [facility]) ICD-10-CM: N18.6, Z99.2  ICD-9-CM: 585.6, V45.11  6/21/2021 Unknown        Uncontrolled hypertension ICD-10-CM: I10  ICD-9-CM: 401.9  3/21/2021 Yes          patient admitted hospital with uncontrolled blood pressure, syncope episode, during thedialysis  has been followed by nephrologist,   has been The treatment foruncontrolled diabetes  Currently patient had increase of heartburn, abdominal pain epigastric pain, dyspepsia symptoms, now current on the PPI and Carafate, symptoms persist,  Plan:   Continue going to antiacid treatment,  Dysphagia diet,  Cardiac dialysis,  Electrolyte dependence,fluids retention, dialysis,  Cardiac , pulmonary hypertension treatment,      A EGD has been scheduled in the morning to rule out esophageal infection, esophagitis,    Indications, risks, options discussed with patient  Signed By: Shayna Doll MD     June 24, 2021         Thank you for allowing me to participate in this patients care  Cc Referring Physician   Titus Mota MD

## 2021-06-25 NOTE — PROGRESS NOTES
OCCUPATIONAL THERAPY TREATMENT  Patient: Demetrius Mccallum (35 y.o. female)  Date: 6/25/2021  Diagnosis: Syncope [R55]  Fluid overload [E87.70]  Syncope and collapse [R55]  ESRD on dialysis ([facility] [address] [N18.6, Z99.2] <principal problem not specified>  Procedure(s) (LRB):  ESOPHAGOGASTRODUODENOSCOPY (EGD) (N/A)    Precautions:    Chart, occupational therapy assessment, plan of care, and goals were reviewed. ASSESSMENT  Patient continues with skilled OT services and is progressing towards goals. Pt. Received sitting at EOB and agreeable to therapy session. Pt. Performed functional transfers and mobility independently with no AD. Pt. Able to tolerate standing at sink for increased time while performing grooming tasks IND. Pt. Able to complete toilet transfer and doffing/donning undergarments IND as well as toilet hygiene. Pt. Left seated at EOB with needs met and call bell within reach. At this time pt has met all OT goals and is independent with ADL's, functional transfers and mobility. Discussed with supervising OT and pt about plan of care and at this time will discharge pt from OT case load, please re-order if needs arise. Other factors to consider for discharge: PLOF, time since on set. PLAN :  Patient continues to benefit from skilled intervention to address the above impairments. Continue treatment per established plan of care. to address goals. Recommendation for discharge: (in order for the patient to meet his/her long term goals)  Home self care    This discharge recommendation:  Has been made in collaboration with the attending provider and/or case management    IF patient discharges home will need the following DME: none       SUBJECTIVE:   Patient stated  Im very independent and am able to do all my self care on my own. Doug Jamin    OBJECTIVE DATA SUMMARY:   Cognitive/Behavioral Status:  Neurologic State: Alert; Appropriate for age  Orientation Level: Oriented X4  Cognition: Appropriate decision making; Appropriate for age attention/concentration; Appropriate safety awareness; Follows commands    Functional Mobility and Transfers for ADLs:  Transfers:     Functional Transfers  Bathroom Mobility: Independent  Toilet Transfer : Independent       Balance:  Sitting: Intact  Standing: Intact    ADL Intervention:  Grooming  Grooming Assistance: Independent  Position Performed: Standing  Washing Face: Independent  Washing Hands: Independent  Brushing Teeth: Independent    Lower Body Dressing Assistance  Underpants: Independent    Toileting  Toileting Assistance: Independent  Bladder Hygiene: Independent  Clothing Management: Independent    Pain:  R side arm pain at IV site- RN aware    Activity Tolerance:   Good  Please refer to the flowsheet for vital signs taken during this treatment. After treatment patient left in no apparent distress:   Sitting at EOB with needs met and call bell within reach.      COMMUNICATION/COLLABORATION:   The patients plan of care was discussed with: Occupational therapist.     Luis Dye  Time Calculation: 23 mins    Problem: Self Care Deficits Care Plan (Adult)  Goal: *Acute Goals and Plan of Care (Insert Text)  Description: Pt will be I grooming standing at sink  Pt will be I LE dressing sitting EOB/long sit  Pt will be I sit <>  prep for toileting  Pt will be I toileting/toilet transfer/cloth mgmt  Outcome: Progressing Towards Goal

## 2021-06-25 NOTE — ANESTHESIA POSTPROCEDURE EVALUATION
Procedure(s):  ESOPHAGOGASTRODUODENOSCOPY (EGD). total IV anesthesia, general    Anesthesia Post Evaluation      Multimodal analgesia: multimodal analgesia not used between 6 hours prior to anesthesia start to PACU discharge  Patient location during evaluation: bedside  Patient participation: complete - patient participated  Level of consciousness: sleepy but conscious  Pain score: 0  Pain management: adequate  Airway patency: patent  Anesthetic complications: no  Cardiovascular status: acceptable  Respiratory status: acceptable  Hydration status: acceptable  Post anesthesia nausea and vomiting:  none  Final Post Anesthesia Temperature Assessment:  Normothermia (36.0-37.5 degrees C)      INITIAL Post-op Vital signs: No vitals data found for the desired time range.

## 2021-06-25 NOTE — ANESTHESIA PREPROCEDURE EVALUATION
Relevant Problems   CARDIOVASCULAR   (+) Uncontrolled hypertension      RENAL FAILURE   (+) ESRD (end stage renal disease) on dialysis (HCC)   (+) ESRD on dialysis (HCC)   (+) Nephrotic syndrome   (+) Stage 5 chronic kidney disease (HCC)      ENDOCRINE   (+) DM2 (diabetes mellitus, type 2) (HCC)      HEMATOLOGY   (+) Anemia, chronic disease       Anesthetic History               Review of Systems / Medical History      Pulmonary                   Neuro/Psych              Cardiovascular    Hypertension                   GI/Hepatic/Renal                Endo/Other    Diabetes    Morbid obesity     Other Findings   Comments: Pulmonary hypertension (HCC)  DM2 (diabetes mellitus, type 2) ([facility] [address]  Uncontrolled hypertension  Syncope  Syncope and collapse  Nephrotic syndrome   Stage 5 chronic kidney disease (HCC)   Anemia, chronic disease   H/O metabolic acidosis   ESRD (end stage renal disease) on dialysis (HCC)   ESRD on dialysis (HCC)   Fluid overload              Physical Exam    Airway  Mallampati: II  TM Distance: 4 - 6 cm  Neck ROM: normal range of motion   Mouth opening: Normal     Cardiovascular    Rhythm: regular  Rate: normal         Dental    Dentition: Upper partial plate     Pulmonary  Breath sounds clear to auscultation               Abdominal  GI exam deferred       Other Findings            Anesthetic Plan    ASA: 4  Anesthesia type: total IV anesthesia and general          Induction: Intravenous  Anesthetic plan and risks discussed with: Patient and Family      General anesthesia was prescribed for this patient because by definition it is \"a drug-induced loss of consciousness during which patients are not arousable, even by painful stimulation. \" Sometimes, the ability to independently maintain ventilatory function is often impaired and patients often require assistance in maintaining a patent airway.  Occasionally, positive pressure ventilation may be required because of depressed spontaneous ventilation or drug-induced depression of neuromuscular function. This depth of anesthesia is preferred for endoscopic/esophageal procedures to facilitate the procedure and for patient safety/quality of care.

## 2021-06-25 NOTE — PROGRESS NOTES
Patient discharged home self-care education on medications and follow up appointments given patient verbalized understanding. Discharge plan of care/case management plan validated with provider discharge order.

## 2021-06-25 NOTE — DISCHARGE SUMMARY
Physician Discharge Summary     Patient ID:    Logan Tim  [id number]  62 y.o.  1960    Admit date: 6/21/2021    Discharge date : 6/25/2021    Chronic Diagnoses:    Problem List as of 6/25/2021 Date Reviewed: 6/25/2021          Codes Class Noted - Resolved    Syncope and collapse ICD-10-CM: R55  ICD-9-CM: 780.2  6/23/2021 - Present        ESRD on dialysis [facility]) ICD-10-CM: N18.6, Z99.2  ICD-9-CM: 585.6, V45.11  6/23/2021 - Present        Fluid overload ICD-10-CM: E87.70  ICD-9-CM: 276.69  6/23/2021 - Present        Syncope ICD-10-CM: R55  ICD-9-CM: 780.2  6/21/2021 - Present        ESRD (end stage renal disease) on dialysis [facility]) ICD-10-CM: N18.6, Z99.2  ICD-9-CM: 585.6, V45.11  6/21/2021 - Present        Nephrotic syndrome (Chronic) ICD-10-CM: N04.9  ICD-9-CM: 581.9  3/22/2021 - Present        Anemia, chronic disease ICD-10-CM: D63.8  ICD-9-CM: 285.29  3/22/2021 - Present        H/O metabolic acidosis [id number]: Z86.39  ICD-9-CM: V12.29  3/22/2021 - Present        Pulmonary hypertension (HCC) (Chronic) ICD-10-CM: I27.20  ICD-9-CM: 416.8  3/22/2021 - Present        DM2 (diabetes mellitus, type 2) (HCC) (Chronic) ICD-10-CM: E11.9  ICD-9-CM: 250.00  3/22/2021 - Present        Uncontrolled hypertension ICD-10-CM: I10  ICD-9-CM: 401.9  3/21/2021 - Present        Stage 5 chronic kidney disease ([facility] [address] ICD-10-CM: N18.5  ICD-9-CM: 585.5  3/20/2021 - Present        RESOLVED: Intractable nausea and vomiting ICD-10-CM: R11.2  ICD-9-CM: 536.2  3/20/2021 - 3/22/2021            22    Final Diagnoses:   Syncope [R55]  Fluid overload [E87.70]  Syncope and collapse [R55]  ESRD on dialysis ([facility] [address] [N18.6, Z99.2]    Reason for Hospitalization and 5Hospital Course:  60-year-old female r=newly started on HD x 1 month, dm2 on glipizide, hypertension presents 2/2 syncope, n/v, weakness , BP elevated on arrival to ED sbp~ 226. Ekg shows sr, qt prolonged. cxr suggests chf. Echo 3/2021 lvef 47%, dromar 2 dd. Suspect intradialytic hypotension. Nephrology following. Getting HD while here and appears fluid overload is improved but she developed severe dyspepsis not responsive to ppi. Gi consulted and planning on EGD, showed gastritis, Carafate added and Hgb stable. Patient received hemodialysis patient glipizide was discontinued due to hypoglycemia, patient did not show intradialysis hypotension, BP meds adjusted, patient was discharged in stable condition after nephrology cleared  Up as outpatient            Discharge Medications:   Current Discharge Medication List        START taking these medications    Details   sucralfate (CARAFATE) 1 gram tablet Take 1 Tablet by mouth four (4) times daily. Indications: gastroesophageal reflux disease, heartburn  Qty: 120 Tablet, Refills: 0  Start date: 6/25/2021      polyethylene glycol (MIRALAX) 17 gram packet Take 1 Packet by mouth daily. Qty: 30 Packet, Refills: 0  Start date: 6/25/2021           CONTINUE these medications which have CHANGED    Details   hydrALAZINE (APRESOLINE) 100 mg tablet Take 1 Tablet by mouth three (3) times daily. Qty: 90 Tablet, Refills: 0  Start date: 6/25/2021      isosorbide dinitrate (ISORDIL) 20 mg tablet Take 1 Tablet by mouth three (3) times daily. Qty: 90 Tablet, Refills: 0  Start date: 6/25/2021      pantoprazole (PROTONIX) 40 mg tablet Take 1 Tablet by mouth Before breakfast and dinner. Qty: 30 Tablet, Refills: 0  Start date: 6/26/2021           CONTINUE these medications which have NOT CHANGED    Details   valsartan (DIOVAN) 160 mg tablet Take 160 mg by mouth daily. aspirin delayed-release 81 mg tablet Take 81 mg by mouth. docusate sodium (COLACE) 100 mg capsule Take 100 mg by mouth two (2) times a day. carvediloL (COREG) 12.5 mg tablet Take 12.5 mg by mouth two (2) times daily (with meals). acetaminophen (TYLENOL) 325 mg tablet Take 2 Tabs by mouth every six (6) hours as needed for Pain or Fever.   Qty: 30 Tab, Refills: 0      ondansetron (Zofran ODT) 4 mg disintegrating tablet Take 1 Tab by mouth every eight (8) hours as needed for Nausea or Vomiting. Qty: 21 Tab, Refills: 0           STOP taking these medications       famotidine (PEPCID) 20 mg tablet Comments:   Reason for Stopping: Follow up Care:    1. aMcy Cleveland MD in 1-2 weeks. Please call to set up an appointment shortly after discharge. Diet:  Cardiac Diet    Disposition:  Home. Advanced Directive:   FULL x   DNR      Discharge Exam:  See today's note. CONSULTATIONS: GI    Significant Diagnostic Studies:   6/21/2021: BUN 46 mg/dL* (Ref range: 6 - 20 mg/dL); Calcium 9.1 mg/dL (Ref range: 8.5 - 10.1 mg/dL); CO2 25 mmol/L (Ref range: 21 - 32 mmol/L); Creatinine 5.24 mg/dL* (Ref range: 0.55 - 1.02 mg/dL); Glucose 151 mg/dL* (Ref range: 65 - 100 mg/dL); HCT 36.4 % (Ref range: 35.0 - 47.0 %); HGB 11.3 g/dL* (Ref range: 11.5 - 16.0 g/dL); Potassium 4.9 mmol/L (Ref range: 3.5 - 5.1 mmol/L); Sodium 138 mmol/L (Ref range: 136 - 145 mmol/L)  6/22/2021: BUN 52 mg/dL* (Ref range: 6 - 20 mg/dL); Calcium 8.8 mg/dL (Ref range: 8.5 - 10.1 mg/dL); CO2 24 mmol/L (Ref range: 21 - 32 mmol/L); Creatinine 5.54 mg/dL* (Ref range: 0.55 - 1.02 mg/dL); Glucose 132 mg/dL* (Ref range: 65 - 100 mg/dL); HCT 33.1 %* (Ref range: 35.0 - 47.0 %); HGB 9.9 g/dL* (Ref range: 11.5 - 16.0 g/dL); Potassium 4.2 mmol/L (Ref range: 3.5 - 5.1 mmol/L); Sodium 139 mmol/L (Ref range: 136 - 145 mmol/L)  Recent Labs     06/24/21  0850 06/23/21  1911   WBC 5.5 5.1   HGB 10.2* 10.0*   HCT 33.6* 33.1*    181     No results for input(s): NA, K, CL, CO2, BUN, CREA, GLU, CA, MG, PHOS, URICA in the last 72 hours. No results for input(s): ALT, AP, TBIL, TBILI, TP, ALB, GLOB, GGT, AML, LPSE in the last 72 hours. No lab exists for component: SGOT, GPT, AMYP, HLPSE  No results for input(s): INR, PTP, APTT, INREXT in the last 72 hours.    No results for input(s): FE, TIBC, PSAT, FERR in the last 72 hours. No results for input(s): PH, PCO2, PO2 in the last 72 hours.   Recent Labs     06/25/21  0244 06/24/21  0850 06/23/21  1911   CPK [phone]     Lab Results   Component Value Date/Time    Glucose (POC) 122 (H) 06/25/2021 08:30 AM    Glucose (POC) 203 (H) 06/24/2021 08:03 PM    Glucose (POC) 149 (H) 06/24/2021 04:18 PM    Glucose (POC) 137 (H) 06/24/2021 11:37 AM    Glucose (POC) 154 (H) 06/24/2021 08:04 AM       Cc copy to  PCP  Spending time with patient care  30 minutes    Signed:  Liliana Rowland MD  6/25/2021  4:39 PM

## 2021-06-25 NOTE — DISCHARGE INSTRUCTIONS
Patient Education        Upper GI Endoscopy: What to Expect at [address] had an upper GI endoscopy. Your doctor used a thin, lighted tube that bends to look at the inside of your esophagus, your stomach, and the first part of the small intestine, called the duodenum. After you have an endoscopy, you will stay at the hospital or clinic for 1 to 2 hours. This will allow the medicine to wear off. You will be able to go home after your doctor or nurse checks to make sure that you're not having any problems. You may have to stay overnight if you had treatment during the test. You may have a sore throat for a day or two after the test.  This care sheet gives you a general idea about what to expect after the test.  How can you care for yourself at home? Activity   · Rest as much as you need to after you go home. · You should be able to go back to your usual activities the day after the test.  Diet   · Follow your doctor's directions for eating after the test.  · Drink plenty of fluids (unless your doctor has told you not to). Medications   · If you have a sore throat the day after the test, use an over-the-counter spray to numb your throat. Follow-up care is a key part of your treatment and safety. Be sure to make and go to all appointments, and call your doctor if you are having problems. It's also a good idea to know your test results and keep a list of the medicines you take. When should you call for help? Call 911 anytime you think you may need emergency care. For example, call if:    · You passed out (lost consciousness).     · You have trouble breathing.     · You pass maroon or bloody stools.    Call your doctor now or seek immediate medical care if:    · You have pain that does not get better after your take pain medicine.     · You have new or worse belly pain.     · You have blood in your stools.     · You are sick to your stomach and cannot keep fluids down.     · You have a fever.     · You cannot pass stools or gas. Watch closely for changes in your health, and be sure to contact your doctor if:    · Your throat still hurts after a day or two.     · You do not get better as expected. Where can you learn more? Go to http://www.elizalde.com/  Enter J454 in the search box to learn more about \"Upper GI Endoscopy: What to Expect at Home. \"  Current as of: April 15, 2020               Content Version: 12.8  © 2006-2021 [employer]. Care instructions adapted under license by [employer] (which disclaims liability or warranty for this information). If you have questions about a medical condition or this instruction, always ask your healthcare professional. [address] any warranty or liability for your use of this information.

## 2021-06-25 NOTE — PROGRESS NOTES
Tx initiated via a patent Right upper chest catheter. No s/s of infection or skin breakdown. Net fluid removal set for 1kg. Tx time =3.5 hours today.

## 2021-08-03 PROBLEM — R55 SYNCOPE: Status: RESOLVED | Noted: 2021-06-21 | Resolved: 2021-08-03

## 2021-08-26 NOTE — ADDENDUM NOTE
Addendum  created 08/26/21 1518 by Drea Reyes MD    Attestation recorded in [facility], [place], [facility] 97 filed

## 2022-02-23 ENCOUNTER — HOSPITAL ENCOUNTER (EMERGENCY)
Age: 62
Discharge: HOME | End: 2022-02-23
Attending: EMERGENCY MEDICINE
Payer: MEDICARE

## 2022-02-23 VITALS
HEART RATE: 96 BPM | BODY MASS INDEX: 36.37 KG/M2 | TEMPERATURE: 98.3 F | WEIGHT: 240 LBS | DIASTOLIC BLOOD PRESSURE: 80 MMHG | HEIGHT: 68 IN | SYSTOLIC BLOOD PRESSURE: 121 MMHG | OXYGEN SATURATION: 96 % | RESPIRATION RATE: 15 BRPM

## 2022-02-23 DIAGNOSIS — R11.2 NAUSEA AND VOMITING, INTRACTABILITY OF VOMITING NOT SPECIFIED, UNSPECIFIED VOMITING TYPE: Primary | ICD-10-CM

## 2022-02-23 DIAGNOSIS — R19.7 DIARRHEA, UNSPECIFIED TYPE: ICD-10-CM

## 2022-02-23 LAB
ALBUMIN SERPL-MCNC: 2.8 G/DL (ref 3.5–5)
ALBUMIN/GLOB SERPL: 0.7 {RATIO} (ref 1.1–2.2)
ALP SERPL-CCNC: 70 U/L (ref 45–117)
ALT SERPL-CCNC: 28 U/L (ref 12–78)
ANION GAP SERPL CALC-SCNC: 10 MMOL/L (ref 5–15)
APPEARANCE UR: CLEAR
AST SERPL W P-5'-P-CCNC: 28 U/L (ref 15–37)
BACTERIA URNS QL MICRO: ABNORMAL /HPF
BASOPHILS # BLD: 0 K/UL (ref 0–0.1)
BASOPHILS NFR BLD: 0 % (ref 0–1)
BILIRUB SERPL-MCNC: 0.3 MG/DL (ref 0.2–1)
BILIRUB UR QL: NEGATIVE
BUN SERPL-MCNC: 52 MG/DL (ref 6–20)
BUN/CREAT SERPL: 7 (ref 12–20)
C DIFF TOX GENS STL QL NAA+PROBE: NEGATIVE
CA-I BLD-MCNC: 9.4 MG/DL (ref 8.5–10.1)
CHLORIDE SERPL-SCNC: 109 MMOL/L (ref 97–108)
CO2 SERPL-SCNC: 18 MMOL/L (ref 21–32)
COLOR UR: ABNORMAL
CREAT SERPL-MCNC: 7.67 MG/DL (ref 0.55–1.02)
DIFFERENTIAL METHOD BLD: ABNORMAL
EOSINOPHIL # BLD: 0 K/UL (ref 0–0.4)
EOSINOPHIL NFR BLD: 1 % (ref 0–7)
ERYTHROCYTE [DISTWIDTH] IN BLOOD BY AUTOMATED COUNT: 15.8 % (ref 11.5–14.5)
GLOBULIN SER CALC-MCNC: 4 G/DL (ref 2–4)
GLUCOSE SERPL-MCNC: 151 MG/DL (ref 65–100)
GLUCOSE UR STRIP.AUTO-MCNC: 50 MG/DL
HCT VFR BLD AUTO: 32.2 % (ref 35–47)
HGB BLD-MCNC: 10.4 G/DL (ref 11.5–16)
HGB UR QL STRIP: ABNORMAL
HYALINE CASTS URNS QL MICRO: ABNORMAL /LPF (ref 0–5)
IMM GRANULOCYTES # BLD AUTO: 0 K/UL (ref 0–0.04)
IMM GRANULOCYTES NFR BLD AUTO: 1 % (ref 0–0.5)
KETONES UR QL STRIP.AUTO: NEGATIVE MG/DL
LEUKOCYTE ESTERASE UR QL STRIP.AUTO: NEGATIVE
LIPASE SERPL-CCNC: 139 U/L (ref 73–393)
LYMPHOCYTES # BLD: 0.4 K/UL (ref 0.8–3.5)
LYMPHOCYTES NFR BLD: 6 % (ref 12–49)
MCH RBC QN AUTO: 29.9 PG (ref 26–34)
MCHC RBC AUTO-ENTMCNC: 32.3 G/DL (ref 30–36.5)
MCV RBC AUTO: 92.5 FL (ref 80–99)
MONOCYTES # BLD: 0.9 K/UL (ref 0–1)
MONOCYTES NFR BLD: 12 % (ref 5–13)
NEUTS SEG # BLD: 5.9 K/UL (ref 1.8–8)
NEUTS SEG NFR BLD: 80 % (ref 32–75)
NITRITE UR QL STRIP.AUTO: NEGATIVE
NRBC # BLD: 0 K/UL (ref 0–0.01)
NRBC BLD-RTO: 0 PER 100 WBC
PH UR STRIP: 6 [PH] (ref 5–8)
PLATELET # BLD AUTO: 121 K/UL (ref 150–400)
PMV BLD AUTO: 11.2 FL (ref 8.9–12.9)
POTASSIUM SERPL-SCNC: 3.9 MMOL/L (ref 3.5–5.1)
PROT SERPL-MCNC: 6.8 G/DL (ref 6.4–8.2)
PROT UR STRIP-MCNC: >300 MG/DL
RBC # BLD AUTO: 3.48 M/UL (ref 3.8–5.2)
RBC #/AREA URNS HPF: ABNORMAL /HPF (ref 0–5)
SODIUM SERPL-SCNC: 137 MMOL/L (ref 136–145)
SP GR UR REFRACTOMETRY: 1.01 (ref 1–1.03)
UROBILINOGEN UR QL STRIP.AUTO: 0.1 EU/DL (ref 0.1–1)
WBC # BLD AUTO: 7.3 K/UL (ref 3.6–11)
WBC URNS QL MICRO: ABNORMAL /HPF (ref 0–4)

## 2022-02-23 PROCEDURE — 96374 THER/PROPH/DIAG INJ IV PUSH: CPT

## 2022-02-23 PROCEDURE — 87493 C DIFF AMPLIFIED PROBE: CPT

## 2022-02-23 PROCEDURE — 99284 EMERGENCY DEPT VISIT MOD MDM: CPT

## 2022-02-23 PROCEDURE — 83690 ASSAY OF LIPASE: CPT

## 2022-02-23 PROCEDURE — 81001 URINALYSIS AUTO W/SCOPE: CPT

## 2022-02-23 PROCEDURE — 80053 COMPREHEN METABOLIC PANEL: CPT

## 2022-02-23 PROCEDURE — 36415 COLL VENOUS BLD VENIPUNCTURE: CPT

## 2022-02-23 PROCEDURE — 85025 COMPLETE CBC W/AUTO DIFF WBC: CPT

## 2022-02-23 PROCEDURE — 74011250636 HC RX REV CODE- 250/636: Performed by: EMERGENCY MEDICINE

## 2022-02-23 RX ORDER — ONDANSETRON 4 MG/1
4 TABLET, ORALLY DISINTEGRATING ORAL
Qty: 12 TABLET | Refills: 0 | Status: SHIPPED | OUTPATIENT
Start: 2022-02-23 | End: 2022-02-27

## 2022-02-23 RX ORDER — ONDANSETRON 2 MG/ML
4 INJECTION INTRAMUSCULAR; INTRAVENOUS
Status: COMPLETED | OUTPATIENT
Start: 2022-02-23 | End: 2022-02-23

## 2022-02-23 RX ADMIN — SODIUM CHLORIDE 1000 ML: 9 INJECTION, SOLUTION INTRAVENOUS at 13:55

## 2022-02-23 RX ADMIN — ONDANSETRON 4 MG: 2 INJECTION INTRAMUSCULAR; INTRAVENOUS at 15:20

## 2022-02-23 NOTE — ED PROVIDER NOTES
EMERGENCY DEPARTMENT HISTORY AND PHYSICAL EXAM        Date: 2/23/2022  Patient Name: Denise Arcos    History of Presenting Illness     Chief Complaint   Patient presents with    Nausea    Vomiting    Diarrhea       History Provided By: Patient    HPI: Denise Arcos, 62 y.o. female history of anemia, diabetes, hypertension, obesity, chronic kidney disease, and pulmonary hypertension who presents with nausea, vomiting, diarrhea. Symptoms have been present for 2 days. No abdominal pain. No fevers. No other symptoms. No bloody stools. PCP: Diane Manuel MD    Current Outpatient Medications   Medication Sig Dispense Refill    ondansetron (ZOFRAN ODT) 4 mg disintegrating tablet Take 1 Tablet by mouth every eight (8) hours as needed for Nausea or Vomiting for up to 4 days. 12 Tablet 0    hydrALAZINE (APRESOLINE) 100 mg tablet Take 1 Tablet by mouth three (3) times daily. 90 Tablet 0    isosorbide dinitrate (ISORDIL) 20 mg tablet Take 1 Tablet by mouth three (3) times daily. 90 Tablet 0    pantoprazole (PROTONIX) 40 mg tablet Take 1 Tablet by mouth Before breakfast and dinner. 30 Tablet 0    sucralfate (CARAFATE) 1 gram tablet Take 1 Tablet by mouth four (4) times daily. Indications: gastroesophageal reflux disease, heartburn 120 Tablet 0    polyethylene glycol (MIRALAX) 17 gram packet Take 1 Packet by mouth daily. 30 Packet 0    acetaminophen (TYLENOL) 325 mg tablet Take 2 Tabs by mouth every six (6) hours as needed for Pain or Fever. 30 Tab 0    valsartan (DIOVAN) 160 mg tablet Take 160 mg by mouth daily.  aspirin delayed-release 81 mg tablet Take 81 mg by mouth.  docusate sodium (COLACE) 100 mg capsule Take 100 mg by mouth two (2) times a day.  carvediloL (COREG) 12.5 mg tablet Take 12.5 mg by mouth two (2) times daily (with meals).          Past History     Past Medical History:  Past Medical History:   Diagnosis Date    Anemia, chronic disease 3/22/2021    DM2 (diabetes mellitus, type 2) ([facility] 2/87/1813    H/O metabolic acidosis 4/55/1326    Hypertension     Morbid obesity ([facility]     Nephrotic syndrome 3/22/2021    Pulmonary hypertension ([facility] 3/22/2021    Renal failure        Past Surgical History:  Past Surgical History:   Procedure Laterality Date    IR INSERT NON TUNL CVC OVER 5 YRS  3/12/2021       Family History:  History reviewed. No pertinent family history. Social History:  Social History     Tobacco Use    Smoking status: Never Smoker    Smokeless tobacco: Never Used   Substance Use Topics    Alcohol use: Not on file    Drug use: Not on file       Allergies: Allergies   Allergen Reactions    Doxycycline Swelling    Egg Diarrhea    Milk Containing Products Nausea and Vomiting    Tetracycline Swelling       Review of Systems   Review of Systems   Constitutional: Negative for fever. HENT: Negative for congestion. Eyes: Negative for visual disturbance. Respiratory: Negative for shortness of breath. Cardiovascular: Negative for chest pain. Gastrointestinal: Positive for diarrhea, nausea and vomiting. Negative for abdominal pain and blood in stool. Genitourinary: Negative for dysuria. Musculoskeletal: Negative for arthralgias. Skin: Negative for rash. Neurological: Negative for headaches. Physical Exam   Constitutional: No acute distress. Well-nourished. Skin: No rash. ENT: No rhinorrhea. No cough. Head is normocephalic and atraumatic. Eye: No proptosis or conjunctival injections. Respiratory: No apparent respiratory distress. Gastrointestinal: Nondistended. Nontender abdomen. Musculoskeletal: No obvious bony deformities. Psychiatric: Cooperative. Appropriate mood and affect.     Diagnostic Study Results     Labs -     Recent Results (from the past 24 hour(s))   CBC WITH AUTOMATED DIFF    Collection Time: 02/23/22  3:27 PM   Result Value Ref Range    WBC 7.3 3.6 - 11.0 K/uL    RBC 3.48 (L) 3.80 - 5.20 M/uL    HGB 10.4 (L) 11.5 - 16.0 g/dL    HCT 32.2 (L) 35.0 - 47.0 %    MCV 92.5 80.0 - 99.0 FL    MCH 29.9 26.0 - 34.0 PG    MCHC 32.3 30.0 - 36.5 g/dL    RDW 15.8 (H) 11.5 - 14.5 %    PLATELET 322 (L) 479 - 400 K/uL    MPV 11.2 8.9 - 12.9 FL    NRBC 0.0 0.0  WBC    ABSOLUTE NRBC 0.00 0.00 - 0.01 K/uL    NEUTROPHILS 80 (H) 32 - 75 %    LYMPHOCYTES 6 (L) 12 - 49 %    MONOCYTES 12 5 - 13 %    EOSINOPHILS 1 0 - 7 %    BASOPHILS 0 0 - 1 %    IMMATURE GRANULOCYTES 1 (H) 0 - 0.5 %    ABS. NEUTROPHILS 5.9 1.8 - 8.0 K/UL    ABS. LYMPHOCYTES 0.4 (L) 0.8 - 3.5 K/UL    ABS. MONOCYTES 0.9 0.0 - 1.0 K/UL    ABS. EOSINOPHILS 0.0 0.0 - 0.4 K/UL    ABS. BASOPHILS 0.0 0.0 - 0.1 K/UL    ABS. IMM. GRANS. 0.0 0.00 - 0.04 K/UL    DF AUTOMATED     METABOLIC PANEL, COMPREHENSIVE    Collection Time: 02/23/22  3:27 PM   Result Value Ref Range    Sodium 137 136 - 145 mmol/L    Potassium 3.9 3.5 - 5.1 mmol/L    Chloride 109 (H) 97 - 108 mmol/L    CO2 18 (L) 21 - 32 mmol/L    Anion gap 10 5 - 15 mmol/L    Glucose 151 (H) 65 - 100 mg/dL    BUN 52 (H) 6 - 20 mg/dL    Creatinine 7.67 (H) 0.55 - 1.02 mg/dL    BUN/Creatinine ratio 7 (L) 12 - 20      GFR est AA 7 (L) >60 ml/min/1.73m2    GFR est non-AA 5 (L) >60 ml/min/1.73m2    Calcium 9.4 8.5 - 10.1 mg/dL    Bilirubin, total 0.3 0.2 - 1.0 mg/dL    AST (SGOT) 28 15 - 37 U/L    ALT (SGPT) 28 12 - 78 U/L    Alk.  phosphatase 70 45 - 117 U/L    Protein, total 6.8 6.4 - 8.2 g/dL    Albumin 2.8 (L) 3.5 - 5.0 g/dL    Globulin 4.0 2.0 - 4.0 g/dL    A-G Ratio 0.7 (L) 1.1 - 2.2     URINALYSIS W/ RFLX MICROSCOPIC    Collection Time: 02/23/22  3:27 PM   Result Value Ref Range    Color Yellow/Straw      Appearance Clear Clear      Specific gravity 1.014 1.003 - 1.030      pH (UA) 6.0 5.0 - 8.0      Protein >300 (A) Negative mg/dL    Glucose 50 (A) Negative mg/dL    Ketone Negative Negative mg/dL    Bilirubin Negative Negative      Blood Small (A) Negative      Urobilinogen 0.1 0.1 - 1.0 EU/dL    Nitrites Negative Negative      Leukocyte Esterase Negative Negative     LIPASE    Collection Time: 02/23/22  3:27 PM   Result Value Ref Range    Lipase 139 73 - 393 U/L   URINE MICROSCOPIC    Collection Time: 02/23/22  3:27 PM   Result Value Ref Range    WBC 0-4 0 - 4 /hpf    RBC 0-5 0 - 5 /hpf    Bacteria 1+ (A) Negative /hpf    Hyaline cast 5-10 0 - 5 /lpf       Radiologic Studies -   No orders to display     CT Results  (Last 48 hours)    None        CXR Results  (Last 48 hours)    None          Medical Decision Making and ED Course     I reviewed the available vital signs, nursing notes, past medical history, past surgical history, family history, and social history. Vital Signs - Reviewed the patient's vital signs. Patient Vitals for the past 12 hrs:   Temp Pulse Resp BP SpO2   02/23/22 1131 98.3 °F (36.8 °C) 93 18 109/68 99 %     Medical Decision Making:   Presented with nausea, vomiting, diarrhea. The differential diagnosis is dehydration, electrolyte normality, UTI, gastroenteritis, food toxicity. Work-up is unremarkable except for chronic kidney disease. C. difficile is pending. Patient likely has viral gastroenteritis or food toxicity. Feel no need for antibiotics. For no need for CT scan since she is not having any abdominal pain and has no abdominal tenderness. Recommended follow-up as needed. I recommended that she stay hydrated. Wrote prescription for Zofran. Disposition     Discharged home    DISCHARGE PLAN:  1. Current Discharge Medication List      CONTINUE these medications which have NOT CHANGED    Details   hydrALAZINE (APRESOLINE) 100 mg tablet Take 1 Tablet by mouth three (3) times daily. Qty: 90 Tablet, Refills: 0      isosorbide dinitrate (ISORDIL) 20 mg tablet Take 1 Tablet by mouth three (3) times daily. Qty: 90 Tablet, Refills: 0      pantoprazole (PROTONIX) 40 mg tablet Take 1 Tablet by mouth Before breakfast and dinner.   Qty: 30 Tablet, Refills: 0      sucralfate (CARAFATE) 1 gram tablet Take 1 Tablet by mouth four (4) times daily. Indications: gastroesophageal reflux disease, heartburn  Qty: 120 Tablet, Refills: 0      polyethylene glycol (MIRALAX) 17 gram packet Take 1 Packet by mouth daily. Qty: 30 Packet, Refills: 0      acetaminophen (TYLENOL) 325 mg tablet Take 2 Tabs by mouth every six (6) hours as needed for Pain or Fever. Qty: 30 Tab, Refills: 0      ondansetron (Zofran ODT) 4 mg disintegrating tablet Take 1 Tab by mouth every eight (8) hours as needed for Nausea or Vomiting. Qty: 21 Tab, Refills: 0      valsartan (DIOVAN) 160 mg tablet Take 160 mg by mouth daily. aspirin delayed-release 81 mg tablet Take 81 mg by mouth. docusate sodium (COLACE) 100 mg capsule Take 100 mg by mouth two (2) times a day. carvediloL (COREG) 12.5 mg tablet Take 12.5 mg by mouth two (2) times daily (with meals). 2.   Follow-up Information     Follow up With Specialties Details Why [facility] EMERGENCY DEPT Emergency Medicine Go today As soon as possible if symptoms worsen [address] [facility] [address]  [phone]    Primary care doctor  Schedule an appointment as soon as possible for a visit in 3 days          3. Return to ED if worse     Diagnosis     Clinical impression:   1. Nausea and vomiting, intractability of vomiting not specified, unspecified vomiting type    2. Diarrhea, unspecified type           Attestation:  Please note that this dictation was completed with [employer], the computer voice recognition software. Quite often unanticipated grammatical, syntax, homophones, and other interpretive errors are inadvertently transcribed by the computer software. Please disregard these errors. Please excuse any errors that have escaped final proofreading. Thank you.   Jen Montalvo, DO

## 2022-02-23 NOTE — ED NOTES
Labs and stool specimen re-done since lab can not find the original tubes of blood. Stool cup found but mis-labeled.

## 2022-02-23 NOTE — DISCHARGE INSTRUCTIONS
Thank you! Thank you for allowing me to care for you in the emergency department. I sincerely hope that you are satisfied with your visit today. It is my goal to provide you with excellent care. Below you will find a list of your labs and imaging from your visit today. Should you have any questions regarding these results please do not hesitate to call the emergency department. Labs -     Recent Results (from the past 12 hour(s))   CBC WITH AUTOMATED DIFF    Collection Time: 02/23/22  3:27 PM   Result Value Ref Range    WBC 7.3 3.6 - 11.0 K/uL    RBC 3.48 (L) 3.80 - 5.20 M/uL    HGB 10.4 (L) 11.5 - 16.0 g/dL    HCT 32.2 (L) 35.0 - 47.0 %    MCV 92.5 80.0 - 99.0 FL    MCH 29.9 26.0 - 34.0 PG    MCHC 32.3 30.0 - 36.5 g/dL    RDW 15.8 (H) 11.5 - 14.5 %    PLATELET 270 (L) 277 - 400 K/uL    MPV 11.2 8.9 - 12.9 FL    NRBC 0.0 0.0  WBC    ABSOLUTE NRBC 0.00 0.00 - 0.01 K/uL    NEUTROPHILS 80 (H) 32 - 75 %    LYMPHOCYTES 6 (L) 12 - 49 %    MONOCYTES 12 5 - 13 %    EOSINOPHILS 1 0 - 7 %    BASOPHILS 0 0 - 1 %    IMMATURE GRANULOCYTES 1 (H) 0 - 0.5 %    ABS. NEUTROPHILS 5.9 1.8 - 8.0 K/UL    ABS. LYMPHOCYTES 0.4 (L) 0.8 - 3.5 K/UL    ABS. MONOCYTES 0.9 0.0 - 1.0 K/UL    ABS. EOSINOPHILS 0.0 0.0 - 0.4 K/UL    ABS. BASOPHILS 0.0 0.0 - 0.1 K/UL    ABS. IMM. GRANS. 0.0 0.00 - 0.04 K/UL    DF AUTOMATED     METABOLIC PANEL, COMPREHENSIVE    Collection Time: 02/23/22  3:27 PM   Result Value Ref Range    Sodium 137 136 - 145 mmol/L    Potassium 3.9 3.5 - 5.1 mmol/L    Chloride 109 (H) 97 - 108 mmol/L    CO2 18 (L) 21 - 32 mmol/L    Anion gap 10 5 - 15 mmol/L    Glucose 151 (H) 65 - 100 mg/dL    BUN 52 (H) 6 - 20 mg/dL    Creatinine 7.67 (H) 0.55 - 1.02 mg/dL    BUN/Creatinine ratio 7 (L) 12 - 20      GFR est AA 7 (L) >60 ml/min/1.73m2    GFR est non-AA 5 (L) >60 ml/min/1.73m2    Calcium 9.4 8.5 - 10.1 mg/dL    Bilirubin, total 0.3 0.2 - 1.0 mg/dL    AST (SGOT) 28 15 - 37 U/L    ALT (SGPT) 28 12 - 78 U/L    Alk. phosphatase 70 45 - 117 U/L    Protein, total 6.8 6.4 - 8.2 g/dL    Albumin 2.8 (L) 3.5 - 5.0 g/dL    Globulin 4.0 2.0 - 4.0 g/dL    A-G Ratio 0.7 (L) 1.1 - 2.2     URINALYSIS W/ RFLX MICROSCOPIC    Collection Time: 02/23/22  3:27 PM   Result Value Ref Range    Color Yellow/Straw      Appearance Clear Clear      Specific gravity 1.014 1.003 - 1.030      pH (UA) 6.0 5.0 - 8.0      Protein >300 (A) Negative mg/dL    Glucose 50 (A) Negative mg/dL    Ketone Negative Negative mg/dL    Bilirubin Negative Negative      Blood Small (A) Negative      Urobilinogen 0.1 0.1 - 1.0 EU/dL    Nitrites Negative Negative      Leukocyte Esterase Negative Negative     LIPASE    Collection Time: 02/23/22  3:27 PM   Result Value Ref Range    Lipase 139 73 - 393 U/L   URINE MICROSCOPIC    Collection Time: 02/23/22  3:27 PM   Result Value Ref Range    WBC 0-4 0 - 4 /hpf    RBC 0-5 0 - 5 /hpf    Bacteria 1+ (A) Negative /hpf    Hyaline cast 5-10 0 - 5 /lpf       Radiologic Studies -   No orders to display     CT Results  (Last 48 hours)      None          CXR Results  (Last 48 hours)      None               If you feel that you have not received excellent quality care or timely care, please ask to speak to the nurse manager. Please choose us in the future for your continued health care needs. ------------------------------------------------------------------------------------------------------------  The exam and treatment you received in the Emergency Department were for an urgent problem and are not intended as complete care. It is important that you follow-up with a doctor, nurse practitioner, or physician assistant to:  (1) confirm your diagnosis,  (2) re-evaluation of changes in your illness and treatment, and  (3) for ongoing care. If your symptoms become worse or you do not improve as expected and you are unable to reach your usual health care provider, you should return to the Emergency Department.  We are available 24 hours a day.     Please take your discharge instructions with you when you go to your follow-up appointment. If you have any problem arranging a follow-up appointment, contact the Emergency Department immediately. If a prescription has been provided, please have it filled as soon as possible to prevent a delay in treatment. Read the entire medication instruction sheet provided to you by the pharmacy. If you have any questions or reservations about taking the medication due to side effects or interactions with other medications, please call your primary care physician or contact the ER to speak with the charge nurse. Make an appointment with your family doctor or the physician you were referred to for follow-up of this visit as instructed on your discharge paperwork, as this is a mandatory follow-up. Return to the ER if you are unable to be seen or if you are unable to be seen in a timely manner. If you have any problem arranging the follow-up visit, contact the Emergency Department immediately.

## 2022-03-18 PROBLEM — Z86.39: Status: ACTIVE | Noted: 2021-03-22

## 2022-03-19 PROBLEM — N18.5 STAGE 5 CHRONIC KIDNEY DISEASE (HCC): Status: ACTIVE | Noted: 2021-03-20

## 2022-03-19 PROBLEM — Z99.2 ESRD (END STAGE RENAL DISEASE) ON DIALYSIS (HCC): Status: ACTIVE | Noted: 2021-06-21

## 2022-03-19 PROBLEM — R55 SYNCOPE AND COLLAPSE: Status: ACTIVE | Noted: 2021-06-23

## 2022-03-19 PROBLEM — N04.9 NEPHROTIC SYNDROME: Status: ACTIVE | Noted: 2021-03-22

## 2022-03-19 PROBLEM — N18.6 ESRD (END STAGE RENAL DISEASE) ON DIALYSIS (HCC): Status: ACTIVE | Noted: 2021-06-21

## 2022-03-19 PROBLEM — Z99.2 ESRD ON DIALYSIS (HCC): Status: ACTIVE | Noted: 2021-06-23

## 2022-03-19 PROBLEM — N18.6 ESRD ON DIALYSIS (HCC): Status: ACTIVE | Noted: 2021-06-23

## 2022-03-19 PROBLEM — I10 UNCONTROLLED HYPERTENSION: Status: ACTIVE | Noted: 2021-03-21

## 2022-03-19 PROBLEM — E87.70 FLUID OVERLOAD: Status: ACTIVE | Noted: 2021-06-23

## 2022-03-19 PROBLEM — D63.8 ANEMIA, CHRONIC DISEASE: Status: ACTIVE | Noted: 2021-03-22

## 2022-03-20 PROBLEM — I27.20 PULMONARY HYPERTENSION (HCC): Status: ACTIVE | Noted: 2021-03-22

## 2022-03-20 PROBLEM — E11.9 DM2 (DIABETES MELLITUS, TYPE 2) (HCC): Status: ACTIVE | Noted: 2021-03-22

## 2022-03-25 ENCOUNTER — TRANSCRIBE ORDER (OUTPATIENT)
Dept: SCHEDULING | Age: 62
End: 2022-03-25

## 2022-03-25 DIAGNOSIS — Z12.31 SCREENING MAMMOGRAM, ENCOUNTER FOR: Primary | ICD-10-CM

## 2022-03-31 ENCOUNTER — TRANSCRIBE ORDER (OUTPATIENT)
Dept: SCHEDULING | Age: 62
End: 2022-03-31

## 2022-03-31 DIAGNOSIS — Z12.31 SCREENING MAMMOGRAM FOR HIGH-RISK PATIENT: Primary | ICD-10-CM

## 2022-04-07 ENCOUNTER — HOSPITAL ENCOUNTER (OUTPATIENT)
Dept: MAMMOGRAPHY | Age: 62
Discharge: HOME | End: 2022-04-07
Attending: INTERNAL MEDICINE
Payer: MEDICARE

## 2022-04-07 DIAGNOSIS — Z12.31 SCREENING MAMMOGRAM FOR HIGH-RISK PATIENT: ICD-10-CM

## 2022-04-07 PROCEDURE — 77063 BREAST TOMOSYNTHESIS BI: CPT

## 2022-06-14 ENCOUNTER — HOSPITAL ENCOUNTER (OUTPATIENT)
Dept: PREADMISSION TESTING | Age: 62
Setting detail: OUTPATIENT SURGERY
Discharge: HOME | End: 2022-06-14
Attending: INTERNAL MEDICINE | Admitting: INTERNAL MEDICINE
Payer: MEDICARE

## 2022-06-14 ENCOUNTER — ANESTHESIA (OUTPATIENT)
Dept: ENDOSCOPY | Age: 62
End: 2022-06-14
Payer: MEDICARE

## 2022-06-14 ENCOUNTER — HOSPITAL ENCOUNTER (OUTPATIENT)
Age: 62
Setting detail: OUTPATIENT SURGERY
Discharge: HOME | End: 2022-06-14
Attending: INTERNAL MEDICINE | Admitting: INTERNAL MEDICINE
Payer: MEDICARE

## 2022-06-14 ENCOUNTER — APPOINTMENT (OUTPATIENT)
Dept: ENDOSCOPY | Age: 62
End: 2022-06-14
Attending: INTERNAL MEDICINE
Payer: MEDICARE

## 2022-06-14 ENCOUNTER — ANESTHESIA EVENT (OUTPATIENT)
Dept: ENDOSCOPY | Age: 62
End: 2022-06-14
Payer: MEDICARE

## 2022-06-14 VITALS
RESPIRATION RATE: 18 BRPM | HEART RATE: 81 BPM | TEMPERATURE: 97.8 F | SYSTOLIC BLOOD PRESSURE: 172 MMHG | BODY MASS INDEX: 35.46 KG/M2 | WEIGHT: 234 LBS | DIASTOLIC BLOOD PRESSURE: 94 MMHG | OXYGEN SATURATION: 100 % | HEIGHT: 68 IN

## 2022-06-14 LAB
ALBUMIN SERPL-MCNC: 3.4 G/DL (ref 3.5–5)
ALBUMIN/GLOB SERPL: 1 {RATIO} (ref 1.1–2.2)
ALP SERPL-CCNC: 82 U/L (ref 45–117)
ALT SERPL-CCNC: 13 U/L (ref 12–78)
ANION GAP SERPL CALC-SCNC: 8 MMOL/L (ref 5–15)
AST SERPL W P-5'-P-CCNC: 10 U/L (ref 15–37)
ATRIAL RATE: 84 BPM
BILIRUB SERPL-MCNC: 0.4 MG/DL (ref 0.2–1)
BUN SERPL-MCNC: 34 MG/DL (ref 6–20)
BUN/CREAT SERPL: 6 (ref 12–20)
CA-I BLD-MCNC: 9.4 MG/DL (ref 8.5–10.1)
CALCULATED P AXIS, ECG09: 55 DEGREES
CALCULATED R AXIS, ECG10: 32 DEGREES
CALCULATED T AXIS, ECG11: 78 DEGREES
CHLORIDE SERPL-SCNC: 99 MMOL/L (ref 97–108)
CO2 SERPL-SCNC: 30 MMOL/L (ref 21–32)
CREAT SERPL-MCNC: 5.29 MG/DL (ref 0.55–1.02)
DIAGNOSIS, 93000: NORMAL
ERYTHROCYTE [DISTWIDTH] IN BLOOD BY AUTOMATED COUNT: 15.9 % (ref 11.5–14.5)
GLOBULIN SER CALC-MCNC: 3.5 G/DL (ref 2–4)
GLUCOSE BLD STRIP.AUTO-MCNC: 174 MG/DL (ref 65–117)
GLUCOSE SERPL-MCNC: 183 MG/DL (ref 65–100)
HCT VFR BLD AUTO: 36.3 % (ref 35–47)
HGB BLD-MCNC: 11.4 G/DL (ref 11.5–16)
MCH RBC QN AUTO: 28.2 PG (ref 26–34)
MCHC RBC AUTO-ENTMCNC: 31.4 G/DL (ref 30–36.5)
MCV RBC AUTO: 89.9 FL (ref 80–99)
NRBC # BLD: 0 K/UL (ref 0–0.01)
NRBC BLD-RTO: 0 PER 100 WBC
P-R INTERVAL, ECG05: 214 MS
PERFORMED BY, TECHID: ABNORMAL
PLATELET # BLD AUTO: 190 K/UL (ref 150–400)
PMV BLD AUTO: 10.6 FL (ref 8.9–12.9)
POTASSIUM SERPL-SCNC: 4.4 MMOL/L (ref 3.5–5.1)
PROT SERPL-MCNC: 6.9 G/DL (ref 6.4–8.2)
Q-T INTERVAL, ECG07: 412 MS
QRS DURATION, ECG06: 94 MS
QTC CALCULATION (BEZET), ECG08: 486 MS
RBC # BLD AUTO: 4.04 M/UL (ref 3.8–5.2)
SODIUM SERPL-SCNC: 137 MMOL/L (ref 136–145)
VENTRICULAR RATE, ECG03: 84 BPM
WBC # BLD AUTO: 7.8 K/UL (ref 3.6–11)

## 2022-06-14 PROCEDURE — 76040000007: Performed by: INTERNAL MEDICINE

## 2022-06-14 PROCEDURE — 80053 COMPREHEN METABOLIC PANEL: CPT

## 2022-06-14 PROCEDURE — 93005 ELECTROCARDIOGRAM TRACING: CPT

## 2022-06-14 PROCEDURE — 74011250636 HC RX REV CODE- 250/636: Performed by: ANESTHESIOLOGY

## 2022-06-14 PROCEDURE — 36415 COLL VENOUS BLD VENIPUNCTURE: CPT

## 2022-06-14 PROCEDURE — 88305 TISSUE EXAM BY PATHOLOGIST: CPT

## 2022-06-14 PROCEDURE — 2709999900 HC NON-CHARGEABLE SUPPLY: Performed by: INTERNAL MEDICINE

## 2022-06-14 PROCEDURE — 85027 COMPLETE CBC AUTOMATED: CPT

## 2022-06-14 PROCEDURE — 76060000032 HC ANESTHESIA 0.5 TO 1 HR: Performed by: INTERNAL MEDICINE

## 2022-06-14 PROCEDURE — 77030009426 HC FCPS BIOP ENDOSC BSC -B: Performed by: INTERNAL MEDICINE

## 2022-06-14 PROCEDURE — 82962 GLUCOSE BLOOD TEST: CPT

## 2022-06-14 RX ORDER — SODIUM CHLORIDE 9 MG/ML
25 INJECTION, SOLUTION INTRAVENOUS CONTINUOUS
Status: DISCONTINUED | OUTPATIENT
Start: 2022-06-14 | End: 2022-06-14 | Stop reason: HOSPADM

## 2022-06-14 RX ORDER — SODIUM CHLORIDE, SODIUM LACTATE, POTASSIUM CHLORIDE, CALCIUM CHLORIDE 600; 310; 30; 20 MG/100ML; MG/100ML; MG/100ML; MG/100ML
INJECTION, SOLUTION INTRAVENOUS
Status: DISCONTINUED | OUTPATIENT
Start: 2022-06-14 | End: 2022-06-14 | Stop reason: HOSPADM

## 2022-06-14 RX ORDER — PROPOFOL 10 MG/ML
INJECTION, EMULSION INTRAVENOUS AS NEEDED
Status: DISCONTINUED | OUTPATIENT
Start: 2022-06-14 | End: 2022-06-14 | Stop reason: HOSPADM

## 2022-06-14 RX ORDER — HYDRALAZINE HYDROCHLORIDE 20 MG/ML
20 INJECTION INTRAMUSCULAR; INTRAVENOUS
Status: DISCONTINUED | OUTPATIENT
Start: 2022-06-14 | End: 2022-06-14 | Stop reason: HOSPADM

## 2022-06-14 RX ADMIN — PROPOFOL 50 MG: 10 INJECTION, EMULSION INTRAVENOUS at 09:10

## 2022-06-14 RX ADMIN — PROPOFOL 50 MG: 10 INJECTION, EMULSION INTRAVENOUS at 09:15

## 2022-06-14 RX ADMIN — SODIUM CHLORIDE, POTASSIUM CHLORIDE, SODIUM LACTATE AND CALCIUM CHLORIDE: 600; 310; 30; 20 INJECTION, SOLUTION INTRAVENOUS at 09:09

## 2022-06-14 RX ADMIN — PROPOFOL 50 MG: 10 INJECTION, EMULSION INTRAVENOUS at 09:11

## 2022-06-14 RX ADMIN — PROPOFOL 50 MG: 10 INJECTION, EMULSION INTRAVENOUS at 09:13

## 2022-06-14 NOTE — ROUTINE PROCESS
Made Dr. Tonie Holt aware of pt's BP of 191/90. Dr. Tonie Holt ordered 20mg of Hydralazine to be given once IV was started.

## 2022-06-14 NOTE — PERIOP NOTES
Patient alert and oriented x 4 with respirations even and unlabored on RA. Patient discharged home per physician;s order. Patient verbalizes understanding of discharge instructions. Patient transported via wheelchair to front hospital entrance with family present to transport patient via private vehicle for discharge.

## 2022-06-14 NOTE — PERIOP NOTES
Called Dr. Reynaldo Tirado office, [facility], spoke to Niko made aware of patient labs, BUN 34, Creatinine 5.29, and glucose 183.  Niko stated would make MD aware, no new orders at this time

## 2022-06-14 NOTE — ANESTHESIA PREPROCEDURE EVALUATION
Relevant Problems   CARDIOVASCULAR   (+) Uncontrolled hypertension      RENAL FAILURE   (+) ESRD (end stage renal disease) on dialysis (HCC)   (+) ESRD on dialysis (HCC)   (+) Nephrotic syndrome   (+) Stage 5 chronic kidney disease (HCC)      ENDOCRINE   (+) DM2 (diabetes mellitus, type 2) (HCC)      HEMATOLOGY   (+) Anemia, chronic disease       Anesthetic History   No history of anesthetic complications            Review of Systems / Medical History  Patient summary reviewed, nursing notes reviewed and pertinent labs reviewed    Pulmonary  Within defined limits                 Neuro/Psych   Within defined limits           Cardiovascular    Hypertension: poorly controlled                Comments: Pulmonary Hypertension   GI/Hepatic/Renal         Renal disease (Last Dialysis yesterday  6/13/2022): ESRD and dialysis       Endo/Other    Diabetes    Morbid obesity     Other Findings   Comments:   Medical History  Hypertension  Morbid obesity ([facility]  Renal failure  Nephrotic syndrome  Anemia, chronic disease  H/O metabolic acidosis  Pulmonary hypertension (HCC)  DM2 (diabetes mellitus, type 2) ([facility]  GERD (gastroesophageal reflux disease)  Chronic kidney disease         Physical Exam    Airway  Mallampati: III  TM Distance: 4 - 6 cm  Neck ROM: normal range of motion   Mouth opening: Normal     Cardiovascular    Rhythm: regular  Rate: normal         Dental    Dentition: Poor dentition     Pulmonary  Breath sounds clear to auscultation               Abdominal  GI exam deferred       Other Findings            Anesthetic Plan    ASA: 4  Anesthesia type: total IV anesthesia and MAC          Induction: Intravenous  Anesthetic plan and risks discussed with: Patient and Family

## 2022-06-14 NOTE — ROUTINE PROCESS
Dr. Kittie Boxer. Notified of post op BP. Stated ok to DC home as long as patient was educated to take her home BP medication as soon as she gets home. Pt verbalized understanding.

## 2022-06-14 NOTE — ROUTINE PROCESS
Pt stated she had about a cup of chicken broth this morning around 0615.  [facility] was notified and he agreed to continue with the procedure. No new orders at this time.

## 2022-06-16 ENCOUNTER — HOSPITAL ENCOUNTER (OUTPATIENT)
Age: 62
Discharge: HOME | End: 2022-06-16
Attending: INTERNAL MEDICINE | Admitting: INTERNAL MEDICINE
Payer: MEDICARE

## 2022-06-16 VITALS
HEART RATE: 84 BPM | OXYGEN SATURATION: 94 % | RESPIRATION RATE: 18 BRPM | WEIGHT: 234 LBS | BODY MASS INDEX: 35.46 KG/M2 | HEIGHT: 68 IN | TEMPERATURE: 97.3 F | SYSTOLIC BLOOD PRESSURE: 135 MMHG | DIASTOLIC BLOOD PRESSURE: 69 MMHG

## 2022-06-16 DIAGNOSIS — Z01.818 PRE-OP EVALUATION: ICD-10-CM

## 2022-06-16 PROBLEM — I10 HYPERTENSION: Status: ACTIVE | Noted: 2022-06-16

## 2022-06-16 LAB
GLUCOSE BLD STRIP.AUTO-MCNC: 109 MG/DL (ref 65–117)
GLUCOSE BLD STRIP.AUTO-MCNC: 125 MG/DL (ref 65–117)
GLUCOSE BLD STRIP.AUTO-MCNC: 144 MG/DL (ref 65–117)
PERFORMED BY, TECHID: ABNORMAL
PERFORMED BY, TECHID: ABNORMAL
PERFORMED BY, TECHID: NORMAL

## 2022-06-16 PROCEDURE — C1894 INTRO/SHEATH, NON-LASER: HCPCS | Performed by: INTERNAL MEDICINE

## 2022-06-16 PROCEDURE — 74011000636 HC RX REV CODE- 636: Performed by: INTERNAL MEDICINE

## 2022-06-16 PROCEDURE — 82962 GLUCOSE BLOOD TEST: CPT

## 2022-06-16 PROCEDURE — 93458 L HRT ARTERY/VENTRICLE ANGIO: CPT | Performed by: INTERNAL MEDICINE

## 2022-06-16 PROCEDURE — 76210000020 HC REC RM PH II FIRST 0.5 HR: Performed by: INTERNAL MEDICINE

## 2022-06-16 PROCEDURE — 74011250636 HC RX REV CODE- 250/636: Performed by: INTERNAL MEDICINE

## 2022-06-16 PROCEDURE — 77030019698 HC SYR ANGI MDLON MRTM -A: Performed by: INTERNAL MEDICINE

## 2022-06-16 PROCEDURE — C1769 GUIDE WIRE: HCPCS | Performed by: INTERNAL MEDICINE

## 2022-06-16 PROCEDURE — 74011000250 HC RX REV CODE- 250: Performed by: INTERNAL MEDICINE

## 2022-06-16 PROCEDURE — 76210000017 HC OR PH I REC 1.5 TO 2 HR: Performed by: INTERNAL MEDICINE

## 2022-06-16 PROCEDURE — 99152 MOD SED SAME PHYS/QHP 5/>YRS: CPT | Performed by: INTERNAL MEDICINE

## 2022-06-16 PROCEDURE — 77030040934 HC CATH DIAG DXTERITY MEDT -A: Performed by: INTERNAL MEDICINE

## 2022-06-16 RX ORDER — LIDOCAINE HYDROCHLORIDE 10 MG/ML
INJECTION INFILTRATION; PERINEURAL AS NEEDED
Status: DISCONTINUED | OUTPATIENT
Start: 2022-06-16 | End: 2022-06-16 | Stop reason: HOSPADM

## 2022-06-16 RX ORDER — HEPARIN SODIUM 1000 [USP'U]/ML
INJECTION, SOLUTION INTRAVENOUS; SUBCUTANEOUS AS NEEDED
Status: DISCONTINUED | OUTPATIENT
Start: 2022-06-16 | End: 2022-06-16 | Stop reason: HOSPADM

## 2022-06-16 RX ORDER — HEPARIN SODIUM 200 [USP'U]/100ML
INJECTION, SOLUTION INTRAVENOUS
Status: COMPLETED | OUTPATIENT
Start: 2022-06-16 | End: 2022-06-16

## 2022-06-16 RX ORDER — MIDAZOLAM HYDROCHLORIDE 1 MG/ML
INJECTION INTRAMUSCULAR; INTRAVENOUS AS NEEDED
Status: DISCONTINUED | OUTPATIENT
Start: 2022-06-16 | End: 2022-06-16 | Stop reason: HOSPADM

## 2022-06-16 RX ORDER — VERAPAMIL HYDROCHLORIDE 2.5 MG/ML
INJECTION, SOLUTION INTRAVENOUS AS NEEDED
Status: DISCONTINUED | OUTPATIENT
Start: 2022-06-16 | End: 2022-06-16 | Stop reason: HOSPADM

## 2022-06-16 RX ORDER — FENTANYL CITRATE 50 UG/ML
INJECTION, SOLUTION INTRAMUSCULAR; INTRAVENOUS AS NEEDED
Status: DISCONTINUED | OUTPATIENT
Start: 2022-06-16 | End: 2022-06-16 | Stop reason: HOSPADM

## 2022-06-16 RX ORDER — SODIUM CHLORIDE 9 MG/ML
20 INJECTION, SOLUTION INTRAVENOUS CONTINUOUS
Status: DISCONTINUED | OUTPATIENT
Start: 2022-06-16 | End: 2022-06-16

## 2022-06-16 RX ADMIN — SODIUM CHLORIDE 20 ML/HR: 9 INJECTION, SOLUTION INTRAVENOUS at 13:37

## 2022-06-16 NOTE — PERIOP NOTES
Discharge instructions reviewed, verbalizes understanding. No complaints at this time. Discharged via wheelchair to private vehicle.

## 2022-06-16 NOTE — PERIOP NOTES
TRANSFER - OUT REPORT:    Verbal report given to [facility], RN(name) on Mark Poag  being transferred to [facility](unit) for routine post - op       Report consisted of patients Situation, Background, Assessment and   Recommendations(SBAR). Information from the following report(s) SBAR, Procedure Summary, Intake/Output and MAR was reviewed with the receiving nurse. Opportunity for questions and clarification was provided.       Patient transported with:   Registered Nurse

## 2022-06-16 NOTE — PROGRESS NOTES
Patient stated that she feels \"crazy\" when her blood sugar gets low. Blood sugar was 109. Called Dr. Mele Vidales and he gave telephone orders for 1/2 amp of D50 but we don't carry that anymore so I hung a  D10 IV bag and let it run wide open for a few minutes. Received verbal orders from Dr. Mele Vidales once he was on unit to let the rest of the IV bag run in and recheck blood sugar. Will continue to monitor.

## 2022-09-14 ENCOUNTER — APPOINTMENT (OUTPATIENT)
Dept: CT IMAGING | Age: 62
End: 2022-09-14
Attending: EMERGENCY MEDICINE
Payer: MEDICARE

## 2022-09-14 ENCOUNTER — HOSPITAL ENCOUNTER (EMERGENCY)
Age: 62
Discharge: HOME | End: 2022-09-14
Attending: EMERGENCY MEDICINE
Payer: MEDICARE

## 2022-09-14 ENCOUNTER — APPOINTMENT (OUTPATIENT)
Dept: GENERAL RADIOLOGY | Age: 62
End: 2022-09-14
Attending: EMERGENCY MEDICINE
Payer: MEDICARE

## 2022-09-14 VITALS
DIASTOLIC BLOOD PRESSURE: 108 MMHG | HEIGHT: 68 IN | BODY MASS INDEX: 35.46 KG/M2 | WEIGHT: 234 LBS | RESPIRATION RATE: 20 BRPM | OXYGEN SATURATION: 98 % | HEART RATE: 83 BPM | TEMPERATURE: 98.1 F | SYSTOLIC BLOOD PRESSURE: 204 MMHG

## 2022-09-14 DIAGNOSIS — R55 SYNCOPE AND COLLAPSE: Primary | ICD-10-CM

## 2022-09-14 LAB
ALBUMIN SERPL-MCNC: 3.3 G/DL (ref 3.5–5)
ALBUMIN/GLOB SERPL: 0.8 {RATIO} (ref 1.1–2.2)
ALBUMIN/GLOB SERPL: 0.8 {RATIO} (ref 1.1–2.2)
ALP SERPL-CCNC: 88 U/L (ref 45–117)
ALP SERPL-CCNC: 88 U/L (ref 45–117)
ALT SERPL-CCNC: 19 U/L (ref 12–78)
ALT SERPL-CCNC: 19 U/L (ref 12–78)
ANION GAP SERPL CALC-SCNC: 10 MMOL/L (ref 5–15)
ANION GAP SERPL CALC-SCNC: 10 MMOL/L (ref 5–15)
AST SERPL W P-5'-P-CCNC: 20 U/L (ref 15–37)
AST SERPL W P-5'-P-CCNC: 20 U/L (ref 15–37)
BASOPHILS # BLD: 0.1 K/UL (ref 0–0.1)
BASOPHILS NFR BLD: 1 % (ref 0–1)
BILIRUB DIRECT SERPL-MCNC: 0.2 MG/DL (ref 0–0.2)
BILIRUB SERPL-MCNC: 0.5 MG/DL (ref 0.2–1)
BILIRUB SERPL-MCNC: 0.5 MG/DL (ref 0.2–1)
BUN SERPL-MCNC: 83 MG/DL (ref 6–20)
BUN SERPL-MCNC: 83 MG/DL (ref 6–20)
BUN/CREAT SERPL: 8 (ref 12–20)
BUN/CREAT SERPL: 8 (ref 12–20)
CA-I BLD-MCNC: 9 MG/DL (ref 8.5–10.1)
CA-I BLD-MCNC: 9 MG/DL (ref 8.5–10.1)
CHLORIDE SERPL-SCNC: 108 MMOL/L (ref 97–108)
CHLORIDE SERPL-SCNC: 108 MMOL/L (ref 97–108)
CO2 SERPL-SCNC: 21 MMOL/L (ref 21–32)
CO2 SERPL-SCNC: 21 MMOL/L (ref 21–32)
CREAT SERPL-MCNC: 9.97 MG/DL (ref 0.55–1.02)
CREAT SERPL-MCNC: 9.97 MG/DL (ref 0.55–1.02)
DIFFERENTIAL METHOD BLD: ABNORMAL
EOSINOPHIL # BLD: 0.1 K/UL (ref 0–0.4)
EOSINOPHIL NFR BLD: 1 % (ref 0–7)
ERYTHROCYTE [DISTWIDTH] IN BLOOD BY AUTOMATED COUNT: 16.1 % (ref 11.5–14.5)
GLOBULIN SER CALC-MCNC: 3.9 G/DL (ref 2–4)
GLOBULIN SER CALC-MCNC: 3.9 G/DL (ref 2–4)
GLUCOSE SERPL-MCNC: 198 MG/DL (ref 65–100)
GLUCOSE SERPL-MCNC: 198 MG/DL (ref 65–100)
HCT VFR BLD AUTO: 31.5 % (ref 35–47)
HGB BLD-MCNC: 10 G/DL (ref 11.5–16)
IMM GRANULOCYTES # BLD AUTO: 0.2 K/UL (ref 0–0.04)
IMM GRANULOCYTES NFR BLD AUTO: 2 % (ref 0–0.5)
LIPASE SERPL-CCNC: 307 U/L (ref 73–393)
LYMPHOCYTES # BLD: 0.9 K/UL (ref 0.8–3.5)
LYMPHOCYTES NFR BLD: 8 % (ref 12–49)
MCH RBC QN AUTO: 29.2 PG (ref 26–34)
MCHC RBC AUTO-ENTMCNC: 31.7 G/DL (ref 30–36.5)
MCV RBC AUTO: 92.1 FL (ref 80–99)
MONOCYTES # BLD: 0.4 K/UL (ref 0–1)
MONOCYTES NFR BLD: 4 % (ref 5–13)
NEUTS SEG # BLD: 9 K/UL (ref 1.8–8)
NEUTS SEG NFR BLD: 84 % (ref 32–75)
NRBC # BLD: 0 K/UL (ref 0–0.01)
NRBC BLD-RTO: 0 PER 100 WBC
PHOSPHATE SERPL-MCNC: 6.4 MG/DL (ref 2.6–4.7)
PLATELET # BLD AUTO: 181 K/UL (ref 150–400)
PMV BLD AUTO: 12.2 FL (ref 8.9–12.9)
POTASSIUM SERPL-SCNC: 4.6 MMOL/L (ref 3.5–5.1)
POTASSIUM SERPL-SCNC: 4.6 MMOL/L (ref 3.5–5.1)
PROT SERPL-MCNC: 7.2 G/DL (ref 6.4–8.2)
PROT SERPL-MCNC: 7.2 G/DL (ref 6.4–8.2)
RBC # BLD AUTO: 3.42 M/UL (ref 3.8–5.2)
SODIUM SERPL-SCNC: 139 MMOL/L (ref 136–145)
SODIUM SERPL-SCNC: 139 MMOL/L (ref 136–145)
TROPONIN-HIGH SENSITIVITY: 35 NG/L (ref 0–51)
WBC # BLD AUTO: 10.7 K/UL (ref 3.6–11)

## 2022-09-14 PROCEDURE — 80053 COMPREHEN METABOLIC PANEL: CPT

## 2022-09-14 PROCEDURE — 74011250636 HC RX REV CODE- 250/636

## 2022-09-14 PROCEDURE — 96374 THER/PROPH/DIAG INJ IV PUSH: CPT

## 2022-09-14 PROCEDURE — 84484 ASSAY OF TROPONIN QUANT: CPT

## 2022-09-14 PROCEDURE — 71045 X-RAY EXAM CHEST 1 VIEW: CPT

## 2022-09-14 PROCEDURE — G0257 UNSCHED DIALYSIS ESRD PT HOS: HCPCS

## 2022-09-14 PROCEDURE — 96376 TX/PRO/DX INJ SAME DRUG ADON: CPT

## 2022-09-14 PROCEDURE — 99284 EMERGENCY DEPT VISIT MOD MDM: CPT

## 2022-09-14 PROCEDURE — 36415 COLL VENOUS BLD VENIPUNCTURE: CPT

## 2022-09-14 PROCEDURE — 85025 COMPLETE CBC W/AUTO DIFF WBC: CPT

## 2022-09-14 PROCEDURE — 83690 ASSAY OF LIPASE: CPT

## 2022-09-14 PROCEDURE — 93005 ELECTROCARDIOGRAM TRACING: CPT

## 2022-09-14 PROCEDURE — 74011250636 HC RX REV CODE- 250/636: Performed by: EMERGENCY MEDICINE

## 2022-09-14 PROCEDURE — 80069 RENAL FUNCTION PANEL: CPT

## 2022-09-14 PROCEDURE — 80076 HEPATIC FUNCTION PANEL: CPT

## 2022-09-14 RX ORDER — SEVELAMER CARBONATE 800 MG/1
1600 TABLET, FILM COATED ORAL
COMMUNITY

## 2022-09-14 RX ORDER — ERGOCALCIFEROL 1.25 MG/1
50000 CAPSULE ORAL
COMMUNITY

## 2022-09-14 RX ORDER — ONDANSETRON 2 MG/ML
4 INJECTION INTRAMUSCULAR; INTRAVENOUS
Status: COMPLETED | OUTPATIENT
Start: 2022-09-14 | End: 2022-09-14

## 2022-09-14 RX ORDER — FUROSEMIDE 40 MG/1
40 TABLET ORAL 3 TIMES DAILY
COMMUNITY

## 2022-09-14 RX ORDER — DIPHENHYDRAMINE HYDROCHLORIDE 50 MG/ML
25 INJECTION, SOLUTION INTRAMUSCULAR; INTRAVENOUS
Status: DISCONTINUED | OUTPATIENT
Start: 2022-09-14 | End: 2022-09-14 | Stop reason: HOSPADM

## 2022-09-14 RX ORDER — MECLIZINE HCL 12.5 MG 12.5 MG/1
12.5 TABLET ORAL
COMMUNITY

## 2022-09-14 RX ORDER — METOCLOPRAMIDE HYDROCHLORIDE 5 MG/ML
5 INJECTION INTRAMUSCULAR; INTRAVENOUS
Status: DISCONTINUED | OUTPATIENT
Start: 2022-09-14 | End: 2022-09-14 | Stop reason: HOSPADM

## 2022-09-14 RX ORDER — NIFEDIPINE 30 MG/1
30 TABLET, EXTENDED RELEASE ORAL DAILY
COMMUNITY

## 2022-09-14 RX ORDER — ONDANSETRON 2 MG/ML
INJECTION INTRAMUSCULAR; INTRAVENOUS
Status: COMPLETED
Start: 2022-09-14 | End: 2022-09-14

## 2022-09-14 RX ORDER — ONDANSETRON 2 MG/ML
4 INJECTION INTRAMUSCULAR; INTRAVENOUS ONCE
Status: COMPLETED | OUTPATIENT
Start: 2022-09-14 | End: 2022-09-14

## 2022-09-14 RX ORDER — FENTANYL CITRATE 50 UG/ML
50 INJECTION, SOLUTION INTRAMUSCULAR; INTRAVENOUS
Status: DISCONTINUED | OUTPATIENT
Start: 2022-09-14 | End: 2022-09-14 | Stop reason: HOSPADM

## 2022-09-14 RX ADMIN — ONDANSETRON 4 MG: 2 INJECTION INTRAMUSCULAR; INTRAVENOUS at 15:51

## 2022-09-14 RX ADMIN — ONDANSETRON 4 MG: 2 INJECTION INTRAMUSCULAR; INTRAVENOUS at 13:06

## 2022-09-14 NOTE — ED NOTES
Pt states she is not in any pain, refused pain medication, fentnyl wasted with Juliette RN after patient refusal.

## 2022-09-14 NOTE — DIALYSIS
Reprt called to ED-[facility]. Pt to return to the ED, station one loop. Pt allison 2 hour hemodialysis well. One liter net fluid removed.

## 2022-09-14 NOTE — DISCHARGE INSTRUCTIONS
You were seen in the ER for your syncope (fainting), thankfully, we did not find any dangerous causes of this fainting. It was likely from fluid shifts after not having dialysis for so long and from build up of toxins in your blood. We did not find a dangerous heart rhythm or dangerous electrolyte abnormalities like a high potassium, but these toxins do build up when you don't have good working kidneys. You felt much better after dialysis, so we did not have to do a CT scan to rule out bleeding on the brain. Follow up with your primary care doctor for any future issues and return to the ER for any new or concerning symptoms. Thank you! Thank you for allowing me to care for you in the emergency department. It is my goal to provide you with excellent care. If you have not received excellent quality care, please ask to speak to the nurse manager. Please fill out the survey that will come to you by mail or email since we listen to your feedback! Below you will find a list of your tests from today's visit. Should you have any questions, please do not hesitate to call the emergency department. Labs  Recent Results (from the past 12 hour(s))   HEPATIC FUNCTION PANEL    Collection Time: 09/14/22  1:00 PM   Result Value Ref Range    Protein, total 7.2 6.4 - 8.2 g/dL    Albumin 3.3 (L) 3.5 - 5.0 g/dL    Globulin 3.9 2.0 - 4.0 g/dL    A-G Ratio 0.8 (L) 1.1 - 2.2      Bilirubin, total 0.5 0.2 - 1.0 mg/dL    Bilirubin, direct 0.2 0.0 - 0.2 mg/dL    Alk.  phosphatase 88 45 - 117 U/L    AST (SGOT) 20 15 - 37 U/L    ALT (SGPT) 19 12 - 78 U/L   LIPASE    Collection Time: 09/14/22  1:00 PM   Result Value Ref Range    Lipase 307 73 - 393 U/L   RENAL FUNCTION PANEL    Collection Time: 09/14/22  1:00 PM   Result Value Ref Range    Sodium 139 136 - 145 mmol/L    Potassium 4.6 3.5 - 5.1 mmol/L    Chloride 108 97 - 108 mmol/L    CO2 21 21 - 32 mmol/L    Anion gap 10 5 - 15 mmol/L    Glucose 198 (H) 65 - 100 mg/dL    BUN 83 (H) 6 - 20 mg/dL    Creatinine 9.97 (H) 0.55 - 1.02 mg/dL    BUN/Creatinine ratio 8 (L) 12 - 20      GFR est AA 5 (L) >60 ml/min/1.73m2    GFR est non-AA 4 (L) >60 ml/min/1.73m2    Calcium 9.0 8.5 - 10.1 mg/dL    Phosphorus 6.4 (H) 2.6 - 4.7 mg/dL    Albumin 3.3 (L) 3.5 - 5.0 g/dL   CBC WITH AUTOMATED DIFF    Collection Time: 09/14/22  1:02 PM   Result Value Ref Range    WBC 10.7 3.6 - 11.0 K/uL    RBC 3.42 (L) 3.80 - 5.20 M/uL    HGB 10.0 (L) 11.5 - 16.0 g/dL    HCT 31.5 (L) 35.0 - 47.0 %    MCV 92.1 80.0 - 99.0 FL    MCH 29.2 26.0 - 34.0 PG    MCHC 31.7 30.0 - 36.5 g/dL    RDW 16.1 (H) 11.5 - 14.5 %    PLATELET 768 586 - 181 K/uL    MPV 12.2 8.9 - 12.9 FL    NRBC 0.0 0.0  WBC    ABSOLUTE NRBC 0.00 0.00 - 0.01 K/uL    NEUTROPHILS 84 (H) 32 - 75 %    LYMPHOCYTES 8 (L) 12 - 49 %    MONOCYTES 4 (L) 5 - 13 %    EOSINOPHILS 1 0 - 7 %    BASOPHILS 1 0 - 1 %    IMMATURE GRANULOCYTES 2 (H) 0 - 0.5 %    ABS. NEUTROPHILS 9.0 (H) 1.8 - 8.0 K/UL    ABS. LYMPHOCYTES 0.9 0.8 - 3.5 K/UL    ABS. MONOCYTES 0.4 0.0 - 1.0 K/UL    ABS. EOSINOPHILS 0.1 0.0 - 0.4 K/UL    ABS. BASOPHILS 0.1 0.0 - 0.1 K/UL    ABS. IMM. GRANS. 0.2 (H) 0.00 - 0.04 K/UL    DF AUTOMATED     TROPONIN-HIGH SENSITIVITY    Collection Time: 09/14/22  1:02 PM   Result Value Ref Range    Troponin-High Sensitivity 35 0 - 51 ng/L   METABOLIC PANEL, COMPREHENSIVE    Collection Time: 09/14/22  1:02 PM   Result Value Ref Range    Sodium 139 136 - 145 mmol/L    Potassium 4.6 3.5 - 5.1 mmol/L    Chloride 108 97 - 108 mmol/L    CO2 21 21 - 32 mmol/L    Anion gap 10 5 - 15 mmol/L    Glucose 198 (H) 65 - 100 mg/dL    BUN 83 (H) 6 - 20 mg/dL    Creatinine 9.97 (H) 0.55 - 1.02 mg/dL    BUN/Creatinine ratio 8 (L) 12 - 20      GFR est AA 5 (L) >60 ml/min/1.73m2    GFR est non-AA 4 (L) >60 ml/min/1.73m2    Calcium 9.0 8.5 - 10.1 mg/dL    Bilirubin, total 0.5 0.2 - 1.0 mg/dL    AST (SGOT) 20 15 - 37 U/L    ALT (SGPT) 19 12 - 78 U/L    Alk.  phosphatase 88 45 - 117 U/L    Protein, total 7.2 6.4 - 8.2 g/dL    Albumin 3.3 (L) 3.5 - 5.0 g/dL    Globulin 3.9 2.0 - 4.0 g/dL    A-G Ratio 0.8 (L) 1.1 - 2.2         Radiologic Studies  XR CHEST PORT   Final Result   Hazy interstitial and airspace disease likely represents pulmonary edema. CT HEAD WO CONT    (Results Pending)     CT Results  (Last 48 hours)      None          CXR Results  (Last 48 hours)                 09/14/22 1322  XR CHEST PORT Final result    Impression:  Hazy interstitial and airspace disease likely represents pulmonary edema. Narrative:  PORTABLE CHEST RADIOGRAPH/S: 9/14/2022 1:22 PM       INDICATION: Syncope. COMPARISON: 6/23/2021, 6/21/2021, 3/20/2021, 3/8/2021. TECHNIQUE: Portable frontal upright radiograph/s of the chest.       FINDINGS:    The lungs are hypoinflated. Hazy interstitial and airspace disease likely   represents pulmonary edema, given cardiomegaly and hemodialysis catheter seen on   prior chest radiographs. Atypical infection is possible, but considered less   likely. The central airways are patent. No pneumothorax and no large pleural   effusion. The radiograph is slightly underpenetrated secondary to patient body   habitus and portable technique.                 ------------------------------------------------------------------------------------------------------------  The exam and treatment you received in the Emergency Department were for an urgent problem and are not intended as complete care. It is important that you follow-up with a doctor, nurse practitioner, or physician assistant to:  (1) confirm your diagnosis,  (2) re-evaluation of changes in your illness and treatment, and  (3) for ongoing care. Please take your discharge instructions with you when you go to your follow-up appointment. If you have any problem arranging a follow-up appointment, contact the Emergency Department.   If your symptoms become worse or you do not improve as expected and you are unable to reach your health care provider, please return to the Emergency Department. We are available 24 hours a day. If a prescription has been provided, please have it filled as soon as possible to prevent a delay in treatment. If you have any questions or reservations about taking the medication due to side effects or interactions with other medications, please call your primary care provider or contact the ER.

## 2022-09-14 NOTE — PROGRESS NOTES
Admission Medication Reconciliation:    Information obtained from:  Transfer papers [facility] clinic)    Comments/Recommendations: Reviewed PTA medications and patient's allergies. Patient states she hasn't picked up her medications in more than a few weeks. Medications are what she should be taking per [facility] dialysis clinic        Allergies:  Doxycycline, Egg, Milk containing products, and Tetracycline    Significant PMH/Disease States:   Past Medical History:   Diagnosis Date    Anemia, chronic disease 3/22/2021    Chronic kidney disease     HD - M, W, F, Left AV Fistula    DM2 (diabetes mellitus, type 2) ([facility] [address] 3/22/2021    GERD (gastroesophageal reflux disease)     H/O metabolic acidosis 1/70/4067    Hypertension     Morbid obesity ([facility] [address]     Nephrotic syndrome 3/22/2021    Pulmonary hypertension ([facility] 3/22/2021    Renal failure      Chief Complaint for this Admission:    Chief Complaint   Patient presents with    Syncope     Prior to Admission Medications:   Prior to Admission Medications   Prescriptions Last Dose Informant Patient Reported? Taking? NIFEdipine ER (PROCARDIA XL) 30 mg ER tablet  Transfer Papers Yes Yes   Sig: Take 30 mg by mouth daily. carvediloL (COREG) 12.5 mg tablet  Transfer Papers Yes Yes   Sig: Take 12.5 mg by mouth two (2) times a day. ergocalciferol (ERGOCALCIFEROL) 1,250 mcg (50,000 unit) capsule  Transfer Papers Yes Yes   Sig: Take 50,000 Units by mouth every seven (7) days. furosemide (LASIX) 40 mg tablet  Transfer Papers Yes Yes   Sig: Take 40 mg by mouth three (3) times daily. hydrALAZINE (APRESOLINE) 100 mg tablet  Transfer Papers No Yes   Sig: Take 1 Tablet by mouth three (3) times daily. isosorbide dinitrate (ISORDIL) 20 mg tablet  Transfer Papers No Yes   Sig: Take 1 Tablet by mouth three (3) times daily. meclizine (ANTIVERT) 12.5 mg tablet  Transfer Papers Yes Yes   Sig: Take 12.5 mg by mouth two (2) times daily as needed for Dizziness.    sevelamer carbonate (RENVELA) 800 mg tab tab  Transfer Papers Yes Yes   Sig: Take 1,600 mg by mouth three (3) times daily (with meals). valsartan (DIOVAN) 160 mg tablet  Transfer Papers Yes Yes   Sig: Take 160 mg by mouth daily.       Facility-Administered Medications: None       Jannet Gomez

## 2022-09-14 NOTE — ED TRIAGE NOTES
Pt arrived via ems from [facility]  Pt had not gotten dialyzed in 6 days, went out of town, was dialyzed 3 consecutive days prior to leaving, when placed on dialysis today the nurse states she went unresponsive. Pt upon ems arrival was responsive hypertensive and nauseas. Pt on arrival to ed is vomiting and responsive.

## 2022-09-14 NOTE — ED PROVIDER NOTES
EMERGENCY DEPARTMENT HISTORY AND PHYSICAL EXAM      Date: 9/14/2022  Patient Name: Liat Mandujano      History of Presenting Illness     Chief Complaint   Patient presents with    Syncope       History Provided By: Patient and EMS    HPI: Liat Mandujano, 65 y.o. female with a past medical history significant for Obesity, pulm HTN, ESRD on HD MWF, DM2 presents to the ED with cc of syncope. Pt was out of town visiting family for last 6 days so could not go to dialysis. Had 3 days back to back of HD before so she could go. Today went to HD and soon after initiation had syncopal event after which she had epigastric abd pain, N/V. Denies focal weakness, numbness, tingling. Denies recent illness such as F/C. Still makes urine multiple times a day, has only been on HD x1yr. There are no other complaints, changes, or physical findings at this time. PCP: Ruth Greco MD    Current Facility-Administered Medications   Medication Dose Route Frequency Provider Last Rate Last Admin    fentaNYL citrate (PF) injection 50 mcg  50 mcg IntraVENous NOW Gonzalez Pierre MD        metoclopramide HCl (REGLAN) injection 5 mg  5 mg IntraVENous NOW Gonzalez Pierre MD        diphenhydrAMINE (BENADRYL) injection 25 mg  25 mg IntraVENous NOW Naldo Gunter MD         Current Outpatient Medications   Medication Sig Dispense Refill    furosemide (LASIX) 40 mg tablet Take 40 mg by mouth three (3) times daily. NIFEdipine ER (PROCARDIA XL) 30 mg ER tablet Take 30 mg by mouth daily. meclizine (ANTIVERT) 12.5 mg tablet Take 12.5 mg by mouth two (2) times daily as needed for Dizziness. ergocalciferol (ERGOCALCIFEROL) 1,250 mcg (50,000 unit) capsule Take 50,000 Units by mouth every seven (7) days. sevelamer carbonate (RENVELA) 800 mg tab tab Take 1,600 mg by mouth three (3) times daily (with meals). hydrALAZINE (APRESOLINE) 100 mg tablet Take 1 Tablet by mouth three (3) times daily.  90 Tablet 0    isosorbide dinitrate (ISORDIL) 20 mg tablet Take 1 Tablet by mouth three (3) times daily. 90 Tablet 0    valsartan (DIOVAN) 160 mg tablet Take 160 mg by mouth daily. carvediloL (COREG) 12.5 mg tablet Take 12.5 mg by mouth two (2) times a day. Past History     Past Medical History:  Past Medical History:   Diagnosis Date    Anemia, chronic disease 3/22/2021    Chronic kidney disease     HD - M, W, F, Left AV Fistula    DM2 (diabetes mellitus, type 2) ([facility] [address] 3/22/2021    GERD (gastroesophageal reflux disease)     H/O metabolic acidosis 0/82/9364    Hypertension     Morbid obesity ([facility] [address]     Nephrotic syndrome 3/22/2021    Pulmonary hypertension ([facility] [address] 3/22/2021    Renal failure        Past Surgical History:  Past Surgical History:   Procedure Laterality Date    COLONOSCOPY N/A 6/14/2022    COLONOSCOPY performed by Kym Chavez MD at [facility] ENDOSCOPY    HX BREAST BIOPSY      HX ORTHOPAEDIC      I&D of left foot    IR INSERT NON TUNL CVC OVER 5 YRS  3/12/2021    VASCULAR SURGERY PROCEDURE UNLIST      Left AV Fistula       Family History:  Family History   Problem Relation Age of Onset    Breast Cancer Maternal Grandmother     No Known Problems Mother     Heart Disease Father     Diabetes Father        Social History:  Social History     Tobacco Use    Smoking status: Never    Smokeless tobacco: Never   Vaping Use    Vaping Use: Never used   Substance Use Topics    Alcohol use: Not Currently    Drug use: Never       Allergies: Allergies   Allergen Reactions    Doxycycline Swelling    Egg Diarrhea    Milk Containing Products Nausea and Vomiting    Tetracycline Swelling         Review of Systems   Constitutional: Negative except as in HPI. Eyes: Negative except as in HPI.  ENT: Negative except as in HPI. Cardiovascular: Negative except as in HPI. Respiratory: Negative except as in HPI. Gastrointestinal: Negative except as in HPI. Genitourinary: Negative except as in HPI.   Musculoskeletal: Negative except as in HPI.  Integumentary: Negative except as in HPI. Neurological: Negative except as in HPI. Psychiatric: Negative except as in HPI. Endocrine: Negative except as in HPI. Hematologic/Lymphatic: Negative except as in HPI. Allergic/Immunologic: Negative except as in HPI. Physical Exam   Constitutional: Awake and alert, interactive, in some distress from nausea and vomiting  Eyes: PERRL, no injection or scleral icterus, no discharge  HEENT: NCAT, neck supple, MMM, no oropharyngeal exudates  CV: RRR, no m/r/g  Respiratory: CTAB, no r/r/w  GI: Abd soft, nondistended, ttp epigastrium  : Deferred  MSK: FROM, no joint effusions or edema  Skin: No rashes  Neuro: CN2-12 intact, symmetric facies, fluent speech. SILT distally, MAEE. Psych: Well-groomed, normal speech, behavior, appropriate mood    Lab and Diagnostic Study Results     Labs -     Recent Results (from the past 12 hour(s))   HEPATIC FUNCTION PANEL    Collection Time: 09/14/22  1:00 PM   Result Value Ref Range    Protein, total 7.2 6.4 - 8.2 g/dL    Albumin 3.3 (L) 3.5 - 5.0 g/dL    Globulin 3.9 2.0 - 4.0 g/dL    A-G Ratio 0.8 (L) 1.1 - 2.2      Bilirubin, total 0.5 0.2 - 1.0 mg/dL    Bilirubin, direct 0.2 0.0 - 0.2 mg/dL    Alk.  phosphatase 88 45 - 117 U/L    AST (SGOT) 20 15 - 37 U/L    ALT (SGPT) 19 12 - 78 U/L   LIPASE    Collection Time: 09/14/22  1:00 PM   Result Value Ref Range    Lipase 307 73 - 393 U/L   RENAL FUNCTION PANEL    Collection Time: 09/14/22  1:00 PM   Result Value Ref Range    Sodium 139 136 - 145 mmol/L    Potassium 4.6 3.5 - 5.1 mmol/L    Chloride 108 97 - 108 mmol/L    CO2 21 21 - 32 mmol/L    Anion gap 10 5 - 15 mmol/L    Glucose 198 (H) 65 - 100 mg/dL    BUN 83 (H) 6 - 20 mg/dL    Creatinine 9.97 (H) 0.55 - 1.02 mg/dL    BUN/Creatinine ratio 8 (L) 12 - 20      GFR est AA 5 (L) >60 ml/min/1.73m2    GFR est non-AA 4 (L) >60 ml/min/1.73m2    Calcium 9.0 8.5 - 10.1 mg/dL    Phosphorus 6.4 (H) 2.6 - 4.7 mg/dL    Albumin 3.3 (L) 3.5 - 5.0 g/dL   CBC WITH AUTOMATED DIFF    Collection Time: 09/14/22  1:02 PM   Result Value Ref Range    WBC 10.7 3.6 - 11.0 K/uL    RBC 3.42 (L) 3.80 - 5.20 M/uL    HGB 10.0 (L) 11.5 - 16.0 g/dL    HCT 31.5 (L) 35.0 - 47.0 %    MCV 92.1 80.0 - 99.0 FL    MCH 29.2 26.0 - 34.0 PG    MCHC 31.7 30.0 - 36.5 g/dL    RDW 16.1 (H) 11.5 - 14.5 %    PLATELET 598 309 - 252 K/uL    MPV 12.2 8.9 - 12.9 FL    NRBC 0.0 0.0  WBC    ABSOLUTE NRBC 0.00 0.00 - 0.01 K/uL    NEUTROPHILS 84 (H) 32 - 75 %    LYMPHOCYTES 8 (L) 12 - 49 %    MONOCYTES 4 (L) 5 - 13 %    EOSINOPHILS 1 0 - 7 %    BASOPHILS 1 0 - 1 %    IMMATURE GRANULOCYTES 2 (H) 0 - 0.5 %    ABS. NEUTROPHILS 9.0 (H) 1.8 - 8.0 K/UL    ABS. LYMPHOCYTES 0.9 0.8 - 3.5 K/UL    ABS. MONOCYTES 0.4 0.0 - 1.0 K/UL    ABS. EOSINOPHILS 0.1 0.0 - 0.4 K/UL    ABS. BASOPHILS 0.1 0.0 - 0.1 K/UL    ABS. IMM. GRANS. 0.2 (H) 0.00 - 0.04 K/UL    DF AUTOMATED     TROPONIN-HIGH SENSITIVITY    Collection Time: 09/14/22  1:02 PM   Result Value Ref Range    Troponin-High Sensitivity 35 0 - 51 ng/L   METABOLIC PANEL, COMPREHENSIVE    Collection Time: 09/14/22  1:02 PM   Result Value Ref Range    Sodium 139 136 - 145 mmol/L    Potassium 4.6 3.5 - 5.1 mmol/L    Chloride 108 97 - 108 mmol/L    CO2 21 21 - 32 mmol/L    Anion gap 10 5 - 15 mmol/L    Glucose 198 (H) 65 - 100 mg/dL    BUN 83 (H) 6 - 20 mg/dL    Creatinine 9.97 (H) 0.55 - 1.02 mg/dL    BUN/Creatinine ratio 8 (L) 12 - 20      GFR est AA 5 (L) >60 ml/min/1.73m2    GFR est non-AA 4 (L) >60 ml/min/1.73m2    Calcium 9.0 8.5 - 10.1 mg/dL    Bilirubin, total 0.5 0.2 - 1.0 mg/dL    AST (SGOT) 20 15 - 37 U/L    ALT (SGPT) 19 12 - 78 U/L    Alk.  phosphatase 88 45 - 117 U/L    Protein, total 7.2 6.4 - 8.2 g/dL    Albumin 3.3 (L) 3.5 - 5.0 g/dL    Globulin 3.9 2.0 - 4.0 g/dL    A-G Ratio 0.8 (L) 1.1 - 2.2         Radiologic Studies -   [unfilled]  CT Results  (Last 48 hours)      None          CXR Results  (Last 48 hours)                 09/14/22 1322  XR CHEST PORT Final result    Impression:  Hazy interstitial and airspace disease likely represents pulmonary edema. Narrative:  PORTABLE CHEST RADIOGRAPH/S: 9/14/2022 1:22 PM       INDICATION: Syncope. COMPARISON: 6/23/2021, 6/21/2021, 3/20/2021, 3/8/2021. TECHNIQUE: Portable frontal upright radiograph/s of the chest.       FINDINGS:    The lungs are hypoinflated. Hazy interstitial and airspace disease likely   represents pulmonary edema, given cardiomegaly and hemodialysis catheter seen on   prior chest radiographs. Atypical infection is possible, but considered less   likely. The central airways are patent. No pneumothorax and no large pleural   effusion. The radiograph is slightly underpenetrated secondary to patient body   habitus and portable technique. Medical Decision Making and ED Course   - I am the first and primary provider for this patient AND AM THE PRIMARY PROVIDER OF RECORD. - I reviewed the vital signs, available nursing notes, past medical history, past surgical history, family history and social history. - Initial assessment performed. The patients presenting problems have been discussed, and the staff are in agreement with the care plan formulated and outlined with them. I have encouraged them to ask questions as they arise throughout their visit. Vital Signs-Reviewed the patient's vital signs. Patient Vitals for the past 12 hrs:   Temp Pulse Resp BP SpO2   09/14/22 1745 98.1 °F (36.7 °C) 83 20 (!) 204/108 --   09/14/22 1715 -- 84 -- (!) 205/109 --   09/14/22 1645 -- 80 -- (!) 204/99 --   09/14/22 1615 -- 84 -- (!) 214/110 --   09/14/22 1545 98 °F (36.7 °C) 85 20 (!) 210/106 --   09/14/22 1243 97.8 °F (36.6 °C) 87 19 (!) 203/96 98 %       EKG interpretation: [email or web address], nl QRS/Qtc/axis, no JENNIFER/STD or nonanatomic TWI. No Brugada's, hypertrophy w/dagger lateral Q-waves, delta or epsilon waves. No peaked Twaves.         Provider Notes (Medical Decision Making):   62F w/syncope, likely from acute fluid shifts of HD after week of missing HD. EKG no e/o hyperkalemia or dysrhythmia, however given missed HD sessions will attempt to arrange for HD here today. Neuro-intact, will give zofran/fentanyl for pain/nausea and reassess need for CT imaging of head and abd. Dispo pending workup. ED Course:       ED Course as of 09/14/22 1839   Wed Sep 14, 2022   1433 Potassium: 4.6 [YA]   1433 Troponin-High Sensitivity: 35 [YA]   1433 XR CHEST PORT  IMPRESSION  Hazy interstitial and airspace disease likely represents pulmonary edema. [YA]   1546 BUN(!): 83 [YA]   1546 Creatinine(!): 9.97  Given Uremia and nonfocal neuro exam, will have Dr. Pili Cruz dialyze first as patient unable to lie flat for CT scan at this time. Will reassess need for CT imaging after dialysis. [YA]   [id number] Patient back from dialysis, does not feel nauseated or sick, no focal weakness or numbness, declining CT evaluation at this time, will discharge with return precautions. Patient requested food to eat, tolerating PO without difficulty including crackers. [YA]      ED Course User Index  [YA] Paul Hannon MD         Consultations:     Nephrologist Dr. Pili Cruz      Disposition     Disposition: DC- Adult Discharges: All of the diagnostic tests were reviewed and questions answered. Diagnosis, care plan and treatment options were discussed. The patient understands the instructions and will follow up as directed. The patients results have been reviewed with them. They have been counseled regarding their diagnosis. The patient verbally convey understanding and agreement of the signs, symptoms, diagnosis, treatment and prognosis and additionally agrees to follow up as recommended with their PCP in 24 - 48 hours. They also agree with the care-plan and convey that all of their questions have been answered.   I have also put together some discharge instructions for them that include: 1) educational information regarding their diagnosis, 2) how to care for their diagnosis at home, as well a 3) list of reasons why they would want to return to the ED prior to their follow-up appointment, should their condition change. Discharged      Diagnosis     Clinical Impression:   1. Syncope and collapse        Attestations:     Rosey Caraballo MD

## 2022-09-14 NOTE — CONSULTS
Renal Consult Note    Admit Date: 9/14/2022      HPI:   72-year-old -American lady with history of end-stage renal failure who is on maintenance hemodialysis on a Monday Wednesday Friday schedule. Her last dialysis was on Wednesday last week. She had been out of town and did not dialyze. She presented for her usual dialysis treatment today and about an hour into her treatment she reportedly had a syncopal episode. She now complains of nausea. She denies headache. She denies dizziness. She denies chest pain or shortness of breath. She denies abdominal pain. She has had intermittent diarrhea. She continues to make urine. Current Facility-Administered Medications   Medication Dose Route Frequency    fentaNYL citrate (PF) injection 50 mcg  50 mcg IntraVENous NOW     Current Outpatient Medications   Medication Sig    furosemide (LASIX) 40 mg tablet Take 40 mg by mouth three (3) times daily. NIFEdipine ER (PROCARDIA XL) 30 mg ER tablet Take 30 mg by mouth daily. meclizine (ANTIVERT) 12.5 mg tablet Take 12.5 mg by mouth two (2) times daily as needed for Dizziness. ergocalciferol (ERGOCALCIFEROL) 1,250 mcg (50,000 unit) capsule Take 50,000 Units by mouth every seven (7) days. sevelamer carbonate (RENVELA) 800 mg tab tab Take 1,600 mg by mouth three (3) times daily (with meals). hydrALAZINE (APRESOLINE) 100 mg tablet Take 1 Tablet by mouth three (3) times daily. isosorbide dinitrate (ISORDIL) 20 mg tablet Take 1 Tablet by mouth three (3) times daily. valsartan (DIOVAN) 160 mg tablet Take 160 mg by mouth daily. carvediloL (COREG) 12.5 mg tablet Take 12.5 mg by mouth two (2) times a day.         Past Medical History:   Diagnosis Date    Anemia, chronic disease 3/22/2021    Chronic kidney disease     HD - M, W, F, Left AV Fistula    DM2 (diabetes mellitus, type 2) [facility] 3/22/2021    GERD (gastroesophageal reflux disease)     H/O metabolic acidosis 8/96/3908    Hypertension     Morbid obesity ([facility]     Nephrotic syndrome 3/22/2021    Pulmonary hypertension ([facility] 3/22/2021    Renal failure       Past Surgical History:   Procedure Laterality Date    COLONOSCOPY N/A 6/14/2022    COLONOSCOPY performed by Serenity Martinez MD at [address] [place] [address] ENDOSCOPY    HX BREAST BIOPSY      HX ORTHOPAEDIC      I&D of left foot    IR INSERT NON TUNL CVC OVER 5 YRS  3/12/2021    VASCULAR SURGERY PROCEDURE UNLIST      Left AV Fistula     Family History   Problem Relation Age of Onset    Breast Cancer Maternal Grandmother     No Known Problems Mother     Heart Disease Father     Diabetes Father       Social History     Tobacco Use    Smoking status: Never    Smokeless tobacco: Never   Substance Use Topics    Alcohol use: Not Currently         Review of Systems    Review of Systems   Respiratory:  Negative for cough and shortness of breath. Cardiovascular:  Negative for chest pain. Gastrointestinal:  Positive for nausea and vomiting. Negative for abdominal pain. Neurological:  Negative for dizziness and headaches. Physical Exam:     Physical Exam  Cardiovascular:      Rate and Rhythm: Regular rhythm. Pulmonary:      Breath sounds: Normal breath sounds. Abdominal:      Palpations: Abdomen is soft. Musculoskeletal:         General: No swelling. Neurological:      Mental Status: She is alert. No carotid bruits. No edema  Left upper arm AV fistula is patent. Dialysis needles in place. Patient Vitals for the past 8 hrs:   BP Temp Pulse Resp SpO2 Height Weight   09/14/22 1243 (!) 203/96 97.8 °F (36.6 °C) 87 19 98 % 5' 8\" (1.727 m) 106.1 kg (234 lb)     No intake/output data recorded. No intake/output data recorded. XR CHEST PORT   Final Result   Hazy interstitial and airspace disease likely represents pulmonary edema.       CT HEAD WO CONT    (Results Pending)        Data Review   Recent Labs     09/14/22  1302   WBC 10.7   HGB 10.0*   HCT 31.5*        Recent Labs     09/14/22  1302 09/14/22  1300    139   K 4.6 4.6    108   CO2 21 21   * 198*   BUN 83* 83*   CREA 9.97* 9.97*   CA 9.0 9.0   PHOS  --  6.4*   ALB 3.3* 3.3*  3.3*   ALT 19 19     No components found for: GLPOC  No results for input(s): PH, PCO2, PO2, HCO3, FIO2 in the last 72 hours. No results for input(s): INR, INREXT, INREXT in the last 72 hours. Assessment:           Active Problems:    * No active hospital problems. *  ESRD hemodialysis dependent on a Monday Wednesday Friday schedule    Hypertension    Anemia of chronic disease    Secondary hyperparathyroidism    Nausea/vomiting. Uremia could be a contributing factor as the patient has not dialyzed in the last week. Diabetes mellitus    Pulmonary hypertension by history    Syncope. Etiology unknown. Plan: Will dialyze her for 2 hours today. CT of the brain has been ordered but not done as yet. Disposition as per ER. No indication for erythropoietin based on hemoglobin today.   Thank you for this consult

## 2022-09-14 NOTE — ED NOTES
46- diaylsis nurse ext. Spoke about leaving current dialysis needles in that patient arrived with, so that she can be emergently dialyzed here if needed. Nurse, Patricia Hugo states to call above extension when patient is ready.

## 2022-09-15 LAB
ATRIAL RATE: 87 BPM
CALCULATED P AXIS, ECG09: 62 DEGREES
CALCULATED R AXIS, ECG10: 55 DEGREES
CALCULATED T AXIS, ECG11: 93 DEGREES
DIAGNOSIS, 93000: NORMAL
P-R INTERVAL, ECG05: 182 MS
Q-T INTERVAL, ECG07: 406 MS
QRS DURATION, ECG06: 82 MS
QTC CALCULATION (BEZET), ECG08: 488 MS
VENTRICULAR RATE, ECG03: 87 BPM

## 2023-03-07 ENCOUNTER — TELEPHONE (OUTPATIENT)
Dept: OBGYN CLINIC | Age: 63
End: 2023-03-07

## 2023-03-07 NOTE — TELEPHONE ENCOUNTER
3/7/2023 @1:13pm and 12:02pm-Pt L/M on VM regarding a new patient appt. At 3:10pm, R/T phone call and L/M on VM to have patient contact the office at [phone]. ww

## 2023-04-13 ENCOUNTER — OFFICE VISIT (OUTPATIENT)
Dept: OBGYN CLINIC | Age: 63
End: 2023-04-13

## 2023-04-13 VITALS
BODY MASS INDEX: 37.59 KG/M2 | HEART RATE: 77 BPM | SYSTOLIC BLOOD PRESSURE: 151 MMHG | OXYGEN SATURATION: 98 % | RESPIRATION RATE: 18 BRPM | WEIGHT: 248 LBS | DIASTOLIC BLOOD PRESSURE: 66 MMHG | HEIGHT: 68 IN

## 2023-04-13 DIAGNOSIS — Z12.4 ENCOUNTER FOR SCREENING FOR MALIGNANT NEOPLASM OF CERVIX: ICD-10-CM

## 2023-04-13 DIAGNOSIS — Z01.419 GYNECOLOGIC EXAM NORMAL: Primary | ICD-10-CM

## 2023-04-13 RX ORDER — REPAGLINIDE 2 MG/1
2 TABLET ORAL
COMMUNITY

## 2023-04-21 LAB
CYTOLOGIST CVX/VAG CYTO: ABNORMAL
CYTOLOGY CVX/VAG DOC CYTO: ABNORMAL
CYTOLOGY CVX/VAG DOC THIN PREP: ABNORMAL
DX ICD CODE: ABNORMAL
DX ICD CODE: ABNORMAL
HPV I/H RISK 4 DNA CVX QL PROBE+SIG AMP: NEGATIVE
LABCORP, 190119: ABNORMAL
Lab: ABNORMAL
OTHER STN SPEC: ABNORMAL
PATHOLOGIST CVX/VAG CYTO: ABNORMAL
STAT OF ADQ CVX/VAG CYTO-IMP: ABNORMAL

## 2023-05-25 RX ORDER — NIFEDIPINE 30 MG/1
30 TABLET, EXTENDED RELEASE ORAL DAILY
COMMUNITY

## 2023-05-25 RX ORDER — REPAGLINIDE 2 MG/1
2 TABLET ORAL
COMMUNITY

## 2023-05-25 RX ORDER — ISOSORBIDE DINITRATE 20 MG/1
20 TABLET ORAL 3 TIMES DAILY
COMMUNITY
Start: 2021-06-25

## 2023-05-25 RX ORDER — VALSARTAN 160 MG/1
160 TABLET ORAL DAILY
COMMUNITY

## 2023-05-25 RX ORDER — HYDRALAZINE HYDROCHLORIDE 100 MG/1
100 TABLET, FILM COATED ORAL 3 TIMES DAILY
COMMUNITY
Start: 2021-06-25

## 2023-05-25 RX ORDER — CARVEDILOL 12.5 MG/1
12.5 TABLET ORAL 2 TIMES DAILY
COMMUNITY

## 2023-05-25 RX ORDER — ERGOCALCIFEROL 1.25 MG/1
50000 CAPSULE ORAL
COMMUNITY

## 2023-06-06 ENCOUNTER — HOSPITAL ENCOUNTER (OUTPATIENT)
Facility: HOSPITAL | Age: 63
Discharge: HOME | End: 2023-06-09
Attending: INTERNAL MEDICINE
Payer: MEDICARE

## 2023-06-06 DIAGNOSIS — Z12.31 VISIT FOR SCREENING MAMMOGRAM: ICD-10-CM

## 2023-06-06 PROCEDURE — 77063 BREAST TOMOSYNTHESIS BI: CPT

## 2023-10-23 ENCOUNTER — APPOINTMENT (OUTPATIENT)
Facility: HOSPITAL | Age: 63
DRG: 689 | End: 2023-10-23
Payer: MEDICARE

## 2023-10-23 ENCOUNTER — HOSPITAL ENCOUNTER (INPATIENT)
Facility: HOSPITAL | Age: 63
LOS: 3 days | Discharge: HOME | DRG: 689 | End: 2023-10-26
Attending: STUDENT IN AN ORGANIZED HEALTH CARE EDUCATION/TRAINING PROGRAM | Admitting: FAMILY MEDICINE
Payer: MEDICARE

## 2023-10-23 DIAGNOSIS — N30.01 ACUTE CYSTITIS WITH HEMATURIA: ICD-10-CM

## 2023-10-23 DIAGNOSIS — R11.2 NAUSEA AND VOMITING, UNSPECIFIED VOMITING TYPE: Primary | ICD-10-CM

## 2023-10-23 PROBLEM — D72.829 LEUKOCYTOSIS: Status: ACTIVE | Noted: 2023-10-23

## 2023-10-23 LAB
ALBUMIN SERPL-MCNC: 3.2 G/DL (ref 3.5–5)
ALBUMIN/GLOB SERPL: 0.8 (ref 1.1–2.2)
ALP SERPL-CCNC: 83 U/L (ref 45–117)
ALT SERPL-CCNC: 16 U/L (ref 12–78)
ANION GAP SERPL CALC-SCNC: 12 MMOL/L (ref 5–15)
APPEARANCE UR: ABNORMAL
AST SERPL W P-5'-P-CCNC: 22 U/L (ref 15–37)
BACTERIA URNS QL MICRO: NEGATIVE /HPF
BASOPHILS # BLD: 0.1 K/UL (ref 0–0.1)
BASOPHILS NFR BLD: 0 % (ref 0–1)
BILIRUB SERPL-MCNC: 0.5 MG/DL (ref 0.2–1)
BILIRUB UR QL: NEGATIVE
BUN SERPL-MCNC: 48 MG/DL (ref 6–20)
BUN/CREAT SERPL: 5 (ref 12–20)
CA-I BLD-MCNC: 9.5 MG/DL (ref 8.5–10.1)
CHLORIDE SERPL-SCNC: 99 MMOL/L (ref 97–108)
CO2 SERPL-SCNC: 27 MMOL/L (ref 21–32)
COLOR UR: ABNORMAL
CREAT SERPL-MCNC: 9.13 MG/DL (ref 0.55–1.02)
DIFFERENTIAL METHOD BLD: ABNORMAL
EKG ATRIAL RATE: 75 BPM
EKG DIAGNOSIS: NORMAL
EKG P AXIS: 34 DEGREES
EKG P-R INTERVAL: 216 MS
EKG Q-T INTERVAL: 430 MS
EKG QRS DURATION: 88 MS
EKG QTC CALCULATION (BAZETT): 480 MS
EKG R AXIS: 52 DEGREES
EKG T AXIS: 83 DEGREES
EKG VENTRICULAR RATE: 75 BPM
EOSINOPHIL # BLD: 0 K/UL (ref 0–0.4)
EOSINOPHIL NFR BLD: 0 % (ref 0–7)
EPITH CASTS URNS QL MICRO: ABNORMAL /LPF
ERYTHROCYTE [DISTWIDTH] IN BLOOD BY AUTOMATED COUNT: 17.8 % (ref 11.5–14.5)
EST. AVERAGE GLUCOSE BLD GHB EST-MCNC: 120 MG/DL
FLUAV AG NPH QL IA: NEGATIVE
FLUBV AG NOSE QL IA: NEGATIVE
GLOBULIN SER CALC-MCNC: 4 G/DL (ref 2–4)
GLUCOSE BLD STRIP.AUTO-MCNC: 124 MG/DL (ref 65–100)
GLUCOSE BLD STRIP.AUTO-MCNC: 141 MG/DL (ref 65–100)
GLUCOSE SERPL-MCNC: 206 MG/DL (ref 65–100)
GLUCOSE UR STRIP.AUTO-MCNC: 50 MG/DL
HBA1C MFR BLD: 5.8 % (ref 4–5.6)
HCT VFR BLD AUTO: 32 % (ref 35–47)
HGB BLD-MCNC: 10.3 G/DL (ref 11.5–16)
HGB UR QL STRIP: ABNORMAL
IMM GRANULOCYTES # BLD AUTO: 0.2 K/UL (ref 0–0.04)
IMM GRANULOCYTES NFR BLD AUTO: 1 % (ref 0–0.5)
KETONES UR QL STRIP.AUTO: NEGATIVE MG/DL
LACTATE BLD-SCNC: 1.26 MMOL/L (ref 0.4–2)
LEUKOCYTE ESTERASE UR QL STRIP.AUTO: ABNORMAL
LIPASE SERPL-CCNC: 30 U/L (ref 13–75)
LYMPHOCYTES # BLD: 0.3 K/UL (ref 0.8–3.5)
LYMPHOCYTES NFR BLD: 1 % (ref 12–49)
MCH RBC QN AUTO: 28.3 PG (ref 26–34)
MCHC RBC AUTO-ENTMCNC: 32.2 G/DL (ref 30–36.5)
MCV RBC AUTO: 87.9 FL (ref 80–99)
MONOCYTES # BLD: 0.8 K/UL (ref 0–1)
MONOCYTES NFR BLD: 4 % (ref 5–13)
NEUTS SEG # BLD: 20.1 K/UL (ref 1.8–8)
NEUTS SEG NFR BLD: 94 % (ref 32–75)
NITRITE UR QL STRIP.AUTO: NEGATIVE
NRBC # BLD: 0 K/UL (ref 0–0.01)
NRBC BLD-RTO: 0 PER 100 WBC
PERFORMED BY:: ABNORMAL
PERFORMED BY:: ABNORMAL
PERFORMED BY:: NORMAL
PH UR STRIP: 8 (ref 5–8)
PHOSPHATE SERPL-MCNC: 4 MG/DL (ref 2.6–4.7)
PLATELET # BLD AUTO: 176 K/UL (ref 150–400)
PMV BLD AUTO: 12.8 FL (ref 8.9–12.9)
POTASSIUM SERPL-SCNC: 4.4 MMOL/L (ref 3.5–5.1)
PROT SERPL-MCNC: 7.2 G/DL (ref 6.4–8.2)
PROT UR STRIP-MCNC: >300 MG/DL
RBC # BLD AUTO: 3.64 M/UL (ref 3.8–5.2)
RBC #/AREA URNS HPF: ABNORMAL /HPF (ref 0–5)
SARS-COV-2 RDRP RESP QL NAA+PROBE: NOT DETECTED
SODIUM SERPL-SCNC: 138 MMOL/L (ref 136–145)
SP GR UR REFRACTOMETRY: 1.01 (ref 1–1.03)
TROPONIN I SERPL HS-MCNC: 40 NG/L (ref 0–51)
TROPONIN I SERPL HS-MCNC: 48 NG/L (ref 0–51)
URINE CULTURE IF INDICATED: ABNORMAL
UROBILINOGEN UR QL STRIP.AUTO: 0.1 EU/DL (ref 0.1–1)
WBC # BLD AUTO: 21.4 K/UL (ref 3.6–11)
WBC URNS QL MICRO: >100 /HPF (ref 0–4)

## 2023-10-23 PROCEDURE — 6370000000 HC RX 637 (ALT 250 FOR IP)

## 2023-10-23 PROCEDURE — 71046 X-RAY EXAM CHEST 2 VIEWS: CPT

## 2023-10-23 PROCEDURE — 93005 ELECTROCARDIOGRAM TRACING: CPT | Performed by: STUDENT IN AN ORGANIZED HEALTH CARE EDUCATION/TRAINING PROGRAM

## 2023-10-23 PROCEDURE — 87077 CULTURE AEROBIC IDENTIFY: CPT

## 2023-10-23 PROCEDURE — 82962 GLUCOSE BLOOD TEST: CPT

## 2023-10-23 PROCEDURE — 84484 ASSAY OF TROPONIN QUANT: CPT

## 2023-10-23 PROCEDURE — 87804 INFLUENZA ASSAY W/OPTIC: CPT

## 2023-10-23 PROCEDURE — 1100000000 HC RM PRIVATE

## 2023-10-23 PROCEDURE — 87186 SC STD MICRODIL/AGAR DIL: CPT

## 2023-10-23 PROCEDURE — 81001 URINALYSIS AUTO W/SCOPE: CPT

## 2023-10-23 PROCEDURE — 83036 HEMOGLOBIN GLYCOSYLATED A1C: CPT

## 2023-10-23 PROCEDURE — 87086 URINE CULTURE/COLONY COUNT: CPT

## 2023-10-23 PROCEDURE — 36415 COLL VENOUS BLD VENIPUNCTURE: CPT

## 2023-10-23 PROCEDURE — 2709999900 HC NON-CHARGEABLE SUPPLY

## 2023-10-23 PROCEDURE — 83690 ASSAY OF LIPASE: CPT

## 2023-10-23 PROCEDURE — G0257 UNSCHED DIALYSIS ESRD PT HOS: HCPCS

## 2023-10-23 PROCEDURE — 96374 THER/PROPH/DIAG INJ IV PUSH: CPT

## 2023-10-23 PROCEDURE — 80053 COMPREHEN METABOLIC PANEL: CPT

## 2023-10-23 PROCEDURE — 5A1D70Z PERFORMANCE OF URINARY FILTRATION, INTERMITTENT, LESS THAN 6 HOURS PER DAY: ICD-10-PCS | Performed by: FAMILY MEDICINE

## 2023-10-23 PROCEDURE — 99285 EMERGENCY DEPT VISIT HI MDM: CPT

## 2023-10-23 PROCEDURE — 84100 ASSAY OF PHOSPHORUS: CPT

## 2023-10-23 PROCEDURE — 2580000003 HC RX 258

## 2023-10-23 PROCEDURE — 87150 DNA/RNA AMPLIFIED PROBE: CPT

## 2023-10-23 PROCEDURE — 87040 BLOOD CULTURE FOR BACTERIA: CPT

## 2023-10-23 PROCEDURE — 2580000003 HC RX 258: Performed by: FAMILY MEDICINE

## 2023-10-23 PROCEDURE — 83605 ASSAY OF LACTIC ACID: CPT

## 2023-10-23 PROCEDURE — 6370000000 HC RX 637 (ALT 250 FOR IP): Performed by: FAMILY MEDICINE

## 2023-10-23 PROCEDURE — 74176 CT ABD & PELVIS W/O CONTRAST: CPT

## 2023-10-23 PROCEDURE — 85025 COMPLETE CBC W/AUTO DIFF WBC: CPT

## 2023-10-23 PROCEDURE — 6360000002 HC RX W HCPCS

## 2023-10-23 PROCEDURE — 87635 SARS-COV-2 COVID-19 AMP PRB: CPT

## 2023-10-23 PROCEDURE — 6360000002 HC RX W HCPCS: Performed by: FAMILY MEDICINE

## 2023-10-23 RX ORDER — ACETAMINOPHEN 325 MG/1
650 TABLET ORAL EVERY 6 HOURS PRN
Status: DISCONTINUED | OUTPATIENT
Start: 2023-10-23 | End: 2023-10-26 | Stop reason: HOSPADM

## 2023-10-23 RX ORDER — VALSARTAN 80 MG/1
160 TABLET ORAL DAILY
Status: DISCONTINUED | OUTPATIENT
Start: 2023-10-23 | End: 2023-10-26 | Stop reason: HOSPADM

## 2023-10-23 RX ORDER — INSULIN LISPRO 100 [IU]/ML
0-4 INJECTION, SOLUTION INTRAVENOUS; SUBCUTANEOUS NIGHTLY
Status: DISCONTINUED | OUTPATIENT
Start: 2023-10-23 | End: 2023-10-26 | Stop reason: HOSPADM

## 2023-10-23 RX ORDER — SODIUM CHLORIDE, SODIUM LACTATE, POTASSIUM CHLORIDE, AND CALCIUM CHLORIDE .6; .31; .03; .02 G/100ML; G/100ML; G/100ML; G/100ML
500 INJECTION, SOLUTION INTRAVENOUS
Status: COMPLETED | OUTPATIENT
Start: 2023-10-23 | End: 2023-10-23

## 2023-10-23 RX ORDER — DEXTROSE MONOHYDRATE 100 MG/ML
INJECTION, SOLUTION INTRAVENOUS CONTINUOUS PRN
Status: DISCONTINUED | OUTPATIENT
Start: 2023-10-23 | End: 2023-10-26 | Stop reason: HOSPADM

## 2023-10-23 RX ORDER — INSULIN LISPRO 100 [IU]/ML
0-16 INJECTION, SOLUTION INTRAVENOUS; SUBCUTANEOUS
Status: DISCONTINUED | OUTPATIENT
Start: 2023-10-23 | End: 2023-10-26 | Stop reason: HOSPADM

## 2023-10-23 RX ORDER — SODIUM CHLORIDE 0.9 % (FLUSH) 0.9 %
5-40 SYRINGE (ML) INJECTION EVERY 12 HOURS SCHEDULED
Status: DISCONTINUED | OUTPATIENT
Start: 2023-10-23 | End: 2023-10-26 | Stop reason: HOSPADM

## 2023-10-23 RX ORDER — CARVEDILOL 12.5 MG/1
12.5 TABLET ORAL 2 TIMES DAILY
Status: DISCONTINUED | OUTPATIENT
Start: 2023-10-23 | End: 2023-10-26 | Stop reason: HOSPADM

## 2023-10-23 RX ORDER — POLYETHYLENE GLYCOL 3350 17 G/17G
17 POWDER, FOR SOLUTION ORAL DAILY PRN
Status: DISCONTINUED | OUTPATIENT
Start: 2023-10-23 | End: 2023-10-26 | Stop reason: HOSPADM

## 2023-10-23 RX ORDER — HYDRALAZINE HYDROCHLORIDE 50 MG/1
100 TABLET, FILM COATED ORAL 3 TIMES DAILY
Status: DISCONTINUED | OUTPATIENT
Start: 2023-10-23 | End: 2023-10-26 | Stop reason: HOSPADM

## 2023-10-23 RX ORDER — SODIUM CHLORIDE 0.9 % (FLUSH) 0.9 %
5-40 SYRINGE (ML) INJECTION PRN
Status: DISCONTINUED | OUTPATIENT
Start: 2023-10-23 | End: 2023-10-26 | Stop reason: HOSPADM

## 2023-10-23 RX ORDER — HEPARIN SODIUM 5000 [USP'U]/ML
5000 INJECTION, SOLUTION INTRAVENOUS; SUBCUTANEOUS EVERY 8 HOURS SCHEDULED
Status: DISCONTINUED | OUTPATIENT
Start: 2023-10-23 | End: 2023-10-26 | Stop reason: HOSPADM

## 2023-10-23 RX ORDER — ACETAMINOPHEN 500 MG
1000 TABLET ORAL ONCE
Status: COMPLETED | OUTPATIENT
Start: 2023-10-23 | End: 2023-10-23

## 2023-10-23 RX ORDER — ACETAMINOPHEN 650 MG/1
650 SUPPOSITORY RECTAL EVERY 6 HOURS PRN
Status: DISCONTINUED | OUTPATIENT
Start: 2023-10-23 | End: 2023-10-26 | Stop reason: HOSPADM

## 2023-10-23 RX ORDER — ISOSORBIDE DINITRATE 20 MG/1
20 TABLET ORAL 3 TIMES DAILY
Status: DISCONTINUED | OUTPATIENT
Start: 2023-10-23 | End: 2023-10-26 | Stop reason: HOSPADM

## 2023-10-23 RX ORDER — SODIUM CHLORIDE 9 MG/ML
INJECTION, SOLUTION INTRAVENOUS PRN
Status: DISCONTINUED | OUTPATIENT
Start: 2023-10-23 | End: 2023-10-26 | Stop reason: HOSPADM

## 2023-10-23 RX ORDER — ONDANSETRON 2 MG/ML
4 INJECTION INTRAMUSCULAR; INTRAVENOUS
Status: COMPLETED | OUTPATIENT
Start: 2023-10-23 | End: 2023-10-23

## 2023-10-23 RX ORDER — NIFEDIPINE 30 MG/1
30 TABLET, EXTENDED RELEASE ORAL DAILY
Status: DISCONTINUED | OUTPATIENT
Start: 2023-10-23 | End: 2023-10-26 | Stop reason: HOSPADM

## 2023-10-23 RX ORDER — ONDANSETRON 2 MG/ML
4 INJECTION INTRAMUSCULAR; INTRAVENOUS EVERY 6 HOURS PRN
Status: DISCONTINUED | OUTPATIENT
Start: 2023-10-23 | End: 2023-10-26 | Stop reason: HOSPADM

## 2023-10-23 RX ORDER — ONDANSETRON 4 MG/1
4 TABLET, ORALLY DISINTEGRATING ORAL EVERY 8 HOURS PRN
Status: DISCONTINUED | OUTPATIENT
Start: 2023-10-23 | End: 2023-10-26 | Stop reason: HOSPADM

## 2023-10-23 RX ORDER — ERGOCALCIFEROL 1.25 MG/1
50000 CAPSULE ORAL
Status: DISCONTINUED | OUTPATIENT
Start: 2023-10-25 | End: 2023-10-26 | Stop reason: HOSPADM

## 2023-10-23 RX ADMIN — HEPARIN SODIUM 5000 UNITS: 5000 INJECTION INTRAVENOUS; SUBCUTANEOUS at 21:50

## 2023-10-23 RX ADMIN — ACETAMINOPHEN 1000 MG: 500 TABLET ORAL at 09:40

## 2023-10-23 RX ADMIN — VANCOMYCIN HYDROCHLORIDE 2250 MG: 1.25 INJECTION, POWDER, LYOPHILIZED, FOR SOLUTION INTRAVENOUS at 13:55

## 2023-10-23 RX ADMIN — ACETAMINOPHEN 650 MG: 325 TABLET ORAL at 20:04

## 2023-10-23 RX ADMIN — ONDANSETRON 4 MG: 4 TABLET, ORALLY DISINTEGRATING ORAL at 20:04

## 2023-10-23 RX ADMIN — ONDANSETRON 4 MG: 2 INJECTION INTRAMUSCULAR; INTRAVENOUS at 09:41

## 2023-10-23 RX ADMIN — SODIUM CHLORIDE, PRESERVATIVE FREE 10 ML: 5 INJECTION INTRAVENOUS at 22:40

## 2023-10-23 RX ADMIN — SODIUM CHLORIDE, POTASSIUM CHLORIDE, SODIUM LACTATE AND CALCIUM CHLORIDE 500 ML: 600; 310; 30; 20 INJECTION, SOLUTION INTRAVENOUS at 09:41

## 2023-10-23 RX ADMIN — CARVEDILOL 12.5 MG: 12.5 TABLET, FILM COATED ORAL at 20:04

## 2023-10-23 RX ADMIN — HYDRALAZINE HYDROCHLORIDE 100 MG: 50 TABLET, FILM COATED ORAL at 20:03

## 2023-10-23 ASSESSMENT — PAIN DESCRIPTION - LOCATION
LOCATION: ABDOMEN
LOCATION: ABDOMEN

## 2023-10-23 ASSESSMENT — PAIN DESCRIPTION - FREQUENCY: FREQUENCY: INTERMITTENT

## 2023-10-23 ASSESSMENT — PAIN SCALES - GENERAL
PAINLEVEL_OUTOF10: 2
PAINLEVEL_OUTOF10: 4
PAINLEVEL_OUTOF10: 9
PAINLEVEL_OUTOF10: 4

## 2023-10-23 ASSESSMENT — LIFESTYLE VARIABLES
HOW MANY STANDARD DRINKS CONTAINING ALCOHOL DO YOU HAVE ON A TYPICAL DAY: PATIENT DOES NOT DRINK
HOW OFTEN DO YOU HAVE A DRINK CONTAINING ALCOHOL: NEVER

## 2023-10-23 ASSESSMENT — PAIN SCALES - WONG BAKER: WONGBAKER_NUMERICALRESPONSE: 2

## 2023-10-23 ASSESSMENT — ENCOUNTER SYMPTOMS
NAUSEA: 1
VOMITING: 1
SHORTNESS OF BREATH: 0

## 2023-10-23 ASSESSMENT — PAIN - FUNCTIONAL ASSESSMENT: PAIN_FUNCTIONAL_ASSESSMENT: 0-10

## 2023-10-23 NOTE — ED PROVIDER NOTES
the lung bases may be due to reactive small airway  disease. [CR]   2586 Dr. Tal Carvalho to admit. [KW]   1250 Dr. Amparo Mathur with [facility] to arrange dialysis for patient. [KW]      ED Course User Index  [KW] WILMER Sofia - NP       Disposition Considerations (Tests not done, Shared Decision Making, Pt Expectation of Test or Treatment.): See ED Course    Patient was given the following medications:  Medications   lactated ringers bolus bolus 500 mL (500 mLs IntraVENous New Bag 10/23/23 0941)   ondansetron (ZOFRAN) injection 4 mg (4 mg IntraVENous Given 10/23/23 0941)   acetaminophen (TYLENOL) tablet 1,000 mg (1,000 mg Oral Given 10/23/23 0940)       CONSULTS: (Who and What was discussed)  IP CONSULT TO PHARMACY     Social Determinants affecting Dx or Tx: None    Smoking Cessation: Not Applicable    PROCEDURES   Unless otherwise noted above, none  Procedures      CRITICAL CARE TIME   Patient does not meet Critical Care Time, 0 minutes    ED FINAL IMPRESSION     1. Nausea and vomiting, unspecified vomiting type          DISPOSITION/PLAN   DISPOSITION      Admit Note: Pt is being admitted by Dr. Tal Carvalho, hospitalist. The results of their tests and reason(s) for their admission have been discussed with pt and/or available family. They convey agreement and understanding for the need to be admitted and for the admission diagnosis.      PATIENT REFERRED TO:  [facility] EMERGENCY DEPT  [address]  [phone]    If symptoms worsen    Pardeep Caputo MD  [address]  [phone]    Call in 1 day          DISCHARGE MEDICATIONS:     Medication List        ASK your doctor about these medications      carvedilol 12.5 MG tablet  Commonly known as: COREG     ergocalciferol 1.25 MG (15020 UT) capsule  Commonly known as: ERGOCALCIFEROL     hydrALAZINE 100 MG tablet  Commonly known as: APRESOLINE     isosorbide dinitrate 20 MG

## 2023-10-23 NOTE — ED NOTES
Spoke with Dr Rae Coello regarding duplicatae order for lactic and blood cultures, first lactic complete and blood cultures drawn earlier x2. He said does not need to repeat.      Joanna Ramos RN  10/23/23 2383

## 2023-10-23 NOTE — ED NOTES
Called [facility] regarding sbar form coming up via tube, pt will be taken up from dialysis, IV did infiltrate in dialysis while infusing Vanc, will need to be restarted and other meds that were ordered will need to be restarted.      Merlin Rohrer, RN  10/23/23 9177

## 2023-10-23 NOTE — DIALYSIS
Patient tolerated treatment. 2000 ml of fluid was removed. Patient was alert and oriented during treatment. 68.7 liters of fluid was removed. Report was given to [facility].

## 2023-10-23 NOTE — ED TRIAGE NOTES
Pt c/o chills and nausea/vomiting since yesterday. Pt denies abd pain but states that nausea is 9/10. A&O x4, clear speech, follows commands, pt tearful in triage.

## 2023-10-23 NOTE — CARE COORDINATION
Provided? No   Mode of Transport at Discharge Other (see comment)  (Daughter)   Confirm Follow Up Transport Family     CM met with pt & D/C Plan is home with daughter & daughter to transport. Send Rxs to [facility] upon discharge. [facility] MWF - drives self. Uses no DME.

## 2023-10-23 NOTE — ED NOTES
Pt in dialysis, received call stating IV infiltrated that Vancomycin was infusing in. Simba Martinez RN  10/23/23 9042

## 2023-10-23 NOTE — H&P
IR NONTUNNELED VASCULAR CATHETER  3/12/2021    ORTHOPEDIC SURGERY      I&D of left foot    VASCULAR SURGERY      Left AV Fistula       Social History     Tobacco Use    Smoking status: Never    Smokeless tobacco: Never   Substance Use Topics    Alcohol use: Not Currently        Family History   Problem Relation Age of Onset    Diabetes Father     Heart Disease Father     Breast Cancer Maternal Grandmother     No Known Problems Mother        Allergies   Allergen Reactions    Doxycycline Swelling    Egg Solids, Whole Diarrhea    Tetracycline Swelling    Milk (Cow) Nausea And Vomiting        Prior to Admission medications    Medication Sig Start Date End Date Taking?  Authorizing Provider   carvedilol (COREG) 12.5 MG tablet Take 1 tablet by mouth 2 times daily    Automatic Reconciliation, Ar   ergocalciferol (ERGOCALCIFEROL) 1.25 MG (02778 UT) capsule Take 1 capsule by mouth every 7 days    Automatic Reconciliation, Ar   hydrALAZINE (APRESOLINE) 100 MG tablet Take 1 tablet by mouth 3 times daily 6/25/21   Automatic Reconciliation, Ar   isosorbide dinitrate (ISORDIL) 20 MG tablet Take 1 tablet by mouth 3 times daily 6/25/21   Automatic Reconciliation, Ar   NIFEdipine (PROCARDIA XL) 30 MG extended release tablet Take 1 tablet by mouth daily    Automatic Reconciliation, Ar   repaglinide (PRANDIN) 2 MG tablet Take 1 tablet by mouth 3 times daily (before meals)    Automatic Reconciliation, Ar   valsartan (DIOVAN) 160 MG tablet Take 1 tablet by mouth daily    Automatic Reconciliation, Ar         Current Facility-Administered Medications:     vancomycin (VANCOCIN) intermittent dosing (placeholder), , Other, RX Placeholder, Dereck Rebolledo MD    vancomycin (VANCOCIN) 2,250 mg in sodium chloride 0.9 % 500 mL IVPB, 2,250 mg, IntraVENous, Once, WILMER Ji NP    carvedilol (COREG) tablet 12.5 mg, 12.5 mg, Oral, BID, Dereck Rebolledo MD    ergocalciferol capsule 50,000 Units, 50,000 Units, Oral, Q7 Days,
6 - 20 mg/dL    Creatinine 9.13 (H) 0.55 - 1.02 mg/dL    Bun/Cre Ratio 5 (L) 12 - 20      Est, Glom Filt Rate 4 (L) >60 ml/min/1.73m2    Calcium 9.5 8.5 - 10.1 mg/dL    Total Bilirubin 0.5 0.2 - 1.0 mg/dL    AST 22 15 - 37 U/L    ALT 16 12 - 78 U/L    Alk Phosphatase 83 45 - 117 U/L    Total Protein 7.2 6.4 - 8.2 g/dL    Albumin 3.2 (L) 3.5 - 5.0 g/dL    Globulin 4.0 2.0 - 4.0 g/dL    Albumin/Globulin Ratio 0.8 (L) 1.1 - 2.2     Lipase    Collection Time: 10/23/23  9:40 AM   Result Value Ref Range    Lipase 30 13 - 75 U/L   Rapid influenza A/B antigens    Collection Time: 10/23/23  9:40 AM    Specimen: Nasal Washing   Result Value Ref Range    Influenza A Ag Negative Negative      Influenza B Ag Negative Negative     Troponin    Collection Time: 10/23/23  9:40 AM   Result Value Ref Range    Troponin, High Sensitivity 40 0 - 51 ng/L   POC Lactic Acid    Collection Time: 10/23/23 10:35 AM   Result Value Ref Range    POC Lactic Acid 1.26 0.40 - 2.00 mmol/L    Performed by: Silvana Garner (PCT)    Urinalysis with Reflex to Culture    Collection Time: 10/23/23 11:35 AM    Specimen: Urine   Result Value Ref Range    Color, UA Yellow/Straw      Appearance Turbid (A) Clear      Specific Gravity, UA 1.011 1.003 - 1.030      pH, Urine 8.0 5.0 - 8.0      Protein, UA >300 (A) Negative mg/dL    Glucose, UA 50 (A) Negative mg/dL    Ketones, Urine Negative Negative mg/dL    Bilirubin Urine Negative Negative      Blood, Urine Moderate (A) Negative      Urobilinogen, Urine 0.1 0.1 - 1.0 EU/dL    Nitrite, Urine Negative Negative      Leukocyte Esterase, Urine Large (A) Negative      WBC, UA >100 (H) 0 - 4 /hpf    RBC, UA 20-50 0 - 5 /hpf    Epithelial Cells UA Many (A) Few /lpf    BACTERIA, URINE Negative Negative /hpf    Urine Culture if Indicated Urine Culture Ordered (A) Culture not indicated by UA result             Xray Result (most recent):  CT ABDOMEN PELVIS WO CONTRAST Additional Contrast? None    Result Date:

## 2023-10-23 NOTE — CONSULTS
Renal Consult Note    Admit Date: 10/23/2023      HPI:   59 yr old  lady with  h/o ESRD on HD on a MWF schedule presents to the ER with chills and vomiting. Temp in the ER was 100. She complains of some lower abdominal discomfort. She has not had diarrhea. She denies chest pain or shortness of breath. She has been on hemodialysis for about 2 years. She is known to have diabetes and hypertension. CT of the abdomen showed minimally enlarged pelvic lymph nodes and possibility of reactive airway disease. Rapid test for COVID-19 was negative. Urinalysis showed moderate blood 300 protein large leukocyte esterase and greater than 100 WBCs . She has been started on IV vancomycin and IV Rocephin.       Current Facility-Administered Medications   Medication Dose Route Frequency    vancomycin (VANCOCIN) intermittent dosing (placeholder)   Other RX Placeholder    vancomycin (VANCOCIN) 2,250 mg in sodium chloride 0.9 % 500 mL IVPB  2,250 mg IntraVENous Once    carvedilol (COREG) tablet 12.5 mg  12.5 mg Oral BID    [START ON 10/25/2023] vitamin D (ERGOCALCIFEROL) capsule 50,000 Units  50,000 Units Oral Q7 Days    hydrALAZINE (APRESOLINE) tablet 100 mg  100 mg Oral TID    isosorbide dinitrate (ISORDIL) tablet 20 mg  20 mg Oral TID    NIFEdipine (PROCARDIA XL) extended release tablet 30 mg  30 mg Oral Daily    valsartan (DIOVAN) tablet 160 mg  160 mg Oral Daily    insulin lispro (HUMALOG) injection vial 0-16 Units  0-16 Units SubCUTAneous TID WC    insulin lispro (HUMALOG) injection vial 0-4 Units  0-4 Units SubCUTAneous Nightly    glucose chewable tablet 16 g  4 tablet Oral PRN    dextrose bolus 10% 125 mL  125 mL IntraVENous PRN    Or    dextrose bolus 10% 250 mL  250 mL IntraVENous PRN    glucagon injection 1 mg  1 mg SubCUTAneous PRN    dextrose 10 % infusion   IntraVENous Continuous PRN    sodium chloride flush 0.9 % injection 5-40 mL  5-40 mL IntraVENous 2 times per day    sodium chloride flush 0.9 % injection 5-40 mL

## 2023-10-23 NOTE — ED NOTES
Pt placed on continuous cardiac monitoring, pulse ox, and blood pressure monitoring at this time.         Corey Zhu RN  10/23/23 9901

## 2023-10-24 LAB
ALBUMIN SERPL-MCNC: 2.6 G/DL (ref 3.5–5)
ALBUMIN/GLOB SERPL: 0.7 (ref 1.1–2.2)
ALP SERPL-CCNC: 68 U/L (ref 45–117)
ALT SERPL-CCNC: 11 U/L (ref 12–78)
ANION GAP SERPL CALC-SCNC: 8 MMOL/L (ref 5–15)
AST SERPL W P-5'-P-CCNC: 9 U/L (ref 15–37)
BASOPHILS # BLD: 0.1 K/UL (ref 0–0.1)
BASOPHILS NFR BLD: 1 % (ref 0–1)
BILIRUB SERPL-MCNC: 0.5 MG/DL (ref 0.2–1)
BUN SERPL-MCNC: 32 MG/DL (ref 6–20)
BUN/CREAT SERPL: 5 (ref 12–20)
CA-I BLD-MCNC: 8.9 MG/DL (ref 8.5–10.1)
CHLORIDE SERPL-SCNC: 101 MMOL/L (ref 97–108)
CO2 SERPL-SCNC: 29 MMOL/L (ref 21–32)
CREAT SERPL-MCNC: 6.56 MG/DL (ref 0.55–1.02)
DIFFERENTIAL METHOD BLD: ABNORMAL
EOSINOPHIL # BLD: 0 K/UL (ref 0–0.4)
EOSINOPHIL NFR BLD: 0 % (ref 0–7)
ERYTHROCYTE [DISTWIDTH] IN BLOOD BY AUTOMATED COUNT: 17.3 % (ref 11.5–14.5)
GLOBULIN SER CALC-MCNC: 3.6 G/DL (ref 2–4)
GLUCOSE BLD STRIP.AUTO-MCNC: 103 MG/DL (ref 65–100)
GLUCOSE BLD STRIP.AUTO-MCNC: 134 MG/DL (ref 65–100)
GLUCOSE BLD STRIP.AUTO-MCNC: 181 MG/DL (ref 65–100)
GLUCOSE BLD STRIP.AUTO-MCNC: 206 MG/DL (ref 65–100)
GLUCOSE SERPL-MCNC: 112 MG/DL (ref 65–100)
HCT VFR BLD AUTO: 30.3 % (ref 35–47)
HGB BLD-MCNC: 9.4 G/DL (ref 11.5–16)
IMM GRANULOCYTES # BLD AUTO: 0.1 K/UL (ref 0–0.04)
IMM GRANULOCYTES NFR BLD AUTO: 0 % (ref 0–0.5)
LYMPHOCYTES # BLD: 0.9 K/UL (ref 0.8–3.5)
LYMPHOCYTES NFR BLD: 8 % (ref 12–49)
MCH RBC QN AUTO: 27.6 PG (ref 26–34)
MCHC RBC AUTO-ENTMCNC: 31 G/DL (ref 30–36.5)
MCV RBC AUTO: 89.1 FL (ref 80–99)
MONOCYTES # BLD: 0.8 K/UL (ref 0–1)
MONOCYTES NFR BLD: 7 % (ref 5–13)
NEUTS SEG # BLD: 10 K/UL (ref 1.8–8)
NEUTS SEG NFR BLD: 84 % (ref 32–75)
NRBC # BLD: 0 K/UL (ref 0–0.01)
NRBC BLD-RTO: 0 PER 100 WBC
PERFORMED BY:: ABNORMAL
PLATELET # BLD AUTO: 102 K/UL (ref 150–400)
POTASSIUM SERPL-SCNC: 3.6 MMOL/L (ref 3.5–5.1)
PROT SERPL-MCNC: 6.2 G/DL (ref 6.4–8.2)
RBC # BLD AUTO: 3.4 M/UL (ref 3.8–5.2)
SODIUM SERPL-SCNC: 138 MMOL/L (ref 136–145)
VANCOMYCIN SERPL-MCNC: 23.2 UG/ML
WBC # BLD AUTO: 11.9 K/UL (ref 3.6–11)

## 2023-10-24 PROCEDURE — 6370000000 HC RX 637 (ALT 250 FOR IP): Performed by: FAMILY MEDICINE

## 2023-10-24 PROCEDURE — 80053 COMPREHEN METABOLIC PANEL: CPT

## 2023-10-24 PROCEDURE — 6360000002 HC RX W HCPCS: Performed by: FAMILY MEDICINE

## 2023-10-24 PROCEDURE — 82962 GLUCOSE BLOOD TEST: CPT

## 2023-10-24 PROCEDURE — 85025 COMPLETE CBC W/AUTO DIFF WBC: CPT

## 2023-10-24 PROCEDURE — 80202 ASSAY OF VANCOMYCIN: CPT

## 2023-10-24 PROCEDURE — 2580000003 HC RX 258: Performed by: FAMILY MEDICINE

## 2023-10-24 PROCEDURE — 1100000000 HC RM PRIVATE

## 2023-10-24 PROCEDURE — 36415 COLL VENOUS BLD VENIPUNCTURE: CPT

## 2023-10-24 RX ADMIN — ACETAMINOPHEN 650 MG: 325 TABLET ORAL at 06:42

## 2023-10-24 RX ADMIN — CARVEDILOL 12.5 MG: 12.5 TABLET, FILM COATED ORAL at 09:29

## 2023-10-24 RX ADMIN — ISOSORBIDE DINITRATE 20 MG: 20 TABLET ORAL at 14:03

## 2023-10-24 RX ADMIN — CEFTRIAXONE SODIUM 1000 MG: 1 INJECTION, POWDER, FOR SOLUTION INTRAMUSCULAR; INTRAVENOUS at 14:03

## 2023-10-24 RX ADMIN — HYDRALAZINE HYDROCHLORIDE 100 MG: 50 TABLET, FILM COATED ORAL at 21:47

## 2023-10-24 RX ADMIN — HYDRALAZINE HYDROCHLORIDE 100 MG: 50 TABLET, FILM COATED ORAL at 14:03

## 2023-10-24 RX ADMIN — ACETAMINOPHEN 650 MG: 325 TABLET ORAL at 01:57

## 2023-10-24 RX ADMIN — ISOSORBIDE DINITRATE 20 MG: 20 TABLET ORAL at 21:47

## 2023-10-24 RX ADMIN — ISOSORBIDE DINITRATE 20 MG: 20 TABLET ORAL at 09:29

## 2023-10-24 RX ADMIN — SODIUM CHLORIDE, PRESERVATIVE FREE 10 ML: 5 INJECTION INTRAVENOUS at 21:49

## 2023-10-24 RX ADMIN — CARVEDILOL 12.5 MG: 12.5 TABLET, FILM COATED ORAL at 21:59

## 2023-10-24 RX ADMIN — SODIUM CHLORIDE, PRESERVATIVE FREE 10 ML: 5 INJECTION INTRAVENOUS at 09:31

## 2023-10-24 RX ADMIN — NIFEDIPINE 30 MG: 30 TABLET, FILM COATED, EXTENDED RELEASE ORAL at 09:29

## 2023-10-24 RX ADMIN — HEPARIN SODIUM 5000 UNITS: 5000 INJECTION INTRAVENOUS; SUBCUTANEOUS at 14:03

## 2023-10-24 RX ADMIN — ACETAMINOPHEN 650 MG: 325 TABLET ORAL at 21:54

## 2023-10-24 RX ADMIN — HEPARIN SODIUM 5000 UNITS: 5000 INJECTION INTRAVENOUS; SUBCUTANEOUS at 21:46

## 2023-10-24 RX ADMIN — HYDRALAZINE HYDROCHLORIDE 100 MG: 50 TABLET, FILM COATED ORAL at 09:29

## 2023-10-24 RX ADMIN — HEPARIN SODIUM 5000 UNITS: 5000 INJECTION INTRAVENOUS; SUBCUTANEOUS at 06:41

## 2023-10-24 RX ADMIN — VALSARTAN 160 MG: 80 TABLET, FILM COATED ORAL at 09:29

## 2023-10-24 ASSESSMENT — ENCOUNTER SYMPTOMS
ABDOMINAL PAIN: 0
SHORTNESS OF BREATH: 0

## 2023-10-24 ASSESSMENT — PAIN SCALES - GENERAL
PAINLEVEL_OUTOF10: 2
PAINLEVEL_OUTOF10: 2
PAINLEVEL_OUTOF10: 6
PAINLEVEL_OUTOF10: 0
PAINLEVEL_OUTOF10: 2
PAINLEVEL_OUTOF10: 5

## 2023-10-24 ASSESSMENT — PAIN DESCRIPTION - LOCATION
LOCATION: ABDOMEN
LOCATION: THROAT

## 2023-10-24 ASSESSMENT — PAIN SCALES - WONG BAKER
WONGBAKER_NUMERICALRESPONSE: 4
WONGBAKER_NUMERICALRESPONSE: 0

## 2023-10-24 ASSESSMENT — PAIN DESCRIPTION - ORIENTATION: ORIENTATION: LEFT

## 2023-10-24 ASSESSMENT — PAIN DESCRIPTION - DESCRIPTORS
DESCRIPTORS: THROBBING;DISCOMFORT
DESCRIPTORS: BURNING

## 2023-10-25 LAB
ALBUMIN SERPL-MCNC: 2.6 G/DL (ref 3.5–5)
ALBUMIN/GLOB SERPL: 0.7 (ref 1.1–2.2)
ALP SERPL-CCNC: 73 U/L (ref 45–117)
ALT SERPL-CCNC: 12 U/L (ref 12–78)
ANION GAP SERPL CALC-SCNC: 11 MMOL/L (ref 5–15)
AST SERPL W P-5'-P-CCNC: 10 U/L (ref 15–37)
BACTERIA SPEC CULT: ABNORMAL
BILIRUB SERPL-MCNC: 0.4 MG/DL (ref 0.2–1)
BUN SERPL-MCNC: 46 MG/DL (ref 6–20)
BUN/CREAT SERPL: 6 (ref 12–20)
CA-I BLD-MCNC: 8.6 MG/DL (ref 8.5–10.1)
CHLORIDE SERPL-SCNC: 100 MMOL/L (ref 97–108)
CO2 SERPL-SCNC: 28 MMOL/L (ref 21–32)
COLONY COUNT, CNT: ABNORMAL
CREAT SERPL-MCNC: 8.12 MG/DL (ref 0.55–1.02)
ERYTHROCYTE [DISTWIDTH] IN BLOOD BY AUTOMATED COUNT: 17.1 % (ref 11.5–14.5)
GLOBULIN SER CALC-MCNC: 3.5 G/DL (ref 2–4)
GLUCOSE BLD STRIP.AUTO-MCNC: 113 MG/DL (ref 65–100)
GLUCOSE BLD STRIP.AUTO-MCNC: 158 MG/DL (ref 65–100)
GLUCOSE BLD STRIP.AUTO-MCNC: 183 MG/DL (ref 65–100)
GLUCOSE SERPL-MCNC: 160 MG/DL (ref 65–100)
HCT VFR BLD AUTO: 28.7 % (ref 35–47)
HGB BLD-MCNC: 9 G/DL (ref 11.5–16)
Lab: ABNORMAL
MCH RBC QN AUTO: 28 PG (ref 26–34)
MCHC RBC AUTO-ENTMCNC: 31.4 G/DL (ref 30–36.5)
MCV RBC AUTO: 89.1 FL (ref 80–99)
NRBC # BLD: 0 K/UL (ref 0–0.01)
NRBC BLD-RTO: 0 PER 100 WBC
PERFORMED BY:: ABNORMAL
PLATELET # BLD AUTO: 106 K/UL (ref 150–400)
POTASSIUM SERPL-SCNC: 3.6 MMOL/L (ref 3.5–5.1)
PROT SERPL-MCNC: 6.1 G/DL (ref 6.4–8.2)
RBC # BLD AUTO: 3.22 M/UL (ref 3.8–5.2)
SODIUM SERPL-SCNC: 139 MMOL/L (ref 136–145)
VANCOMYCIN SERPL-MCNC: 21.1 UG/ML
WBC # BLD AUTO: 7.7 K/UL (ref 3.6–11)

## 2023-10-25 PROCEDURE — 36415 COLL VENOUS BLD VENIPUNCTURE: CPT

## 2023-10-25 PROCEDURE — 2580000003 HC RX 258: Performed by: FAMILY MEDICINE

## 2023-10-25 PROCEDURE — 1100000000 HC RM PRIVATE

## 2023-10-25 PROCEDURE — 6370000000 HC RX 637 (ALT 250 FOR IP): Performed by: FAMILY MEDICINE

## 2023-10-25 PROCEDURE — 80053 COMPREHEN METABOLIC PANEL: CPT

## 2023-10-25 PROCEDURE — 80202 ASSAY OF VANCOMYCIN: CPT

## 2023-10-25 PROCEDURE — 82962 GLUCOSE BLOOD TEST: CPT

## 2023-10-25 PROCEDURE — 2709999900 HC NON-CHARGEABLE SUPPLY

## 2023-10-25 PROCEDURE — 90935 HEMODIALYSIS ONE EVALUATION: CPT

## 2023-10-25 PROCEDURE — 85027 COMPLETE CBC AUTOMATED: CPT

## 2023-10-25 PROCEDURE — 6360000002 HC RX W HCPCS: Performed by: FAMILY MEDICINE

## 2023-10-25 RX ORDER — PREDNISONE 20 MG/1
20 TABLET ORAL DAILY
Status: DISCONTINUED | OUTPATIENT
Start: 2023-10-25 | End: 2023-10-26 | Stop reason: HOSPADM

## 2023-10-25 RX ORDER — DIPHENHYDRAMINE HCL 25 MG
25 CAPSULE ORAL EVERY 6 HOURS PRN
Status: DISCONTINUED | OUTPATIENT
Start: 2023-10-25 | End: 2023-10-26 | Stop reason: HOSPADM

## 2023-10-25 RX ADMIN — VALSARTAN 160 MG: 80 TABLET, FILM COATED ORAL at 14:02

## 2023-10-25 RX ADMIN — PREDNISONE 20 MG: 20 TABLET ORAL at 13:37

## 2023-10-25 RX ADMIN — HEPARIN SODIUM 5000 UNITS: 5000 INJECTION INTRAVENOUS; SUBCUTANEOUS at 13:38

## 2023-10-25 RX ADMIN — NIFEDIPINE 30 MG: 30 TABLET, FILM COATED, EXTENDED RELEASE ORAL at 13:37

## 2023-10-25 RX ADMIN — CEFTRIAXONE SODIUM 1000 MG: 1 INJECTION, POWDER, FOR SOLUTION INTRAMUSCULAR; INTRAVENOUS at 13:36

## 2023-10-25 RX ADMIN — CARVEDILOL 12.5 MG: 12.5 TABLET, FILM COATED ORAL at 22:39

## 2023-10-25 RX ADMIN — ISOSORBIDE DINITRATE 20 MG: 20 TABLET ORAL at 13:37

## 2023-10-25 RX ADMIN — SODIUM CHLORIDE, PRESERVATIVE FREE 10 ML: 5 INJECTION INTRAVENOUS at 22:45

## 2023-10-25 RX ADMIN — ERGOCALCIFEROL 50000 UNITS: 1.25 CAPSULE ORAL at 13:37

## 2023-10-25 RX ADMIN — ONDANSETRON 4 MG: 2 INJECTION INTRAMUSCULAR; INTRAVENOUS at 22:44

## 2023-10-25 RX ADMIN — SODIUM CHLORIDE, PRESERVATIVE FREE 10 ML: 5 INJECTION INTRAVENOUS at 13:39

## 2023-10-25 RX ADMIN — ISOSORBIDE DINITRATE 20 MG: 20 TABLET ORAL at 22:39

## 2023-10-25 RX ADMIN — HYDRALAZINE HYDROCHLORIDE 100 MG: 50 TABLET, FILM COATED ORAL at 22:39

## 2023-10-25 RX ADMIN — HEPARIN SODIUM 5000 UNITS: 5000 INJECTION INTRAVENOUS; SUBCUTANEOUS at 22:39

## 2023-10-25 RX ADMIN — HEPARIN SODIUM 5000 UNITS: 5000 INJECTION INTRAVENOUS; SUBCUTANEOUS at 06:36

## 2023-10-25 RX ADMIN — HYDRALAZINE HYDROCHLORIDE 100 MG: 50 TABLET, FILM COATED ORAL at 13:37

## 2023-10-25 ASSESSMENT — ENCOUNTER SYMPTOMS
ABDOMINAL PAIN: 0
SHORTNESS OF BREATH: 0

## 2023-10-25 NOTE — DIALYSIS
Patient tolerated treatment. 2000 ml of fluid was removed. Patient was alert and oriented during treatment. 89.7 liters of blood was processed. Report given to primary nurse Kandi.

## 2023-10-26 VITALS
RESPIRATION RATE: 16 BRPM | BODY MASS INDEX: 37.12 KG/M2 | DIASTOLIC BLOOD PRESSURE: 80 MMHG | HEART RATE: 72 BPM | SYSTOLIC BLOOD PRESSURE: 162 MMHG | TEMPERATURE: 98.1 F | WEIGHT: 244.93 LBS | HEIGHT: 68 IN | OXYGEN SATURATION: 98 %

## 2023-10-26 LAB
ACCESSION NUMBER, LLC1M: ABNORMAL
ACINETOBACTER CALCOAC BAUMANNII COMPLEX BY PCR: NOT DETECTED
ALBUMIN SERPL-MCNC: 2.8 G/DL (ref 3.5–5)
ALBUMIN/GLOB SERPL: 0.7 (ref 1.1–2.2)
ALP SERPL-CCNC: 80 U/L (ref 45–117)
ALT SERPL-CCNC: 11 U/L (ref 12–78)
ANION GAP SERPL CALC-SCNC: 5 MMOL/L (ref 5–15)
AST SERPL W P-5'-P-CCNC: 10 U/L (ref 15–37)
BACTEROIDES FRAGILIS BY PCR: NOT DETECTED
BILIRUB SERPL-MCNC: 0.3 MG/DL (ref 0.2–1)
BIOFIRE TEST COMMENT: ABNORMAL
BUN SERPL-MCNC: 31 MG/DL (ref 6–20)
BUN/CREAT SERPL: 5 (ref 12–20)
CA-I BLD-MCNC: 9.4 MG/DL (ref 8.5–10.1)
CANDIDA ALBICANS BY PCR: NOT DETECTED
CANDIDA AURIS BY PCR: NOT DETECTED
CANDIDA GLABRATA: NOT DETECTED
CANDIDA KRUSEI BY PCR: NOT DETECTED
CANDIDA PARAPSILOSIS BY PCR: NOT DETECTED
CANDIDA TROPICALIS BY PCR: NOT DETECTED
CHLORIDE SERPL-SCNC: 103 MMOL/L (ref 97–108)
CO2 SERPL-SCNC: 29 MMOL/L (ref 21–32)
CREAT SERPL-MCNC: 5.98 MG/DL (ref 0.55–1.02)
CRYPTOCOCCUS NEOFORMANS/GATTII BY PCR: NOT DETECTED
ENTEROBACTER CLOACAE COMPLEX BY PCR: NOT DETECTED
ENTEROBACTERALES BY PCR: NOT DETECTED
ENTEROCOCCUS FAECALIS BY PCR: NOT DETECTED
ENTEROCOCCUS FAECIUM BY PCR: NOT DETECTED
ERYTHROCYTE [DISTWIDTH] IN BLOOD BY AUTOMATED COUNT: 17.1 % (ref 11.5–14.5)
ESCHERICHIA COLI: NOT DETECTED
GLOBULIN SER CALC-MCNC: 4.3 G/DL (ref 2–4)
GLUCOSE BLD STRIP.AUTO-MCNC: 130 MG/DL (ref 65–100)
GLUCOSE BLD STRIP.AUTO-MCNC: 208 MG/DL (ref 65–100)
GLUCOSE SERPL-MCNC: 212 MG/DL (ref 65–100)
HAEMOPHILUS INFLUENZAE BY PCR: NOT DETECTED
HCT VFR BLD AUTO: 33.2 % (ref 35–47)
HGB BLD-MCNC: 10.3 G/DL (ref 11.5–16)
KLEBSIELLA AEROGENES BY PCR: NOT DETECTED
KLEBSIELLA OXYTOCA BY PCR: NOT DETECTED
KLEBSIELLA PNEUMONIAE GROUP BY PCR: NOT DETECTED
LISTERIA MONOCYTOGENES BY PCR: NOT DETECTED
MCH RBC QN AUTO: 27.7 PG (ref 26–34)
MCHC RBC AUTO-ENTMCNC: 31 G/DL (ref 30–36.5)
MCV RBC AUTO: 89.2 FL (ref 80–99)
NEISSERIA MENINGITIDIS BY PCR: NOT DETECTED
NRBC # BLD: 0 K/UL (ref 0–0.01)
NRBC BLD-RTO: 0 PER 100 WBC
PERFORMED BY:: ABNORMAL
PERFORMED BY:: ABNORMAL
PLATELET # BLD AUTO: 169 K/UL (ref 150–400)
PMV BLD AUTO: 12.7 FL (ref 8.9–12.9)
POTASSIUM SERPL-SCNC: 4.8 MMOL/L (ref 3.5–5.1)
PROT SERPL-MCNC: 7.1 G/DL (ref 6.4–8.2)
PROTEUS BY PCR: NOT DETECTED
PSEUDOMONAS AERUGINOSA, PSAEP: NOT DETECTED
RBC # BLD AUTO: 3.72 M/UL (ref 3.8–5.2)
RESISTANT GENE TARGETS: ABNORMAL
SALMONELLA SPECIES BY PCR: NOT DETECTED
SERRATIA MARCESCENS BY PCR: NOT DETECTED
SODIUM SERPL-SCNC: 137 MMOL/L (ref 136–145)
STAPHYLOCOCCUS AUREUS: NOT DETECTED
STAPHYLOCOCCUS EPIDERMIDIS BY PCR: NOT DETECTED
STAPHYLOCOCCUS LUGDUNENSIS BY PCR: NOT DETECTED
STAPHYLOCOCCUS: DETECTED
STENOTROPHOMONAS MALTOPHILIA BY PCR: NOT DETECTED
STREPTOCOCCUS AGALACTIAE (GROUP B): NOT DETECTED
STREPTOCOCCUS PNEUMONIAE , SPNP: NOT DETECTED
STREPTOCOCCUS PYOGENES (GROUP A), SPYOP: NOT DETECTED
STREPTOCOCCUS: NOT DETECTED
WBC # BLD AUTO: 5.9 K/UL (ref 3.6–11)

## 2023-10-26 PROCEDURE — 6370000000 HC RX 637 (ALT 250 FOR IP): Performed by: FAMILY MEDICINE

## 2023-10-26 PROCEDURE — 36415 COLL VENOUS BLD VENIPUNCTURE: CPT

## 2023-10-26 PROCEDURE — 80053 COMPREHEN METABOLIC PANEL: CPT

## 2023-10-26 PROCEDURE — 85027 COMPLETE CBC AUTOMATED: CPT

## 2023-10-26 PROCEDURE — 6360000002 HC RX W HCPCS: Performed by: FAMILY MEDICINE

## 2023-10-26 PROCEDURE — 2580000003 HC RX 258: Performed by: FAMILY MEDICINE

## 2023-10-26 PROCEDURE — 82962 GLUCOSE BLOOD TEST: CPT

## 2023-10-26 RX ORDER — CIPROFLOXACIN 250 MG/1
250 TABLET, FILM COATED ORAL 2 TIMES DAILY
Qty: 14 TABLET | Refills: 0 | Status: SHIPPED | OUTPATIENT
Start: 2023-10-26 | End: 2023-11-02

## 2023-10-26 RX ORDER — PREDNISONE 20 MG/1
20 TABLET ORAL DAILY
Qty: 10 TABLET | Refills: 0 | Status: SHIPPED | OUTPATIENT
Start: 2023-10-27 | End: 2023-11-06

## 2023-10-26 RX ADMIN — CARVEDILOL 12.5 MG: 12.5 TABLET, FILM COATED ORAL at 09:03

## 2023-10-26 RX ADMIN — PREDNISONE 20 MG: 20 TABLET ORAL at 09:03

## 2023-10-26 RX ADMIN — HEPARIN SODIUM 5000 UNITS: 5000 INJECTION INTRAVENOUS; SUBCUTANEOUS at 06:39

## 2023-10-26 RX ADMIN — HYDRALAZINE HYDROCHLORIDE 100 MG: 50 TABLET, FILM COATED ORAL at 09:05

## 2023-10-26 RX ADMIN — HYDRALAZINE HYDROCHLORIDE 100 MG: 50 TABLET, FILM COATED ORAL at 15:05

## 2023-10-26 RX ADMIN — CEFTRIAXONE SODIUM 1000 MG: 1 INJECTION, POWDER, FOR SOLUTION INTRAMUSCULAR; INTRAVENOUS at 15:05

## 2023-10-26 RX ADMIN — ISOSORBIDE DINITRATE 20 MG: 20 TABLET ORAL at 09:05

## 2023-10-26 RX ADMIN — SODIUM CHLORIDE, PRESERVATIVE FREE 10 ML: 5 INJECTION INTRAVENOUS at 09:05

## 2023-10-26 RX ADMIN — VALSARTAN 160 MG: 80 TABLET, FILM COATED ORAL at 10:47

## 2023-10-26 RX ADMIN — NIFEDIPINE 30 MG: 30 TABLET, FILM COATED, EXTENDED RELEASE ORAL at 09:05

## 2023-10-26 RX ADMIN — ISOSORBIDE DINITRATE 20 MG: 20 TABLET ORAL at 15:05

## 2023-10-26 ASSESSMENT — ENCOUNTER SYMPTOMS
SHORTNESS OF BREATH: 0
ABDOMINAL PAIN: 0

## 2023-10-26 NOTE — PLAN OF CARE
Problem: Safety - Adult  Goal: Free from fall injury  Outcome: Completed     Problem: Discharge Planning  Goal: Discharge to home or other facility with appropriate resources  Outcome: Completed     Problem: Pain  Goal: Verbalizes/displays adequate comfort level or baseline comfort level  Outcome: Completed  Flowsheets (Taken 10/26/2023 0438 by Sam Joseph RN)  Verbalizes/displays adequate comfort level or baseline comfort level:   Encourage patient to monitor pain and request assistance   Assess pain using appropriate pain scale     Problem: ABCDS Injury Assessment  Goal: Absence of physical injury  Outcome: Completed     Problem: Skin/Tissue Integrity  Goal: Absence of new skin breakdown  Description: 1. Monitor for areas of redness and/or skin breakdown  2. Assess vascular access sites hourly  3. Every 4-6 hours minimum:  Change oxygen saturation probe site  4. Every 4-6 hours:  If on nasal continuous positive airway pressure, respiratory therapy assess nares and determine need for appliance change or resting period. Outcome: Completed     Problem: Skin/Tissue Integrity  Goal: Absence of new skin breakdown  Description: 1. Monitor for areas of redness and/or skin breakdown  2. Assess vascular access sites hourly  3. Every 4-6 hours minimum:  Change oxygen saturation probe site  4. Every 4-6 hours:  If on nasal continuous positive airway pressure, respiratory therapy assess nares and determine need for appliance change or resting period.   Outcome: Completed

## 2023-10-26 NOTE — DISCHARGE SUMMARY
10/26  Patient is alert and oriented. She reports nausea this morning. She reports her left lower extremity has improved with tenderness compared to yesterday. She denies Chest pain, SOB, vomiting, and diarrhea at this time. Cr: 5.98  WBC: 5, 900  Platelets: 648,744  Hgb: 10.3  HCT: 33.2  One blood culture positive for gram positive cocci, likely due to contamination    Patient doing well discharged home on p.o.  Cipro follow-up with PCP 1 to 2 weeks medication reconciliation done    Occasional reconciliation done time discharge patient 35 minutes 50% time spent counseling and coordination of care      Signed:   Jt Reece MD  10/26/2023  12:12 PM

## 2023-10-27 LAB
BACTERIA SPEC CULT: ABNORMAL
BACTERIA SPEC CULT: ABNORMAL
Lab: ABNORMAL

## 2023-10-29 LAB
BACTERIA SPEC CULT: NORMAL
Lab: NORMAL

## 2024-07-26 ENCOUNTER — HOSPITAL ENCOUNTER (OUTPATIENT)
Facility: HOSPITAL | Age: 64
End: 2024-07-26
Attending: INTERNAL MEDICINE
Payer: MEDICARE

## 2024-07-26 DIAGNOSIS — Z12.31 SCREENING MAMMOGRAM, ENCOUNTER FOR: ICD-10-CM

## 2024-07-26 PROCEDURE — 77063 BREAST TOMOSYNTHESIS BI: CPT

## 2024-08-14 ENCOUNTER — HOSPITAL ENCOUNTER (OUTPATIENT)
Facility: HOSPITAL | Age: 64
Setting detail: RECURRING SERIES
Discharge: HOME | End: 2024-08-17
Payer: MEDICARE

## 2024-08-14 PROCEDURE — 97112 NEUROMUSCULAR REEDUCATION: CPT | Performed by: PHYSICAL THERAPIST

## 2024-08-14 PROCEDURE — 97110 THERAPEUTIC EXERCISES: CPT | Performed by: PHYSICAL THERAPIST

## 2024-08-14 PROCEDURE — 97162 PT EVAL MOD COMPLEX 30 MIN: CPT | Performed by: PHYSICAL THERAPIST

## 2024-08-14 NOTE — THERAPY EVALUATION
[facility] [address], [address]  [place] [address]  Ph: [phone]     Fax: [phone]       PHYSICAL THERAPY - MEDICARE EVALUATION/PLAN OF CARE NOTE (updated 3/23)      Date: 2024          Patient Name:  Stefanie Sharpe :  1960   Medical   Diagnosis:  Muscle weakness (generalized) [M62.81]  Kidney transplant status [Z94.0] Treatment Diagnosis:  M62.81  GENERAL MUSCLE WEAKNESS and R26.89   Abnormalities of gait and mobility    Referral Source:  Beatriz Venegas MD Provider #:  [id number]                Insurance: Payor: [facility] MEDICARE / Plan: [employer] DUAL COMPLETE / Product Type: *No Product type* /      Patient  verified yes     Visit #   Current  / Total 1 16-24   Time   In / Out 1:45 pm  3:00 pm   Total Treatment Time 70   Total Timed Codes 25   1:1 Treatment Time 25      [facility] Totals Reminder:  bill using total billable   min of TIMED therapeutic procedures and modalities.   8-22 min = 1 unit; 23-37 min = 2 units; 38-52 min = 3 units;  53-67 min = 4 units; 68-82 min = 5 units           SUBJECTIVE  Pain Level (0-10 scale): 0/10  []constant []intermittent []improving []worsening []no change since onset    Any medication changes, allergies to medications, adverse drug reactions, diagnosis change, or new procedure performed?: [x] No    [] Yes (see summary sheet for update)  Medications: Verified on Patient Summary List    Subjective functional status/changes:      Patient reports she had kidney transplant 2024 and was hospitalized for several days after and was then discharged home.  She reports she noticed some LE weakness and unsteadiness ~ 2 weeks after transplant.  Reports she started using SPC a~ 2 months ago to help her stability with walking.  She reports 1 fall 2024 in her home  in which she fell but is not sure how, does not recall LE buckling or dizziness.  Required A to get up after fall.  She was then hospitalized due

## 2024-08-19 ENCOUNTER — HOSPITAL ENCOUNTER (OUTPATIENT)
Facility: HOSPITAL | Age: 64
Setting detail: RECURRING SERIES
Discharge: HOME | End: 2024-08-22
Payer: MEDICARE

## 2024-08-19 PROCEDURE — 97110 THERAPEUTIC EXERCISES: CPT

## 2024-08-19 NOTE — PROGRESS NOTES
PHYSICAL THERAPY - MEDICARE DAILY TREATMENT NOTE (updated 3/23)      Date: 2024          Patient Name:  Stefanie Sharpe :  1960   Medical   Diagnosis:  Muscle weakness (generalized) [M62.81]  Kidney transplant status [Z94.0] Treatment Diagnosis:  M62.81  GENERAL MUSCLE WEAKNESS and R26.89   Abnormalities of gait and mobility    Referral Source:  Beatriz Venegas MD Insurance:   Payor: [facility] MEDICARE / Plan: [employer] DUAL COMPLETE / Product Type: *No Product type* /                     Patient  verified yes     Visit #   Current  / Total 2 16-24   Time   In / Out 01:45 pm 02:30 pm   Total Treatment Time 45 min   Total Timed Codes 45 min   1:1 Treatment Time 45 min      [facility] Totals Reminder:  bill using total billable   min of TIMED therapeutic procedures and modalities.   8-22 min = 1 unit; 23-37 min = 2 units; 38-52 min = 3 units; 53-67 min = 4 units; 68-82 min = 5 units            SUBJECTIVE    Pain Level (0-10 scale): 0/10    Any medication changes, allergies to medications, adverse drug reactions, diagnosis change, or new procedure performed?: [x] No    [] Yes (see summary sheet for update)  Medications: Verified on Patient Summary List    Subjective functional status/changes:     Patient came in wearing crocks . She did have socks with grippers on them. She was advised next time to bring in tennis shoes to work out in. Patient also came in with a spc but not using it.    OBJECTIVE      Therapeutic Procedures:  Tx Min Billable or 1:1 Min (if diff from Tx Min) Procedure, Rationale, Specifics   45  72599 Therapeutic Exercise (timed):  increase ROM, strength, coordination, balance, and proprioception to improve patient's ability to progress to PLOF and address remaining functional goals. (see flow sheet as applicable)     Details if applicable:       68706 Neuromuscular Re-Education (timed):  improve balance, coordination, kinesthetic sense, posture, core stability and proprioception to

## 2024-08-21 ENCOUNTER — HOSPITAL ENCOUNTER (OUTPATIENT)
Facility: HOSPITAL | Age: 64
Setting detail: RECURRING SERIES
Discharge: HOME | End: 2024-08-24
Payer: MEDICARE

## 2024-08-21 PROCEDURE — 97110 THERAPEUTIC EXERCISES: CPT

## 2024-08-21 NOTE — PROGRESS NOTES
with gait.      [] Met [] Not met [] Partially met  Date:      Patient will be able to complete 5 STS transfers without UE A with good control from standard chair to demonstrate improving functional strength.  [] Met [] Not met [] Partially met  Date:     Patient will be able to maintain SLS > 10 B with 1 UE support to demonstrate improving balance..  [] Met [] Not met [] Partially met  Date:        Long Term Goals: To be accomplished in 16-24 treatments.     Pt will have improved AMPAC score by 10%.        [] Met [] Not met [] Partially met Date:      Patient can ambulate >600 feet for a 6 min walk test to safely walk community distances with LRAD and without LOB or fatigue to get groceries, meds, etc.         [] Met [] Not met [] Partially met Date:      Patient can SLS with no support  5 seconds R/L to improve coordination and decrease fall risk for housework like dusting.       [] Met [] Not met [] Partially met Date:      Patient can march on mini-trampoline or stand on foam for 2 minutes with no HHA to safely negotiate uneven terrain with LRAD and no fear of falling or LOB for safe gait.      [] Met [] Not met [] Partially met  Date:      Patient has improved Jimenez score by 8 for decreased fall risk and safer ambulation like walking to the store. (Jimenez at al 40/56)      [] Met [] Not met [] Partially met  Date:                 Patient will perform >12 reps with 30 seconds STS test to demonstrate improved functional strength to improve ability to get up from low surfaces.    [] Met [] Not met [] Partially met  Date:                    PLAN  Treatment Plan may include any combination of the followin Therapeutic Exercise, 35135 Neuromuscular Re-Education, 66540 Manual Therapy, 29645 Therapeutic Activity, 74701 Self Care/Home Management, 17541 Electrical Stim unattended, and 42019 Gait Training    Frequency / Duration: Patient to be seen 1-2 times per week for 16-24 treatments.    Yes  Continue plan of

## 2024-08-26 ENCOUNTER — APPOINTMENT (OUTPATIENT)
Facility: HOSPITAL | Age: 64
End: 2024-08-26
Payer: MEDICARE

## 2024-08-28 ENCOUNTER — HOSPITAL ENCOUNTER (OUTPATIENT)
Facility: HOSPITAL | Age: 64
Setting detail: RECURRING SERIES
Discharge: HOME | End: 2024-08-31
Payer: MEDICARE

## 2024-08-28 PROCEDURE — 97110 THERAPEUTIC EXERCISES: CPT

## 2024-08-28 NOTE — PROGRESS NOTES
Partially met  Date:      Patient will be able to complete 5 STS transfers without UE A with good control from standard chair to demonstrate improving functional strength.  [x] Met [] Not met [] Partially met  Date: 24     Patient will be able to maintain SLS > 10 B with 1 UE support to demonstrate improving balance..  [] Met [] Not met [] Partially met  Date:        Long Term Goals: To be accomplished in 16-24 treatments.     Pt will have improved AMPAC score by 10%.        [] Met [] Not met [] Partially met Date:      Patient can ambulate >600 feet for a 6 min walk test to safely walk community distances with LRAD and without LOB or fatigue to get groceries, meds, etc.         [] Met [] Not met [] Partially met Date:      Patient can SLS with no support  5 seconds R/L to improve coordination and decrease fall risk for housework like dusting.       [] Met [] Not met [] Partially met Date:      Patient can march on mini-trampoline or stand on foam for 2 minutes with no HHA to safely negotiate uneven terrain with LRAD and no fear of falling or LOB for safe gait.      [] Met [] Not met [] Partially met  Date:      Patient has improved Jimenez score by 8 for decreased fall risk and safer ambulation like walking to the store. (Jimenez at al 40/56)      [] Met [] Not met [] Partially met  Date:                 Patient will perform >12 reps with 30 seconds STS test to demonstrate improved functional strength to improve ability to get up from low surfaces.    [] Met [] Not met [] Partially met  Date:                    PLAN  Treatment Plan may include any combination of the followin Therapeutic Exercise, 89833 Neuromuscular Re-Education, 93776 Manual Therapy, 26129 Therapeutic Activity, 11349 Self Care/Home Management, 46992 Electrical Stim unattended, and 35555 Gait Training    Frequency / Duration: Patient to be seen 1-2 times per week for 16-24 treatments.    Yes  Continue plan of care  Re-Cert Due:

## 2024-08-30 ENCOUNTER — HOSPITAL ENCOUNTER (OUTPATIENT)
Facility: HOSPITAL | Age: 64
Setting detail: RECURRING SERIES
End: 2024-08-30
Payer: MEDICARE

## 2024-08-30 PROCEDURE — 97110 THERAPEUTIC EXERCISES: CPT

## 2024-08-30 NOTE — PROGRESS NOTES
PHYSICAL THERAPY - MEDICARE DAILY TREATMENT NOTE (updated 3/23)      Date: 2024          Patient Name:  Stefanie Sharpe :  1960   Medical   Diagnosis:  Muscle weakness (generalized) [M62.81]  Kidney transplant status [Z94.0] Treatment Diagnosis:  M62.81  GENERAL MUSCLE WEAKNESS and R26.89   Abnormalities of gait and mobility    Referral Source:  Beatriz Venegas MD Insurance:   Payor: [facility] MEDICARE / Plan: [employer] DUAL COMPLETE / Product Type: *No Product type* /                     Patient  verified yes     Visit #   Current  / Total 5 16-24   Time   In / Out 01:07 pm 2:47 pm   Total Treatment Time 40 min   Total Timed Codes 40 min   1:1 Treatment Time 40 min      [facility] Totals Reminder:  bill using total billable   min of TIMED therapeutic procedures and modalities.   8-22 min = 1 unit; 23-37 min = 2 units; 38-52 min = 3 units; 53-67 min = 4 units; 68-82 min = 5 units            SUBJECTIVE    Pain Level (0-10 scale): 0/10    Any medication changes, allergies to medications, adverse drug reactions, diagnosis change, or new procedure performed?: [x] No    [] Yes (see summary sheet for update)  Medications: Verified on Patient Summary List    Subjective functional status/changes:     Pt has no new complaints. Patient arrived late to appt.     OBJECTIVE      Therapeutic Procedures:  Tx Min Billable or 1:1 Min (if diff from Tx Min) Procedure, Rationale, Specifics   40  27749 Therapeutic Exercise (timed):  increase ROM, strength, coordination, balance, and proprioception to improve patient's ability to progress to PLOF and address remaining functional goals. (see flow sheet as applicable)     Details if applicable:       25455 Neuromuscular Re-Education (timed):  improve balance, coordination, kinesthetic sense, posture, core stability and proprioception to improve patient's ability to develop conscious control of individual muscles and awareness of position of extremities in order to progress  to PLOF and address remaining functional goals. (see flow sheet as applicable)     Details if applicable:                    40     Total Total         [x]  Patient Education billed concurrently with other procedures   [x] Review HEP    [] Progressed/Changed HEP, detail:    [] Other detail:         Other Objective/Functional Measures  See exercise flow sheet  Unable to do trial AFO     Pain Level at end of session (0-10 scale): 0/10      Assessment   Modified treatment session today due to time constraints. Required seated rest breaks between functional strengthening exercises due to c/o fatigue. Continues to require 1 HHA on railing with static balance activities, cues on decreasing UE support however pt hesitant due to unsteadiness. Plan to progress pt accordingly.   Patient will continue to benefit from skilled PT / OT services to modify and progress therapeutic interventions, analyze and address functional mobility deficits, analyze and address ROM deficits, analyze and address strength deficits, analyze and address soft tissue restrictions, analyze and cue for proper movement patterns, and analyze and modify for postural abnormalities to address functional deficits and attain remaining goals.    Progress toward goals / Updated goals:  []  See Progress Note/Re-certification   Short Term Goals: To be accomplished in 8-12 treatments.     Pt will be independent with HEP to allow for progression of therapy services and decrease/prevent soreness after sessions.      [x] Met [] Not met [] Partially met  Date:8/19     Pt will be able to verbalize techniques that promote awareness of positioning and surroundings during transfers/gait to prevent falls.       [] Met [] Not met [] Partially met  Date:     Pt will trial R AFO to improve R LE clearance with gait.      [] Met [] Not met [] Partially met  Date:      Patient will be able to complete 5 STS transfers without UE A with good control from standard chair to  demonstrate improving functional strength.  [x] Met [] Not met [] Partially met  Date: 24     Patient will be able to maintain SLS > 10 B with 1 UE support to demonstrate improving balance..  [] Met [] Not met [] Partially met  Date:        Long Term Goals: To be accomplished in 16-24 treatments.     Pt will have improved AMPAC score by 10%.        [] Met [] Not met [] Partially met Date:      Patient can ambulate >600 feet for a 6 min walk test to safely walk community distances with LRAD and without LOB or fatigue to get groceries, meds, etc.         [] Met [] Not met [] Partially met Date:      Patient can SLS with no support  5 seconds R/L to improve coordination and decrease fall risk for housework like dusting.       [] Met [] Not met [] Partially met Date:      Patient can march on mini-trampoline or stand on foam for 2 minutes with no HHA to safely negotiate uneven terrain with LRAD and no fear of falling or LOB for safe gait.      [] Met [] Not met [] Partially met  Date:      Patient has improved Jimenez score by 8 for decreased fall risk and safer ambulation like walking to the store. (Jimenez at eval 40/56)      [] Met [] Not met [] Partially met  Date:                 Patient will perform >12 reps with 30 seconds STS test to demonstrate improved functional strength to improve ability to get up from low surfaces.    [] Met [] Not met [] Partially met  Date:                    PLAN  Treatment Plan may include any combination of the followin Therapeutic Exercise, 13849 Neuromuscular Re-Education, 54341 Manual Therapy, 12854 Therapeutic Activity, 64378 Self Care/Home Management, 94897 Electrical Stim unattended, and 73129 Gait Training    Frequency / Duration: Patient to be seen 1-2 times per week for 16-24 treatments.    Yes  Continue plan of care  Re-Cert Due: 2024-2024   [x]  Upgrade activities as tolerated  []  Discharge due to:  [x]  Other:Try AFO      Polly Massey, PT

## 2024-09-04 ENCOUNTER — HOSPITAL ENCOUNTER (EMERGENCY)
Facility: HOSPITAL | Age: 64
Discharge: HOME | End: 2024-09-04
Attending: STUDENT IN AN ORGANIZED HEALTH CARE EDUCATION/TRAINING PROGRAM
Payer: MEDICARE

## 2024-09-04 ENCOUNTER — HOSPITAL ENCOUNTER (OUTPATIENT)
Facility: HOSPITAL | Age: 64
Setting detail: RECURRING SERIES
Discharge: HOME | End: 2024-09-07
Payer: MEDICARE

## 2024-09-04 VITALS
OXYGEN SATURATION: 99 % | SYSTOLIC BLOOD PRESSURE: 129 MMHG | HEART RATE: 80 BPM | BODY MASS INDEX: 33.04 KG/M2 | HEIGHT: 68 IN | TEMPERATURE: 98.2 F | RESPIRATION RATE: 20 BRPM | WEIGHT: 218 LBS | DIASTOLIC BLOOD PRESSURE: 69 MMHG

## 2024-09-04 DIAGNOSIS — L03.116 CELLULITIS OF LEFT LOWER EXTREMITY: Primary | ICD-10-CM

## 2024-09-04 PROCEDURE — 6370000000 HC RX 637 (ALT 250 FOR IP): Performed by: STUDENT IN AN ORGANIZED HEALTH CARE EDUCATION/TRAINING PROGRAM

## 2024-09-04 PROCEDURE — 97112 NEUROMUSCULAR REEDUCATION: CPT

## 2024-09-04 PROCEDURE — 99283 EMERGENCY DEPT VISIT LOW MDM: CPT

## 2024-09-04 PROCEDURE — 97110 THERAPEUTIC EXERCISES: CPT

## 2024-09-04 RX ORDER — ACETAMINOPHEN 500 MG
1000 TABLET ORAL
Status: COMPLETED | OUTPATIENT
Start: 2024-09-04 | End: 2024-09-04

## 2024-09-04 RX ORDER — SULFAMETHOXAZOLE/TRIMETHOPRIM 800-160 MG
1 TABLET ORAL EVERY 12 HOURS SCHEDULED
Status: DISCONTINUED | OUTPATIENT
Start: 2024-09-04 | End: 2024-09-05 | Stop reason: HOSPADM

## 2024-09-04 RX ORDER — SULFAMETHOXAZOLE/TRIMETHOPRIM 800-160 MG
1 TABLET ORAL 2 TIMES DAILY
Qty: 14 TABLET | Refills: 0 | Status: SHIPPED | OUTPATIENT
Start: 2024-09-04 | End: 2024-09-11

## 2024-09-04 RX ADMIN — SULFAMETHOXAZOLE AND TRIMETHOPRIM 1 TABLET: 800; 160 TABLET ORAL at 23:41

## 2024-09-04 RX ADMIN — ACETAMINOPHEN 1000 MG: 500 TABLET ORAL at 23:41

## 2024-09-04 ASSESSMENT — PAIN SCALES - GENERAL: PAINLEVEL_OUTOF10: 0

## 2024-09-04 ASSESSMENT — LIFESTYLE VARIABLES
HOW OFTEN DO YOU HAVE A DRINK CONTAINING ALCOHOL: NEVER
HOW MANY STANDARD DRINKS CONTAINING ALCOHOL DO YOU HAVE ON A TYPICAL DAY: PATIENT DOES NOT DRINK

## 2024-09-04 NOTE — PROGRESS NOTES
care  Re-Cert Due: 8/14/2024-11/12/2024   [x]  Upgrade activities as tolerated  []  Discharge due to:  [x]  Other:Lena Massey, PT       9/4/2024       1:52 PM

## 2024-09-05 NOTE — ED PROVIDER NOTES
[facility] EMERGENCY DEPT  EMERGENCY DEPARTMENT HISTORY AND PHYSICAL EXAM      Date: 9/4/2024  Patient Name: Stefanie Sharpe  MRN: [id number]  Birthdate 1960  Date of evaluation: 9/4/2024  Provider: Orlando Dumont MD   Note Started: 11:04 PM EDT 9/4/24    HISTORY OF PRESENT ILLNESS     Chief Complaint   Patient presents with    Rash       History Provided By: Patient    HPI: Stefanie Sharpe is a 63 y.o. female PMH kidney transplant, type 2 diabetes, GERD, hypertension, acute renal failure with previous ESRD/dialysis, presenting with 1 day of a small red rash to her inner distal thigh near the knee but not over the knee itself.  She denies any abrasions bug bites or lacerations in this area.  She reports she first noticed it earlier today.  She denies any fevers nausea vomiting or chills.  The rash is slightly painful and red.    PAST MEDICAL HISTORY   Past Medical History:  Past Medical History:   Diagnosis Date    Anemia, chronic disease 3/22/2021    Chronic kidney disease     HD - M, W, F, Left AV Fistula    DM2 (diabetes mellitus, type 2) (HCC) 3/22/2021    GERD (gastroesophageal reflux disease)     H/O metabolic acidosis 3/22/2021    Hypertension     Morbid obesity (HCC)     Nephrotic syndrome 3/22/2021    Pulmonary hypertension (HCC) 3/22/2021    Renal failure        Past Surgical History:  Past Surgical History:   Procedure Laterality Date    BREAST BIOPSY Right     benign    COLONOSCOPY N/A 6/14/2022    COLONOSCOPY performed by Esha Donohue MD at [facility] ENDOSCOPY    IR NONTUNNELED VASCULAR CATHETER  03/12/2021    IR NONTUNNELED VASCULAR CATHETER 3/12/2021 [facility] RAD ANGIO IR    IR NONTUNNELED VASCULAR CATHETER  3/12/2021    ORTHOPEDIC SURGERY      I&D of left foot    VASCULAR SURGERY      Left AV Fistula       Family History:  Family History   Problem Relation Age of Onset    Liver Cancer Mother     Diabetes Father     Heart Disease Father     Breast Cancer Maternal Grandmother        Social History:  Social

## 2024-09-05 NOTE — DISCHARGE INSTRUCTIONS
has been provided, please fill it as soon as possible to prevent a delay in treatment. If you have any questions or reservations about taking the medication due to side effects or interactions with other medications, please call your primary care provider or contact us directly.  Again, THANK YOU for choosing us to care for YOU!

## 2024-09-06 ENCOUNTER — HOSPITAL ENCOUNTER (OUTPATIENT)
Facility: HOSPITAL | Age: 64
Setting detail: RECURRING SERIES
Discharge: HOME | End: 2024-09-09
Payer: MEDICARE

## 2024-09-06 PROCEDURE — 97110 THERAPEUTIC EXERCISES: CPT | Performed by: PHYSICAL THERAPIST

## 2024-09-09 ENCOUNTER — APPOINTMENT (OUTPATIENT)
Facility: HOSPITAL | Age: 64
End: 2024-09-09
Payer: MEDICARE

## 2024-09-11 ENCOUNTER — HOSPITAL ENCOUNTER (OUTPATIENT)
Facility: HOSPITAL | Age: 64
Setting detail: RECURRING SERIES
Discharge: HOME | End: 2024-09-14
Payer: MEDICARE

## 2024-09-11 PROCEDURE — 97112 NEUROMUSCULAR REEDUCATION: CPT

## 2024-09-11 PROCEDURE — 97110 THERAPEUTIC EXERCISES: CPT

## 2024-09-16 ENCOUNTER — HOSPITAL ENCOUNTER (OUTPATIENT)
Facility: HOSPITAL | Age: 64
Setting detail: RECURRING SERIES
End: 2024-09-16
Payer: MEDICARE

## 2024-09-18 ENCOUNTER — HOSPITAL ENCOUNTER (OUTPATIENT)
Facility: HOSPITAL | Age: 64
Setting detail: RECURRING SERIES
Discharge: HOME | End: 2024-09-21
Payer: MEDICARE

## 2024-09-18 PROCEDURE — 97110 THERAPEUTIC EXERCISES: CPT

## 2024-09-18 PROCEDURE — 97112 NEUROMUSCULAR REEDUCATION: CPT

## 2024-09-23 ENCOUNTER — HOSPITAL ENCOUNTER (OUTPATIENT)
Facility: HOSPITAL | Age: 64
Setting detail: RECURRING SERIES
Discharge: HOME | End: 2024-09-26
Payer: MEDICARE

## 2024-09-23 PROCEDURE — 97110 THERAPEUTIC EXERCISES: CPT

## 2024-09-23 PROCEDURE — 97112 NEUROMUSCULAR REEDUCATION: CPT

## 2024-09-30 ENCOUNTER — HOSPITAL ENCOUNTER (OUTPATIENT)
Facility: HOSPITAL | Age: 64
Setting detail: RECURRING SERIES
Discharge: HOME | End: 2024-10-03
Payer: MEDICARE

## 2024-09-30 PROCEDURE — 97530 THERAPEUTIC ACTIVITIES: CPT

## 2024-09-30 PROCEDURE — 97112 NEUROMUSCULAR REEDUCATION: CPT

## 2024-09-30 PROCEDURE — 97110 THERAPEUTIC EXERCISES: CPT

## 2024-09-30 NOTE — PROGRESS NOTES
abnormalities to address functional deficits and attain remaining goals.    Progress toward goals / Updated goals:  []  See Progress Note/Re-certification   Short Term Goals: To be accomplished in 8-12 treatments.     Pt will be independent with HEP to allow for progression of therapy services and decrease/prevent soreness after sessions.      [x] Met [] Not met [] Partially met  Date:  9/6     Pt will be able to verbalize techniques that promote awareness of positioning and surroundings during transfers/gait to prevent falls.       [x] Met [] Not met [] Partially met  Date: 9/11/24     Pt will trial R AFO to improve R LE clearance with gait.      [x] Met [] Not met [] Partially met  Date: 9/6 noted improved R foot clearance, however pt still presents with increased hip and knee flexion.      Patient will be able to complete 5 STS transfers without UE A with good control from standard chair to demonstrate improving functional strength.  [x] Met [] Not met [] Partially met  Date: 8/28/24     Patient will be able to maintain SLS > 10 B with 1 UE support to demonstrate improving balance..  [x] Met [] Not met [] Partially met  Date: 9/6        Long Term Goals: To be accomplished in 16-24 treatments.     Pt will have improved AMPAC score by 10%.        [] Met [x] Not met [] Partially met Date: 9/18 32.79%     Patient can ambulate >600 feet for a 6 min walk test to safely walk community distances with LRAD and without LOB or fatigue to get groceries, meds, etc.         [x] Met [] Not met [] Partially met Date: 608 ft 9/18     Patient can SLS with no support  5 seconds R/L to improve coordination and decrease fall risk for housework like dusting.       [] Met [x] Not met [] Partially met Date: 9/18 2-3\", unsteady     Patient can march on mini-trampoline or stand on foam for 2 minutes with no HHA to safely negotiate uneven terrain with LRAD and no fear of falling or LOB for safe gait.      [] Met [] Not met [] Partially met

## 2024-10-07 ENCOUNTER — HOSPITAL ENCOUNTER (OUTPATIENT)
Facility: HOSPITAL | Age: 64
Setting detail: RECURRING SERIES
Discharge: HOME | End: 2024-10-10
Payer: MEDICARE

## 2024-10-07 PROCEDURE — 97110 THERAPEUTIC EXERCISES: CPT

## 2024-10-07 PROCEDURE — 97112 NEUROMUSCULAR REEDUCATION: CPT

## 2024-10-07 NOTE — PROGRESS NOTES
PHYSICAL THERAPY - MEDICARE DAILY TREATMENT NOTE (updated 3/23)      Date: 10/7/2024          Patient Name:  Stefanie Sharpe :  1960   Medical   Diagnosis:  Muscle weakness (generalized) [M62.81]  Kidney transplant status [Z94.0] Treatment Diagnosis:  M62.81  GENERAL MUSCLE WEAKNESS and R26.89   Abnormalities of gait and mobility    Referral Source:  Beatriz Venegas MD Insurance:   Payor: [facility] MEDICARE / Plan: [employer] DUAL COMPLETE / Product Type: *No Product type* /                     Patient  verified yes     Visit #   Current  / Total 12 16-24   Time   In / Out 10:45 am 11:30 am   Total Treatment Time 45min   Total Timed Codes 45 min   1:1 Treatment Time 45 min      [facility] Totals Reminder:  bill using total billable   min of TIMED therapeutic procedures and modalities.   8-22 min = 1 unit; 23-37 min = 2 units; 38-52 min = 3 units; 53-67 min = 4 units; 68-82 min = 5 units            SUBJECTIVE    Pain Level (0-10 scale): 0/10    Any medication changes, allergies to medications, adverse drug reactions, diagnosis change, or new procedure performed?: [x] No    [] Yes (see summary sheet for update)  Medications: Verified on Patient Summary List   Sulfamethoxazole-Trimethoprim 800-160 MG 1 tablet Oral 2 TIMES DAILY     Subjective functional status/changes:     Patient has no current complaints.     OBJECTIVE    Therapeutic Procedures:  Tx Min Billable or 1:1 Min (if diff from Tx Min) Procedure, Rationale, Specifics   35  30731 Therapeutic Exercise (timed):  increase ROM, strength, coordination, balance, and proprioception to improve patient's ability to progress to PLOF and address remaining functional goals. (see flow sheet as applicable)     Details if applicable:  Performed testing for progress note   10  73861 Neuromuscular Re-Education (timed):  improve balance, coordination, kinesthetic sense, posture, core stability and proprioception to improve patient's ability to develop conscious

## 2024-10-09 ENCOUNTER — HOSPITAL ENCOUNTER (OUTPATIENT)
Facility: HOSPITAL | Age: 64
Setting detail: RECURRING SERIES
Discharge: HOME | End: 2024-10-12
Payer: MEDICARE

## 2024-10-09 PROCEDURE — 97112 NEUROMUSCULAR REEDUCATION: CPT

## 2024-10-09 PROCEDURE — 97110 THERAPEUTIC EXERCISES: CPT

## 2024-10-09 NOTE — PROGRESS NOTES
PHYSICAL THERAPY - MEDICARE DAILY TREATMENT NOTE (updated 3/23)      Date: 10/9/2024          Patient Name:  Stefanie Sharpe :  1960   Medical   Diagnosis:  Muscle weakness (generalized) [M62.81]  Kidney transplant status [Z94.0] Treatment Diagnosis:  M62.81  GENERAL MUSCLE WEAKNESS and R26.89   Abnormalities of gait and mobility    Referral Source:  Beatriz Venegas MD Insurance:   Payor: [facility] MEDICARE / Plan: [employer] DUAL COMPLETE / Product Type: *No Product type* /                     Patient  verified yes     Visit #   Current  / Total 13 16-24   Time   In / Out 01:50 pm 2:39 pm   Total Treatment Time 45 min   Total Timed Codes 45 min   1:1 Treatment Time 45 min      [facility] Totals Reminder:  bill using total billable   min of TIMED therapeutic procedures and modalities.   8-22 min = 1 unit; 23-37 min = 2 units; 38-52 min = 3 units; 53-67 min = 4 units; 68-82 min = 5 units            SUBJECTIVE    Pain Level (0-10 scale): 0/10    Any medication changes, allergies to medications, adverse drug reactions, diagnosis change, or new procedure performed?: [x] No    [] Yes (see summary sheet for update)  Medications: Verified on Patient Summary List   Sulfamethoxazole-Trimethoprim 800-160 MG 1 tablet Oral 2 TIMES DAILY     Subjective functional status/changes:     Patient has no current complaints. Patient stated she has follow up MD ( transplant ) visit this week.    OBJECTIVE    Therapeutic Procedures:  Tx Min Billable or 1:1 Min (if diff from Tx Min) Procedure, Rationale, Specifics   35  00674 Therapeutic Exercise (timed):  increase ROM, strength, coordination, balance, and proprioception to improve patient's ability to progress to PLOF and address remaining functional goals. (see flow sheet as applicable)     Details if applicable:  Performed testing for progress note   10  42807 Neuromuscular Re-Education (timed):  improve balance, coordination, kinesthetic sense, posture, core stability and

## 2024-10-14 ENCOUNTER — HOSPITAL ENCOUNTER (OUTPATIENT)
Facility: HOSPITAL | Age: 64
Setting detail: RECURRING SERIES
End: 2024-10-14
Payer: MEDICARE

## 2024-10-16 ENCOUNTER — HOSPITAL ENCOUNTER (OUTPATIENT)
Facility: HOSPITAL | Age: 64
Setting detail: RECURRING SERIES
Discharge: HOME | End: 2024-10-19
Payer: MEDICARE

## 2024-10-16 PROCEDURE — 97530 THERAPEUTIC ACTIVITIES: CPT

## 2024-10-16 PROCEDURE — 97110 THERAPEUTIC EXERCISES: CPT

## 2024-10-16 PROCEDURE — 97112 NEUROMUSCULAR REEDUCATION: CPT

## 2024-10-16 NOTE — PROGRESS NOTES
PHYSICAL THERAPY - MEDICARE DAILY TREATMENT NOTE (updated 3/23)      Date: 10/16/2024          Patient Name:  Stefanie Sharpe :  1960   Medical   Diagnosis:  Muscle weakness (generalized) [M62.81]  Kidney transplant status [Z94.0] Treatment Diagnosis:  M62.81  GENERAL MUSCLE WEAKNESS and R26.89   Abnormalities of gait and mobility    Referral Source:  Beatriz Veengas MD Insurance:   Payor: [facility] MEDICARE / Plan: [employer] DUAL COMPLETE / Product Type: *No Product type* /                     Patient  verified yes     Visit #   Current  / Total 14 16-24   Time   In / Out 01:40 pm 2:35 pm   Total Treatment Time 55 min   Total Timed Codes 55 min   1:1 Treatment Time 55 min      [facility] Totals Reminder:  bill using total billable   min of TIMED therapeutic procedures and modalities.   8-22 min = 1 unit; 23-37 min = 2 units; 38-52 min = 3 units; 53-67 min = 4 units; 68-82 min = 5 units            SUBJECTIVE    Pain Level (0-10 scale): 0/10    Any medication changes, allergies to medications, adverse drug reactions, diagnosis change, or new procedure performed?: [x] No    [] Yes (see summary sheet for update)  Medications: Verified on Patient Summary List   Sulfamethoxazole-Trimethoprim 800-160 MG 1 tablet Oral 2 TIMES DAILY     Subjective functional status/changes:     Patient reports having GI issues earlier this week, feeling better today. Has no current complaints. MD follow up went well.     OBJECTIVE    Therapeutic Procedures:  Tx Min Billable or 1:1 Min (if diff from Tx Min) Procedure, Rationale, Specifics   35  64854 Therapeutic Exercise (timed):  increase ROM, strength, coordination, balance, and proprioception to improve patient's ability to progress to PLOF and address remaining functional goals. (see flow sheet as applicable)     Details if applicable:  Performed testing for progress note   10  49050 Neuromuscular Re-Education (timed):  improve balance, coordination, kinesthetic sense,

## 2024-10-21 ENCOUNTER — HOSPITAL ENCOUNTER (OUTPATIENT)
Facility: HOSPITAL | Age: 64
Setting detail: RECURRING SERIES
Discharge: HOME | End: 2024-10-24
Payer: MEDICARE

## 2024-10-21 PROCEDURE — 97112 NEUROMUSCULAR REEDUCATION: CPT

## 2024-10-21 PROCEDURE — 97110 THERAPEUTIC EXERCISES: CPT

## 2024-10-21 NOTE — PROGRESS NOTES
[facility] [address], [address]  [place] [address]  Ph: [phone]     Fax: [phone]    PHYSICAL THERAPY PROGRESS NOTE  Patient Name:  Stefanie Sharpe :  1960   Treatment/Medical Diagnosis: Muscle weakness (generalized) [M62.81]  Kidney transplant status [Z94.0]   Referral Source:  Beatriz Venegas MD     Date of Initial Visit:  24 Attended Visits:  15 Missed Visits:  0     SUMMARY OF TREATMENT/ASSESSMENT:  Patient has completed 15 visits of skilled physical therapy for treatment of generalized weakness and gait instability following kidney transplant. Patient has made gains in standing balance, endurance, gross muscle strength, and function since last assessment. Patient has improved TUG score to 9 seconds placing her at lower risk of falls and has met goal of walking >600 ft with 6 MWT w/o AD however score places pt below age normative value representing limited functional capacity. Patient continues to show mild unsteadiness and gait abnormalities when walking independently. Patient most concerned with continued impairment in balance/coordination and limitations with activities due to restrictions with knees and endurance. Patient could continue to benefit from skilled physical therapy to work on improving functional strength and mobility, improve steadiness with gait, walking endurance and work on remaining deficits.     CURRENT STATUS/GOALS:    Transfers:   Bed mobility: Independent  Sit to/from Supine: 0 HHA, prefers UE A  Sit to/from stands: prefers UE A, able without UE A but with rocking and wide LASHAE     Gait:  Slight unsteadiness, ambulated w/o SPC in clinic with testing.  R trendelenburg and steppage pattern with decreased heel strike R and occasional R foot drag     TUG test: 9 seconds without AD  ( - 12 seconds without AD)     GAIT SPEED TEST     RESULTS:  6 meters (19.7 ft) / 9 sec = 0.66 m/s Limited community ambulator

## 2024-10-21 NOTE — PROGRESS NOTES
PHYSICAL THERAPY - MEDICARE DAILY TREATMENT NOTE (updated 3/23)      Date: 10/21/2024          Patient Name:  Stefanie Sharpe :  1960   Medical   Diagnosis:  Muscle weakness (generalized) [M62.81]  Kidney transplant status [Z94.0] Treatment Diagnosis:  M62.81  GENERAL MUSCLE WEAKNESS and R26.89   Abnormalities of gait and mobility    Referral Source:  Beatriz Venegas MD Insurance:   Payor: [facility] MEDICARE / Plan: [employer] DUAL COMPLETE / Product Type: *No Product type* /                     Patient  verified yes     Visit #   Current  / Total 15 16-24   Time   In / Out 02:45 pm 3:30 pm   Total Treatment Time 45 min   Total Timed Codes 45 min   1:1 Treatment Time 45 min      [facility] Totals Reminder:  bill using total billable   min of TIMED therapeutic procedures and modalities.   8-22 min = 1 unit; 23-37 min = 2 units; 38-52 min = 3 units; 53-67 min = 4 units; 68-82 min = 5 units            SUBJECTIVE    Pain Level (0-10 scale): 0/10    Any medication changes, allergies to medications, adverse drug reactions, diagnosis change, or new procedure performed?: [x] No    [] Yes (see summary sheet for update)  Medications: Verified on Patient Summary List   Sulfamethoxazole-Trimethoprim 800-160 MG 1 tablet Oral 2 TIMES DAILY     Subjective functional status/changes:     Patient repots she's coming along well with therapy and performance of daily activities. Denies any falls since fall in .    OBJECTIVE    Transfers:   Bed mobility: Independent  Sit to/from Supine: 0 HHA, prefers UE A  Sit to/from stands: prefers UE A, able without UE A but with rocking and wide LASHAE     Gait:  Slight unsteadiness, ambulated w/o SPC in clinic with testing.  R trendelenburg and steppage pattern with decreased heel strike R and occasional R foot drag     TUG test: 9 seconds without AD  ( - 12 seconds without AD)     GAIT SPEED TEST     RESULTS:  6 meters (19.7 ft) / 9 sec = 0.66 m/s Limited community ambulator

## 2024-10-28 ENCOUNTER — HOSPITAL ENCOUNTER (OUTPATIENT)
Facility: HOSPITAL | Age: 64
Setting detail: RECURRING SERIES
Discharge: HOME | End: 2024-10-31
Payer: MEDICARE

## 2024-10-28 PROCEDURE — 97112 NEUROMUSCULAR REEDUCATION: CPT

## 2024-10-28 PROCEDURE — 97110 THERAPEUTIC EXERCISES: CPT

## 2024-10-28 NOTE — PROGRESS NOTES
PHYSICAL THERAPY - MEDICARE DAILY TREATMENT NOTE (updated 3/23)      Date: 10/28/2024          Patient Name:  Stefanie Sharpe :  1960   Medical   Diagnosis:  Muscle weakness (generalized) [M62.81]  Kidney transplant status [Z94.0] Treatment Diagnosis:  M62.81  GENERAL MUSCLE WEAKNESS and R26.89   Abnormalities of gait and mobility    Referral Source:  Beatriz Venegas MD Insurance:   Payor: [facility] MEDICARE / Plan: [employer] DUAL COMPLETE / Product Type: *No Product type* /                     Patient  verified yes     Visit #   Current  / Total 16 16-24   Time   In / Out 01:45 pm 2:30 pm   Total Treatment Time 45 min   Total Timed Codes 45 min   1:1 Treatment Time 45 min      [facility] Totals Reminder:  bill using total billable   min of TIMED therapeutic procedures and modalities.   8-22 min = 1 unit; 23-37 min = 2 units; 38-52 min = 3 units; 53-67 min = 4 units; 68-82 min = 5 units            SUBJECTIVE    Pain Level (0-10 scale): 0/10    Any medication changes, allergies to medications, adverse drug reactions, diagnosis change, or new procedure performed?: [x] No    [] Yes (see summary sheet for update)  Medications: Verified on Patient Summary List   Sulfamethoxazole-Trimethoprim 800-160 MG 1 tablet Oral 2 TIMES DAILY     Subjective functional status/changes:     Patient reporting no pain at present time.    OBJECTIVE     Therapeutic Procedures:  Tx Min Billable or 1:1 Min (if diff from Tx Min) Procedure, Rationale, Specifics   35  75280 Therapeutic Exercise (timed):  increase ROM, strength, coordination, balance, and proprioception to improve patient's ability to progress to PLOF and address remaining functional goals. (see flow sheet as applicable)     Details if applicable:  Performed testing for progress note   10  03983 Neuromuscular Re-Education (timed):  improve balance, coordination, kinesthetic sense, posture, core stability and proprioception to improve patient's ability to develop

## 2024-11-06 ENCOUNTER — HOSPITAL ENCOUNTER (OUTPATIENT)
Facility: HOSPITAL | Age: 64
Setting detail: RECURRING SERIES
Discharge: HOME | End: 2024-11-09
Payer: MEDICARE

## 2024-11-06 PROCEDURE — 97110 THERAPEUTIC EXERCISES: CPT

## 2024-11-06 NOTE — THERAPY RECERTIFICATION
[facility] [address], [address]  [place] [address]  Ph: [phone]     Fax: [phone]    CONTINUED PLAN OF CARE/RECERTIFICATION FOR PHYSICAL THERAPY          Patient Name:              Stefanie Sharpe :  1960   Treatment/Medical Diagnosis:  Muscle weakness (generalized) [M62.81]  Kidney transplant status [Z94.0]   Onset Date:      Referral Source:  Beatriz Venegas MD Start of Care (SOC):  2024   Prior Hospitalization:  See Medical History Provider #:  SOL      Prior Level of Function (PLOF):  IADLs   Comorbidities:  See intake   Medications:  Verified on Patient Summary List   Visits from SOC:  17 Missed Visits:  0       Summary:  Patient has completed 17 visits of skilled physical therapy for treatment of generalized weakness and gait instability following kidney transplant. She reports 50 % improvement, she can walk without any AD on level surfaces, she can stand for longer periods and she feels stronger. Patient has improved TUG score to 9 seconds placing her at lower risk of falls and has met goal of walking >600 ft with 6 MWT w/o AD however score places pt below age normative value representing limited functional capacity. Patient continues to show mild unsteadiness and gait abnormalities when walking independently. Patient most concerned with continued impairment in balance/coordination and limitations with activities due to restrictions with knees and endurance. Patient could continue to benefit from skilled physical therapy to work on improving functional strength and mobility, improve steadiness with gait, walking endurance and work on remaining deficits.    Patient will continue to benefit from skilled PT / OT services to modify and progress therapeutic interventions, analyze and address functional mobility deficits, analyze and address ROM deficits, analyze and address strength deficits, analyze and address soft tissue restrictions,

## 2024-11-06 NOTE — PROGRESS NOTES
PHYSICAL THERAPY - MEDICARE DAILY TREATMENT NOTE (updated 3/23)      Date: 2024          Patient Name:  Stefanie Sharpe :  1960   Medical   Diagnosis:  Muscle weakness (generalized) [M62.81]  Kidney transplant status [Z94.0] Treatment Diagnosis:  M62.81  GENERAL MUSCLE WEAKNESS and R26.89   Abnormalities of gait and mobility    Referral Source:  Beatriz Venegas MD Insurance:   Payor: [facility] MEDICARE / Plan: [employer] DUAL COMPLETE / Product Type: *No Product type* /                     Patient  verified yes     Visit #   Current  / Total 17 25   Time   In / Out 1:00 pm 1:45 pm   Total Treatment Time 45 min   Total Timed Codes 45 min   1:1 Treatment Time 45 min      [facility] Totals Reminder:  bill using total billable   min of TIMED therapeutic procedures and modalities.   8-22 min = 1 unit; 23-37 min = 2 units; 38-52 min = 3 units; 53-67 min = 4 units; 68-82 min = 5 units            SUBJECTIVE    Pain Level (0-10 scale): 0/10    Any medication changes, allergies to medications, adverse drug reactions, diagnosis change, or new procedure performed?: [x] No    [] Yes (see summary sheet for update)  Medications: Verified on Patient Summary List     Subjective functional status/changes:     Pt is well, no new complaints. She reports that she only uses her spc when she leaves her house.     OBJECTIVE     Therapeutic Procedures:  Tx Min Billable or 1:1 Min (if diff from Tx Min) Procedure, Rationale, Specifics   40  08037 Therapeutic Exercise (timed):  increase ROM, strength, coordination, balance, and proprioception to improve patient's ability to progress to PLOF and address remaining functional goals. (see flow sheet as applicable)     Details if applicable:  Performed testing for progress note   5  02067 Neuromuscular Re-Education (timed):  improve balance, coordination, kinesthetic sense, posture, core stability and proprioception to improve patient's ability to develop conscious control of

## 2024-11-19 ENCOUNTER — APPOINTMENT (OUTPATIENT)
Facility: HOSPITAL | Age: 64
End: 2024-11-19
Payer: MEDICARE

## 2024-11-19 ENCOUNTER — HOSPITAL ENCOUNTER (EMERGENCY)
Facility: HOSPITAL | Age: 64
Discharge: ANOTHER HOSPITAL | End: 2024-11-20
Attending: STUDENT IN AN ORGANIZED HEALTH CARE EDUCATION/TRAINING PROGRAM
Payer: MEDICARE

## 2024-11-19 DIAGNOSIS — L97.426 DIABETIC ULCER OF LEFT MIDFOOT ASSOCIATED WITH TYPE 2 DIABETES MELLITUS, WITH BONE INVOLVEMENT WITHOUT EVIDENCE OF NECROSIS (HCC): Primary | ICD-10-CM

## 2024-11-19 DIAGNOSIS — E11.621 DIABETIC ULCER OF LEFT MIDFOOT ASSOCIATED WITH TYPE 2 DIABETES MELLITUS, WITH BONE INVOLVEMENT WITHOUT EVIDENCE OF NECROSIS (HCC): Primary | ICD-10-CM

## 2024-11-19 DIAGNOSIS — A41.9 SEPSIS WITHOUT ACUTE ORGAN DYSFUNCTION, DUE TO UNSPECIFIED ORGANISM (HCC): ICD-10-CM

## 2024-11-19 PROBLEM — E11.628 TYPE 2 DIABETES MELLITUS WITH LEFT DIABETIC FOOT INFECTION (HCC): Status: ACTIVE | Noted: 2024-11-19

## 2024-11-19 PROBLEM — L08.9 TYPE 2 DIABETES MELLITUS WITH LEFT DIABETIC FOOT INFECTION (HCC): Status: ACTIVE | Noted: 2024-11-19

## 2024-11-19 PROBLEM — Z94.0 RENAL TRANSPLANT RECIPIENT: Status: ACTIVE | Noted: 2024-11-19

## 2024-11-19 PROBLEM — E11.65 HYPERGLYCEMIA DUE TO DIABETES MELLITUS (HCC): Status: ACTIVE | Noted: 2024-11-19

## 2024-11-19 LAB
ALBUMIN SERPL-MCNC: 2.8 G/DL (ref 3.5–5)
ALBUMIN/GLOB SERPL: 0.5 (ref 1.1–2.2)
ALP SERPL-CCNC: 166 U/L (ref 45–117)
ALT SERPL-CCNC: 16 U/L (ref 12–78)
ANION GAP SERPL CALC-SCNC: 7 MMOL/L (ref 2–12)
APPEARANCE UR: ABNORMAL
AST SERPL W P-5'-P-CCNC: 12 U/L (ref 15–37)
BACTERIA URNS QL MICRO: ABNORMAL /HPF
BASOPHILS # BLD: 0 K/UL (ref 0–0.1)
BASOPHILS NFR BLD: 0 % (ref 0–1)
BILIRUB SERPL-MCNC: 0.9 MG/DL (ref 0.2–1)
BILIRUB UR QL: NEGATIVE
BUN SERPL-MCNC: 15 MG/DL (ref 6–20)
BUN/CREAT SERPL: 11 (ref 12–20)
CA-I BLD-MCNC: 10 MG/DL (ref 8.5–10.1)
CHLORIDE SERPL-SCNC: 98 MMOL/L (ref 97–108)
CO2 SERPL-SCNC: 24 MMOL/L (ref 21–32)
COLOR UR: YELLOW
CREAT SERPL-MCNC: 1.38 MG/DL (ref 0.55–1.02)
CRP SERPL-MCNC: 23.9 MG/DL (ref 0–0.3)
DIFFERENTIAL METHOD BLD: ABNORMAL
EKG ATRIAL RATE: 98 BPM
EKG DIAGNOSIS: NORMAL
EKG P AXIS: 93 DEGREES
EKG P-R INTERVAL: 164 MS
EKG Q-T INTERVAL: 330 MS
EKG QRS DURATION: 66 MS
EKG QTC CALCULATION (BAZETT): 421 MS
EKG R AXIS: 26 DEGREES
EKG T AXIS: 52 DEGREES
EKG VENTRICULAR RATE: 98 BPM
EOSINOPHIL # BLD: 0 K/UL (ref 0–0.4)
EOSINOPHIL NFR BLD: 0 % (ref 0–7)
EPITH CASTS URNS QL MICRO: ABNORMAL /LPF
ERYTHROCYTE [DISTWIDTH] IN BLOOD BY AUTOMATED COUNT: 14 % (ref 11.5–14.5)
ERYTHROCYTE [SEDIMENTATION RATE] IN BLOOD: 89 MM/HR (ref 0–30)
FLUAV RNA SPEC QL NAA+PROBE: NOT DETECTED
FLUBV RNA SPEC QL NAA+PROBE: NOT DETECTED
GLOBULIN SER CALC-MCNC: 5.6 G/DL (ref 2–4)
GLUCOSE SERPL-MCNC: 288 MG/DL (ref 65–100)
GLUCOSE UR STRIP.AUTO-MCNC: >500 MG/DL
HCT VFR BLD AUTO: 37.9 % (ref 35–47)
HGB BLD-MCNC: 11.7 G/DL (ref 11.5–16)
HGB UR QL STRIP: ABNORMAL
IMM GRANULOCYTES # BLD AUTO: 0.2 K/UL (ref 0–0.04)
IMM GRANULOCYTES NFR BLD AUTO: 1 % (ref 0–0.5)
KETONES UR QL STRIP.AUTO: NEGATIVE MG/DL
LACTATE BLD-SCNC: 1.02 MMOL/L (ref 0.4–2)
LACTATE BLD-SCNC: 2.03 MMOL/L (ref 0.4–2)
LEUKOCYTE ESTERASE UR QL STRIP.AUTO: ABNORMAL
LYMPHOCYTES # BLD: 0.4 K/UL (ref 0.8–3.5)
LYMPHOCYTES NFR BLD: 2 % (ref 12–49)
MCH RBC QN AUTO: 27.8 PG (ref 26–34)
MCHC RBC AUTO-ENTMCNC: 30.9 G/DL (ref 30–36.5)
MCV RBC AUTO: 90 FL (ref 80–99)
MONOCYTES # BLD: 1.8 K/UL (ref 0–1)
MONOCYTES NFR BLD: 8 % (ref 5–13)
MUCOUS THREADS URNS QL MICRO: ABNORMAL /LPF
NEUTS SEG # BLD: 19.7 K/UL (ref 1.8–8)
NEUTS SEG NFR BLD: 89 % (ref 32–75)
NITRITE UR QL STRIP.AUTO: NEGATIVE
NRBC # BLD: 0 K/UL (ref 0–0.01)
NRBC BLD-RTO: 0 PER 100 WBC
PERFORMED BY:: ABNORMAL
PERFORMED BY:: NORMAL
PH UR STRIP: 5 (ref 5–8)
PLATELET # BLD AUTO: 202 K/UL (ref 150–400)
PMV BLD AUTO: 11.1 FL (ref 8.9–12.9)
POTASSIUM SERPL-SCNC: 4.2 MMOL/L (ref 3.5–5.1)
PROT SERPL-MCNC: 8.4 G/DL (ref 6.4–8.2)
PROT UR STRIP-MCNC: 100 MG/DL
RBC # BLD AUTO: 4.21 M/UL (ref 3.8–5.2)
RBC #/AREA URNS HPF: ABNORMAL /HPF (ref 0–5)
RBC MORPH BLD: ABNORMAL
SARS-COV-2 RNA RESP QL NAA+PROBE: NOT DETECTED
SODIUM SERPL-SCNC: 129 MMOL/L (ref 136–145)
SP GR UR REFRACTOMETRY: 1.03 (ref 1–1.03)
URINE CULTURE IF INDICATED: ABNORMAL
UROBILINOGEN UR QL STRIP.AUTO: 0.1 EU/DL (ref 0.1–1)
WBC # BLD AUTO: 22.1 K/UL (ref 3.6–11)
WBC URNS QL MICRO: >100 /HPF (ref 0–4)

## 2024-11-19 PROCEDURE — 96361 HYDRATE IV INFUSION ADD-ON: CPT

## 2024-11-19 PROCEDURE — 87154 CUL TYP ID BLD PTHGN 6+ TRGT: CPT

## 2024-11-19 PROCEDURE — 87186 SC STD MICRODIL/AGAR DIL: CPT

## 2024-11-19 PROCEDURE — 71045 X-RAY EXAM CHEST 1 VIEW: CPT

## 2024-11-19 PROCEDURE — 2580000003 HC RX 258: Performed by: STUDENT IN AN ORGANIZED HEALTH CARE EDUCATION/TRAINING PROGRAM

## 2024-11-19 PROCEDURE — 6360000002 HC RX W HCPCS: Performed by: INTERNAL MEDICINE

## 2024-11-19 PROCEDURE — 6370000000 HC RX 637 (ALT 250 FOR IP): Performed by: STUDENT IN AN ORGANIZED HEALTH CARE EDUCATION/TRAINING PROGRAM

## 2024-11-19 PROCEDURE — 6370000000 HC RX 637 (ALT 250 FOR IP)

## 2024-11-19 PROCEDURE — 85652 RBC SED RATE AUTOMATED: CPT

## 2024-11-19 PROCEDURE — 96366 THER/PROPH/DIAG IV INF ADDON: CPT

## 2024-11-19 PROCEDURE — 2580000003 HC RX 258: Performed by: INTERNAL MEDICINE

## 2024-11-19 PROCEDURE — 6360000002 HC RX W HCPCS: Performed by: STUDENT IN AN ORGANIZED HEALTH CARE EDUCATION/TRAINING PROGRAM

## 2024-11-19 PROCEDURE — 80053 COMPREHEN METABOLIC PANEL: CPT

## 2024-11-19 PROCEDURE — 86140 C-REACTIVE PROTEIN: CPT

## 2024-11-19 PROCEDURE — 93005 ELECTROCARDIOGRAM TRACING: CPT | Performed by: STUDENT IN AN ORGANIZED HEALTH CARE EDUCATION/TRAINING PROGRAM

## 2024-11-19 PROCEDURE — 87086 URINE CULTURE/COLONY COUNT: CPT

## 2024-11-19 PROCEDURE — 96367 TX/PROPH/DG ADDL SEQ IV INF: CPT

## 2024-11-19 PROCEDURE — 73620 X-RAY EXAM OF FOOT: CPT

## 2024-11-19 PROCEDURE — 87077 CULTURE AEROBIC IDENTIFY: CPT

## 2024-11-19 PROCEDURE — 83605 ASSAY OF LACTIC ACID: CPT

## 2024-11-19 PROCEDURE — 87636 SARSCOV2 & INF A&B AMP PRB: CPT

## 2024-11-19 PROCEDURE — 99285 EMERGENCY DEPT VISIT HI MDM: CPT

## 2024-11-19 PROCEDURE — 85025 COMPLETE CBC W/AUTO DIFF WBC: CPT

## 2024-11-19 PROCEDURE — 81001 URINALYSIS AUTO W/SCOPE: CPT

## 2024-11-19 PROCEDURE — 87040 BLOOD CULTURE FOR BACTERIA: CPT

## 2024-11-19 PROCEDURE — 96365 THER/PROPH/DIAG IV INF INIT: CPT

## 2024-11-19 RX ORDER — INSULIN LISPRO 100 [IU]/ML
4 INJECTION, SOLUTION INTRAVENOUS; SUBCUTANEOUS
COMMUNITY
Start: 2024-02-27

## 2024-11-19 RX ORDER — PREDNISONE 5 MG/1
5 TABLET ORAL DAILY
COMMUNITY
Start: 2024-02-27

## 2024-11-19 RX ORDER — CINACALCET 30 MG/1
30 TABLET, FILM COATED ORAL DAILY
COMMUNITY
Start: 2024-04-22 | End: 2025-04-22

## 2024-11-19 RX ORDER — SODIUM CHLORIDE 9 MG/ML
INJECTION, SOLUTION INTRAVENOUS CONTINUOUS
Status: DISCONTINUED | OUTPATIENT
Start: 2024-11-19 | End: 2024-11-20 | Stop reason: HOSPADM

## 2024-11-19 RX ORDER — PANTOPRAZOLE SODIUM 40 MG/1
40 TABLET, DELAYED RELEASE ORAL 2 TIMES DAILY
COMMUNITY
Start: 2024-06-18 | End: 2025-06-13

## 2024-11-19 RX ORDER — ONDANSETRON 4 MG/1
4 TABLET, FILM COATED ORAL EVERY 8 HOURS PRN
COMMUNITY

## 2024-11-19 RX ORDER — MYCOPHENOLIC ACID 180 MG/1
540 TABLET, DELAYED RELEASE ORAL 2 TIMES DAILY
COMMUNITY
Start: 2024-06-18 | End: 2025-06-18

## 2024-11-19 RX ORDER — ACETAMINOPHEN 325 MG/1
650 TABLET ORAL
Status: COMPLETED | OUTPATIENT
Start: 2024-11-19 | End: 2024-11-19

## 2024-11-19 RX ORDER — LANOLIN ALCOHOL/MO/W.PET/CERES
400 CREAM (GRAM) TOPICAL NIGHTLY
Status: DISCONTINUED | OUTPATIENT
Start: 2024-11-19 | End: 2024-11-20

## 2024-11-19 RX ORDER — ONDANSETRON 4 MG/1
4 TABLET, ORALLY DISINTEGRATING ORAL ONCE
Status: COMPLETED | OUTPATIENT
Start: 2024-11-19 | End: 2024-11-19

## 2024-11-19 RX ORDER — 0.9 % SODIUM CHLORIDE 0.9 %
500 INTRAVENOUS SOLUTION INTRAVENOUS ONCE
Status: COMPLETED | OUTPATIENT
Start: 2024-11-19 | End: 2024-11-19

## 2024-11-19 RX ORDER — 0.9 % SODIUM CHLORIDE 0.9 %
1000 INTRAVENOUS SOLUTION INTRAVENOUS ONCE
Status: COMPLETED | OUTPATIENT
Start: 2024-11-19 | End: 2024-11-19

## 2024-11-19 RX ORDER — PANTOPRAZOLE SODIUM 40 MG/1
40 TABLET, DELAYED RELEASE ORAL
Status: DISCONTINUED | OUTPATIENT
Start: 2024-11-19 | End: 2024-11-20 | Stop reason: HOSPADM

## 2024-11-19 RX ORDER — CARVEDILOL 12.5 MG/1
25 TABLET ORAL
Status: DISCONTINUED | OUTPATIENT
Start: 2024-11-19 | End: 2024-11-20 | Stop reason: HOSPADM

## 2024-11-19 RX ORDER — MYCOPHENOLIC ACID 180 MG/1
180 TABLET, DELAYED RELEASE ORAL
Status: DISCONTINUED | OUTPATIENT
Start: 2024-11-19 | End: 2024-11-19

## 2024-11-19 RX ORDER — MYCOPHENOLIC ACID 180 MG/1
540 TABLET, DELAYED RELEASE ORAL
Status: DISCONTINUED | OUTPATIENT
Start: 2024-11-19 | End: 2024-11-20 | Stop reason: HOSPADM

## 2024-11-19 RX ORDER — ASPIRIN 81 MG/1
81 TABLET, CHEWABLE ORAL DAILY
COMMUNITY
Start: 2024-02-27 | End: 2025-02-26

## 2024-11-19 RX ORDER — ATORVASTATIN CALCIUM 20 MG/1
20 TABLET, FILM COATED ORAL DAILY
COMMUNITY
Start: 2024-02-28 | End: 2024-11-19

## 2024-11-19 RX ORDER — MAGNESIUM OXIDE 400 MG/1
400 TABLET ORAL 2 TIMES DAILY
COMMUNITY
Start: 2024-06-17 | End: 2025-06-17

## 2024-11-19 RX ORDER — LOSARTAN POTASSIUM 25 MG/1
25 TABLET ORAL DAILY
COMMUNITY
Start: 2024-07-19 | End: 2025-07-19

## 2024-11-19 RX ADMIN — Medication 400 MG: at 22:48

## 2024-11-19 RX ADMIN — ONDANSETRON 4 MG: 4 TABLET, ORALLY DISINTEGRATING ORAL at 15:40

## 2024-11-19 RX ADMIN — SODIUM CHLORIDE 1000 ML: 9 INJECTION, SOLUTION INTRAVENOUS at 15:53

## 2024-11-19 RX ADMIN — VANCOMYCIN HYDROCHLORIDE 2500 MG: 1.25 INJECTION, POWDER, LYOPHILIZED, FOR SOLUTION INTRAVENOUS at 16:36

## 2024-11-19 RX ADMIN — PANTOPRAZOLE SODIUM 40 MG: 40 TABLET, DELAYED RELEASE ORAL at 22:46

## 2024-11-19 RX ADMIN — PIPERACILLIN AND TAZOBACTAM 4500 MG: 4; .5 INJECTION, POWDER, FOR SOLUTION INTRAVENOUS at 15:43

## 2024-11-19 RX ADMIN — SODIUM CHLORIDE: 9 INJECTION, SOLUTION INTRAVENOUS at 20:31

## 2024-11-19 RX ADMIN — CARVEDILOL 25 MG: 12.5 TABLET, FILM COATED ORAL at 22:43

## 2024-11-19 RX ADMIN — DIBASIC SODIUM PHOSPHATE, MONOBASIC POTASSIUM PHOSPHATE AND MONOBASIC SODIUM PHOSPHATE 1 TABLET: 852; 155; 130 TABLET ORAL at 22:44

## 2024-11-19 RX ADMIN — MYCOPHENOLIC ACID 540 MG: 180 TABLET, DELAYED RELEASE ORAL at 22:43

## 2024-11-19 RX ADMIN — SODIUM CHLORIDE 500 ML: 9 INJECTION, SOLUTION INTRAVENOUS at 18:10

## 2024-11-19 RX ADMIN — ACETAMINOPHEN 650 MG: 325 TABLET ORAL at 15:56

## 2024-11-19 RX ADMIN — PIPERACILLIN AND TAZOBACTAM 3375 MG: 3; .375 INJECTION, POWDER, LYOPHILIZED, FOR SOLUTION INTRAVENOUS at 22:47

## 2024-11-19 ASSESSMENT — PAIN SCALES - GENERAL
PAINLEVEL_OUTOF10: 0
PAINLEVEL_OUTOF10: 7

## 2024-11-19 ASSESSMENT — PAIN - FUNCTIONAL ASSESSMENT: PAIN_FUNCTIONAL_ASSESSMENT: 0-10

## 2024-11-19 NOTE — ED PROVIDER NOTES
[facility] EMERGENCY DEPT  EMERGENCY DEPARTMENT HISTORY AND PHYSICAL EXAM      Date: 11/19/2024  Patient Name: Stefanie Sharpe  MRN: [id number]  YOB: 1960  Date of evaluation: 11/19/2024  Provider: Demetrius Morejon MD   Note Started: 3:06 PM EST 11/19/24    HISTORY OF PRESENT ILLNESS     Chief Complaint   Patient presents with    Wound Check       History Provided By: Patient    HPI: Stefanie Sharpe is a 64 y.o. female presents to the emergency department for eval ration draining ulcer to her left foot.  Patient states that she has had infection to the site over the last several weeks has been trying to care for it at home with Medihoney, hydroperoxide, with to see her podiatrist Dr. Church today and was told to come to emergency department for admission, IV antibiotics, possible debridement.  Patient denies any fevers, denies any significant pain to area, denies any chest pain, shortness breath, abdominal pain nausea vomiting    PAST MEDICAL HISTORY   Past Medical History:  Past Medical History:   Diagnosis Date    Anemia, chronic disease 3/22/2021    Chronic kidney disease     HD - M, W, F, Left AV Fistula    DM2 (diabetes mellitus, type 2) (HCC) 3/22/2021    GERD (gastroesophageal reflux disease)     H/O metabolic acidosis 3/22/2021    Hypertension     Morbid obesity     Nephrotic syndrome 3/22/2021    Pulmonary hypertension (HCC) 3/22/2021    Renal failure        Past Surgical History:  Past Surgical History:   Procedure Laterality Date    BREAST BIOPSY Right     benign    COLONOSCOPY N/A 6/14/2022    COLONOSCOPY performed by Esha Donohue MD at [facility] ENDOSCOPY    IR NONTUNNELED VASCULAR CATHETER  03/12/2021    IR NONTUNNELED VASCULAR CATHETER 3/12/2021 [facility] RAD ANGIO IR    IR NONTUNNELED VASCULAR CATHETER  3/12/2021    ORTHOPEDIC SURGERY      I&D of left foot    VASCULAR SURGERY      Left AV Fistula       Family History:  Family History   Problem Relation Age of Onset    Liver Cancer Mother     Diabetes

## 2024-11-19 NOTE — ED TRIAGE NOTES
Reports that she has a diabetic ulcer to left foot that is new. Reports slight odor and pain to area.

## 2024-11-19 NOTE — CONSULTS
HOSPITALIST   CONSULT NOTE    11/19/2024 6:07 PM    Patient Information: NAVID RODRIGUEZ   Date of Admit:  11/19/2024  Primary Care Physician:  Cary Max MD  Requesting Physician:  Demetrius Morejon MD    Reason for consult:   Medical evaluation and recommendations for potential admission    Chief complaint:    Chief Complaint   Patient presents with    Wound Check       History of Present Illness:  NAVID RODRIGUEZ is a 64 y.o. female on Demetrius Morejon MD service who was evaluated emergency room on 11/19/2024 for diabetic foot infection    History obtained from patient    Patient developed a wound to her left foot without any known trauma which she noticed approximately 2 weeks ago.  She has been \"doctoring\" the wound herself cleansing with hydrogen peroxide solution.  Over time and particularly in the last day or 2 purulence and drainage has significantly increased.  She has had chills.  She says that the foot throbs but otherwise does not hurt.  She has not been previously diagnosed with neuropathy but feels that she could have diabetic neuropathy because she is lately been experiencing tingling in both feet.  Today the patient was seen for the first time by Dr. Martin of the podiatry service.  He obtained wound cultures and referred her to the emergency room for further evaluation.    Patient found to have a white blood cell count of 22,000, temperature 100.7 °F and lactic acid of 2.03 consistent with severe sepsis.  Although clinically she appears more well than she does on paper.  She has remained hemodynamically stable.  Sodium was 129.  Creatinine 1.38 but she was not able to tell us what her baseline is.  Glucose was 288.  Plain films suggest the possibility of osteomyelitis.  Sedimentation rate found to be 89 with a CRP of 24.  Patient has received IV fluids, vancomycin and Zosyn.  Patient receives tacrolimus, mycophenolate and prednisone for immunosuppression secondary to her renal

## 2024-11-19 NOTE — PROGRESS NOTES
Vancomycin Dosing Consult  Stefanie Sharpe is a 64 y.o. female with diabetic foot ulcer of left midfoot. Pharmacy was consulted by Dr. Soriano to dose and monitor vancomycin. Today is day 1.    Antibiotic regimen: Vancomycin + Zosyn    Temp (24hrs), Av.6 °F (37.6 °C), Min:98.5 °F (36.9 °C), Max:100.7 °F (38.2 °C)    Recent Labs     24  1504   WBC 22.1*   CREATININE 1.38*   BUN 15     Est CrCl: 53 mL/min  Concomitant nephrotoxic drugs: None    Cultures:    Blood: pending   Urine: pending    MRSA Swab: Pending    Target range: Re-dose for random level <15 mcg/mL    Recent level history:  Date/Time Dose & Interval Measured Level (mcg/mL) Associated AUC/SHOAIB              Assessment/Plan:   Wound to left foot, possible osteomyelitis   Febrile, leukocytosis, lactic acidosis  Baseline Scr unknown, previous Scr levels elevated. Pulse dose for now, once stable transition to AUC based dosing.   Vancomycin 2500 mg x 1 dose ordered  Schedule level for  @ 1300  Antimicrobial stop date: 7 days

## 2024-11-20 VITALS
DIASTOLIC BLOOD PRESSURE: 72 MMHG | HEART RATE: 88 BPM | HEIGHT: 68 IN | BODY MASS INDEX: 35.16 KG/M2 | TEMPERATURE: 98.8 F | SYSTOLIC BLOOD PRESSURE: 145 MMHG | WEIGHT: 232 LBS | OXYGEN SATURATION: 99 % | RESPIRATION RATE: 16 BRPM

## 2024-11-20 LAB
ACCESSION NUMBER, LLC1M: ABNORMAL
ACINETOBACTER CALCOAC BAUMANNII COMPLEX BY PCR: NOT DETECTED
ALBUMIN SERPL-MCNC: 2.3 G/DL (ref 3.5–5)
ANION GAP SERPL CALC-SCNC: 5 MMOL/L (ref 2–12)
BACTERIA SPEC CULT: NORMAL
BACTEROIDES FRAGILIS BY PCR: NOT DETECTED
BASOPHILS # BLD: 0.1 K/UL (ref 0–0.1)
BASOPHILS NFR BLD: 1 % (ref 0–1)
BIOFIRE TEST COMMENT: ABNORMAL
BUN SERPL-MCNC: 14 MG/DL (ref 6–20)
BUN/CREAT SERPL: 9 (ref 12–20)
CA-I BLD-MCNC: 9.9 MG/DL (ref 8.5–10.1)
CANDIDA ALBICANS BY PCR: NOT DETECTED
CANDIDA AURIS BY PCR: NOT DETECTED
CANDIDA GLABRATA: NOT DETECTED
CANDIDA KRUSEI BY PCR: NOT DETECTED
CANDIDA PARAPSILOSIS BY PCR: NOT DETECTED
CANDIDA TROPICALIS BY PCR: NOT DETECTED
CHLORIDE SERPL-SCNC: 108 MMOL/L (ref 97–108)
CO2 SERPL-SCNC: 25 MMOL/L (ref 21–32)
COLONY COUNT, CNT: NORMAL
COLONY COUNT, CNT: NORMAL
CREAT SERPL-MCNC: 1.6 MG/DL (ref 0.55–1.02)
CRYPTOCOCCUS NEOFORMANS/GATTII BY PCR: NOT DETECTED
DATE LAST DOSE: NORMAL
DIFFERENTIAL METHOD BLD: ABNORMAL
DOSE AMOUNT: NORMAL UNITS
ENTEROBACTER CLOACAE COMPLEX BY PCR: NOT DETECTED
ENTEROBACTERALES BY PCR: NOT DETECTED
ENTEROCOCCUS FAECALIS BY PCR: NOT DETECTED
ENTEROCOCCUS FAECIUM BY PCR: NOT DETECTED
EOSINOPHIL # BLD: 0.2 K/UL (ref 0–0.4)
EOSINOPHIL NFR BLD: 2 % (ref 0–7)
ERYTHROCYTE [DISTWIDTH] IN BLOOD BY AUTOMATED COUNT: 13.9 % (ref 11.5–14.5)
ESCHERICHIA COLI: NOT DETECTED
EST. AVERAGE GLUCOSE BLD GHB EST-MCNC: 278 MG/DL
GLUCOSE SERPL-MCNC: 256 MG/DL (ref 65–100)
HAEMOPHILUS INFLUENZAE BY PCR: NOT DETECTED
HBA1C MFR BLD: 11.3 % (ref 4–5.6)
HCT VFR BLD AUTO: 33.4 % (ref 35–47)
HGB BLD-MCNC: 10.3 G/DL (ref 11.5–16)
IMM GRANULOCYTES # BLD AUTO: 0.1 K/UL (ref 0–0.04)
IMM GRANULOCYTES NFR BLD AUTO: 0 % (ref 0–0.5)
KLEBSIELLA AEROGENES BY PCR: NOT DETECTED
KLEBSIELLA OXYTOCA BY PCR: NOT DETECTED
KLEBSIELLA PNEUMONIAE GROUP BY PCR: NOT DETECTED
LISTERIA MONOCYTOGENES BY PCR: NOT DETECTED
LYMPHOCYTES # BLD: 0.8 K/UL (ref 0.8–3.5)
LYMPHOCYTES NFR BLD: 6 % (ref 12–49)
Lab: NORMAL
MCH RBC QN AUTO: 28 PG (ref 26–34)
MCHC RBC AUTO-ENTMCNC: 30.8 G/DL (ref 30–36.5)
MCV RBC AUTO: 90.8 FL (ref 80–99)
MECA+MECC+MREJ ISLT/SPM QL: NOT DETECTED
MONOCYTES # BLD: 1.1 K/UL (ref 0–1)
MONOCYTES NFR BLD: 8 % (ref 5–13)
NEISSERIA MENINGITIDIS BY PCR: NOT DETECTED
NEUTS SEG # BLD: 11.3 K/UL (ref 1.8–8)
NEUTS SEG NFR BLD: 83 % (ref 32–75)
NRBC # BLD: 0 K/UL (ref 0–0.01)
NRBC BLD-RTO: 0 PER 100 WBC
PHOSPHATE SERPL-MCNC: 1.8 MG/DL (ref 2.6–4.7)
PLATELET # BLD AUTO: 245 K/UL (ref 150–400)
PMV BLD AUTO: 11.5 FL (ref 8.9–12.9)
POTASSIUM SERPL-SCNC: 4 MMOL/L (ref 3.5–5.1)
PROTEUS BY PCR: NOT DETECTED
PSEUDOMONAS AERUGINOSA, PSAEP: NOT DETECTED
RBC # BLD AUTO: 3.68 M/UL (ref 3.8–5.2)
RESISTANT GENE TARGETS: ABNORMAL
SALMONELLA SPECIES BY PCR: NOT DETECTED
SERRATIA MARCESCENS BY PCR: NOT DETECTED
SODIUM SERPL-SCNC: 138 MMOL/L (ref 136–145)
STAPHYLOCOCCUS AUREUS: DETECTED
STAPHYLOCOCCUS EPIDERMIDIS BY PCR: NOT DETECTED
STAPHYLOCOCCUS LUGDUNENSIS BY PCR: NOT DETECTED
STAPHYLOCOCCUS: DETECTED
STENOTROPHOMONAS MALTOPHILIA BY PCR: NOT DETECTED
STREPTOCOCCUS AGALACTIAE (GROUP B): NOT DETECTED
STREPTOCOCCUS PNEUMONIAE , SPNP: NOT DETECTED
STREPTOCOCCUS PYOGENES (GROUP A), SPYOP: NOT DETECTED
STREPTOCOCCUS: NOT DETECTED
VANCOMYCIN SERPL-MCNC: 15.7 UG/ML
WBC # BLD AUTO: 13.5 K/UL (ref 3.6–11)

## 2024-11-20 PROCEDURE — 6360000002 HC RX W HCPCS

## 2024-11-20 PROCEDURE — 99222 1ST HOSP IP/OBS MODERATE 55: CPT | Performed by: INTERNAL MEDICINE

## 2024-11-20 PROCEDURE — 83036 HEMOGLOBIN GLYCOSYLATED A1C: CPT

## 2024-11-20 PROCEDURE — 36415 COLL VENOUS BLD VENIPUNCTURE: CPT

## 2024-11-20 PROCEDURE — 87070 CULTURE OTHR SPECIMN AEROBIC: CPT

## 2024-11-20 PROCEDURE — 96375 TX/PRO/DX INJ NEW DRUG ADDON: CPT

## 2024-11-20 PROCEDURE — 87205 SMEAR GRAM STAIN: CPT

## 2024-11-20 PROCEDURE — 96372 THER/PROPH/DIAG INJ SC/IM: CPT

## 2024-11-20 PROCEDURE — 80069 RENAL FUNCTION PANEL: CPT

## 2024-11-20 PROCEDURE — 2580000003 HC RX 258: Performed by: INTERNAL MEDICINE

## 2024-11-20 PROCEDURE — 96366 THER/PROPH/DIAG IV INF ADDON: CPT

## 2024-11-20 PROCEDURE — 6360000002 HC RX W HCPCS: Performed by: INTERNAL MEDICINE

## 2024-11-20 PROCEDURE — 80202 ASSAY OF VANCOMYCIN: CPT

## 2024-11-20 PROCEDURE — 87186 SC STD MICRODIL/AGAR DIL: CPT

## 2024-11-20 PROCEDURE — 87147 CULTURE TYPE IMMUNOLOGIC: CPT

## 2024-11-20 PROCEDURE — 6360000002 HC RX W HCPCS: Performed by: STUDENT IN AN ORGANIZED HEALTH CARE EDUCATION/TRAINING PROGRAM

## 2024-11-20 PROCEDURE — 6370000000 HC RX 637 (ALT 250 FOR IP): Performed by: STUDENT IN AN ORGANIZED HEALTH CARE EDUCATION/TRAINING PROGRAM

## 2024-11-20 PROCEDURE — 85025 COMPLETE CBC W/AUTO DIFF WBC: CPT

## 2024-11-20 PROCEDURE — 87077 CULTURE AEROBIC IDENTIFY: CPT

## 2024-11-20 RX ORDER — LOSARTAN POTASSIUM 50 MG/1
25 TABLET ORAL DAILY
Status: CANCELLED | OUTPATIENT
Start: 2024-11-20

## 2024-11-20 RX ORDER — INSULIN LISPRO 100 [IU]/ML
0-4 INJECTION, SOLUTION INTRAVENOUS; SUBCUTANEOUS
Status: CANCELLED | OUTPATIENT
Start: 2024-11-20

## 2024-11-20 RX ORDER — PREDNISONE 5 MG/1
5 TABLET ORAL DAILY
Status: DISCONTINUED | OUTPATIENT
Start: 2024-11-20 | End: 2024-11-20 | Stop reason: HOSPADM

## 2024-11-20 RX ORDER — LOSARTAN POTASSIUM 50 MG/1
25 TABLET ORAL DAILY
Status: DISCONTINUED | OUTPATIENT
Start: 2024-11-20 | End: 2024-11-20 | Stop reason: HOSPADM

## 2024-11-20 RX ORDER — CINACALCET 30 MG/1
30 TABLET, FILM COATED ORAL DAILY
Status: DISCONTINUED | OUTPATIENT
Start: 2024-11-20 | End: 2024-11-20 | Stop reason: HOSPADM

## 2024-11-20 RX ORDER — TACROLIMUS 1 MG/1
3 CAPSULE ORAL 2 TIMES DAILY
Status: DISCONTINUED | OUTPATIENT
Start: 2024-11-20 | End: 2024-11-20 | Stop reason: HOSPADM

## 2024-11-20 RX ORDER — LANOLIN ALCOHOL/MO/W.PET/CERES
400 CREAM (GRAM) TOPICAL DAILY
Status: DISCONTINUED | OUTPATIENT
Start: 2024-11-20 | End: 2024-11-20

## 2024-11-20 RX ORDER — PANTOPRAZOLE SODIUM 40 MG/10ML
40 INJECTION, POWDER, LYOPHILIZED, FOR SOLUTION INTRAVENOUS DAILY
Status: DISCONTINUED | OUTPATIENT
Start: 2024-11-20 | End: 2024-11-20 | Stop reason: HOSPADM

## 2024-11-20 RX ORDER — CARVEDILOL 3.12 MG/1
6.25 TABLET ORAL
Status: COMPLETED | OUTPATIENT
Start: 2024-11-20 | End: 2024-11-20

## 2024-11-20 RX ORDER — ASPIRIN 81 MG/1
81 TABLET, CHEWABLE ORAL DAILY
Status: DISCONTINUED | OUTPATIENT
Start: 2024-11-20 | End: 2024-11-20 | Stop reason: HOSPADM

## 2024-11-20 RX ORDER — ENOXAPARIN SODIUM 100 MG/ML
30 INJECTION SUBCUTANEOUS 2 TIMES DAILY
Status: DISCONTINUED | OUTPATIENT
Start: 2024-11-20 | End: 2024-11-20 | Stop reason: HOSPADM

## 2024-11-20 RX ORDER — ONDANSETRON 2 MG/ML
INJECTION INTRAMUSCULAR; INTRAVENOUS
Status: COMPLETED
Start: 2024-11-20 | End: 2024-11-20

## 2024-11-20 RX ORDER — MAGNESIUM OXIDE 400 MG/1
400 TABLET ORAL 2 TIMES DAILY
Status: CANCELLED | OUTPATIENT
Start: 2024-11-20

## 2024-11-20 RX ORDER — ONDANSETRON 2 MG/ML
4 INJECTION INTRAMUSCULAR; INTRAVENOUS ONCE
Status: COMPLETED | OUTPATIENT
Start: 2024-11-20 | End: 2024-11-20

## 2024-11-20 RX ADMIN — ONDANSETRON 4 MG: 2 INJECTION INTRAMUSCULAR; INTRAVENOUS at 00:45

## 2024-11-20 RX ADMIN — ENOXAPARIN SODIUM 30 MG: 100 INJECTION SUBCUTANEOUS at 15:55

## 2024-11-20 RX ADMIN — PIPERACILLIN AND TAZOBACTAM 3375 MG: 3; .375 INJECTION, POWDER, LYOPHILIZED, FOR SOLUTION INTRAVENOUS at 15:57

## 2024-11-20 RX ADMIN — LOSARTAN POTASSIUM 25 MG: 50 TABLET, FILM COATED ORAL at 11:30

## 2024-11-20 RX ADMIN — TACROLIMUS 3 MG: 1 CAPSULE ORAL at 11:30

## 2024-11-20 RX ADMIN — Medication 400 MG: at 11:30

## 2024-11-20 RX ADMIN — PIPERACILLIN AND TAZOBACTAM 3375 MG: 3; .375 INJECTION, POWDER, LYOPHILIZED, FOR SOLUTION INTRAVENOUS at 05:37

## 2024-11-20 RX ADMIN — ASPIRIN 81 MG 81 MG: 81 TABLET ORAL at 11:30

## 2024-11-20 RX ADMIN — CARVEDILOL 6.25 MG: 3.12 TABLET, FILM COATED ORAL at 11:30

## 2024-11-20 RX ADMIN — PREDNISONE 5 MG: 5 TABLET ORAL at 11:30

## 2024-11-20 RX ADMIN — CINACALCET HYDROCHLORIDE 30 MG: 30 TABLET, FILM COATED ORAL at 11:31

## 2024-11-20 ASSESSMENT — PAIN SCALES - GENERAL
PAINLEVEL_OUTOF10: 2
PAINLEVEL_OUTOF10: 4
PAINLEVEL_OUTOF10: 0

## 2024-11-20 ASSESSMENT — PAIN DESCRIPTION - LOCATION: LOCATION: ABDOMEN

## 2024-11-20 NOTE — CONSULTS
Infectious Disease Consult Note    Reason for Consult: Left foot plantar surface ulcer   Date of Consultation: November 20, 2024  Date of Admission: 11/19/2024  Referring Physician: Hospitalist     HPI: 64 y.o BF who presented to the ED w left plantar surface ulcer for which ID has been consulted. Her medical history is significant for anemia of chronic disease, DM, CKD, previously on HD, s/p renal transplant earlier this year, and pulmonary HTN.  She was seen by Podiatry as outpt for left foot plantar surface ulcer on 11/19 and referred to the ED for inpt mgt. She reports noticing left foot plantar surface ulcer a couple wks ago, denies preceding trauma, was self managing w Medihoney, but noticed worsening which prompted outpt f/u w podiatry.  She had a fever spike of 100.7 F in the ED has otherwise been afebrile hemodynamically stable and on RA.  Blood work on 11/19 revealed WBC of 22.1, ESR 89, Cr 1.38, CRP 23.90, lactic acid 2.3.  Blood and urine Cx from 11/19 are pending. She tested neg for COVID and influenza on 11/19. No wound Cx has been done.  X-ray of left foot in the ED revealed soft tissue swelling left first MTP joint soft tissue gas and possible osteomyelitis, MRI recommended to further eval. She is on renally dosed Vanc and Zosyn. Pt was seen in the ED, pending transfer to the floors.     Review of Systems:     Gen: Negative for chills, fevers, weight loss, weight gain   CV:  Negative for chest pain, dyspnea on exertion, leg edema   Lungs: Negative for shortness of breath, cough, wheezing   Abdomen: Negative for abdominal pain, nausea, vomiting, diarrhea, constipation   Genitourinary: Negative for genital pain or genital discharge     Neuro: Negative for headache, numbness, tingling, extremity weakness   Skin: Negative for rash, sores/open wounds   Musculoskeletal: Negative for joint pain, joint swelling, joint erythema    Psych: Negative for manic behavior       Past Medical History:  Past Medical

## 2024-11-20 NOTE — ED NOTES
Soiled dressing from left foot removed. Skin ulcer cleansed with iodine and wrapped with bulkee gauze.

## 2024-11-20 NOTE — ED NOTES
Patient changed from clothes into IP hospital gown. Linens changed after small, liquid bowel movement. Ice water and crackers provided. Remains connected to bedside monitoring.

## 2024-11-20 NOTE — ED NOTES
Access center called to update still no bed assignment at [facility] at this time.

## 2024-11-20 NOTE — PROGRESS NOTES
Vancomycin Dosing Consult  Stefanie Sharpe is a 64 y.o. female with diabetic foot ulcer of left midfoot. Pharmacy was consulted by Dr. Soriano to dose and monitor vancomycin. Today is day 2.    Antibiotic regimen: Vancomycin + Zosyn    Temp (24hrs), Av.7 °F (37.1 °C), Min:98.5 °F (36.9 °C), Max:98.8 °F (37.1 °C)    Recent Labs     24  1504 24  1211   WBC 22.1* 13.5*   CREATININE 1.38* 1.60*   BUN 15 14     Est CrCl: 46 mL/min  Concomitant nephrotoxic drugs: None, tacrolimus    Cultures:    Blood 1: 2/2 GPC, pending   Blood 2: 1/2 GPC, pending   Urine: NGTD, pending   Wound: NGTD, pending    MRSA Swab: Pending    Target range: Re-dose for random level <15 mcg/mL    Recent level history:  Date/Time Dose & Interval Measured Level (mcg/mL) Associated AUC/SHOAIB    1210 2500 mg x 1  1636 15.7         Assessment/Plan:   Wound to left foot, possible osteomyelitis   Now afebrile, leukocytosis rending down, lactic acidosis  Baseline Scr unknown, previous Scr levels elevated in ~ 5 range; now 1.6 and uptrending. Continue pulse dosing for now  Ordered Vancomycin IV 1000 mg x 1 dose 1700  Schedule level for  @ 1500  BMP through   Antimicrobial stop date: 7 days

## 2024-11-20 NOTE — ED NOTES
Called transfer center at this time and spoke to Will regarding transfer status. To contact [facility] and get update momentarily.

## 2024-11-20 NOTE — ED NOTES
Verbal report to Polly JENKINS at this time. Patient resting in ER stretcher, connected to continuous cardiac BP and Sp02 monitoring. No signs of distress noted.

## 2024-11-20 NOTE — ED NOTES
RN at [facility] requested vancomycin dose be sent with patient since transport ([facility]) arrived prior to administration. BLS unable to administer. Vancomycin sent with [facility].

## 2024-11-22 LAB
BACTERIA SPEC CULT: ABNORMAL
Lab: ABNORMAL
Lab: ABNORMAL

## 2024-11-23 LAB
BACTERIA SPEC CULT: ABNORMAL
GRAM STN SPEC: ABNORMAL
GRAM STN SPEC: ABNORMAL
Lab: ABNORMAL

## (undated) DEVICE — CATH 5F 100CM JL35 -- DXTERITY

## (undated) DEVICE — GUIDEWIRE VASC J 1.5 MM 0.035 INX260 CM FIX EXCHANGE INQWIRE

## (undated) DEVICE — BLOCK BITE STD GRN ORAL AD W/O STRP SLD PLAS DISP BITEGARD

## (undated) DEVICE — CATH 5F 100CM JR40 -- DXTERITY

## (undated) DEVICE — MASK ANES INF SZ 2 PREM TAIL VLV INFL PRT UNSCENTED SGL PT

## (undated) DEVICE — SINGLE-USE BIOPSY FORCEPS: Brand: RADIAL JAW 4

## (undated) DEVICE — WASTEBAG DRIP/ADAPTER: Brand: MEDLINE INDUSTRIES, INC.

## (undated) DEVICE — THE ENDO CARRY-ON PROCEDURE KIT CONTAINS ALL OF THE SUPPLIES AND INFECTION PREVENTION PRODUCTS NEEDED FOR ENDOSCOPIC PROCEDURES: Brand: ENDO CARRY-ON PROCEDURE KIT

## (undated) DEVICE — 3M™ STERI-DRAPE™ SMALL DRAPE WITH ADHESIVE APERTURE 1092 25/BX,4/CS&#X20;: Brand: STERI-DRAPE™

## (undated) DEVICE — GUIDEWIRE VASC L260CM DIA0.035IN TIP L3MM STD EXCHG PTFE J

## (undated) DEVICE — SURGICAL PROCEDURE TRAY CRD CATH 3 PRT

## (undated) DEVICE — GLIDESHEATH SLENDER ACCESS KIT: Brand: GLIDESHEATH SLENDER

## (undated) DEVICE — CONMED SCOPE SAVER BITE BLOCK, 14 X 20 MM: Brand: CONMED SCOPE SAVER

## (undated) DEVICE — TUBING PRESS INJ FLX SH 30IN --

## (undated) DEVICE — SYRINGE MED 10ML RED POLYCARB BRL FIX M LUER CONN FLAT GRP

## (undated) DEVICE — SYRINGE MED 10ML PUR GAM COMPATIBLE POLYCARB FIX M LUER CONN

## (undated) DEVICE — 3M™ TEGADERM™ TRANSPARENT FILM DRESSING FRAME STYLE, 1626W, 4 IN X 4-3/4 IN (10 CM X 12 CM), 50/CT 4CT/CASE: Brand: 3M™ TEGADERM™